# Patient Record
Sex: FEMALE | Race: WHITE | NOT HISPANIC OR LATINO
[De-identification: names, ages, dates, MRNs, and addresses within clinical notes are randomized per-mention and may not be internally consistent; named-entity substitution may affect disease eponyms.]

---

## 2017-02-06 ENCOUNTER — RX RENEWAL (OUTPATIENT)
Age: 62
End: 2017-02-06

## 2017-03-13 ENCOUNTER — RX RENEWAL (OUTPATIENT)
Age: 62
End: 2017-03-13

## 2017-04-19 ENCOUNTER — RX RENEWAL (OUTPATIENT)
Age: 62
End: 2017-04-19

## 2017-07-21 ENCOUNTER — OUTPATIENT (OUTPATIENT)
Dept: OUTPATIENT SERVICES | Facility: HOSPITAL | Age: 62
LOS: 1 days | End: 2017-07-21
Payer: MEDICARE

## 2017-07-21 ENCOUNTER — APPOINTMENT (OUTPATIENT)
Dept: MRI IMAGING | Facility: CLINIC | Age: 62
End: 2017-07-21

## 2017-07-21 DIAGNOSIS — Z00.8 ENCOUNTER FOR OTHER GENERAL EXAMINATION: ICD-10-CM

## 2017-07-21 PROCEDURE — 73721 MRI JNT OF LWR EXTRE W/O DYE: CPT

## 2017-09-26 ENCOUNTER — APPOINTMENT (OUTPATIENT)
Dept: PULMONOLOGY | Facility: CLINIC | Age: 62
End: 2017-09-26
Payer: MEDICARE

## 2017-09-26 VITALS
HEART RATE: 87 BPM | DIASTOLIC BLOOD PRESSURE: 70 MMHG | OXYGEN SATURATION: 93 % | TEMPERATURE: 97.1 F | HEIGHT: 66 IN | SYSTOLIC BLOOD PRESSURE: 110 MMHG | RESPIRATION RATE: 16 BRPM | BODY MASS INDEX: 47.09 KG/M2 | WEIGHT: 293 LBS

## 2017-09-26 PROCEDURE — 99214 OFFICE O/P EST MOD 30 MIN: CPT

## 2017-09-26 RX ORDER — IBUPROFEN 800 MG/1
800 TABLET, FILM COATED ORAL
Qty: 90 | Refills: 0 | Status: DISCONTINUED | COMMUNITY
Start: 2017-07-14

## 2017-09-26 RX ORDER — FAMOTIDINE 20 MG/1
20 TABLET, FILM COATED ORAL
Qty: 90 | Refills: 0 | Status: DISCONTINUED | COMMUNITY
Start: 2017-07-14

## 2017-09-26 RX ORDER — SULFAMETHOXAZOLE AND TRIMETHOPRIM 800; 160 MG/1; MG/1
800-160 TABLET ORAL
Qty: 14 | Refills: 0 | Status: DISCONTINUED | COMMUNITY
Start: 2017-06-24

## 2017-09-26 RX ORDER — TAPENTADOL HYDROCHLORIDE 150 MG/1
150 TABLET, FILM COATED, EXTENDED RELEASE ORAL
Qty: 30 | Refills: 0 | Status: DISCONTINUED | COMMUNITY
Start: 2017-06-04

## 2017-09-26 RX ORDER — SERTRALINE 25 MG/1
25 TABLET, FILM COATED ORAL
Qty: 30 | Refills: 0 | Status: DISCONTINUED | COMMUNITY
Start: 2017-08-18

## 2017-09-26 RX ORDER — OXYCODONE AND ACETAMINOPHEN 7.5; 325 MG/1; MG/1
7.5-325 TABLET ORAL
Qty: 30 | Refills: 0 | Status: DISCONTINUED | COMMUNITY
Start: 2017-07-27

## 2017-09-26 RX ORDER — AMOXICILLIN 500 MG/1
500 CAPSULE ORAL
Qty: 21 | Refills: 0 | Status: DISCONTINUED | COMMUNITY
Start: 2017-09-05

## 2017-09-26 RX ORDER — FERROUS FUM/VIT C/B12-IF/FOLIC 110-0.5MG
CAPSULE ORAL
Qty: 30 | Refills: 0 | Status: DISCONTINUED | COMMUNITY
Start: 2017-04-07

## 2017-09-26 RX ORDER — PENICILLIN V POTASSIUM 500 MG/1
500 TABLET, FILM COATED ORAL
Qty: 28 | Refills: 0 | Status: DISCONTINUED | COMMUNITY
Start: 2017-09-14

## 2017-09-26 RX ORDER — QUETIAPINE 300 MG/1
300 TABLET, FILM COATED, EXTENDED RELEASE ORAL
Qty: 30 | Refills: 0 | Status: DISCONTINUED | COMMUNITY
Start: 2017-06-13

## 2017-09-26 RX ORDER — USTEKINUMAB 90 MG/ML
90 INJECTION, SOLUTION SUBCUTANEOUS
Qty: 1 | Refills: 0 | Status: DISCONTINUED | COMMUNITY
Start: 2017-06-30

## 2017-11-16 ENCOUNTER — EMERGENCY (EMERGENCY)
Facility: HOSPITAL | Age: 62
LOS: 1 days | Discharge: HOME | End: 2017-11-16
Attending: EMERGENCY MEDICINE | Admitting: EMERGENCY MEDICINE
Payer: MEDICARE

## 2017-11-16 VITALS
OXYGEN SATURATION: 98 % | DIASTOLIC BLOOD PRESSURE: 88 MMHG | SYSTOLIC BLOOD PRESSURE: 148 MMHG | HEART RATE: 80 BPM | TEMPERATURE: 98 F | RESPIRATION RATE: 16 BRPM

## 2017-11-16 VITALS
HEART RATE: 85 BPM | OXYGEN SATURATION: 97 % | DIASTOLIC BLOOD PRESSURE: 94 MMHG | SYSTOLIC BLOOD PRESSURE: 164 MMHG | RESPIRATION RATE: 18 BRPM | TEMPERATURE: 98 F

## 2017-11-16 LAB
ALBUMIN SERPL ELPH-MCNC: 4.5 G/DL — SIGNIFICANT CHANGE UP (ref 3.3–5)
ALP SERPL-CCNC: 65 U/L — SIGNIFICANT CHANGE UP (ref 40–120)
ALT FLD-CCNC: 20 U/L RC — SIGNIFICANT CHANGE UP (ref 10–45)
ANION GAP SERPL CALC-SCNC: 17 MMOL/L — SIGNIFICANT CHANGE UP (ref 5–17)
AST SERPL-CCNC: 21 U/L — SIGNIFICANT CHANGE UP (ref 10–40)
BASOPHILS # BLD AUTO: 0.1 K/UL — SIGNIFICANT CHANGE UP (ref 0–0.2)
BASOPHILS NFR BLD AUTO: 1.4 % — SIGNIFICANT CHANGE UP (ref 0–2)
BILIRUB SERPL-MCNC: 0.2 MG/DL — SIGNIFICANT CHANGE UP (ref 0.2–1.2)
BUN SERPL-MCNC: 13 MG/DL — SIGNIFICANT CHANGE UP (ref 7–23)
CALCIUM SERPL-MCNC: 9.4 MG/DL — SIGNIFICANT CHANGE UP (ref 8.4–10.5)
CHLORIDE SERPL-SCNC: 102 MMOL/L — SIGNIFICANT CHANGE UP (ref 96–108)
CO2 SERPL-SCNC: 23 MMOL/L — SIGNIFICANT CHANGE UP (ref 22–31)
CREAT SERPL-MCNC: 0.55 MG/DL — SIGNIFICANT CHANGE UP (ref 0.5–1.3)
EOSINOPHIL # BLD AUTO: 0.3 K/UL — SIGNIFICANT CHANGE UP (ref 0–0.5)
EOSINOPHIL NFR BLD AUTO: 4.6 % — SIGNIFICANT CHANGE UP (ref 0–6)
GLUCOSE SERPL-MCNC: 131 MG/DL — HIGH (ref 70–99)
HCT VFR BLD CALC: 34 % — LOW (ref 34.5–45)
HGB BLD-MCNC: 11.2 G/DL — LOW (ref 11.5–15.5)
LYMPHOCYTES # BLD AUTO: 2 K/UL — SIGNIFICANT CHANGE UP (ref 1–3.3)
LYMPHOCYTES # BLD AUTO: 29.2 % — SIGNIFICANT CHANGE UP (ref 13–44)
MCHC RBC-ENTMCNC: 30.7 PG — SIGNIFICANT CHANGE UP (ref 27–34)
MCHC RBC-ENTMCNC: 33.1 GM/DL — SIGNIFICANT CHANGE UP (ref 32–36)
MCV RBC AUTO: 92.8 FL — SIGNIFICANT CHANGE UP (ref 80–100)
MONOCYTES # BLD AUTO: 0.6 K/UL — SIGNIFICANT CHANGE UP (ref 0–0.9)
MONOCYTES NFR BLD AUTO: 8.8 % — SIGNIFICANT CHANGE UP (ref 2–14)
NEUTROPHILS # BLD AUTO: 3.9 K/UL — SIGNIFICANT CHANGE UP (ref 1.8–7.4)
NEUTROPHILS NFR BLD AUTO: 56 % — SIGNIFICANT CHANGE UP (ref 43–77)
PLATELET # BLD AUTO: 236 K/UL — SIGNIFICANT CHANGE UP (ref 150–400)
POTASSIUM SERPL-MCNC: 3.9 MMOL/L — SIGNIFICANT CHANGE UP (ref 3.5–5.3)
POTASSIUM SERPL-SCNC: 3.9 MMOL/L — SIGNIFICANT CHANGE UP (ref 3.5–5.3)
PROT SERPL-MCNC: 7.1 G/DL — SIGNIFICANT CHANGE UP (ref 6–8.3)
RBC # BLD: 3.66 M/UL — LOW (ref 3.8–5.2)
RBC # FLD: 11.9 % — SIGNIFICANT CHANGE UP (ref 10.3–14.5)
SODIUM SERPL-SCNC: 142 MMOL/L — SIGNIFICANT CHANGE UP (ref 135–145)
WBC # BLD: 7 K/UL — SIGNIFICANT CHANGE UP (ref 3.8–10.5)
WBC # FLD AUTO: 7 K/UL — SIGNIFICANT CHANGE UP (ref 3.8–10.5)

## 2017-11-16 PROCEDURE — 73130 X-RAY EXAM OF HAND: CPT

## 2017-11-16 PROCEDURE — 85027 COMPLETE CBC AUTOMATED: CPT

## 2017-11-16 PROCEDURE — 80053 COMPREHEN METABOLIC PANEL: CPT

## 2017-11-16 PROCEDURE — 99284 EMERGENCY DEPT VISIT MOD MDM: CPT

## 2017-11-16 PROCEDURE — 99284 EMERGENCY DEPT VISIT MOD MDM: CPT | Mod: 25

## 2017-11-16 PROCEDURE — 96374 THER/PROPH/DIAG INJ IV PUSH: CPT

## 2017-11-16 PROCEDURE — 73130 X-RAY EXAM OF HAND: CPT | Mod: 26,RT

## 2017-11-16 RX ORDER — VANCOMYCIN HCL 1 G
1000 VIAL (EA) INTRAVENOUS ONCE
Qty: 0 | Refills: 0 | Status: COMPLETED | OUTPATIENT
Start: 2017-11-16 | End: 2017-11-16

## 2017-11-16 RX ORDER — AZTREONAM 2 G
1 VIAL (EA) INJECTION
Qty: 20 | Refills: 0
Start: 2017-11-16 | End: 2017-11-26

## 2017-11-16 RX ORDER — VANCOMYCIN HCL 1 G
1000 VIAL (EA) INTRAVENOUS ONCE
Qty: 0 | Refills: 0 | Status: DISCONTINUED | OUTPATIENT
Start: 2017-11-16 | End: 2017-11-16

## 2017-11-16 RX ORDER — ACETAMINOPHEN 500 MG
650 TABLET ORAL ONCE
Qty: 0 | Refills: 0 | Status: COMPLETED | OUTPATIENT
Start: 2017-11-16 | End: 2017-11-16

## 2017-11-16 RX ADMIN — Medication 650 MILLIGRAM(S): at 20:01

## 2017-11-16 RX ADMIN — Medication 250 MILLIGRAM(S): at 20:00

## 2017-11-16 NOTE — ED ADULT NURSE NOTE - OBJECTIVE STATEMENT
62 y.o female aaox3 ambulatory with h/o cellulitis and obesity p/w redness and localized swelling in the rt metacarpal area started yesterday, afebrile, but c/o subjective chills, no nausea or vomiting or abdominal pain. VS wnl.

## 2017-11-16 NOTE — ED PROVIDER NOTE - PLAN OF CARE
1. Follow up with your PMD (Gregory Obrien) within 48-72hours.   2. Rest and elevate affected area. Take Motrin 600mg every 8 hours with food for pain. Complete the course of BactrimDS as prescribed.   3. Any worsening redness, swelling, streaking (red lines), fever, chills retrun to ER R 3rd mcp dorsum

## 2017-11-16 NOTE — ED PROVIDER NOTE - CARE PLAN
Instructions for follow-up, activity and diet:	1. Follow up with your PMD (Gregory Obrien) within 48-72hours.   2. Rest and elevate affected area. Take Motrin 600mg every 8 hours with food for pain. Complete the course of BactrimDS as prescribed.   3. Any worsening redness, swelling, streaking (red lines), fever, chills retrun to ER Principal Discharge DX:	Cellulitis of hand excluding fingers  Goal:	R 3rd mcp dorsum  Instructions for follow-up, activity and diet:	1. Follow up with your PMD (Gregory Obrien) within 48-72hours.   2. Rest and elevate affected area. Take Motrin 600mg every 8 hours with food for pain. Complete the course of BactrimDS as prescribed.   3. Any worsening redness, swelling, streaking (red lines), fever, chills retrun to ER

## 2017-11-16 NOTE — ED PROVIDER NOTE - MEDICAL DECISION MAKING DETAILS
R hand cellulitis over dorsum of 3rd MCP, no evidence of septic joint or FTS.  Pt well appearing, nontoxic, will tx c vanc and d/c on bactrim.  STrict return precautions discussed.  Hopeful d/c.  --BMM

## 2017-11-16 NOTE — ED ADULT NURSE NOTE - CHPI ED SYMPTOMS NEG
no nausea/no decreased eating/drinking/no numbness/no fever/no vomiting/no tingling/no dizziness/no weakness

## 2017-11-16 NOTE — ED ADULT NURSE NOTE - DISCHARGE TEACHING
advised to ff-up with PMD in 2-3 days and continue taking Bactrim and complete the course of PO antibiotics.

## 2017-11-16 NOTE — ED ADULT NURSE NOTE - PSH
Gastric bypass status for obesity  , revised 2010  H/O:  section  x 2  History of cholecystectomy  1976  History of laminectomy  lumbar, with fusion  Plastic surgery for unacceptable cosmetic appearance  arms from extra skin post weight loss after bypass surgery
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
## 2017-11-16 NOTE — ED PROVIDER NOTE - PHYSICAL EXAMINATION
R hand 3rd MCP with redness and diffuse swelling, tenderness to palp. FROM less than 2 sec cap refill. 2+ pulses. no lymphangitis
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
## 2018-02-14 ENCOUNTER — OUTPATIENT (OUTPATIENT)
Dept: OUTPATIENT SERVICES | Facility: HOSPITAL | Age: 63
LOS: 1 days | End: 2018-02-14
Payer: MEDICARE

## 2018-02-14 ENCOUNTER — APPOINTMENT (OUTPATIENT)
Dept: MRI IMAGING | Facility: CLINIC | Age: 63
End: 2018-02-14
Payer: MEDICARE

## 2018-02-14 DIAGNOSIS — Z00.8 ENCOUNTER FOR OTHER GENERAL EXAMINATION: ICD-10-CM

## 2018-02-14 PROCEDURE — 70553 MRI BRAIN STEM W/O & W/DYE: CPT | Mod: 26

## 2018-02-14 PROCEDURE — 70553 MRI BRAIN STEM W/O & W/DYE: CPT

## 2018-02-14 PROCEDURE — A9585: CPT

## 2018-03-07 ENCOUNTER — APPOINTMENT (OUTPATIENT)
Dept: ENDOCRINOLOGY | Facility: CLINIC | Age: 63
End: 2018-03-07

## 2018-05-02 ENCOUNTER — RESULT REVIEW (OUTPATIENT)
Age: 63
End: 2018-05-02

## 2018-06-11 ENCOUNTER — APPOINTMENT (OUTPATIENT)
Dept: SURGERY | Facility: CLINIC | Age: 63
End: 2018-06-11
Payer: MEDICARE

## 2018-06-11 PROCEDURE — 99205 OFFICE O/P NEW HI 60 MIN: CPT

## 2018-06-13 ENCOUNTER — FORM ENCOUNTER (OUTPATIENT)
Age: 63
End: 2018-06-13

## 2018-06-14 ENCOUNTER — OUTPATIENT (OUTPATIENT)
Dept: OUTPATIENT SERVICES | Facility: HOSPITAL | Age: 63
LOS: 1 days | End: 2018-06-14
Payer: MEDICARE

## 2018-06-14 ENCOUNTER — APPOINTMENT (OUTPATIENT)
Dept: CT IMAGING | Facility: CLINIC | Age: 63
End: 2018-06-14
Payer: MEDICARE

## 2018-06-14 DIAGNOSIS — K43.2 INCISIONAL HERNIA WITHOUT OBSTRUCTION OR GANGRENE: ICD-10-CM

## 2018-06-14 PROCEDURE — 74177 CT ABD & PELVIS W/CONTRAST: CPT

## 2018-06-14 PROCEDURE — 74177 CT ABD & PELVIS W/CONTRAST: CPT | Mod: 26

## 2018-06-15 ENCOUNTER — APPOINTMENT (OUTPATIENT)
Dept: INFECTIOUS DISEASE | Facility: CLINIC | Age: 63
End: 2018-06-15

## 2018-07-19 ENCOUNTER — APPOINTMENT (OUTPATIENT)
Dept: INFECTIOUS DISEASE | Facility: CLINIC | Age: 63
End: 2018-07-19
Payer: MEDICARE

## 2018-07-19 VITALS
TEMPERATURE: 97.8 F | HEART RATE: 86 BPM | DIASTOLIC BLOOD PRESSURE: 90 MMHG | SYSTOLIC BLOOD PRESSURE: 154 MMHG | WEIGHT: 291 LBS | OXYGEN SATURATION: 94 % | BODY MASS INDEX: 46.77 KG/M2 | HEIGHT: 66 IN

## 2018-07-19 PROCEDURE — 99204 OFFICE O/P NEW MOD 45 MIN: CPT

## 2018-07-30 ENCOUNTER — APPOINTMENT (OUTPATIENT)
Dept: SURGERY | Facility: CLINIC | Age: 63
End: 2018-07-30
Payer: MEDICARE

## 2018-07-30 VITALS
HEIGHT: 64 IN | OXYGEN SATURATION: 97 % | SYSTOLIC BLOOD PRESSURE: 124 MMHG | BODY MASS INDEX: 50.02 KG/M2 | TEMPERATURE: 98 F | WEIGHT: 293 LBS | HEART RATE: 98 BPM | DIASTOLIC BLOOD PRESSURE: 79 MMHG | RESPIRATION RATE: 16 BRPM

## 2018-07-30 PROCEDURE — 99215 OFFICE O/P EST HI 40 MIN: CPT

## 2018-07-30 RX ORDER — HYDROCODONE BITARTRATE AND ACETAMINOPHEN 7.5; 325 MG/1; MG/1
7.5-325 TABLET ORAL
Qty: 20 | Refills: 0 | Status: DISCONTINUED | COMMUNITY
Start: 2017-09-18 | End: 2018-07-30

## 2019-01-11 ENCOUNTER — APPOINTMENT (OUTPATIENT)
Dept: SURGERY | Facility: CLINIC | Age: 64
End: 2019-01-11
Payer: MEDICARE

## 2019-01-11 VITALS
OXYGEN SATURATION: 95 % | HEART RATE: 90 BPM | WEIGHT: 281 LBS | BODY MASS INDEX: 47.97 KG/M2 | SYSTOLIC BLOOD PRESSURE: 145 MMHG | HEIGHT: 64 IN | DIASTOLIC BLOOD PRESSURE: 92 MMHG | TEMPERATURE: 99.3 F | RESPIRATION RATE: 18 BRPM

## 2019-01-11 PROCEDURE — 99214 OFFICE O/P EST MOD 30 MIN: CPT

## 2019-01-11 RX ORDER — CALCIPOTRIENE AND BETAMETHASONE DIPROPIONATE 50; .5 UG/G; MG/G
0.005-0.064 AEROSOL, FOAM TOPICAL
Qty: 60 | Refills: 0 | Status: DISCONTINUED | COMMUNITY
Start: 2017-03-29 | End: 2019-01-11

## 2019-01-11 RX ORDER — NYSTATIN AND TRIAMCINOLONE ACETONIDE 100000; 1 [USP'U]/G; MG/G
100000-0.1 OINTMENT TOPICAL
Qty: 15 | Refills: 0 | Status: DISCONTINUED | COMMUNITY
Start: 2017-06-13 | End: 2019-01-11

## 2019-01-11 RX ORDER — MUPIROCIN 20 MG/G
2 OINTMENT TOPICAL
Qty: 22 | Refills: 0 | Status: DISCONTINUED | COMMUNITY
Start: 2017-06-24 | End: 2019-01-11

## 2019-01-11 RX ORDER — MUPIROCIN CALCIUM 20 MG/G
2 OINTMENT TOPICAL
Qty: 45 | Refills: 3 | Status: DISCONTINUED | COMMUNITY
Start: 2018-07-19 | End: 2019-01-11

## 2019-01-11 RX ORDER — CLONAZEPAM 1 MG/1
1 TABLET ORAL
Refills: 0 | Status: DISCONTINUED | COMMUNITY

## 2019-01-14 LAB
25(OH)D3 SERPL-MCNC: 34.5 NG/ML
ALBUMIN SERPL ELPH-MCNC: 4.5 G/DL
ALP BLD-CCNC: 76 U/L
ALT SERPL-CCNC: 19 U/L
ANION GAP SERPL CALC-SCNC: 13 MMOL/L
AST SERPL-CCNC: 20 U/L
BASOPHILS # BLD AUTO: 0.03 K/UL
BASOPHILS NFR BLD AUTO: 0.3 %
BILIRUB SERPL-MCNC: 0.4 MG/DL
BUN SERPL-MCNC: 14 MG/DL
CALCIUM SERPL-MCNC: 9.2 MG/DL
CHLORIDE SERPL-SCNC: 101 MMOL/L
CO2 SERPL-SCNC: 26 MMOL/L
CREAT SERPL-MCNC: 0.37 MG/DL
EOSINOPHIL # BLD AUTO: 0.18 K/UL
EOSINOPHIL NFR BLD AUTO: 1.6 %
FOLATE SERPL-MCNC: >20 NG/ML
GLUCOSE SERPL-MCNC: 100 MG/DL
HBA1C MFR BLD HPLC: 4.9 %
HCT VFR BLD CALC: 36.9 %
HGB BLD-MCNC: 11.5 G/DL
IMM GRANULOCYTES NFR BLD AUTO: 0.3 %
IRON SERPL-MCNC: 33 UG/DL
LYMPHOCYTES # BLD AUTO: 1.87 K/UL
LYMPHOCYTES NFR BLD AUTO: 16.3 %
MAN DIFF?: NORMAL
MCHC RBC-ENTMCNC: 28.9 PG
MCHC RBC-ENTMCNC: 31.2 GM/DL
MCV RBC AUTO: 92.7 FL
MONOCYTES # BLD AUTO: 0.58 K/UL
MONOCYTES NFR BLD AUTO: 5 %
NEUTROPHILS # BLD AUTO: 8.8 K/UL
NEUTROPHILS NFR BLD AUTO: 76.5 %
PLATELET # BLD AUTO: 256 K/UL
POTASSIUM SERPL-SCNC: 4.1 MMOL/L
PROT SERPL-MCNC: 6.8 G/DL
RBC # BLD: 3.98 M/UL
RBC # FLD: 13.5 %
SODIUM SERPL-SCNC: 140 MMOL/L
VIT B12 SERPL-MCNC: 402 PG/ML
WBC # FLD AUTO: 11.49 K/UL

## 2019-01-16 LAB — VIT B1 SERPL-MCNC: 237.9 NMOL/L

## 2019-06-09 ENCOUNTER — INPATIENT (INPATIENT)
Facility: HOSPITAL | Age: 64
LOS: 1 days | Discharge: HOME | DRG: 313 | End: 2019-06-11
Attending: INTERNAL MEDICINE | Admitting: HOSPITALIST
Payer: MEDICARE

## 2019-06-09 VITALS
RESPIRATION RATE: 19 BRPM | OXYGEN SATURATION: 100 % | DIASTOLIC BLOOD PRESSURE: 66 MMHG | SYSTOLIC BLOOD PRESSURE: 125 MMHG | HEART RATE: 80 BPM | WEIGHT: 279.99 LBS | TEMPERATURE: 98 F | HEIGHT: 65 IN

## 2019-06-09 DIAGNOSIS — R07.9 CHEST PAIN, UNSPECIFIED: ICD-10-CM

## 2019-06-09 LAB
ALBUMIN SERPL ELPH-MCNC: 4.3 G/DL — SIGNIFICANT CHANGE UP (ref 3.3–5)
ALP SERPL-CCNC: 77 U/L — SIGNIFICANT CHANGE UP (ref 40–120)
ALT FLD-CCNC: 19 U/L — SIGNIFICANT CHANGE UP (ref 10–45)
ANION GAP SERPL CALC-SCNC: 16 MMOL/L — SIGNIFICANT CHANGE UP (ref 5–17)
AST SERPL-CCNC: 33 U/L — SIGNIFICANT CHANGE UP (ref 10–40)
BASOPHILS # BLD AUTO: 0.1 K/UL — SIGNIFICANT CHANGE UP (ref 0–0.2)
BASOPHILS NFR BLD AUTO: 0.6 % — SIGNIFICANT CHANGE UP (ref 0–2)
BILIRUB SERPL-MCNC: 0.2 MG/DL — SIGNIFICANT CHANGE UP (ref 0.2–1.2)
BUN SERPL-MCNC: 17 MG/DL — SIGNIFICANT CHANGE UP (ref 7–23)
CALCIUM SERPL-MCNC: 9.6 MG/DL — SIGNIFICANT CHANGE UP (ref 8.4–10.5)
CHLORIDE SERPL-SCNC: 97 MMOL/L — SIGNIFICANT CHANGE UP (ref 96–108)
CO2 SERPL-SCNC: 26 MMOL/L — SIGNIFICANT CHANGE UP (ref 22–31)
CREAT SERPL-MCNC: 0.51 MG/DL — SIGNIFICANT CHANGE UP (ref 0.5–1.3)
EOSINOPHIL # BLD AUTO: 0.2 K/UL — SIGNIFICANT CHANGE UP (ref 0–0.5)
EOSINOPHIL NFR BLD AUTO: 2.4 % — SIGNIFICANT CHANGE UP (ref 0–6)
GLUCOSE SERPL-MCNC: 137 MG/DL — HIGH (ref 70–99)
HCT VFR BLD CALC: 37.3 % — SIGNIFICANT CHANGE UP (ref 34.5–45)
HGB BLD-MCNC: 12.3 G/DL — SIGNIFICANT CHANGE UP (ref 11.5–15.5)
INR BLD: 0.93 RATIO — SIGNIFICANT CHANGE UP (ref 0.88–1.16)
LYMPHOCYTES # BLD AUTO: 3.1 K/UL — SIGNIFICANT CHANGE UP (ref 1–3.3)
LYMPHOCYTES # BLD AUTO: 31.1 % — SIGNIFICANT CHANGE UP (ref 13–44)
MCHC RBC-ENTMCNC: 30.5 PG — SIGNIFICANT CHANGE UP (ref 27–34)
MCHC RBC-ENTMCNC: 32.9 GM/DL — SIGNIFICANT CHANGE UP (ref 32–36)
MCV RBC AUTO: 92.6 FL — SIGNIFICANT CHANGE UP (ref 80–100)
MONOCYTES # BLD AUTO: 0.7 K/UL — SIGNIFICANT CHANGE UP (ref 0–0.9)
MONOCYTES NFR BLD AUTO: 7.2 % — SIGNIFICANT CHANGE UP (ref 2–14)
NEUTROPHILS # BLD AUTO: 5.9 K/UL — SIGNIFICANT CHANGE UP (ref 1.8–7.4)
NEUTROPHILS NFR BLD AUTO: 58.6 % — SIGNIFICANT CHANGE UP (ref 43–77)
PLATELET # BLD AUTO: 286 K/UL — SIGNIFICANT CHANGE UP (ref 150–400)
POTASSIUM SERPL-MCNC: 4.4 MMOL/L — SIGNIFICANT CHANGE UP (ref 3.5–5.3)
POTASSIUM SERPL-SCNC: 4.4 MMOL/L — SIGNIFICANT CHANGE UP (ref 3.5–5.3)
PROT SERPL-MCNC: 7.3 G/DL — SIGNIFICANT CHANGE UP (ref 6–8.3)
PROTHROM AB SERPL-ACNC: 10.6 SEC — SIGNIFICANT CHANGE UP (ref 10–12.9)
RBC # BLD: 4.03 M/UL — SIGNIFICANT CHANGE UP (ref 3.8–5.2)
RBC # FLD: 11.8 % — SIGNIFICANT CHANGE UP (ref 10.3–14.5)
SODIUM SERPL-SCNC: 139 MMOL/L — SIGNIFICANT CHANGE UP (ref 135–145)
TROPONIN T, HIGH SENSITIVITY RESULT: <6 NG/L — SIGNIFICANT CHANGE UP (ref 0–51)
WBC # BLD: 10.1 K/UL — SIGNIFICANT CHANGE UP (ref 3.8–10.5)
WBC # FLD AUTO: 10.1 K/UL — SIGNIFICANT CHANGE UP (ref 3.8–10.5)

## 2019-06-09 PROCEDURE — 71045 X-RAY EXAM CHEST 1 VIEW: CPT | Mod: 26

## 2019-06-09 PROCEDURE — 99285 EMERGENCY DEPT VISIT HI MDM: CPT | Mod: GC,25

## 2019-06-09 PROCEDURE — 93010 ELECTROCARDIOGRAM REPORT: CPT

## 2019-06-09 NOTE — ED PROVIDER NOTE - CLINICAL SUMMARY MEDICAL DECISION MAKING FREE TEXT BOX
62 yo F PMHx gastric bypass, HTN, p/w chest pressure radiating to jaw/R shoulder, concerning for ACS, will check labs + trop, CXR, likely CDU for stress

## 2019-06-09 NOTE — ED PROVIDER NOTE - PHYSICAL EXAMINATION
Gen: AAOx3, non-toxic, obese  Head: NCAT  HEENT: EOMI, oral mucosa moist, normal conjunctiva  Lung: CTAB, no respiratory distress, no wheezes/rhonchi/rales B/L, speaking in full sentences  CV: RRR, no murmurs, rubs or gallops  Abd: soft, NTND, no guarding  MSK: no visible deformities  Neuro: No focal sensory or motor deficits  Skin: Warm, well perfused, no rash  Psych: normal affect.   ~Ben Ingram M.D. Resident

## 2019-06-09 NOTE — ED ADULT TRIAGE NOTE - CHIEF COMPLAINT QUOTE
Pt c/o "palpitations/chest discomfort/jaw pain and some sob x 1 hr that started while eating dinner. I had recent dental work so not sure if the jaw pain is from that "

## 2019-06-09 NOTE — ED PROVIDER NOTE - OBJECTIVE STATEMENT
62 yo F PMHx gastric bypass, HTN, p/w chest pressure. Sx began around 7 pm while patient was out to dinner. Pressure radiates to jaw and R shoulder. +Palpitations. Cont to have chest pressure now. Denies fevers/chills, N/V, abd pain. Denies tobacco. Pt has not had a stress/ECHO in over 10 years. Pt took a 324 mg ASA at 8 pm.    PMD: Gregory Obrien MD

## 2019-06-09 NOTE — ED PROVIDER NOTE - ATTENDING CONTRIBUTION TO CARE
attending [place]: 63yF h/o obesity with prior gastric bypass, HTN, p/w nonexertional chest pressure x 1 hour. Started while eating. Radiates to jaw and R shoulder. +Palpitations. Took full dose ASA prior to arrival. never smoker. Last stress test many years ago. Concern for ACS. Will obtain ekg, place on tele, labs including trop, likely admit for stress.

## 2019-06-09 NOTE — ED ADULT NURSE NOTE - OBJECTIVE STATEMENT
62 y/o F presents to the c/o chest discomfort.  PMHx gastric bypass, HTN.  Pt reports chest pressure which began suddenly while eating dinner about 7 PM tonight. Pt states the pain radiates to the R shoulder and to her jaw.  Last stress test was over 10 years ago.  Pt recently had dental work done.  took a 324 mg ASA at 8 pm. Patient is A&Ox4. Face is symmetrical. PERRL 3mmB. Speech is clear. Patient is moving all extremities with 5/5 strength and walks with steady gait. No SOB.  Abdomen is soft, nondistended, nontender to palpation. No fevers/chills. VSS  Pt on cardiac monitor, EKG complete, NSR.

## 2019-06-09 NOTE — ED PROVIDER NOTE - NS ED ROS FT
GENERAL: No fever or chills, EYES: no change in vision, HEENT: no trouble swallowing or speaking, CARDIAC: +chest pain, PULMONARY: no cough or SOB, GI: no abdominal pain, no nausea, no vomiting, no diarrhea or constipation, : No changes in urination, SKIN: no rashes, NEURO: no headache,  MSK: No joint pain ~Ben Ingram M.D. Resident

## 2019-06-10 DIAGNOSIS — Z29.9 ENCOUNTER FOR PROPHYLACTIC MEASURES, UNSPECIFIED: ICD-10-CM

## 2019-06-10 DIAGNOSIS — F41.9 ANXIETY DISORDER, UNSPECIFIED: ICD-10-CM

## 2019-06-10 DIAGNOSIS — G89.29 OTHER CHRONIC PAIN: ICD-10-CM

## 2019-06-10 DIAGNOSIS — R07.9 CHEST PAIN, UNSPECIFIED: ICD-10-CM

## 2019-06-10 DIAGNOSIS — I10 ESSENTIAL (PRIMARY) HYPERTENSION: ICD-10-CM

## 2019-06-10 LAB
CHOLEST SERPL-MCNC: 167 MG/DL — SIGNIFICANT CHANGE UP (ref 10–199)
HBA1C BLD-MCNC: 4.9 % — SIGNIFICANT CHANGE UP (ref 4–5.6)
HDLC SERPL-MCNC: 51 MG/DL — SIGNIFICANT CHANGE UP
LIPID PNL WITH DIRECT LDL SERPL: 103 MG/DL — HIGH
TOTAL CHOLESTEROL/HDL RATIO MEASUREMENT: 3.3 RATIO — SIGNIFICANT CHANGE UP (ref 3.3–7.1)
TRIGL SERPL-MCNC: 63 MG/DL — SIGNIFICANT CHANGE UP (ref 10–149)
TROPONIN T, HIGH SENSITIVITY RESULT: <6 NG/L — SIGNIFICANT CHANGE UP (ref 0–51)
TSH SERPL-MCNC: 2.64 UIU/ML — SIGNIFICANT CHANGE UP (ref 0.27–4.2)

## 2019-06-10 PROCEDURE — 99223 1ST HOSP IP/OBS HIGH 75: CPT | Mod: GC

## 2019-06-10 PROCEDURE — 93018 CV STRESS TEST I&R ONLY: CPT

## 2019-06-10 PROCEDURE — 12345: CPT | Mod: NC

## 2019-06-10 PROCEDURE — 93016 CV STRESS TEST SUPVJ ONLY: CPT

## 2019-06-10 PROCEDURE — 78452 HT MUSCLE IMAGE SPECT MULT: CPT | Mod: 26

## 2019-06-10 RX ORDER — QUETIAPINE FUMARATE 200 MG/1
300 TABLET, FILM COATED ORAL AT BEDTIME
Refills: 0 | Status: DISCONTINUED | OUTPATIENT
Start: 2019-06-10 | End: 2019-06-11

## 2019-06-10 RX ORDER — OXYBUTYNIN CHLORIDE 5 MG
5 TABLET ORAL
Refills: 0 | Status: DISCONTINUED | OUTPATIENT
Start: 2019-06-10 | End: 2019-06-11

## 2019-06-10 RX ORDER — SERTRALINE 25 MG/1
200 TABLET, FILM COATED ORAL DAILY
Refills: 0 | Status: DISCONTINUED | OUTPATIENT
Start: 2019-06-10 | End: 2019-06-10

## 2019-06-10 RX ORDER — CLONAZEPAM 1 MG
3 TABLET ORAL AT BEDTIME
Refills: 0 | Status: DISCONTINUED | OUTPATIENT
Start: 2019-06-10 | End: 2019-06-11

## 2019-06-10 RX ORDER — CLONAZEPAM 1 MG
1.5 TABLET ORAL DAILY
Refills: 0 | Status: DISCONTINUED | OUTPATIENT
Start: 2019-06-10 | End: 2019-06-11

## 2019-06-10 RX ORDER — CLONAZEPAM 1 MG
1 TABLET ORAL DAILY
Refills: 0 | Status: DISCONTINUED | OUTPATIENT
Start: 2019-06-10 | End: 2019-06-11

## 2019-06-10 RX ORDER — SERTRALINE 25 MG/1
200 TABLET, FILM COATED ORAL DAILY
Refills: 0 | Status: DISCONTINUED | OUTPATIENT
Start: 2019-06-10 | End: 2019-06-11

## 2019-06-10 RX ORDER — ENOXAPARIN SODIUM 100 MG/ML
40 INJECTION SUBCUTANEOUS EVERY 24 HOURS
Refills: 0 | Status: DISCONTINUED | OUTPATIENT
Start: 2019-06-10 | End: 2019-06-11

## 2019-06-10 RX ORDER — MONTELUKAST 4 MG/1
4 TABLET, CHEWABLE ORAL DAILY
Refills: 0 | Status: DISCONTINUED | OUTPATIENT
Start: 2019-06-10 | End: 2019-06-11

## 2019-06-10 RX ORDER — LAMOTRIGINE 25 MG/1
150 TABLET, ORALLY DISINTEGRATING ORAL DAILY
Refills: 0 | Status: DISCONTINUED | OUTPATIENT
Start: 2019-06-10 | End: 2019-06-11

## 2019-06-10 RX ADMIN — QUETIAPINE FUMARATE 300 MILLIGRAM(S): 200 TABLET, FILM COATED ORAL at 21:25

## 2019-06-10 RX ADMIN — Medication 5 MILLIGRAM(S): at 05:29

## 2019-06-10 RX ADMIN — SERTRALINE 200 MILLIGRAM(S): 25 TABLET, FILM COATED ORAL at 10:57

## 2019-06-10 RX ADMIN — LAMOTRIGINE 150 MILLIGRAM(S): 25 TABLET, ORALLY DISINTEGRATING ORAL at 10:57

## 2019-06-10 RX ADMIN — Medication 5 MILLIGRAM(S): at 17:30

## 2019-06-10 RX ADMIN — ENOXAPARIN SODIUM 40 MILLIGRAM(S): 100 INJECTION SUBCUTANEOUS at 05:30

## 2019-06-10 RX ADMIN — MONTELUKAST 4 MILLIGRAM(S): 4 TABLET, CHEWABLE ORAL at 10:57

## 2019-06-10 RX ADMIN — Medication 3 MILLIGRAM(S): at 21:25

## 2019-06-10 RX ADMIN — Medication 1.5 MILLIGRAM(S): at 17:03

## 2019-06-10 NOTE — H&P ADULT - NSHPSOCIALHISTORY_GEN_ALL_CORE
Lives with , has son daughter, grand children, enjoys logic games and puzzles, no smoking or alcohol use.

## 2019-06-10 NOTE — PATIENT PROFILE ADULT - DO YOU FEEL LIKE HURTING OTHERS?
no
Per mom past two weeks waking up from naps saying he couldn't see. Then today and tonight had abnormal eye movements "He would look down, eyes would roll back, and then continuos blinking." PMD told to come in, unsure of activity is a tick or seizure. Pt. alert/appropriate and currently acting himself per mom, no distress

## 2019-06-10 NOTE — CHART NOTE - NSCHARTNOTEFT_GEN_A_CORE
Patient seen and examined. Currently no chest pain but did have epigastric and substernal chest pain while eating. Radiation to the jaw. To go for stress test today. If normal, may consider dc home as symptoms are then likely GI related. if not, can watch on monitor overnight. If stress test positive, will need cardiology consult for possible cath.

## 2019-06-10 NOTE — H&P ADULT - NSHPPHYSICALEXAM_GEN_ALL_CORE
PHYSICAL EXAM:  Vital Signs Last 24 Hrs  T(C): 36.7 (06-10-19 @ 01:52)  T(F): 98 (06-10-19 @ 01:52), Max: 98 (06-10-19 @ 01:52)  HR: 76 (06-10-19 @ 01:52) (72 - 80)  BP: 135/76 (06-10-19 @ 01:52)  BP(mean): --  RR: 16 (06-10-19 @ 01:52) (16 - 19)  SpO2: 100% (06-10-19 @ 01:52) (99% - 100%)  Wt(kg): --    Constitutional: NAD, awake and alert, morbidly obese  EYES: EOMI  ENT:  Normal Hearing, no tonsillar exudates   Neck: Soft and supple , no thyromegaly   Respiratory: Breath sounds are clear bilaterally, No wheezing, rales or rhonchi  Cardiovascular: S1 and S2, regular rate and rhythm, no Murmurs, gallops or rubs, no JVD,    Gastrointestinal: Bowel Sounds present, soft, nontender, nondistended, no guarding, no rebound  Extremities: No cyanosis or clubbing; warm to touch  Vascular: 2+ peripheral pulses lower ex  Neurological: No focal deficits, CN II-XII intact bilaterally, sensation to light touch intact in all extremities, gait intact. Pupils are equally reactive to light and symmetrical in size.   Musculoskeletal: 5/5 strength b/l upper and lower extremities; no joint swelling.  Skin: No rashes  Psych: no depression or anhedonia, AAOx3  HEME: no bruises, no nose bleeds

## 2019-06-10 NOTE — H&P ADULT - ASSESSMENT
62 yo woman with PMH of gastric bypass, HTN, chronic pain, anxiety p/w chest pressure likely stable angina vs anxeity driven

## 2019-06-10 NOTE — H&P ADULT - PROBLEM SELECTOR PLAN 1
- ACS r/o, EKG reviewed NSR w/o ST changes TWI, trop neg x 2  - if not cardiac in origin consideration for lung/GI/other etiology. No reproducible pain, no infectious symptoms, no abodmianl pain, acid reflux symptoms. Possibly anxiety driven given patients extensive history.   - stress test in AM - ACS r/o, EKG reviewed NSR w/o ST changes TWI, trop neg x 2  - if not cardiac in origin consideration for lung/GI/other etiology. No reproducible pain, no infectious symptoms, no abodmianl pain, acid reflux symptoms. Possibly anxiety driven given patients extensive history. Consideration for myocarditis w/ episode of fever/stress/translocation from recent dental procedure but pt afebrile/no leukocytosis/CP not sharp,reproducable.   - stress test in AM - ACS r/o, EKG reviewed NSR w/o ST changes TWI, trop neg x 2  - if not cardiac in origin consideration for lung/GI/other etiology. No reproducible pain, no infectious symptoms, no abodmianl pain, acid reflux symptoms. Possibly anxiety driven given patients extensive history. Consideration for myocarditis w/ reported episode of fever/stress/translocation from recent dental procedure but unlikely since pt afebrile/no leukocytosis/CP not sharp,reproducable.   - stress test in AM  - monitor on telemetry  - check lipid panel, TSH, A1c

## 2019-06-10 NOTE — H&P ADULT - NSHPLABSRESULTS_GEN_ALL_CORE
12.3   10.1  )-----------( 286      ( 09 Jun 2019 21:48 )             37.3   06-09    139  |  97  |  17  ----------------------------<  137<H>  4.4   |  26  |  0.51    Ca    9.6      09 Jun 2019 21:49    TPro  7.3  /  Alb  4.3  /  TBili  0.2  /  DBili  x   /  AST  33  /  ALT  19  /  AlkPhos  77  06-09    EKG: NSR VR 72, No ST changes, no TWI Labs reviewed     12.3   10.1  )-----------( 286      ( 09 Jun 2019 21:48 )             37.3   06-09    139  |  97  |  17  ----------------------------<  137<H>  4.4   |  26  |  0.51    Ca    9.6      09 Jun 2019 21:49    TPro  7.3  /  Alb  4.3  /  TBili  0.2  /  DBili  x   /  AST  33  /  ALT  19  /  AlkPhos  77  06-09    EKG reviewed: NSR VR 72, No ST changes, no TWI    CXR reviewed: negative
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
## 2019-06-10 NOTE — CHART NOTE - NSCHARTNOTEFT_GEN_A_CORE
Reference #: [id number]    06/03/2019	06/03/2019	clonazepam 1 mg tablet	60	30	ReitmanRony MD  05/08/2019	05/26/2019	nucynta er 150 mg tablet	60	30	Stuart, Usresh  05/02/2019	05/21/2019	clonazepam 0.5 mg tablet	90	30	ReitmanRony MD  05/08/2019	05/08/2019	nucynta 50 mg tablet	90	30	Stuart, Suresh  04/04/2019	04/25/2019	clonazepam 1 mg tablet	60	30	ReitmanRony MD  04/08/2019	04/25/2019	nucynta er 150 mg tablet	60	30	Stuart, Suresh  04/04/2019	04/21/2019	clonazepam 0.5 mg tablet	90	30	ReitmanRony MD  04/08/2019	04/21/2019	oxycodone-acetaminophen  mg tab	60	30	Stuart, Suresh  03/07/2019	03/26/2019	clonazepam 1 mg tablet	60	30	ReitRony rendon MD  03/07/2019	03/22/2019	clonazepam 0.5 mg tablet	90	30	ReitmanRony MD  03/14/2019	03/22/2019	nucynta er 150 mg tablet	60	30	Stuart, Suresh  03/14/2019	03/22/2019	oxycodone-acetaminophen  mg tab	60	30	Stuart, Suresh  02/06/2019	02/19/2019	clonazepam 0.5 mg tablet	90	30	ReitRony rendon MD  02/06/2019	02/19/2019	clonazepam 1 mg tablet	60	30	ReitRony rendon MD  02/13/2019	02/19/2019	nucynta er 150 mg tablet	60	30	Obrien, Adalberto  02/13/2019	02/19/2019	oxycodone-acetaminophen  mg tab	60	30	ObrienAdalberto garcia  01/09/2019	01/21/2019	clonazepam 1 mg tablet	60	30	ReitRony rendon MD  01/09/2019	01/21/2019	clonazepam 0.5 mg tablet	90	30	ReitRony rendon MD  01/16/2019	01/21/2019	nucynta er 150 mg tablet	60	30	Stuart, Suresh  01/15/2019	01/17/2019	clonazepam 2 mg tablet	45	30	ReRony cuello MD  01/16/2019	01/17/2019	oxycodone-acetaminophen  mg tablet	60	30	Stuart, Suresh  12/06/2018	12/23/2018	nucynta er 150 mg tablet	60	30	Stuart, Suresh  12/12/2018	12/23/2018	clonazepam 0.5 mg tablet	90	30	ReRony cuello MD  12/12/2018	12/23/2018	clonazepam 1 mg tablet	60	30	ReRony cuello MD  12/12/2018	12/13/2018	clonazepam 2 mg tablet	45	30	ReitRony rendon MD  12/06/2018	12/08/2018	oxycodone-acetaminophen  mg tablet	60	30	Stuart, Suresh  11/14/2018	11/24/2018	clonazepam 1 mg tablet	60	30	ReRony cuello MD  11/14/2018	11/24/2018	clonazepam 0.5 mg tablet	90	30	ReitRony rendon MD  11/15/2018	11/24/2018	nucynta er 150 mg tablet	60	30	Stuart, Suresh  10/17/2018	10/24/2018	nucynta er 150 mg tablet	60	30	Stuart, Suresh  10/17/2018	10/24/2018	oxycodone-acetaminophen  mg tablet	60	30	Stuart, Suresh  10/17/2018	10/24/2018	clonazepam 0.5 mg tablet	90	30	ReRony cuello MD  10/17/2018	10/24/2018	clonazepam 1 mg tablet	60	30	ReRony cuello MD  09/13/2018	09/23/2018	clonazepam 0.5 mg tablet	90	30	ReitRony rendon MD  09/13/2018	09/23/2018	clonazepam 1 mg tablet	60	30	ReitRony rendon MD  09/20/2018	09/23/2018	oxycodone-acetaminophen  mg tablet	60	30	Stuart, Suresh  09/20/2018	09/23/2018	nucynta er 150 mg tablet	60	30	Stuart, Suresh  08/16/2018	08/24/2018	clonazepam 0.5 mg tablet	90	30	ReitRony rendon MD  08/16/2018	08/24/2018	clonazepam 1 mg tablet	60	30	Rony Mesa MD  08/23/2018	08/24/2018	oxycodone-acetaminophen  mg tablet	60	30	Stuart, Suresh  08/23/2018	08/24/2018	nucynta er 150 mg tablet	60	30	Stuart, Suresh  07/25/2018	07/31/2018	nucynta er 150 mg tablet	60	30	Stuart, Suresh  07/18/2018	07/26/2018	clonazepam 1 mg tablet	60	30	Rony Mesa MD  07/18/2018	07/26/2018	clonazepam 0.5 mg tablet	90	30	Rony Mesa MD  07/25/2018	07/26/2018	oxycodone-acetaminophen  mg tablet	60	30	Stuart, Suresh  06/27/2018	06/30/2018	nucynta er 150 mg tablet	60	30	Adalberto Obrien  06/13/2018	06/22/2018	clonazepam 0.5 mg tablet	90	30	Rony Mesa MD  06/13/2018	06/22/2018	clonazepam 1 mg tablet	60	30	Rony Mesa MD

## 2019-06-10 NOTE — H&P ADULT - NSICDXPASTSURGICALHX_GEN_ALL_CORE_FT
PAST SURGICAL HISTORY:  Gastric bypass status for obesity , revised 2010    H/O:  section x 2    History of cholecystectomy 1976    History of laminectomy lumbar, with fusion    Plastic surgery for unacceptable cosmetic appearance arms from extra skin post weight loss after bypass surgery

## 2019-06-10 NOTE — H&P ADULT - NSHPREVIEWOFSYSTEMS_GEN_ALL_CORE
CONSTITUTIONAL: No weakness, +fevers or chills  EYES/ENT: No visual changes;  No vertigo or throat pain   NECK: No pain or stiffness  RESPIRATORY: No cough, wheezing, hemoptysis; No shortness of breath  CARDIOVASCULAR: +chest pain or palpitations  GASTROINTESTINAL: No abdominal or epigastric pain. No nausea, vomiting, or hematemesis; No diarrhea or constipation. No melena or hematochezia.  GENITOURINARY: No dysuria, frequency or hematuria  NEUROLOGICAL: No numbness or weakness  SKIN: No itching, burning, rashes, or lesions   All other review of systems is negative unless indicated above. CONSTITUTIONAL: No weakness, +fevers or chills  EYES/ENT: No visual changes;  No vertigo or throat pain   NECK: No pain or stiffness  RESPIRATORY: No cough, wheezing, hemoptysis; + shortness of breath  CARDIOVASCULAR: +chest pain and palpitations; +orthopnea; denies PND or LE edema  GASTROINTESTINAL: No abdominal or epigastric pain. No nausea, vomiting, or hematemesis; No diarrhea or constipation. No melena or hematochezia.  GENITOURINARY: No dysuria, frequency or hematuria  NEUROLOGICAL: No numbness or weakness  MUSCULOSKELETAL: +Joint pain  SKIN: No itching, burning, rashes, or lesions   PSYCH: +Anxiety  All other review of systems is negative unless indicated above.

## 2019-06-10 NOTE — H&P ADULT - HISTORY OF PRESENT ILLNESS
The patient is a 64 yo woman with PMH of gastric bypass, HTN, chronic pain, anxiety p/w chest pressure. Patient in usual state of health yesterday morning. At 7 PM patient was at dinner with , developed generalized chest tightness radiating to right sided chest, non radiating to back. No associated dyspnea. Patient reports having similar episodes of chest pain previously, usually dissapates on own. This episode pain persisted prompting patient to come to ED for evaluation. Pt reports subjective fevers/chills yesterday w/o Tmax. No N/V/Abdominal pain. She reports previous stress test/echo over 5 years later. Patient took at 324 mg ASA at 8 PM.     In the ED:  VS: T 97.6, HR 80, /66, RR 19/100  Labs: CBC, CMP wnl The patient is a 64 yo woman with PMH of gastric bypass, HTN, chronic pain, anxiety p/w chest pressure. Patient in usual state of health yesterday morning. At 7 PM patient was at dinner with , developed generalized chest tightness radiating to right sided chest, non radiating to back. Pt also reports right sided jaw pain, had dental procedure on Thursday, reports receiving significant amount of novocaine. No associated dyspnea. Patient reports having similar episodes of chest pain previously, usually dissapates on own. This episode pain persisted prompting patient to come to ED for evaluation. Pt reports subjective fevers/chills yesterday w/o Tmax. No N/V/Abdominal pain. She reports previous stress test/echo over 5 years later. Patient took at 324 mg ASA at 8 PM.     In the ED:  VS: T 97.6, HR 80, /66, RR 19/100  Labs: CBC, CMP wnl The patient is a 62 yo woman with PMH of gastric bypass, HTN, chronic pain, anxiety p/w chest pressure. Patient in usual state of health yesterday morning. At 7 PM patient was at dinner with , developed generalized chest tightness radiating to right sided chest and shoulder, non radiating to back. She also had right sided jaw pain, but she attributes that to dental procedure on Thursday.   She reports receiving significant amount of novocaine during the procedure. She also had some associated SOB and palpitation. dyspnea. Denies diaphoresis.  Pain did not change with movement or deep breathing.  Patient reports having similar episodes of chest pain previously, usually dissapates on own. This episode pain persisted for an 1hr.  She took aspirin 324mg around 8pm.  By the time she came to ED, her pain subsided.  Patient also reports subjective fevers/chills yesterday w/o Tmax. No N/V/Abdominal pain.  Denies sick contact or recent travel or long flight. She reports previous stress test/echo over 5 years ago.     In the ED:  VS: T 97.6, HR 80, /66, RR 19/100  Labs: CBC, CMP wnl

## 2019-06-11 ENCOUNTER — TRANSCRIPTION ENCOUNTER (OUTPATIENT)
Age: 64
End: 2019-06-11

## 2019-06-11 VITALS
HEART RATE: 76 BPM | TEMPERATURE: 98 F | DIASTOLIC BLOOD PRESSURE: 96 MMHG | SYSTOLIC BLOOD PRESSURE: 157 MMHG | RESPIRATION RATE: 17 BRPM | OXYGEN SATURATION: 99 %

## 2019-06-11 LAB
ANION GAP SERPL CALC-SCNC: 14 MMOL/L — SIGNIFICANT CHANGE UP (ref 5–17)
BUN SERPL-MCNC: 7 MG/DL — SIGNIFICANT CHANGE UP (ref 7–23)
CALCIUM SERPL-MCNC: 9.2 MG/DL — SIGNIFICANT CHANGE UP (ref 8.4–10.5)
CHLORIDE SERPL-SCNC: 98 MMOL/L — SIGNIFICANT CHANGE UP (ref 96–108)
CO2 SERPL-SCNC: 25 MMOL/L — SIGNIFICANT CHANGE UP (ref 22–31)
CREAT SERPL-MCNC: 0.33 MG/DL — LOW (ref 0.5–1.3)
GLUCOSE SERPL-MCNC: 96 MG/DL — SIGNIFICANT CHANGE UP (ref 70–99)
HCT VFR BLD CALC: 32.9 % — LOW (ref 34.5–45)
HCV AB S/CO SERPL IA: 0.08 S/CO — SIGNIFICANT CHANGE UP (ref 0–0.99)
HCV AB SERPL-IMP: SIGNIFICANT CHANGE UP
HGB BLD-MCNC: 11.2 G/DL — LOW (ref 11.5–15.5)
MCHC RBC-ENTMCNC: 31 PG — SIGNIFICANT CHANGE UP (ref 27–34)
MCHC RBC-ENTMCNC: 34.2 GM/DL — SIGNIFICANT CHANGE UP (ref 32–36)
MCV RBC AUTO: 90.7 FL — SIGNIFICANT CHANGE UP (ref 80–100)
PLATELET # BLD AUTO: 287 K/UL — SIGNIFICANT CHANGE UP (ref 150–400)
POTASSIUM SERPL-MCNC: 3.6 MMOL/L — SIGNIFICANT CHANGE UP (ref 3.5–5.3)
POTASSIUM SERPL-SCNC: 3.6 MMOL/L — SIGNIFICANT CHANGE UP (ref 3.5–5.3)
RBC # BLD: 3.62 M/UL — LOW (ref 3.8–5.2)
RBC # FLD: 11.6 % — SIGNIFICANT CHANGE UP (ref 10.3–14.5)
SODIUM SERPL-SCNC: 137 MMOL/L — SIGNIFICANT CHANGE UP (ref 135–145)
WBC # BLD: 8.2 K/UL — SIGNIFICANT CHANGE UP (ref 3.8–10.5)
WBC # FLD AUTO: 8.2 K/UL — SIGNIFICANT CHANGE UP (ref 3.8–10.5)

## 2019-06-11 PROCEDURE — 80048 BASIC METABOLIC PNL TOTAL CA: CPT

## 2019-06-11 PROCEDURE — 84443 ASSAY THYROID STIM HORMONE: CPT

## 2019-06-11 PROCEDURE — 86803 HEPATITIS C AB TEST: CPT

## 2019-06-11 PROCEDURE — 84484 ASSAY OF TROPONIN QUANT: CPT

## 2019-06-11 PROCEDURE — 71045 X-RAY EXAM CHEST 1 VIEW: CPT

## 2019-06-11 PROCEDURE — A9500: CPT

## 2019-06-11 PROCEDURE — 93005 ELECTROCARDIOGRAM TRACING: CPT

## 2019-06-11 PROCEDURE — 99285 EMERGENCY DEPT VISIT HI MDM: CPT | Mod: 25

## 2019-06-11 PROCEDURE — 85027 COMPLETE CBC AUTOMATED: CPT

## 2019-06-11 PROCEDURE — 99239 HOSP IP/OBS DSCHRG MGMT >30: CPT

## 2019-06-11 PROCEDURE — 83880 ASSAY OF NATRIURETIC PEPTIDE: CPT

## 2019-06-11 PROCEDURE — 78452 HT MUSCLE IMAGE SPECT MULT: CPT

## 2019-06-11 PROCEDURE — 80061 LIPID PANEL: CPT

## 2019-06-11 PROCEDURE — 83036 HEMOGLOBIN GLYCOSYLATED A1C: CPT

## 2019-06-11 PROCEDURE — 80053 COMPREHEN METABOLIC PANEL: CPT

## 2019-06-11 PROCEDURE — 93017 CV STRESS TEST TRACING ONLY: CPT

## 2019-06-11 PROCEDURE — 85610 PROTHROMBIN TIME: CPT

## 2019-06-11 RX ORDER — ACETAMINOPHEN 500 MG
650 TABLET ORAL ONCE
Refills: 0 | Status: COMPLETED | OUTPATIENT
Start: 2019-06-11 | End: 2019-06-11

## 2019-06-11 RX ORDER — POTASSIUM CHLORIDE 20 MEQ
40 PACKET (EA) ORAL ONCE
Refills: 0 | Status: COMPLETED | OUTPATIENT
Start: 2019-06-11 | End: 2019-06-11

## 2019-06-11 RX ORDER — PANTOPRAZOLE SODIUM 20 MG/1
40 TABLET, DELAYED RELEASE ORAL
Refills: 0 | Status: DISCONTINUED | OUTPATIENT
Start: 2019-06-11 | End: 2019-06-11

## 2019-06-11 RX ORDER — PANTOPRAZOLE SODIUM 20 MG/1
1 TABLET, DELAYED RELEASE ORAL
Qty: 30 | Refills: 0
Start: 2019-06-11 | End: 2019-07-10

## 2019-06-11 RX ADMIN — ENOXAPARIN SODIUM 40 MILLIGRAM(S): 100 INJECTION SUBCUTANEOUS at 04:59

## 2019-06-11 RX ADMIN — MONTELUKAST 4 MILLIGRAM(S): 4 TABLET, CHEWABLE ORAL at 10:53

## 2019-06-11 RX ADMIN — LAMOTRIGINE 150 MILLIGRAM(S): 25 TABLET, ORALLY DISINTEGRATING ORAL at 10:53

## 2019-06-11 RX ADMIN — Medication 650 MILLIGRAM(S): at 06:05

## 2019-06-11 RX ADMIN — Medication 650 MILLIGRAM(S): at 04:59

## 2019-06-11 RX ADMIN — Medication 40 MILLIEQUIVALENT(S): at 08:55

## 2019-06-11 RX ADMIN — SERTRALINE 200 MILLIGRAM(S): 25 TABLET, FILM COATED ORAL at 10:53

## 2019-06-11 RX ADMIN — Medication 1.5 MILLIGRAM(S): at 10:53

## 2019-06-11 RX ADMIN — PANTOPRAZOLE SODIUM 40 MILLIGRAM(S): 20 TABLET, DELAYED RELEASE ORAL at 10:53

## 2019-06-11 RX ADMIN — Medication 5 MILLIGRAM(S): at 05:02

## 2019-06-11 NOTE — PROGRESS NOTE ADULT - ASSESSMENT
64 yo woman with PMH of gastric bypass, HTN, chronic pain, anxiety p/w chest pressure likely stable angina vs anxeity driven

## 2019-06-11 NOTE — DISCHARGE NOTE PROVIDER - NSDCCPTREATMENT_GEN_ALL_CORE_FT
PRINCIPAL PROCEDURE  Procedure: Regadenoson SPECT cardiac stress test  Findings and Treatment: see HPI

## 2019-06-11 NOTE — DISCHARGE NOTE PROVIDER - NSDCACTIVITY_GEN_ALL_CORE
Stairs allowed/Walking - Indoors allowed/Bathing allowed/Driving allowed/Walking - Outdoors allowed/Return to Work/School allowed/Sex allowed/Showering allowed/No restrictions

## 2019-06-11 NOTE — PROGRESS NOTE ADULT - SUBJECTIVE AND OBJECTIVE BOX
Patient feels well today. No chest pain today.    GENERAL: No fevers, no chills.  EYES: No blurry vision,  No photophobia  ENT: No sore throat.  No dysphagia  Cardiovascular: No chest pain, palpitations, orthopnea  Pulmonary: No cough, no wheezing. No shortness of breath  Gastrointestinal: No abdominal pain, no diarrhea, no constipation.    Musculoskeletal: No weakness.  No myalgias.  Dermatology:  No rashes.  Neuro: No Headache.  No vertigo.  No dizziness.  Psych: No anxiety, no depression.  Denies suicidal thoughts.    Vital Signs Last 24 Hrs  T(C): 36.7 (11 Jun 2019 10:46), Max: 36.8 (10 Crow 2019 21:05)  T(F): 98.1 (11 Jun 2019 10:46), Max: 98.3 (10 Crow 2019 21:05)  HR: 76 (11 Jun 2019 10:46) (63 - 79)  BP: 157/96 (11 Jun 2019 10:46) (142/53 - 166/92)  BP(mean): --  RR: 17 (11 Jun 2019 10:46) (16 - 17)  SpO2: 99% (11 Jun 2019 10:46) (94% - 99%)    GENERAL: NAD, obese  HEAD:  Atraumatic, Normocephalic  EYES: EOMI, PERRLA, conjunctiva and sclera clear  ENT: Pharynx not erythematous  PULMONARY: Clear to auscultation bilaterally; No wheeze  CARDIOVASCULAR: Regular rate and rhythm; No murmurs, rubs, or gallops  ABDOMEN: Soft, Nontender, Nondistended; Bowel sounds present  EXTREMITIES:  2+ Peripheral Pulses, No clubbing, cyanosis, or edema  MUSCULOSKELETAL: No calf tenderness  PSYCH: AAOx3, normal affect  SKIN: warm and dry, No rashes or lesions

## 2019-06-11 NOTE — DISCHARGE NOTE PROVIDER - NSDCCPCAREPLAN_GEN_ALL_CORE_FT
PRINCIPAL DISCHARGE DIAGNOSIS  Diagnosis: Chest pain  Assessment and Plan of Treatment: Low salt, low fat diet.   Weight management.   Take medications as prescribed.    No smoking.  Follow up appointments with your doctor(s)  as instruced.

## 2019-06-11 NOTE — DISCHARGE NOTE PROVIDER - HOSPITAL COURSE
The patient is a 64 yo woman with PMH of gastric bypass, HTN, chronic pain, anxiety p/w chest pressure. Patient in usual state of health yesterday morning. At 7 PM patient was at dinner with , developed generalized chest tightness radiating to right sided chest and shoulder, non radiating to back. She also had right sided jaw pain, but she attributes that to dental procedure on Thursday.   She reports receiving significant amount of novocaine during the procedure. She also had some associated SOB and palpitation. dyspnea. Denies diaphoresis.  Pain did not change with movement or deep breathing.  Patient reports having similar episodes of chest pain previously, usually dissipates on own. This episode pain persisted for an 1hr.  She took aspirin 324mg around 8pm.  By the time she came to ED, her pain subsided.  Patient also reports subjective fevers/chills yesterday w/o Tmax. No N/V/Abdominal pain.  Denies sick contact or recent travel or long flight. She reports previous stress test/echo over 5 years ago. 6/10/19 pt underwent nuclear stress that revealed     * Chest Pain: No chest pain with administration of    Regadenoson.    * Symptom: SOB and nausea started 2 minutes post injection    and resolved after3 minutes.    * HR Response: Appropriate.    * BP Response: Appropriate.    * Heart Rhythm: Sinus Rhythm - 75 BPM.    * Baseline ECG: No significant ST abnormalities.    * ECG Abnormalities: No ischemic ECG changes.    * Arrhythmia: None.    * The left ventricle was normal in size. There are medium    sized, mild defects in anterior, inferior, inferolateral,    inferoseptal walls that are predominantly fixed, mostly    correct with prone imaging, and have normal wall motion    suggestive of attenuation artifact.    * Post-stress gated wall motion analysis was performed    (LVEF = 67 %;LVEDV= 90 ml.), revealing normal LV    function. Chest pain likely GI in origin. Pt started on Protonix daily and will folllow up with PMD.

## 2019-06-11 NOTE — PROGRESS NOTE ADULT - PROBLEM SELECTOR PLAN 1
- ACS r/o, EKG reviewed NSR w/o ST changes TWI, trop neg x 2  - stress test negative  - likely gastrointestinal in nature

## 2019-06-13 NOTE — DISCHARGE NOTE PROVIDER - CARE PROVIDER_API CALL
INSTILL 1 DROP INTO BOTH EYES AT BEDTIME AS DIRECTED Gregory Obrien)  Cardiovascular Disease; Internal Medicine  [facility] [place] [id number]  Phone: [phone]  Fax: [phone]  Follow Up Time: 2 weeks

## 2019-09-17 ENCOUNTER — APPOINTMENT (OUTPATIENT)
Dept: SURGERY | Facility: CLINIC | Age: 64
End: 2019-09-17

## 2019-11-20 ENCOUNTER — APPOINTMENT (OUTPATIENT)
Dept: INFECTIOUS DISEASE | Facility: CLINIC | Age: 64
End: 2019-11-20
Payer: MEDICARE

## 2019-11-20 VITALS
HEIGHT: 64 IN | HEART RATE: 111 BPM | DIASTOLIC BLOOD PRESSURE: 88 MMHG | SYSTOLIC BLOOD PRESSURE: 149 MMHG | TEMPERATURE: 99.58 F | WEIGHT: 266 LBS | OXYGEN SATURATION: 95 % | BODY MASS INDEX: 45.41 KG/M2

## 2019-11-20 PROCEDURE — 99204 OFFICE O/P NEW MOD 45 MIN: CPT | Mod: 25

## 2019-11-20 PROCEDURE — 90686 IIV4 VACC NO PRSV 0.5 ML IM: CPT

## 2019-11-20 PROCEDURE — 99213 OFFICE O/P EST LOW 20 MIN: CPT

## 2019-11-20 PROCEDURE — G0008: CPT
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
## 2019-11-20 NOTE — ASSESSMENT
[FreeTextEntry1] : Recurrent/persistent oral thrush on biologics for Psoriasis.\par other exacerbating factors include antibiotics and steroids.\par \par Chronic Fluconazole is reasonable option. The biologics appear crucial to support quality of life.\par \par Fluconzole 200 mg daily -\par if free from thrush would discontinue and monitor after several weeks.\par potential hepatoxicity and resistance discussed\par \par follow up in 3 months.\par flu shot today\par support offered. [Treatment Education] : treatment education [Rx Dose / Side Effects] : Rx dose/side effects [Medical Care Issues] : medical care issues

## 2019-11-20 NOTE — CONSULT LETTER
[Dear  ___] : Dear  [unfilled], [Consult Letter:] : I had the pleasure of evaluating your patient, [unfilled]. [Please see my note below.] : Please see my note below. [Sincerely,] : Sincerely, [Consult Closing:] : Thank you very much for allowing me to participate in the care of this patient.  If you have any questions, please do not hesitate to contact me. [FreeTextEntry2] : Dr Gregory Obrien\par [address] [facility] [id number] [FreeTextEntry3] : Jim Smith MD, FACP, GRACIE, AAHIVM\par Infectious Diseases \par [facility]

## 2019-11-20 NOTE — REVIEW OF SYSTEMS
[Body Aches] : body aches [Normal Appetite] : appetite not normal  [Recent Weight Loss (___ Lbs)] : recent [unfilled] ~Ulb weight loss [Sore Throat] : sore throat [Hoarseness] : hoarseness [Cough] : cough [Depression] : depression [Anxiety] : anxiety [As Noted in HPI] : as noted in HPI [Negative] : Heme/Lymph [FreeTextEntry6] : resolved

## 2019-11-20 NOTE — PHYSICAL EXAM
[General Appearance - Alert] : alert [General Appearance - In No Acute Distress] : in no acute distress [Sclera] : the sclera and conjunctiva were normal [PERRL With Normal Accommodation] : pupils were equal in size, round, reactive to light [Extraocular Movements] : extraocular movements were intact [Oropharynx] : the oropharynx was normal with no thrush [Outer Ear] : the ears and nose were normal in appearance [Neck Cervical Mass (___cm)] : no neck mass was observed [Thyroid Diffuse Enlargement] : the thyroid was not enlarged [Neck Appearance] : the appearance of the neck was normal [Jugular Venous Distention Increased] : there was no jugular-venous distention [Auscultation Breath Sounds / Voice Sounds] : lungs were clear to auscultation bilaterally [Heart Rate And Rhythm] : heart rate was normal and rhythm regular [Heart Sounds Gallop] : no gallops [Heart Sounds] : normal S1 and S2 [Heart Sounds Pericardial Friction Rub] : no pericardial rub [Murmurs] : no murmurs [Edema] : there was no peripheral edema [Abdomen Soft] : soft [Bowel Sounds] : normal bowel sounds [Abdomen Tenderness] : non-tender [Abdomen Mass (___ Cm)] : no abdominal mass palpated [] : no hepato-splenomegaly [FreeTextEntry1] : psoriated patches noted on dorsum of right hand [Costovertebral Angle Tenderness] : no CVA tenderness [Oriented To Time, Place, And Person] : oriented to person, place, and time [Affect] : the affect was normal

## 2020-01-13 ENCOUNTER — RX RENEWAL (OUTPATIENT)
Age: 65
End: 2020-01-13

## 2020-02-12 ENCOUNTER — APPOINTMENT (OUTPATIENT)
Dept: INFECTIOUS DISEASE | Facility: CLINIC | Age: 65
End: 2020-02-12
Payer: MEDICARE

## 2020-02-12 VITALS
WEIGHT: 256 LBS | RESPIRATION RATE: 18 BRPM | BODY MASS INDEX: 43.71 KG/M2 | TEMPERATURE: 97.9 F | OXYGEN SATURATION: 93 % | HEIGHT: 64 IN | DIASTOLIC BLOOD PRESSURE: 77 MMHG | HEART RATE: 94 BPM | SYSTOLIC BLOOD PRESSURE: 126 MMHG

## 2020-02-12 DIAGNOSIS — L40.50 ARTHROPATHIC PSORIASIS, UNSPECIFIED: ICD-10-CM

## 2020-02-12 PROCEDURE — 99214 OFFICE O/P EST MOD 30 MIN: CPT

## 2020-02-12 NOTE — HISTORY OF PRESENT ILLNESS
[FreeTextEntry1] : Ms Damico was seen in 11/20/19 for recalcitrant thrush. She has been taking Fluconazole 200 mg daily with good effect. She has dry mouth with occasional swallowing disturbance. She consumes lots of water and uses Biotene oral spray. She had ENT exam about 1 week ago which concluded that no thrush is present.\par \par She has resumed Siliq biologic in late December with good suppression of her psoriasis. She continues to have painful arthritic changes in knees - left is worse. She has lost 10# since 11/20/19. Eight in office = 256#, BMI = 43.94.\par \par She notes handing skin folds as well as her painful ventral hernia. She asks about the infectious risk of surgery. Her history is remarkable for 2009 aggressive MRSA infection of anterior abdominal wall; 2013 MSSA infection involving left knee: pre-patellar bursitus; 2018 MRSA infection of dorsum of left hand. She had normal HgbA1C = 4.9 on 1/11/19.   While an infectious risk is present, controlling the psoriasis reduces the risk. I would support indicated surgery after utilizing intranasal muporicin and topical decolonization ("bleach bath") strategies.

## 2020-02-12 NOTE — PHYSICAL EXAM
[General Appearance - In No Acute Distress] : in no acute distress [Sclera] : the sclera and conjunctiva were normal [Outer Ear] : the ears and nose were normal in appearance [Extraocular Movements] : extraocular movements were intact [Oropharynx] : the oropharynx was normal with no thrush [Neck Cervical Mass (___cm)] : no neck mass was observed [Jugular Venous Distention Increased] : there was no jugular-venous distention [] : no respiratory distress [Respiration, Rhythm And Depth] : normal respiratory rhythm and effort [Exaggerated Use Of Accessory Muscles For Inspiration] : no accessory muscle use [FreeTextEntry1] : mild plaque over elblows [Oriented To Time, Place, And Person] : oriented to person, place, and time [Affect] : the affect was normal

## 2020-02-12 NOTE — REVIEW OF SYSTEMS
[Normal Appetite] : appetite not normal  [Feeling Tired] : feeling tired [Recent Weight Loss (___ Lbs)] : recent [unfilled] ~Ulb weight loss [As Noted in HPI] : as noted in HPI [Joint Pain] : joint pain [Limb Pain] : limb pain [Skin Lesions] : skin lesion [Anxiety] : anxiety [Depression] : depression [Negative] : Psychiatric

## 2020-02-12 NOTE — CONSULT LETTER
[Dear  ___] : Dear  [unfilled], [Consult Letter:] : I had the pleasure of evaluating your patient, [unfilled]. [Consult Closing:] : Thank you very much for allowing me to participate in the care of this patient.  If you have any questions, please do not hesitate to contact me. [Please see my note below.] : Please see my note below. [Sincerely,] : Sincerely, [FreeTextEntry2] : Haleigh Griffin MD\par [address]\[place]  [address] [FreeTextEntry3] : Jim Smith MD, FACP, GRACIE, AAHIVM\par Infectious Diseases \par [facility] [DrRasheed  ___] : Dr. RODRIGES

## 2020-02-12 NOTE — ASSESSMENT
[FreeTextEntry1] : Thrush suppressed,\par Patient invited to stop fluconazole and monitor for return of symptoms\par \par History of MRSA and MSSA infection. \par obesity -weight loss encouraging\par psoriasis improved on biologic\par arthritis\par chronic pain\par depression\par \par labs today\par  [Treatment Education] : treatment education [Rx Dose / Side Effects] : Rx dose/side effects [Medical Care Issues] : medical care issues

## 2020-02-14 LAB
ALBUMIN SERPL ELPH-MCNC: 3.9 G/DL
ALP BLD-CCNC: 85 U/L
ALT SERPL-CCNC: 17 U/L
ANION GAP SERPL CALC-SCNC: 12 MMOL/L
AST SERPL-CCNC: 20 U/L
BASOPHILS # BLD AUTO: 0.04 K/UL
BASOPHILS NFR BLD AUTO: 0.4 %
BILIRUB SERPL-MCNC: 0.3 MG/DL
BUN SERPL-MCNC: 15 MG/DL
CALCIUM SERPL-MCNC: 8.9 MG/DL
CHLORIDE SERPL-SCNC: 97 MMOL/L
CO2 SERPL-SCNC: 28 MMOL/L
CREAT SERPL-MCNC: 0.45 MG/DL
EOSINOPHIL # BLD AUTO: 0.26 K/UL
EOSINOPHIL NFR BLD AUTO: 2.7 %
GLUCOSE SERPL-MCNC: 87 MG/DL
HBV CORE IGG+IGM SER QL: NONREACTIVE
HCT VFR BLD CALC: 34 %
HCV AB SER QL: NONREACTIVE
HCV S/CO RATIO: 0.15 S/CO
HGB BLD-MCNC: 10.4 G/DL
IMM GRANULOCYTES NFR BLD AUTO: 0.2 %
LYMPHOCYTES # BLD AUTO: 2.33 K/UL
LYMPHOCYTES NFR BLD AUTO: 24.5 %
MAN DIFF?: NORMAL
MCHC RBC-ENTMCNC: 28.8 PG
MCHC RBC-ENTMCNC: 30.6 GM/DL
MCV RBC AUTO: 94.2 FL
MONOCYTES # BLD AUTO: 0.73 K/UL
MONOCYTES NFR BLD AUTO: 7.7 %
NEUTROPHILS # BLD AUTO: 6.14 K/UL
NEUTROPHILS NFR BLD AUTO: 64.5 %
PLATELET # BLD AUTO: 241 K/UL
POTASSIUM SERPL-SCNC: 4.5 MMOL/L
PROT SERPL-MCNC: 6.1 G/DL
RBC # BLD: 3.61 M/UL
RBC # FLD: 13 %
SODIUM SERPL-SCNC: 137 MMOL/L
WBC # FLD AUTO: 9.52 K/UL

## 2020-02-18 ENCOUNTER — RX RENEWAL (OUTPATIENT)
Age: 65
End: 2020-02-18

## 2020-02-18 LAB
M TB IFN-G BLD-IMP: NEGATIVE
QUANTIFERON TB PLUS MITOGEN MINUS NIL: 3.32 IU/ML
QUANTIFERON TB PLUS NIL: 0.01 IU/ML
QUANTIFERON TB PLUS TB1 MINUS NIL: 0 IU/ML
QUANTIFERON TB PLUS TB2 MINUS NIL: 0 IU/ML

## 2020-02-24 ENCOUNTER — RESULT REVIEW (OUTPATIENT)
Age: 65
End: 2020-02-24

## 2020-03-23 ENCOUNTER — APPOINTMENT (OUTPATIENT)
Dept: ULTRASOUND IMAGING | Facility: IMAGING CENTER | Age: 65
End: 2020-03-23
Payer: MEDICARE

## 2020-03-23 ENCOUNTER — OUTPATIENT (OUTPATIENT)
Dept: OUTPATIENT SERVICES | Facility: HOSPITAL | Age: 65
LOS: 1 days | End: 2020-03-23
Payer: MEDICARE

## 2020-03-23 DIAGNOSIS — Z00.00 ENCOUNTER FOR GENERAL ADULT MEDICAL EXAMINATION WITHOUT ABNORMAL FINDINGS: ICD-10-CM

## 2020-03-23 PROCEDURE — 19083 BX BREAST 1ST LESION US IMAG: CPT

## 2020-03-23 PROCEDURE — 76642 ULTRASOUND BREAST LIMITED: CPT

## 2020-03-23 PROCEDURE — 38505 NEEDLE BIOPSY LYMPH NODES: CPT | Mod: 52

## 2020-04-30 ENCOUNTER — APPOINTMENT (OUTPATIENT)
Dept: ULTRASOUND IMAGING | Facility: IMAGING CENTER | Age: 65
End: 2020-04-30
Payer: MEDICARE

## 2020-04-30 ENCOUNTER — RESULT REVIEW (OUTPATIENT)
Age: 65
End: 2020-04-30

## 2020-04-30 ENCOUNTER — OUTPATIENT (OUTPATIENT)
Dept: OUTPATIENT SERVICES | Facility: HOSPITAL | Age: 65
LOS: 1 days | End: 2020-04-30
Payer: MEDICARE

## 2020-04-30 DIAGNOSIS — R92.8 OTHER ABNORMAL AND INCONCLUSIVE FINDINGS ON DIAGNOSTIC IMAGING OF BREAST: ICD-10-CM

## 2020-04-30 DIAGNOSIS — Z00.8 ENCOUNTER FOR OTHER GENERAL EXAMINATION: ICD-10-CM

## 2020-04-30 PROCEDURE — 88305 TISSUE EXAM BY PATHOLOGIST: CPT | Mod: 26

## 2020-04-30 PROCEDURE — 88305 TISSUE EXAM BY PATHOLOGIST: CPT

## 2020-04-30 PROCEDURE — 88173 CYTOPATH EVAL FNA REPORT: CPT | Mod: 26

## 2020-04-30 PROCEDURE — A4648: CPT

## 2020-04-30 PROCEDURE — 10005 FNA BX W/US GDN 1ST LES: CPT

## 2020-04-30 PROCEDURE — 87205 SMEAR GRAM STAIN: CPT

## 2020-04-30 PROCEDURE — 10005 FNA BX W/US GDN 1ST LES: CPT | Mod: RT

## 2020-04-30 PROCEDURE — 88172 CYTP DX EVAL FNA 1ST EA SITE: CPT

## 2020-04-30 PROCEDURE — 88173 CYTOPATH EVAL FNA REPORT: CPT

## 2020-05-05 LAB
NON-GYNECOLOGICAL CYTOLOGY STUDY: SIGNIFICANT CHANGE UP
TM INTERPRETATION: SIGNIFICANT CHANGE UP

## 2020-07-09 ENCOUNTER — APPOINTMENT (OUTPATIENT)
Dept: SURGERY | Facility: CLINIC | Age: 65
End: 2020-07-09
Payer: MEDICARE

## 2020-07-09 VITALS
HEART RATE: 100 BPM | RESPIRATION RATE: 15 BRPM | HEIGHT: 64 IN | WEIGHT: 237 LBS | OXYGEN SATURATION: 96 % | BODY MASS INDEX: 40.46 KG/M2 | TEMPERATURE: 98.2 F | DIASTOLIC BLOOD PRESSURE: 86 MMHG | SYSTOLIC BLOOD PRESSURE: 132 MMHG

## 2020-07-09 DIAGNOSIS — Z82.3 FAMILY HISTORY OF STROKE: ICD-10-CM

## 2020-07-09 DIAGNOSIS — K43.2 INCISIONAL HERNIA W/OUT OBSTRUCTION OR GANGRENE: ICD-10-CM

## 2020-07-09 PROCEDURE — 99214 OFFICE O/P EST MOD 30 MIN: CPT

## 2020-07-09 RX ORDER — FOLIC ACID 1 MG/1
1 TABLET ORAL
Refills: 0 | Status: DISCONTINUED | COMMUNITY
End: 2020-07-09

## 2020-07-09 RX ORDER — METHOTREXATE 25 MG/ML
25 INJECTION INTRA-ARTERIAL; INTRAMUSCULAR; INTRATHECAL; INTRAVENOUS
Refills: 0 | Status: DISCONTINUED | COMMUNITY
End: 2020-07-09

## 2020-07-09 RX ORDER — HYDROCODONE BITARTRATE AND ACETAMINOPHEN 10; 325 MG/1; MG/1
10-325 TABLET ORAL
Refills: 0 | Status: DISCONTINUED | COMMUNITY
End: 2020-07-09

## 2020-07-09 NOTE — REVIEW OF SYSTEMS
[Joint Pain] : joint pain [Joint Stiffness] : joint stiffness [Confused] : no confusion [Dizziness] : no dizziness [Fainting] : no fainting [Suicidal] : not suicidal [Negative] : Genitourinary

## 2020-07-09 NOTE — HISTORY OF PRESENT ILLNESS
[Pre-Op Weight ___] : Pre-op weight was [unfilled] lbs [de-identified] : Alejandra is a 66 y/o female s/p Revision Solis-en-Y gastric bypass and transoral gastric plication. Patient last seen 01/11/2019- weight  281 lbs/ BMI 48.23. [de-identified] : Patient trying to lose weight in anticipation of needed incisional hernia repair.\roberto Hernia slightly more symptomatic as she has lost weight.\roberto Wants to continue her weight loss efforts for the next couple of months.\roberto Has had multiple recalcitrant bouts of thrush thought to be due to silent reflux

## 2020-07-09 NOTE — PHYSICAL EXAM
[de-identified] : Reducible 4 cm upper midline incisional hernia\par Healed right subcostal incision without hernia\par

## 2020-07-09 NOTE — ASSESSMENT
[de-identified] : Morbid obesity with weight related comorbidity and incisional hernia\par Rule out hiatal hernia\par \par Plan:\par Abdominal CT scan to evaluate size of hernia and presence of hiatal hernia\par Preoperative Botox per protocol prior to surgery\par Follow-up visit 2 months\par Continue medical weight loss efforts\par \par -Details of surgery with drawings reviewed with the patient. Risks include bleeding, infection, injury to surrounding structures, chronic pain, recurrence. Patient understands and wants to proceed.

## 2020-07-17 ENCOUNTER — APPOINTMENT (OUTPATIENT)
Dept: CT IMAGING | Facility: IMAGING CENTER | Age: 65
End: 2020-07-17
Payer: MEDICARE

## 2020-07-17 ENCOUNTER — OUTPATIENT (OUTPATIENT)
Dept: OUTPATIENT SERVICES | Facility: HOSPITAL | Age: 65
LOS: 1 days | End: 2020-07-17
Payer: MEDICARE

## 2020-07-17 DIAGNOSIS — K43.2 INCISIONAL HERNIA WITHOUT OBSTRUCTION OR GANGRENE: ICD-10-CM

## 2020-07-17 PROCEDURE — 74177 CT ABD & PELVIS W/CONTRAST: CPT

## 2020-07-17 PROCEDURE — 82565 ASSAY OF CREATININE: CPT

## 2020-07-17 PROCEDURE — 74177 CT ABD & PELVIS W/CONTRAST: CPT | Mod: 26

## 2020-07-23 RX ORDER — CLONAZEPAM 1 MG/1
1 TABLET ORAL
Qty: 90 | Refills: 0 | Status: DISCONTINUED | COMMUNITY
Start: 2017-07-26 | End: 2020-07-23

## 2020-07-30 ENCOUNTER — APPOINTMENT (OUTPATIENT)
Dept: GASTROENTEROLOGY | Facility: CLINIC | Age: 65
End: 2020-07-30
Payer: MEDICARE

## 2020-07-30 ENCOUNTER — RX RENEWAL (OUTPATIENT)
Age: 65
End: 2020-07-30

## 2020-07-30 VITALS
SYSTOLIC BLOOD PRESSURE: 140 MMHG | HEIGHT: 64 IN | BODY MASS INDEX: 39.78 KG/M2 | HEART RATE: 102 BPM | DIASTOLIC BLOOD PRESSURE: 90 MMHG | TEMPERATURE: 98.8 F | WEIGHT: 233 LBS | OXYGEN SATURATION: 98 %

## 2020-07-30 PROCEDURE — 99202 OFFICE O/P NEW SF 15 MIN: CPT

## 2020-07-30 NOTE — HISTORY OF PRESENT ILLNESS
[de-identified] : Dr. Diaz takes care of this very pleasant 65-year-old female\par \par History of gastric bypass, Solis-en-Y many years ago\par \par Now ventral hernia not symptomatic with some discomfort at times\par \par No nausea vomiting\par \par CAT scan done very recently shows partial bowel obstruction with a loop of bowel in the hernia sac with proximal colon dilation\par \par On physical examination the hernia is able to be entirely reduced\par \par There is no trouble with nausea, vomiting, bowel movements, clinically does not appear to be obstructive\par \par She does get discomfort at times\par \par She has a long history of thrush, 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

## 2020-07-30 NOTE — ASSESSMENT
[FreeTextEntry1] : Impression,\par \par History of gastric bypass surgery with Solis-en-Y\par \par Ventral hernia that appears reducible\par \par CAT scan showing loop of colon within the hernia with partial bowel obstruction and proximal bowel distention on the CAT scan\par \par On physical exam today the hernia seems to be fully reducible and nontender and the patient does not have any obstructive symptoms\par \par Some vague dysphasia, she points to her posterior pharynx and neck\par \par History of chronic yeast infection of the posterior pharynx for which she is on Diflucan under the care of infectious disease\par \par Suggest,\par \par Suggest,\par \par The patient is scheduled for upper endoscopy and colonoscopy next week\par \par Nurse will have to apply constant pressure over the hernia site to prevent bowel entering the hernia to make it possible for the colonoscope to pass and going to the cecum for full study\par \par Upper endoscopy to look for evidence of yeast or other causes of vague dysphasia\par \par I sent a task message to the surgeon to just verify that it safe to proceed, it would seem reasonable to proceed at this time, explained this to the patient, I explained that if bowel gets stuck in the hernia sac I would not be able to have the colonoscope pass and a virtual colonoscopy might make more sense\par \par Suprep\par \par The laxative, or its risks benefits and alternatives have been thoroughly reviewed with the patient in great detail. The laxative instructions have been reviewed in great detail with the patient.\par \par Risks/benefits:\par The procedure, the risks and benefits and alternatives have been reviewed in great detail with the patient.  Risks including, but not limited to sedation such as cardiac and pulmonary compromise, the procedure itself such as bleeding requiring hospitalization, transfusion, surgery, temporary or permanent colostomy.  Perforation or puncture of the requiring hospitalization, surgery, temporary colostomy.\par It has been explained to the patient that though colonoscopy is thought to be the best screening exam for colon cancer and polyps, no screening exam can find all colon polyps or cancers.  \par The patient expresses understanding of the procedure and consents to undergoing the procedure.\par \par

## 2020-07-30 NOTE — PHYSICAL EXAM
[General Appearance - Alert] : alert [General Appearance - In No Acute Distress] : in no acute distress [Sclera] : the sclera and conjunctiva were normal [Neck Appearance] : the appearance of the neck was normal [Jugular Venous Distention Increased] : there was no jugular-venous distention [Neck Cervical Mass (___cm)] : no neck mass was observed [Auscultation Breath Sounds / Voice Sounds] : lungs were clear to auscultation bilaterally [Apical Impulse] : the apical impulse was normal [Full Pulse] : the pedal pulses are present [Edema] : there was no peripheral edema [Bowel Sounds] : normal bowel sounds [Abdomen Soft] : soft [Abdomen Mass (___ Cm)] : no abdominal mass palpated [Abdomen Tenderness] : non-tender [FreeTextEntry1] : Abdomen mid to left upper abdominal ventral hernia, soft, nontender, appears to be fully reducible on physical examination, normal active bowel sounds and nondistended abdomen [Cervical Lymph Nodes Enlarged Posterior Bilaterally] : posterior cervical [Patient Refused] : rectal exam was refused by the patient [Supraclavicular Lymph Nodes Enlarged Bilaterally] : supraclavicular [Cervical Lymph Nodes Enlarged Anterior Bilaterally] : anterior cervical [Axillary Lymph Nodes Enlarged Bilaterally] : axillary [Femoral Lymph Nodes Enlarged Bilaterally] : femoral [Inguinal Lymph Nodes Enlarged Bilaterally] : inguinal [No CVA Tenderness] : no ~M costovertebral angle tenderness [No Spinal Tenderness] : no spinal tenderness [Abnormal Walk] : normal gait [Nail Clubbing] : no clubbing  or cyanosis of the fingernails [Musculoskeletal - Swelling] : no joint swelling seen [Motor Tone] : muscle strength and tone were normal [Skin Color & Pigmentation] : normal skin color and pigmentation [Skin Turgor] : normal skin turgor [No Focal Deficits] : no focal deficits [] : no rash [Affect] : the affect was normal [Impaired Insight] : insight and judgment were intact [Oriented To Time, Place, And Person] : oriented to person, place, and time

## 2020-07-30 NOTE — REASON FOR VISIT
[Initial Evaluation] : an initial evaluation [FreeTextEntry1] : Ventral hernia, partial obstruction on CAT scan but appears to fully reduce on physical exam, vague dysphasia and history of chronic thrush, request for upper endoscopy and colonoscopy preoperative repair of hernia

## 2020-08-03 LAB
25(OH)D3 SERPL-MCNC: 31 NG/ML
ALBUMIN SERPL ELPH-MCNC: 4.2 G/DL
ALP BLD-CCNC: 94 U/L
ALT SERPL-CCNC: 15 U/L
ANION GAP SERPL CALC-SCNC: 14 MMOL/L
AST SERPL-CCNC: 17 U/L
BASOPHILS # BLD AUTO: 0.07 K/UL
BASOPHILS NFR BLD AUTO: 0.9 %
BILIRUB SERPL-MCNC: 0.2 MG/DL
BUN SERPL-MCNC: 13 MG/DL
CALCIUM SERPL-MCNC: 9.6 MG/DL
CHLORIDE SERPL-SCNC: 101 MMOL/L
CO2 SERPL-SCNC: 27 MMOL/L
CREAT SERPL-MCNC: 0.44 MG/DL
EOSINOPHIL # BLD AUTO: 0.35 K/UL
EOSINOPHIL NFR BLD AUTO: 4.3 %
ESTIMATED AVERAGE GLUCOSE: 85 MG/DL
FOLATE SERPL-MCNC: >20 NG/ML
GLUCOSE SERPL-MCNC: 88 MG/DL
HBA1C MFR BLD HPLC: 4.6 %
HCT VFR BLD CALC: 34 %
HGB BLD-MCNC: 10.3 G/DL
IMM GRANULOCYTES NFR BLD AUTO: 0.2 %
IRON SERPL-MCNC: 53 UG/DL
LYMPHOCYTES # BLD AUTO: 3.01 K/UL
LYMPHOCYTES NFR BLD AUTO: 37.3 %
MAN DIFF?: NORMAL
MCHC RBC-ENTMCNC: 28.9 PG
MCHC RBC-ENTMCNC: 30.3 GM/DL
MCV RBC AUTO: 95.2 FL
MONOCYTES # BLD AUTO: 0.68 K/UL
MONOCYTES NFR BLD AUTO: 8.4 %
NEUTROPHILS # BLD AUTO: 3.95 K/UL
NEUTROPHILS NFR BLD AUTO: 48.9 %
PLATELET # BLD AUTO: 283 K/UL
POTASSIUM SERPL-SCNC: 3.9 MMOL/L
PROT SERPL-MCNC: 6.7 G/DL
RBC # BLD: 3.57 M/UL
RBC # FLD: 12.3 %
SARS-COV-2 N GENE NPH QL NAA+PROBE: NOT DETECTED
SODIUM SERPL-SCNC: 142 MMOL/L
VIT B12 SERPL-MCNC: 585 PG/ML
WBC # FLD AUTO: 8.08 K/UL

## 2020-08-04 ENCOUNTER — APPOINTMENT (OUTPATIENT)
Dept: GASTROENTEROLOGY | Facility: AMBULATORY MEDICAL SERVICES | Age: 65
End: 2020-08-04
Payer: MEDICARE

## 2020-08-04 DIAGNOSIS — Z12.11 ENCOUNTER FOR SCREENING FOR MALIGNANT NEOPLASM OF COLON: ICD-10-CM

## 2020-08-04 PROCEDURE — 45378 DIAGNOSTIC COLONOSCOPY: CPT

## 2020-08-04 PROCEDURE — 43239 EGD BIOPSY SINGLE/MULTIPLE: CPT

## 2020-08-10 ENCOUNTER — OUTPATIENT (OUTPATIENT)
Dept: OUTPATIENT SERVICES | Facility: HOSPITAL | Age: 65
LOS: 1 days | End: 2020-08-10
Payer: MEDICARE

## 2020-08-10 ENCOUNTER — RESULT REVIEW (OUTPATIENT)
Age: 65
End: 2020-08-10

## 2020-08-10 ENCOUNTER — APPOINTMENT (OUTPATIENT)
Dept: CT IMAGING | Facility: CLINIC | Age: 65
End: 2020-08-10
Payer: MEDICARE

## 2020-08-10 DIAGNOSIS — Z12.11 ENCOUNTER FOR SCREENING FOR MALIGNANT NEOPLASM OF COLON: ICD-10-CM

## 2020-08-10 DIAGNOSIS — Z98.84 BARIATRIC SURGERY STATUS: ICD-10-CM

## 2020-08-10 DIAGNOSIS — K43.9 VENTRAL HERNIA WITHOUT OBSTRUCTION OR GANGRENE: ICD-10-CM

## 2020-08-10 DIAGNOSIS — E66.01 MORBID (SEVERE) OBESITY DUE TO EXCESS CALORIES: ICD-10-CM

## 2020-08-10 PROCEDURE — 74261 CT COLONOGRAPHY DX: CPT

## 2020-08-10 PROCEDURE — 74261 CT COLONOGRAPHY DX: CPT | Mod: 26

## 2020-08-11 ENCOUNTER — APPOINTMENT (OUTPATIENT)
Dept: SURGERY | Facility: CLINIC | Age: 65
End: 2020-08-11
Payer: MEDICARE

## 2020-08-11 VITALS
HEIGHT: 69 IN | BODY MASS INDEX: 34.51 KG/M2 | DIASTOLIC BLOOD PRESSURE: 66 MMHG | SYSTOLIC BLOOD PRESSURE: 101 MMHG | HEART RATE: 76 BPM | WEIGHT: 233 LBS

## 2020-08-11 PROCEDURE — 99203 OFFICE O/P NEW LOW 30 MIN: CPT

## 2020-08-11 NOTE — HISTORY OF PRESENT ILLNESS
[de-identified] : Pt c/o lesion mouth was very painful but has since resolved.  denies bleeding or increase in size, dysphagia, hoarseness or history of trauma.  no history of tobacco use. \par Brush biopsy:  squamous epithelium with atypia

## 2020-08-11 NOTE — PHYSICAL EXAM
[de-identified] : no palpable thyroid nodules [Laryngoscopy Performed] : laryngoscopy was performed, see procedure section for findings [Midline] : located in midline position [de-identified] : small red area left tonsillar pillar with no substance.  [Normal] : orientation to person, place, and time: normal [de-identified] : indirect  laryngoscopy shows normal vocal cord mobility bilaterally with no lesions noted

## 2020-08-11 NOTE — CONSULT LETTER
[Consult Letter:] : I had the pleasure of evaluating your patient, [unfilled]. [Dear  ___] : Dear  [unfilled], [Please see my note below.] : Please see my note below. [Sincerely,] : Sincerely, [FreeTextEntry2] : Dr. Kennedy Driver, Dr. Gregory Obrien [Consult Closing:] : Thank you very much for allowing me to participate in the care of this patient.  If you have any questions, please do not hesitate to contact me. [FreeTextEntry3] : Norris Paul MD, FACS\par System Director, Endocrine Surgery\par [facility]\par Assistant Clinical Professor of Surgery\par [facility] [employer] [facility]  [DrRasheed  ___] : Dr. RODRIGES

## 2020-08-28 ENCOUNTER — OUTPATIENT (OUTPATIENT)
Dept: OUTPATIENT SERVICES | Facility: HOSPITAL | Age: 65
LOS: 1 days | End: 2020-08-28

## 2020-08-28 DIAGNOSIS — K14.8 OTHER DISEASES OF TONGUE: ICD-10-CM

## 2020-08-29 ENCOUNTER — TRANSCRIPTION ENCOUNTER (OUTPATIENT)
Age: 65
End: 2020-08-29

## 2020-09-04 ENCOUNTER — APPOINTMENT (OUTPATIENT)
Dept: CT IMAGING | Facility: CLINIC | Age: 65
End: 2020-09-04

## 2020-09-06 ENCOUNTER — INPATIENT (INPATIENT)
Facility: HOSPITAL | Age: 65
LOS: 4 days | Discharge: HOME | DRG: 193 | End: 2020-09-11
Attending: INTERNAL MEDICINE | Admitting: HOSPITALIST
Payer: MEDICARE

## 2020-09-06 VITALS
RESPIRATION RATE: 20 BRPM | SYSTOLIC BLOOD PRESSURE: 130 MMHG | OXYGEN SATURATION: 97 % | HEART RATE: 85 BPM | HEIGHT: 63 IN | WEIGHT: 240.08 LBS | DIASTOLIC BLOOD PRESSURE: 83 MMHG

## 2020-09-06 LAB
ALBUMIN SERPL ELPH-MCNC: 3.5 G/DL — SIGNIFICANT CHANGE UP (ref 3.3–5)
ALP SERPL-CCNC: 80 U/L — SIGNIFICANT CHANGE UP (ref 40–120)
ALT FLD-CCNC: 19 U/L — SIGNIFICANT CHANGE UP (ref 10–45)
ANION GAP SERPL CALC-SCNC: 12 MMOL/L — SIGNIFICANT CHANGE UP (ref 5–17)
AST SERPL-CCNC: 24 U/L — SIGNIFICANT CHANGE UP (ref 10–40)
BASOPHILS # BLD AUTO: 0 K/UL — SIGNIFICANT CHANGE UP (ref 0–0.2)
BASOPHILS NFR BLD AUTO: 0 % — SIGNIFICANT CHANGE UP (ref 0–2)
BILIRUB SERPL-MCNC: 0.2 MG/DL — SIGNIFICANT CHANGE UP (ref 0.2–1.2)
BUN SERPL-MCNC: 11 MG/DL — SIGNIFICANT CHANGE UP (ref 7–23)
CALCIUM SERPL-MCNC: 9 MG/DL — SIGNIFICANT CHANGE UP (ref 8.4–10.5)
CHLORIDE SERPL-SCNC: 101 MMOL/L — SIGNIFICANT CHANGE UP (ref 96–108)
CO2 SERPL-SCNC: 26 MMOL/L — SIGNIFICANT CHANGE UP (ref 22–31)
CREAT SERPL-MCNC: 0.51 MG/DL — SIGNIFICANT CHANGE UP (ref 0.5–1.3)
CRP SERPL-MCNC: 0.39 MG/DL — SIGNIFICANT CHANGE UP (ref 0–0.4)
D DIMER BLD IA.RAPID-MCNC: 338 NG/ML DDU — HIGH
EOSINOPHIL # BLD AUTO: 0 K/UL — SIGNIFICANT CHANGE UP (ref 0–0.5)
EOSINOPHIL NFR BLD AUTO: 0 % — SIGNIFICANT CHANGE UP (ref 0–6)
GAS PNL BLDV: SIGNIFICANT CHANGE UP
GLUCOSE SERPL-MCNC: 107 MG/DL — HIGH (ref 70–99)
HCT VFR BLD CALC: 33.1 % — LOW (ref 34.5–45)
HGB BLD-MCNC: 10 G/DL — LOW (ref 11.5–15.5)
LYMPHOCYTES # BLD AUTO: 1.4 K/UL — SIGNIFICANT CHANGE UP (ref 1–3.3)
LYMPHOCYTES # BLD AUTO: 17 % — SIGNIFICANT CHANGE UP (ref 13–44)
MCHC RBC-ENTMCNC: 27.4 PG — SIGNIFICANT CHANGE UP (ref 27–34)
MCHC RBC-ENTMCNC: 30.2 GM/DL — LOW (ref 32–36)
MCV RBC AUTO: 90.7 FL — SIGNIFICANT CHANGE UP (ref 80–100)
MONOCYTES # BLD AUTO: 0 K/UL — SIGNIFICANT CHANGE UP (ref 0–0.9)
MONOCYTES NFR BLD AUTO: 0 % — LOW (ref 2–14)
NEUTROPHILS # BLD AUTO: 6.42 K/UL — SIGNIFICANT CHANGE UP (ref 1.8–7.4)
NEUTROPHILS NFR BLD AUTO: 77 % — SIGNIFICANT CHANGE UP (ref 43–77)
PLATELET # BLD AUTO: 244 K/UL — SIGNIFICANT CHANGE UP (ref 150–400)
POTASSIUM SERPL-MCNC: 4.2 MMOL/L — SIGNIFICANT CHANGE UP (ref 3.5–5.3)
POTASSIUM SERPL-SCNC: 4.2 MMOL/L — SIGNIFICANT CHANGE UP (ref 3.5–5.3)
PROCALCITONIN SERPL-MCNC: <0.03 NG/ML — SIGNIFICANT CHANGE UP (ref 0.02–0.1)
PROT SERPL-MCNC: 6.7 G/DL — SIGNIFICANT CHANGE UP (ref 6–8.3)
RBC # BLD: 3.65 M/UL — LOW (ref 3.8–5.2)
RBC # FLD: 12 % — SIGNIFICANT CHANGE UP (ref 10.3–14.5)
SODIUM SERPL-SCNC: 139 MMOL/L — SIGNIFICANT CHANGE UP (ref 135–145)
WBC # BLD: 8.23 K/UL — SIGNIFICANT CHANGE UP (ref 3.8–10.5)
WBC # FLD AUTO: 8.23 K/UL — SIGNIFICANT CHANGE UP (ref 3.8–10.5)

## 2020-09-06 PROCEDURE — 93010 ELECTROCARDIOGRAM REPORT: CPT

## 2020-09-06 PROCEDURE — 99291 CRITICAL CARE FIRST HOUR: CPT | Mod: CS,GC

## 2020-09-06 PROCEDURE — 71275 CT ANGIOGRAPHY CHEST: CPT | Mod: 26

## 2020-09-06 NOTE — ED PROVIDER NOTE - ATTENDING CONTRIBUTION TO CARE
Jennifer Bassett MD - Attending Physician: I have personally seen and examined this patient with the resident/fellow.  I have fully participated in the care of this patient. I have reviewed all pertinent clinical information, including history, physical exam, plan and the Resident/Fellow’s note and agree except as noted. See MDM

## 2020-09-06 NOTE — ED PROVIDER NOTE - NS ED ROS FT
ROS:  GENERAL: +fever  EYES: no change in vision  HEENT: no trouble swallowing, no trouble speaking  CARDIAC: no chest pain  PULMONARY: +cough/SOB   GI: +nausea.  no abdominal pain, no vomiting, no diarrhea, no constipation  : No dysuria, no frequency, no change in appearance, or odor of urine  SKIN: no rashes  NEURO: no headache, no weakness  MSK: No joint pain    Alex Mcnally PGY3

## 2020-09-06 NOTE — ED PROVIDER NOTE - PROGRESS NOTE DETAILS
Weaned O2. On 1L NC with Sat 92%. No tachypnea/tachycardia. Labs nonactionable. Awaiting CTA for dispo planning Spoke with Rads. R segmental and Subsegmental PE, but no signs of RHS. GGO c/w COVID. Will anticoagulate, admit. Dr Godinez per PMD Dr Obrien Bassem Antonio MD. paged Dr. Godinez at 12:30, 1am, awaiting call back. Bassem Antonio MD. dr ramesh has not called back; discussed case w/ hospitalist, accepts for admission.

## 2020-09-06 NOTE — ED PROVIDER NOTE - CARE PLAN
Principal Discharge DX:	COVID-19 virus infection Principal Discharge DX:	Acute pulmonary embolism without acute cor pulmonale, unspecified pulmonary embolism type  Secondary Diagnosis:	COVID-19 virus infection  Secondary Diagnosis:	Hypoxia

## 2020-09-06 NOTE — ED PROVIDER NOTE - PRINCIPAL DIAGNOSIS
COVID-19 virus infection Acute pulmonary embolism without acute cor pulmonale, unspecified pulmonary embolism type

## 2020-09-06 NOTE — ED PROVIDER NOTE - CRITICAL CARE PROVIDED
interpretation of diagnostic studies/direct patient care (not related to procedure)/additional history taking/documentation/consultation with other physicians

## 2020-09-06 NOTE — ED ADULT NURSE NOTE - CHPI ED NUR SYMPTOMS NEG
no chills/no headache/no decreased eating/drinking/no abdominal pain/no fever/no rash/no vomiting/no diarrhea

## 2020-09-06 NOTE — ED ADULT TRIAGE NOTE - CHIEF COMPLAINT QUOTE
covid + on friday, on 3L NC at home, checked o2 saturation and it was below 95%. 97% on EMS arrival. reports sob and cough.

## 2020-09-06 NOTE — ED PROVIDER NOTE - OBJECTIVE STATEMENT
65F with PMH including HTN, JANE, asthma, anxiety p/w fever, cough, shortness of breath and hypoxia. Was diagnosed with COVID at an urgent care 1.5 weeks ago. Discharged from  on 3L home oxygen. Went to [facility] two days ago due to worsening SOB despite supp ox. CXR was reportedly negative there and the pt was discharged on prednisone and azithromycin. Coming to the ER today because she has continued to experienced worsening SOB and was reportedly hypoxic to the high 80s on 3L. Pt comfortable, satting 98% on 3L on arrival to the ER. Denies any chest pain, abd pain, hemoptysis, numbness, weakness. 65F with PMH including HTN, JANE, asthma, anxiety p/w fever, cough, shortness of breath and hypoxia. Was diagnosed with COVID at an urgent care 1.5 weeks ago. Discharged from  on 2L home oxygen prn (Per records, at that time RA sat 96-98%, Ambulatory Sat 92-94%). Patient has been using 2-3L NC constants since. Went to [facility] two days ago due to worsening SOB despite supp ox. CXR was reportedly negative there and the pt was discharged on prednisone and azithromycin. Coming to the ER today because she has continued to experienced worsening SOB and was reportedly hypoxic to the high 80s when she woke up this morning. Was on 3L, and when EMS arrival, her Sat over 96%. Pt comfortable, satting 98% on 3L on arrival to the ER. Denies any chest pain, abd pain, hemoptysis, numbness, weakness.

## 2020-09-06 NOTE — ED PROVIDER NOTE - PHYSICAL EXAMINATION
Gen: AAOx3, non-toxic  Head: NCAT  HEENT: EOMI, oral mucosa moist, normal conjunctiva  Lung: CTAB, no respiratory distress, no wheezes/rhonchi/rales B/L, speaking in full sentences  CV: RRR, no murmurs, rubs or gallops  Abd: soft, NTND  MSK: no visible deformities  Neuro: No focal sensory or motor deficits  Skin: Warm, well perfused, no rash  Psych: normal affect.     Alex Mcnally PGY3

## 2020-09-06 NOTE — ED PROVIDER NOTE - CLINICAL SUMMARY MEDICAL DECISION MAKING FREE TEXT BOX
SOB in setting of COVID infection. Pt on stable on 3L 02, not in respiratory distress. Will assess for PE, PTX, PNA, worsening inflammatory markers with labs/CTA then determine need for further evaluation. SOB in setting of COVID infection. Pt on stable on 3L 02, not in respiratory distress. Will assess for PE, PTX, PNA, worsening inflammatory markers with labs/CTA then determine need for further evaluation.    Jennifer Bassett MD - Attending Physician: Pt here with known COVID+ here with persistent SOB. No respiratory distress, no tachypnea/retractions, talking in full sentences. Will wean O2 as sat currently over 96%. Labs, CTA for r/o PE given complaints.

## 2020-09-06 NOTE — ED PROVIDER NOTE - CCCP TRG CHIEF CMPLNT
sob, cough
I have reviewed and confirmed nurses' notes for patient's medications, allergies, medical history, and surgical history.

## 2020-09-07 DIAGNOSIS — G47.33 OBSTRUCTIVE SLEEP APNEA (ADULT) (PEDIATRIC): ICD-10-CM

## 2020-09-07 DIAGNOSIS — F39 UNSPECIFIED MOOD [AFFECTIVE] DISORDER: ICD-10-CM

## 2020-09-07 DIAGNOSIS — I26.99 OTHER PULMONARY EMBOLISM WITHOUT ACUTE COR PULMONALE: ICD-10-CM

## 2020-09-07 DIAGNOSIS — U07.1 COVID-19: ICD-10-CM

## 2020-09-07 DIAGNOSIS — G89.29 OTHER CHRONIC PAIN: ICD-10-CM

## 2020-09-07 DIAGNOSIS — J45.909 UNSPECIFIED ASTHMA, UNCOMPLICATED: ICD-10-CM

## 2020-09-07 DIAGNOSIS — Z29.9 ENCOUNTER FOR PROPHYLACTIC MEASURES, UNSPECIFIED: ICD-10-CM

## 2020-09-07 DIAGNOSIS — K43.9 VENTRAL HERNIA WITHOUT OBSTRUCTION OR GANGRENE: ICD-10-CM

## 2020-09-07 DIAGNOSIS — Z71.89 OTHER SPECIFIED COUNSELING: ICD-10-CM

## 2020-09-07 DIAGNOSIS — I10 ESSENTIAL (PRIMARY) HYPERTENSION: ICD-10-CM

## 2020-09-07 LAB
APTT BLD: 116.9 SEC — HIGH (ref 27.5–35.5)
APTT BLD: 26.7 SEC — LOW (ref 27.5–35.5)
FERRITIN SERPL-MCNC: 770 NG/ML — HIGH (ref 15–150)
HCT VFR BLD CALC: 30.6 % — LOW (ref 34.5–45)
HGB BLD-MCNC: 9.2 G/DL — LOW (ref 11.5–15.5)
INR BLD: 1 RATIO — SIGNIFICANT CHANGE UP (ref 0.88–1.16)
MCHC RBC-ENTMCNC: 27.2 PG — SIGNIFICANT CHANGE UP (ref 27–34)
MCHC RBC-ENTMCNC: 30.1 GM/DL — LOW (ref 32–36)
MCV RBC AUTO: 90.5 FL — SIGNIFICANT CHANGE UP (ref 80–100)
NRBC # BLD: 0 /100 WBCS — SIGNIFICANT CHANGE UP (ref 0–0)
PLATELET # BLD AUTO: 263 K/UL — SIGNIFICANT CHANGE UP (ref 150–400)
PROTHROM AB SERPL-ACNC: 11.9 SEC — SIGNIFICANT CHANGE UP (ref 10.6–13.6)
RBC # BLD: 3.38 M/UL — LOW (ref 3.8–5.2)
RBC # FLD: 12 % — SIGNIFICANT CHANGE UP (ref 10.3–14.5)
SARS-COV-2 RNA SPEC QL NAA+PROBE: DETECTED
WBC # BLD: 8.47 K/UL — SIGNIFICANT CHANGE UP (ref 3.8–10.5)
WBC # FLD AUTO: 8.47 K/UL — SIGNIFICANT CHANGE UP (ref 3.8–10.5)

## 2020-09-07 PROCEDURE — 99223 1ST HOSP IP/OBS HIGH 75: CPT | Mod: CS

## 2020-09-07 RX ORDER — CLONAZEPAM 1 MG
1 TABLET ORAL DAILY
Refills: 0 | Status: DISCONTINUED | OUTPATIENT
Start: 2020-09-07 | End: 2020-09-11

## 2020-09-07 RX ORDER — LAMOTRIGINE 25 MG/1
150 TABLET, ORALLY DISINTEGRATING ORAL DAILY
Refills: 0 | Status: DISCONTINUED | OUTPATIENT
Start: 2020-09-07 | End: 2020-09-11

## 2020-09-07 RX ORDER — MONTELUKAST 4 MG/1
1 TABLET, CHEWABLE ORAL
Qty: 0 | Refills: 0 | DISCHARGE

## 2020-09-07 RX ORDER — REMDESIVIR 5 MG/ML
100 INJECTION INTRAVENOUS EVERY 24 HOURS
Refills: 0 | Status: COMPLETED | OUTPATIENT
Start: 2020-09-08 | End: 2020-09-11

## 2020-09-07 RX ORDER — TAPENTADOL HYDROCHLORIDE 50 MG/1
100 TABLET, FILM COATED ORAL DAILY
Refills: 0 | Status: DISCONTINUED | OUTPATIENT
Start: 2020-09-07 | End: 2020-09-08

## 2020-09-07 RX ORDER — DEXAMETHASONE 0.5 MG/5ML
6 ELIXIR ORAL DAILY
Refills: 0 | Status: DISCONTINUED | OUTPATIENT
Start: 2020-09-07 | End: 2020-09-11

## 2020-09-07 RX ORDER — REMDESIVIR 5 MG/ML
INJECTION INTRAVENOUS
Refills: 0 | Status: COMPLETED | OUTPATIENT
Start: 2020-09-07 | End: 2020-09-11

## 2020-09-07 RX ORDER — FLUCONAZOLE 150 MG/1
200 TABLET ORAL
Refills: 0 | Status: DISCONTINUED | OUTPATIENT
Start: 2020-09-07 | End: 2020-09-11

## 2020-09-07 RX ORDER — LAMOTRIGINE 25 MG/1
1 TABLET, ORALLY DISINTEGRATING ORAL
Qty: 0 | Refills: 0 | DISCHARGE

## 2020-09-07 RX ORDER — OMEGA-3 ACID ETHYL ESTERS 1 G
2 CAPSULE ORAL DAILY
Refills: 0 | Status: DISCONTINUED | OUTPATIENT
Start: 2020-09-07 | End: 2020-09-11

## 2020-09-07 RX ORDER — TAPENTADOL HYDROCHLORIDE 50 MG/1
1 TABLET, FILM COATED ORAL
Qty: 0 | Refills: 0 | DISCHARGE

## 2020-09-07 RX ORDER — SOLIFENACIN SUCCINATE 10 MG/1
1 TABLET ORAL
Qty: 0 | Refills: 0 | DISCHARGE

## 2020-09-07 RX ORDER — ENOXAPARIN SODIUM 100 MG/ML
100 INJECTION SUBCUTANEOUS ONCE
Refills: 0 | Status: DISCONTINUED | OUTPATIENT
Start: 2020-09-07 | End: 2020-09-07

## 2020-09-07 RX ORDER — ALBUTEROL 90 UG/1
2 AEROSOL, METERED ORAL EVERY 6 HOURS
Refills: 0 | Status: DISCONTINUED | OUTPATIENT
Start: 2020-09-07 | End: 2020-09-11

## 2020-09-07 RX ORDER — ENOXAPARIN SODIUM 100 MG/ML
110 INJECTION SUBCUTANEOUS ONCE
Refills: 0 | Status: COMPLETED | OUTPATIENT
Start: 2020-09-07 | End: 2020-09-07

## 2020-09-07 RX ORDER — CLONAZEPAM 1 MG
3 TABLET ORAL AT BEDTIME
Refills: 0 | Status: DISCONTINUED | OUTPATIENT
Start: 2020-09-07 | End: 2020-09-11

## 2020-09-07 RX ORDER — HEPARIN SODIUM 5000 [USP'U]/ML
4000 INJECTION INTRAVENOUS; SUBCUTANEOUS EVERY 6 HOURS
Refills: 0 | Status: DISCONTINUED | OUTPATIENT
Start: 2020-09-07 | End: 2020-09-10

## 2020-09-07 RX ORDER — HEPARIN SODIUM 5000 [USP'U]/ML
INJECTION INTRAVENOUS; SUBCUTANEOUS
Qty: 25000 | Refills: 0 | Status: DISCONTINUED | OUTPATIENT
Start: 2020-09-07 | End: 2020-09-10

## 2020-09-07 RX ORDER — MIRABEGRON 50 MG/1
1 TABLET, EXTENDED RELEASE ORAL
Qty: 0 | Refills: 0 | DISCHARGE

## 2020-09-07 RX ORDER — PREGABALIN 225 MG/1
1000 CAPSULE ORAL DAILY
Refills: 0 | Status: DISCONTINUED | OUTPATIENT
Start: 2020-09-07 | End: 2020-09-11

## 2020-09-07 RX ORDER — OXYBUTYNIN CHLORIDE 5 MG
10 TABLET ORAL AT BEDTIME
Refills: 0 | Status: DISCONTINUED | OUTPATIENT
Start: 2020-09-07 | End: 2020-09-11

## 2020-09-07 RX ORDER — OXYCODONE HYDROCHLORIDE 5 MG/1
15 TABLET ORAL
Refills: 0 | Status: DISCONTINUED | OUTPATIENT
Start: 2020-09-07 | End: 2020-09-11

## 2020-09-07 RX ORDER — INFLUENZA VIRUS VACCINE 15; 15; 15; 15 UG/.5ML; UG/.5ML; UG/.5ML; UG/.5ML
0.5 SUSPENSION INTRAMUSCULAR ONCE
Refills: 0 | Status: DISCONTINUED | OUTPATIENT
Start: 2020-09-07 | End: 2020-09-11

## 2020-09-07 RX ORDER — BRODALUMAB 210 MG/1
210 INJECTION SUBCUTANEOUS
Qty: 0 | Refills: 0 | DISCHARGE

## 2020-09-07 RX ORDER — GABAPENTIN 400 MG/1
600 CAPSULE ORAL AT BEDTIME
Refills: 0 | Status: DISCONTINUED | OUTPATIENT
Start: 2020-09-07 | End: 2020-09-11

## 2020-09-07 RX ORDER — GABAPENTIN 400 MG/1
400 CAPSULE ORAL
Refills: 0 | Status: DISCONTINUED | OUTPATIENT
Start: 2020-09-07 | End: 2020-09-11

## 2020-09-07 RX ORDER — CLONAZEPAM 1 MG
1 TABLET ORAL
Qty: 0 | Refills: 0 | DISCHARGE

## 2020-09-07 RX ORDER — FERROUS SULFATE 325(65) MG
325 TABLET ORAL AT BEDTIME
Refills: 0 | Status: DISCONTINUED | OUTPATIENT
Start: 2020-09-07 | End: 2020-09-11

## 2020-09-07 RX ORDER — PANTOPRAZOLE SODIUM 20 MG/1
40 TABLET, DELAYED RELEASE ORAL AT BEDTIME
Refills: 0 | Status: DISCONTINUED | OUTPATIENT
Start: 2020-09-07 | End: 2020-09-11

## 2020-09-07 RX ORDER — CLONAZEPAM 1 MG
1.5 TABLET ORAL DAILY
Refills: 0 | Status: DISCONTINUED | OUTPATIENT
Start: 2020-09-07 | End: 2020-09-11

## 2020-09-07 RX ORDER — ASCORBIC ACID 60 MG
1000 TABLET,CHEWABLE ORAL DAILY
Refills: 0 | Status: DISCONTINUED | OUTPATIENT
Start: 2020-09-07 | End: 2020-09-11

## 2020-09-07 RX ORDER — HEPARIN SODIUM 5000 [USP'U]/ML
8500 INJECTION INTRAVENOUS; SUBCUTANEOUS EVERY 6 HOURS
Refills: 0 | Status: DISCONTINUED | OUTPATIENT
Start: 2020-09-07 | End: 2020-09-10

## 2020-09-07 RX ORDER — HYDROCODONE BITARTRATE 50 MG/1
1 CAPSULE, EXTENDED RELEASE ORAL
Qty: 0 | Refills: 0 | DISCHARGE

## 2020-09-07 RX ORDER — VORTIOXETINE 5 MG/1
10 TABLET, FILM COATED ORAL DAILY
Refills: 0 | Status: DISCONTINUED | OUTPATIENT
Start: 2020-09-07 | End: 2020-09-11

## 2020-09-07 RX ORDER — REMDESIVIR 5 MG/ML
200 INJECTION INTRAVENOUS EVERY 24 HOURS
Refills: 0 | Status: COMPLETED | OUTPATIENT
Start: 2020-09-07 | End: 2020-09-07

## 2020-09-07 RX ORDER — MONTELUKAST 4 MG/1
10 TABLET, CHEWABLE ORAL AT BEDTIME
Refills: 0 | Status: DISCONTINUED | OUTPATIENT
Start: 2020-09-07 | End: 2020-09-11

## 2020-09-07 RX ORDER — ASPIRIN/CALCIUM CARB/MAGNESIUM 324 MG
81 TABLET ORAL
Refills: 0 | Status: DISCONTINUED | OUTPATIENT
Start: 2020-09-07 | End: 2020-09-11

## 2020-09-07 RX ORDER — SERTRALINE 25 MG/1
200 TABLET, FILM COATED ORAL
Qty: 0 | Refills: 0 | DISCHARGE

## 2020-09-07 RX ORDER — LACTOBACILLUS ACIDOPHILUS 100MM CELL
1 CAPSULE ORAL AT BEDTIME
Refills: 0 | Status: DISCONTINUED | OUTPATIENT
Start: 2020-09-07 | End: 2020-09-11

## 2020-09-07 RX ORDER — LIDOCAINE 4 G/100G
1 CREAM TOPICAL DAILY
Refills: 0 | Status: DISCONTINUED | OUTPATIENT
Start: 2020-09-07 | End: 2020-09-11

## 2020-09-07 RX ORDER — LISDEXAMFETAMINE DIMESYLATE 70 MG/1
60 CAPSULE ORAL
Refills: 0 | Status: DISCONTINUED | OUTPATIENT
Start: 2020-09-07 | End: 2020-09-11

## 2020-09-07 RX ORDER — MULTIVIT-MIN/FERROUS GLUCONATE 9 MG/15 ML
1 LIQUID (ML) ORAL DAILY
Refills: 0 | Status: DISCONTINUED | OUTPATIENT
Start: 2020-09-07 | End: 2020-09-11

## 2020-09-07 RX ORDER — SERTRALINE 25 MG/1
200 TABLET, FILM COATED ORAL DAILY
Refills: 0 | Status: DISCONTINUED | OUTPATIENT
Start: 2020-09-07 | End: 2020-09-11

## 2020-09-07 RX ORDER — QUETIAPINE FUMARATE 200 MG/1
200 TABLET, FILM COATED ORAL
Refills: 0 | Status: DISCONTINUED | OUTPATIENT
Start: 2020-09-07 | End: 2020-09-11

## 2020-09-07 RX ADMIN — Medication 1000 MILLIGRAM(S): at 13:40

## 2020-09-07 RX ADMIN — HEPARIN SODIUM 1800 UNIT(S)/HR: 5000 INJECTION INTRAVENOUS; SUBCUTANEOUS at 13:42

## 2020-09-07 RX ADMIN — Medication 3 MILLIGRAM(S): at 21:06

## 2020-09-07 RX ADMIN — Medication 1 TABLET(S): at 13:41

## 2020-09-07 RX ADMIN — LISDEXAMFETAMINE DIMESYLATE 60 MILLIGRAM(S): 70 CAPSULE ORAL at 10:01

## 2020-09-07 RX ADMIN — Medication 325 MILLIGRAM(S): at 21:06

## 2020-09-07 RX ADMIN — Medication 1 APPLICATION(S): at 17:33

## 2020-09-07 RX ADMIN — HEPARIN SODIUM 1600 UNIT(S)/HR: 5000 INJECTION INTRAVENOUS; SUBCUTANEOUS at 21:01

## 2020-09-07 RX ADMIN — VORTIOXETINE 10 MILLIGRAM(S): 5 TABLET, FILM COATED ORAL at 10:02

## 2020-09-07 RX ADMIN — Medication 1 MILLIGRAM(S): at 10:03

## 2020-09-07 RX ADMIN — MONTELUKAST 10 MILLIGRAM(S): 4 TABLET, CHEWABLE ORAL at 21:08

## 2020-09-07 RX ADMIN — Medication 2 GRAM(S): at 10:03

## 2020-09-07 RX ADMIN — FLUCONAZOLE 200 MILLIGRAM(S): 150 TABLET ORAL at 11:58

## 2020-09-07 RX ADMIN — Medication 1.5 MILLIGRAM(S): at 13:41

## 2020-09-07 RX ADMIN — LAMOTRIGINE 150 MILLIGRAM(S): 25 TABLET, ORALLY DISINTEGRATING ORAL at 11:58

## 2020-09-07 RX ADMIN — Medication 5 MILLIGRAM(S): at 10:04

## 2020-09-07 RX ADMIN — GABAPENTIN 600 MILLIGRAM(S): 400 CAPSULE ORAL at 21:06

## 2020-09-07 RX ADMIN — TAPENTADOL HYDROCHLORIDE 100 MILLIGRAM(S): 50 TABLET, FILM COATED ORAL at 10:02

## 2020-09-07 RX ADMIN — Medication 81 MILLIGRAM(S): at 13:40

## 2020-09-07 RX ADMIN — Medication 10 MILLIGRAM(S): at 21:06

## 2020-09-07 RX ADMIN — QUETIAPINE FUMARATE 200 MILLIGRAM(S): 200 TABLET, FILM COATED ORAL at 17:33

## 2020-09-07 RX ADMIN — PREGABALIN 1000 MICROGRAM(S): 225 CAPSULE ORAL at 13:40

## 2020-09-07 RX ADMIN — Medication 6 MILLIGRAM(S): at 13:41

## 2020-09-07 RX ADMIN — SERTRALINE 200 MILLIGRAM(S): 25 TABLET, FILM COATED ORAL at 12:00

## 2020-09-07 RX ADMIN — PANTOPRAZOLE SODIUM 40 MILLIGRAM(S): 20 TABLET, DELAYED RELEASE ORAL at 21:06

## 2020-09-07 RX ADMIN — Medication 1 TABLET(S): at 21:06

## 2020-09-07 RX ADMIN — ALBUTEROL 2 PUFF(S): 90 AEROSOL, METERED ORAL at 22:29

## 2020-09-07 RX ADMIN — ENOXAPARIN SODIUM 110 MILLIGRAM(S): 100 INJECTION SUBCUTANEOUS at 01:02

## 2020-09-07 RX ADMIN — REMDESIVIR 500 MILLIGRAM(S): 5 INJECTION INTRAVENOUS at 21:05

## 2020-09-07 NOTE — H&P ADULT - ASSESSMENT
This patient is a 66yo lady with PMH of OA, chronic pain, sleep apnea, on CPAP, psoriatic arthritis, htn, anemia, mood disorder, history of falls, recently diagnosed with COVID19 infection (8/29/2020) who presents to the ED with COVID pneumonia and new onset segmental and subsegmental PEs.

## 2020-09-07 NOTE — CONSULT NOTE ADULT - ASSESSMENT
This patient is a 64yo lady with PMH of OA, chronic pain, sleep apnea, on CPAP, psoriatic arthritis, htn, anemia, mood disorder, history of falls, recently diagnosed with COVID19 infection (8/29/2020) who presents to the ED with complaint of worsening dyspnea. Her daughter, son-in-law and grandchildren had gotten diagnosed with COVID19. Because of her interaction with them, she was tested, and found positive as well. She was sent home from urgent care with O2 via NC, albuterol inhaler and visiting nurse services. The patient had visited [facility] 3 days prior to admission due to worsening SOB and was discharged on prednisone and azithromycin, which she has continued to take. Despite these medications, she had worsening dyspnea and had to inc O2via NC from 2L to 3L.   Yesterday morning, she found her O2 sat of 86% on 3L NC, thus came to the ED.  sob sec to Covid and acute PE  agree with ac  echo to check RV function as well as check for pericardial effusion  le venous doppler r/o dvt  repeat ecg  asa daily

## 2020-09-07 NOTE — H&P ADULT - NSHPSOCIALHISTORY_GEN_ALL_CORE
The patient lives with her .  She denies tobacco or alcohol use.   She uses cane or walker to ambulate.

## 2020-09-07 NOTE — H&P ADULT - HISTORY OF PRESENT ILLNESS
This patient is a 64yo lady with PMH of htn, JANE, asthma, recently diagnosed with COVID19 infection (8/29/2020) who presents to the ED with complaint of worsening dyspnea. This patient is a 66yo lady with PMH of OA, chronic pain, sleep apnea, on CPAP, psoriatic arthritis, htn, anemia, mood disorder, history of falls, recently diagnosed with COVID19 infection (8/29/2020) who presents to the ED with complaint of worsening dyspnea. Her daughter, son-in-law and grandchildren had gotten diagnosed with COVID19. Because of her interaction with them, she was tested, and found positive as well. She was sent home from urgent care with O2 via NC, albuterol inhaler and visiting nurse services. The patient had visited [facility] 3 days prior to admission due to worsening SOB and was discharged on prednisone and azithromycin, which she has continued to take. Despite these medications, she had worsening dyspnea and had to inc O2via NC from 2L to 3L.   Yesterday morning, she found her O2 sat of 86% on 3L NC, thus came to the ED.    She denies fever, chills, but endorses SOB, cough productive of yellow sputum.

## 2020-09-07 NOTE — H&P ADULT - NSHPPHYSICALEXAM_GEN_ALL_CORE
T(C): 36.4 (09-07-20 @ 08:06), Max: 36.6 (09-06-20 @ 19:52)  HR: 70 (09-07-20 @ 08:06) (66 - 87)  BP: 136/69 (09-07-20 @ 08:06) (130/83 - 142/114)  RR: 18 (09-07-20 @ 08:06) (18 - 20)  SpO2: 95% (09-07-20 @ 08:06) (88% - 97%)  Wt(kg): --    PHYSICAL EXAM:  GENERAL: NAD, obese, well-groomed, well-developed  HEAD:  Atraumatic, Normocephalic  EYES: EOMI, PERRLA, conjunctiva and sclera clear  ENMT: No oropharyngeal exudates, erythema or lesions,  Moist mucous membranes,   NECK: Supple, no cervical lymphadenopathy  NERVOUS SYSTEM:  Alert & Oriented X3, CN II-XII intact, BLE motor strength, full sensation to light touch, 4/5 LUE motor strength (pt has hx of weakness in this arm), 5/5 RUE motor strength   CHEST/LUNG: Coarse breath sounds at bilateral bases, active coughing, no wheezing  HEART: Regular rate and rhythm; No murmurs, rubs, or gallops  ABDOMEN: Soft, nondistended, + hernia palpable at LUQ with mild tenderness  EXTREMITIES:  2+ Peripheral Pulses, trace ankle edema, no cyanosis  SKIN: warm, dry, flaky silvery plaque on erythematous base

## 2020-09-07 NOTE — H&P ADULT - PROBLEM SELECTOR PLAN 10
I discussed patient's current clinical status and goals of care with the patient for 20 minutes. Patient has elected to be FULL CODE and permits intubation with mechanical ventilation, CPR should she develop worsening respiratory failure or experience cardiac arrest.

## 2020-09-07 NOTE — H&P ADULT - NSHPLABSRESULTS_GEN_ALL_CORE
Labs, imaging  personally reviewed by me.     Labs are notable for anemia- Hgb of 10.  D-dimer is elevated at 338.  CMP is unremarkable.   Procalcitonin <0.03 WNL.  VBG found lactate of 0.9 WNL.  COVID19 PCR positive.    LABS:                        10.0   8.23  )-----------( 244      ( 06 Sep 2020 18:58 )             33.1     Hgb Trend: 10.0<--  09-06    139  |  101  |  11  ----------------------------<  107<H>  4.2   |  26  |  0.51    Ca    9.0      06 Sep 2020 18:58    TPro  6.7  /  Alb  3.5  /  TBili  0.2  /  DBili  x   /  AST  24  /  ALT  19  /  AlkPhos  80  09-06    Creatinine Trend: 0.51<--  PT/INR - ( 07 Sep 2020 00:17 )   PT: 11.9 sec;   INR: 1.00 ratio         PTT - ( 07 Sep 2020 00:17 )  PTT:26.7 sec      Venous Blood Gas:  09-06 @ 18:58  7.36/54/33/30/57  VBG Lactate: 0.9

## 2020-09-07 NOTE — H&P ADULT - ATTENDING COMMENTS
Patient assigned to me by night hospitalist in charge for management and care for patient for this evening only. Care to be resumed by day hospitalist in the morning and thereafter.   Layla Rasmussen MD p [phone]

## 2020-09-07 NOTE — H&P ADULT - PROBLEM SELECTOR PLAN 1
Maintain O2 > 92%. Maintain O2 > 92%.  Continue dexamethasone 6mg qdaily.  Consult ID to determine if pt is a candidate for clinical trial or remdesivir use.  Trend CBC, BMP, ferritin, LDH Maintain O2 > 92%.  Continue dexamethasone 6mg qdaily.  Consult ID in AM to determine if pt is a candidate for clinical trial or remdesivir use.  Trend CBC, BMP, ferritin, LDH

## 2020-09-07 NOTE — H&P ADULT - NSHPREVIEWOFSYSTEMS_GEN_ALL_CORE
REVIEW OF SYSTEMS  CONSTITUTIONAL: No fever, no chills, +fatigue  EYES: No eye pain, no vision changes  ENMT:  No difficulty hearing, no throat pain  RESPIRATORY: + cough, + sputum production; + shortness of breath  CARDIOVASCULAR: No chest pain, mild leg swelling  GASTROINTESTINAL: + mild abdominal pain from known hernia, no nausea, no vomiting  GENITOURINARY: No dysuria, no hematuria  NEUROLOGICAL: No headaches, no loss of strength, no numbness  SKIN: No itching, no rashes, no lesions   MUSCULOSKELETAL: No joint pain, no joint swelling; No muscle pain  HEME/LYMPH: No easy bruising, bleeding

## 2020-09-07 NOTE — PROVIDER CONTACT NOTE (OTHER) - SITUATION
pt took home meds this am on her own. trintellix, vyvanse, klonipin and nucynta. meds located in plastic bags with no container or label.

## 2020-09-07 NOTE — H&P ADULT - PROBLEM SELECTOR PLAN 2
Will switch from lovenox to heparin gtt so that it can be quickly discontinued if she has any bleeding related to known hernia.  Pt currently hemodynamically stable.  Check TTE. Will switch from lovenox to heparin gtt so that it can be quickly discontinued if she has any bleeding related to known hernia.  Pt currently hemodynamically stable.  Check TTE.  Check BLE VA duplex US.

## 2020-09-07 NOTE — H&P ADULT - PROBLEM SELECTOR PLAN 6
I discussed patient's current clinical status and goals of care with the patient for 20 minutes. Patient has elected to be FULL CODE and permits intubation with mechanical ventilation, CPR should she develop worsening respiratory failure or experience cardiac arrest. Patient has abdominal wall hernia pending repair in October 2020. She has mild tenderness there. She reports she has a partial bowel obstruction, but has been passing stool and gas without difficulty. Will use heparin gtt for now for PE due to concern that patient may potentially have bleeding, thus may need to discontinue use in a timely fashion. Pt currently, however, does not endorse any signs of melena or hematochezia

## 2020-09-07 NOTE — H&P ADULT - PROBLEM SELECTOR PLAN 3
On CPAP at 8 at home. On CPAP at 8 at home.  Continue O2- ?start BiPAP qhs- discuss if this is feasible with ID team

## 2020-09-07 NOTE — H&P ADULT - PROBLEM SELECTOR PLAN 5
Start home dose of nucynta ER> Start home dose of nucynta ER.  Start home dose of PRN hydrocodone- hold for sedation, RR< 12  Hold flexeril for now. Start home dose of nucynta ER.  Start home dose of PRN oxycodone. hold for sedation, RR< 12  Hold flexeril for now.  ISTOP reviewed

## 2020-09-07 NOTE — H&P ADULT - PROBLEM SELECTOR PLAN 8
VTE ppx: heparin gtt.  Activity: OOB with assistance, fall precaution  Diet: DASH Start equivalent to home dose of enalapril.

## 2020-09-07 NOTE — CHART NOTE - NSCHARTNOTEFT_GEN_A_CORE
This report was requested by: Layla Rasmussen | Reference #: [id number]   others' Prescriptions      Patient Name: Alejandra Damico     YOB: [birth year] 09/05/2020 clonazepam 2 mg tablet  60 30 ReitRony rendon MD Insurance [facility] #[id number]   08/12/2020 08/16/2020 oxycodone hcl 15 mg tablet  60 30 Stuart, Suresh Insurance [facility] #[id number]   08/12/2020 08/17/2020 nucynta er 100 mg tablet  60 30 Stuart, Suresh Insurance [facility] #[id number]   08/12/2020 08/29/2020 oxycodone hcl 5 mg tablet  30 30 Stuart, Suresh Insurance [facility] #[id number]   08/05/2020 08/05/2020 clonazepam 2 mg tablet  60 30 ReitRony rendon MD Insurance [facility] #[id number]   08/05/2020 08/05/2020 vyvanse 60 mg capsule  30 30 ReitRony rendon MD Insurance [facility] #[id number]

## 2020-09-07 NOTE — H&P ADULT - PROBLEM SELECTOR PLAN 4
Speech Therapy  Speech Acute Treatment     Recommendations     -Diet:          *thin liquids and mechanical soft    -Medication Administration:         *via feeding tube, with puree and crushed    -Compensatory Strategies:         *double swallow liquids and double swallow solids    -Feeding Guidelines:          *alternate solids/liquids, eat slowly only when alert, feeds self, periodic liquid wash and periodic/intermittent supervision    -Speech Reviewed Swallow:         *with patient/family, with clinical caregivers and feeding guidelines posted in room    Visit type: treatment    Note type: swallow  Precautions     Affect/Behavior: alert, appropriate, calm, cooperative and pleasant    SUBJECTIVE                                                                                                               Pt pleasant and cooperative       Patient goal / family goal: return to previous functional status     OBJECTIVE                                                                                                                Swallowing   Consistencies     Thin Liquid (straw):  Amount given (oz):  3  Type: snack  Oral:  Impaired  Slow oral transit and suspect premature spillage  Pharyngeal:  Impaired  Multiple swallows noted               ASSESSMENT                                                                                                                 The patient was seen on [address] [place] [address] for swallow treatment.    Mr. Garcia states no difficulty with thin liquids or mechanical soft solids. RN denies concerns. Mr. Garcia concerned regarding episode of shortness of breath yesterday, but no concerns with swallow function. He consumes thin liquids without issue during session, but denies solid trials due to having just eaten a snack and feeling full. Will follow up tomorrow with goal to trial advanced solids to progress oral diet.    Requires SLP Follow Up: Yes    Discharge Recommendations  Recommendations SLP:  Rehab consult, Outpatient speech, Home speech        SLP Identified Barriers to Discharge: medical status   Recommendations for Discharge: SLP: Ongoing speech therapy follow up          Skilled therapy is required to address these limitations in attempt to maximize the patient's independence.    Patient location end of session:  In bed    Safety measures at end of session:  Call light within reach    Patient hand off to:  Nurse    Progress:  Improving as expected  PLAN                                                                                                                                 Suggestions for next Therapy Session:  Trial general solids for upgrade, Mr. Garcia indicated interest in fresh veggies.    Frequency: M sw, trial general item for upgrade (8/23)      Interventions:  Assess tolerance of least restrictive oral diet and diet advancement trials    Plan/Goal Agreement:  Patient agrees with goals and treatment plan      Recommendations     -Diet:          *thin liquids and mechanical soft    -Medication Administration:         *via feeding tube, with puree and crushed    -Compensatory Strategies:         *double swallow liquids and double swallow solids    -Feeding Guidelines:          *alternate solids/liquids, eat slowly only when alert, feeds self, periodic liquid wash and periodic/intermittent supervision    -Speech Reviewed Swallow:         *with patient/family, with clinical caregivers and feeding guidelines posted in room    GOALS:  Long Term Goals: Long Term Goal 1: Tolerate general solids and thin liquids with <10% SS aspiration and stable temps/lungs  Short Term Goals: Goals to be reviewed by: 8/27/20   Short Term Goal 1: Tolerate nectar thick full liquids with <10% SS aspiration and stable temps/lungs   Goal Progress: Goal met    Short Term Goal 2: Complete videofluoroscopic swallow study to clarify/update treatment plan  Goal Progress: Goal Met    Short Term Goal 3: Mr. Garcia will  consume thin liquids and mechanical soft solids with adequate oral clearance, < 10% clinical signs aspiration and stable temps/lungs  Goal Progress:    Short Term Goal 4: Mr. Garcia will consume thin liquids and general consistency solids with adequate mastication and oral clearance, 10% clinical signs aspiration and stable temps/lungs.  Goal Progress:  Documented in the chart in the following areas: Pain. Assessment.         Start home dose of vyvanse, zoloft, lamictal, trintellix, Start home dose of vyvanse, zoloft, lamictal, trintellix. Medications reviewed with pharmacy here.  ISTOP reviewed

## 2020-09-07 NOTE — PROVIDER CONTACT NOTE (OTHER) - ACTION/TREATMENT ORDERED:
provider notified no interventions at this time. awaiting son to drop off medication containers with labels to verify with pharmacy.
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
## 2020-09-08 DIAGNOSIS — U07.1 COVID-19: ICD-10-CM

## 2020-09-08 DIAGNOSIS — R09.02 HYPOXEMIA: ICD-10-CM

## 2020-09-08 DIAGNOSIS — I26.99 OTHER PULMONARY EMBOLISM WITHOUT ACUTE COR PULMONALE: ICD-10-CM

## 2020-09-08 DIAGNOSIS — G47.33 OBSTRUCTIVE SLEEP APNEA (ADULT) (PEDIATRIC): ICD-10-CM

## 2020-09-08 LAB
ALBUMIN SERPL ELPH-MCNC: 3.3 G/DL — SIGNIFICANT CHANGE UP (ref 3.3–5)
ALP SERPL-CCNC: 67 U/L — SIGNIFICANT CHANGE UP (ref 40–120)
ALT FLD-CCNC: 14 U/L — SIGNIFICANT CHANGE UP (ref 10–45)
ANION GAP SERPL CALC-SCNC: 13 MMOL/L — SIGNIFICANT CHANGE UP (ref 5–17)
APTT BLD: 146.6 SEC — CRITICAL HIGH (ref 27.5–35.5)
APTT BLD: 74.9 SEC — HIGH (ref 27.5–35.5)
APTT BLD: 75.2 SEC — HIGH (ref 27.5–35.5)
AST SERPL-CCNC: 20 U/L — SIGNIFICANT CHANGE UP (ref 10–40)
BILIRUB DIRECT SERPL-MCNC: 0.1 MG/DL — SIGNIFICANT CHANGE UP (ref 0–0.2)
BILIRUB INDIRECT FLD-MCNC: 0.1 MG/DL — LOW (ref 0.2–1)
BILIRUB SERPL-MCNC: 0.2 MG/DL — SIGNIFICANT CHANGE UP (ref 0.2–1.2)
BUN SERPL-MCNC: 10 MG/DL — SIGNIFICANT CHANGE UP (ref 7–23)
CALCIUM SERPL-MCNC: 8.9 MG/DL — SIGNIFICANT CHANGE UP (ref 8.4–10.5)
CHLORIDE SERPL-SCNC: 105 MMOL/L — SIGNIFICANT CHANGE UP (ref 96–108)
CO2 SERPL-SCNC: 26 MMOL/L — SIGNIFICANT CHANGE UP (ref 22–31)
CREAT SERPL-MCNC: 0.4 MG/DL — LOW (ref 0.5–1.3)
CRP SERPL-MCNC: 0.19 MG/DL — SIGNIFICANT CHANGE UP (ref 0–0.4)
FERRITIN SERPL-MCNC: 750 NG/ML — HIGH (ref 15–150)
GLUCOSE SERPL-MCNC: 83 MG/DL — SIGNIFICANT CHANGE UP (ref 70–99)
HCT VFR BLD CALC: 30.5 % — LOW (ref 34.5–45)
HGB BLD-MCNC: 9.2 G/DL — LOW (ref 11.5–15.5)
LDH SERPL L TO P-CCNC: 161 U/L — SIGNIFICANT CHANGE UP (ref 50–242)
MCHC RBC-ENTMCNC: 27.1 PG — SIGNIFICANT CHANGE UP (ref 27–34)
MCHC RBC-ENTMCNC: 30.2 GM/DL — LOW (ref 32–36)
MCV RBC AUTO: 90 FL — SIGNIFICANT CHANGE UP (ref 80–100)
NRBC # BLD: 0 /100 WBCS — SIGNIFICANT CHANGE UP (ref 0–0)
PLATELET # BLD AUTO: 276 K/UL — SIGNIFICANT CHANGE UP (ref 150–400)
POTASSIUM SERPL-MCNC: 3.7 MMOL/L — SIGNIFICANT CHANGE UP (ref 3.5–5.3)
POTASSIUM SERPL-SCNC: 3.7 MMOL/L — SIGNIFICANT CHANGE UP (ref 3.5–5.3)
PROT SERPL-MCNC: 6.1 G/DL — SIGNIFICANT CHANGE UP (ref 6–8.3)
RBC # BLD: 3.39 M/UL — LOW (ref 3.8–5.2)
RBC # FLD: 11.9 % — SIGNIFICANT CHANGE UP (ref 10.3–14.5)
SARS-COV-2 IGG SERPL QL IA: POSITIVE
SARS-COV-2 IGM SERPL IA-ACNC: 27.1 INDEX — HIGH
SODIUM SERPL-SCNC: 144 MMOL/L — SIGNIFICANT CHANGE UP (ref 135–145)
WBC # BLD: 7.85 K/UL — SIGNIFICANT CHANGE UP (ref 3.8–10.5)
WBC # FLD AUTO: 7.85 K/UL — SIGNIFICANT CHANGE UP (ref 3.8–10.5)

## 2020-09-08 PROCEDURE — 93970 EXTREMITY STUDY: CPT | Mod: 26,CS

## 2020-09-08 PROCEDURE — 99223 1ST HOSP IP/OBS HIGH 75: CPT | Mod: CS

## 2020-09-08 RX ORDER — OXYCODONE HYDROCHLORIDE 5 MG/1
10 TABLET ORAL EVERY 8 HOURS
Refills: 0 | Status: DISCONTINUED | OUTPATIENT
Start: 2020-09-08 | End: 2020-09-09

## 2020-09-08 RX ORDER — TAPENTADOL HYDROCHLORIDE 50 MG/1
100 TABLET, FILM COATED ORAL DAILY
Refills: 0 | Status: DISCONTINUED | OUTPATIENT
Start: 2020-09-08 | End: 2020-09-08

## 2020-09-08 RX ORDER — CYCLOBENZAPRINE HYDROCHLORIDE 10 MG/1
10 TABLET, FILM COATED ORAL ONCE
Refills: 0 | Status: COMPLETED | OUTPATIENT
Start: 2020-09-08 | End: 2020-09-08

## 2020-09-08 RX ADMIN — FLUCONAZOLE 200 MILLIGRAM(S): 150 TABLET ORAL at 09:24

## 2020-09-08 RX ADMIN — PANTOPRAZOLE SODIUM 40 MILLIGRAM(S): 20 TABLET, DELAYED RELEASE ORAL at 21:12

## 2020-09-08 RX ADMIN — Medication 5 MILLIGRAM(S): at 04:53

## 2020-09-08 RX ADMIN — Medication 1 TABLET(S): at 13:59

## 2020-09-08 RX ADMIN — Medication 1 APPLICATION(S): at 04:53

## 2020-09-08 RX ADMIN — Medication 1 TABLET(S): at 14:05

## 2020-09-08 RX ADMIN — MONTELUKAST 10 MILLIGRAM(S): 4 TABLET, CHEWABLE ORAL at 21:07

## 2020-09-08 RX ADMIN — HEPARIN SODIUM 1300 UNIT(S)/HR: 5000 INJECTION INTRAVENOUS; SUBCUTANEOUS at 04:52

## 2020-09-08 RX ADMIN — HEPARIN SODIUM 1300 UNIT(S)/HR: 5000 INJECTION INTRAVENOUS; SUBCUTANEOUS at 19:12

## 2020-09-08 RX ADMIN — LAMOTRIGINE 150 MILLIGRAM(S): 25 TABLET, ORALLY DISINTEGRATING ORAL at 09:23

## 2020-09-08 RX ADMIN — LIDOCAINE 1 PATCH: 4 CREAM TOPICAL at 14:30

## 2020-09-08 RX ADMIN — Medication 3 MILLIGRAM(S): at 21:07

## 2020-09-08 RX ADMIN — QUETIAPINE FUMARATE 200 MILLIGRAM(S): 200 TABLET, FILM COATED ORAL at 18:17

## 2020-09-08 RX ADMIN — Medication 1 MILLIGRAM(S): at 09:24

## 2020-09-08 RX ADMIN — PREGABALIN 1000 MICROGRAM(S): 225 CAPSULE ORAL at 13:59

## 2020-09-08 RX ADMIN — Medication 1000 MILLIGRAM(S): at 14:00

## 2020-09-08 RX ADMIN — LISDEXAMFETAMINE DIMESYLATE 60 MILLIGRAM(S): 70 CAPSULE ORAL at 09:27

## 2020-09-08 RX ADMIN — REMDESIVIR 500 MILLIGRAM(S): 5 INJECTION INTRAVENOUS at 21:06

## 2020-09-08 RX ADMIN — HEPARIN SODIUM 0 UNIT(S)/HR: 5000 INJECTION INTRAVENOUS; SUBCUTANEOUS at 03:47

## 2020-09-08 RX ADMIN — VORTIOXETINE 10 MILLIGRAM(S): 5 TABLET, FILM COATED ORAL at 09:24

## 2020-09-08 RX ADMIN — SERTRALINE 200 MILLIGRAM(S): 25 TABLET, FILM COATED ORAL at 09:24

## 2020-09-08 RX ADMIN — HEPARIN SODIUM 1300 UNIT(S)/HR: 5000 INJECTION INTRAVENOUS; SUBCUTANEOUS at 12:10

## 2020-09-08 RX ADMIN — OXYCODONE HYDROCHLORIDE 10 MILLIGRAM(S): 5 TABLET ORAL at 18:29

## 2020-09-08 RX ADMIN — Medication 1 TABLET(S): at 21:06

## 2020-09-08 RX ADMIN — Medication 325 MILLIGRAM(S): at 21:07

## 2020-09-08 RX ADMIN — Medication 6 MILLIGRAM(S): at 14:00

## 2020-09-08 RX ADMIN — Medication 10 MILLIGRAM(S): at 21:07

## 2020-09-08 RX ADMIN — LIDOCAINE 1 PATCH: 4 CREAM TOPICAL at 20:00

## 2020-09-08 RX ADMIN — Medication 1.5 MILLIGRAM(S): at 13:59

## 2020-09-08 RX ADMIN — GABAPENTIN 600 MILLIGRAM(S): 400 CAPSULE ORAL at 21:06

## 2020-09-08 RX ADMIN — Medication 81 MILLIGRAM(S): at 14:00

## 2020-09-08 NOTE — CONSULT NOTE ADULT - PROBLEM SELECTOR RECOMMENDATION 2
Small filling defect identified within segmental to subsegmental branches of the RUL pulmonary artery   -Likely provoked 2nd to hypercoagulable state from COVID  -C/w Heparin gtt for now  -Will need AC x at least 3 months  -F/u TTE  -F/u LE duplex  -Check tropin and pro-BNP with AM labs

## 2020-09-08 NOTE — PROGRESS NOTE ADULT - ASSESSMENT
64yo lady      with PMH of OA, chronic pain, sleep apnea, on CPAP, psoriatic arthritis, htn, anemia, mood disorder, history of falls,     recently diagnosed with COVID19 infection (8/29/2020)       who presents to the ED with COVID pneumonia and new onset segmental and subsegmental PE  on iv heparin  pulm dr sarina ELIZABETH / melina ,  on remdisivir  on home meds  for   c/c  pain/  deoression    c/c  anemia  .

## 2020-09-08 NOTE — CONSULT NOTE ADULT - ASSESSMENT
65F with OA, chronic pain, obesity (BMI = 38.4), sleep apnea, on CPAP, psoriatic arthritis - off of biologic, htn, anemia, mood disorder, history of falls, ventral hernia diagnosed with COVID19 on (8/29/2020) with hypoxia and noted to have Pulmonary embolism    Day # 10 since diagnosis  elevated antibodies suggest longer time since infection: (grandchildrens  developed covid from her son and is currently on vent at [facility])  moderate elevation in inflammatory markers  COVID complicated by PE    Suggest  Continue Remdisivir 5 day course 9/7-->  On anticoagulation with Heparin  Decadron 6 mg x 10 days - most beneficial in ventilated patients - would consider taper off in 4-5 days

## 2020-09-08 NOTE — PROGRESS NOTE ADULT - PROBLEM SELECTOR PLAN 2
Will switch from lovenox to heparin gtt so that it can be quickly discontinued if she has any bleeding related to known hernia.  Pt currently hemodynamically stable.  Check TTE.  Check BLE VA duplex US.

## 2020-09-08 NOTE — CONSULT NOTE ADULT - PROBLEM SELECTOR RECOMMENDATION 9
-C/w remdesivir for now (started 9/8). Monitor creatinine/LFTs.  -C/w Decadron 6mg IV for now. Will likely limit to 5 day course pending clinical improvement.

## 2020-09-08 NOTE — PROVIDER CONTACT NOTE (MEDICATION) - SITUATION
it is unknown if patient has correct doasage of home med nuncynta 100 mg. pt admitted to PICU 9/7 with medications in clear ziploc bags w/ no lablels. no container/label for nucynta upon admission. pharmacy unable to verify. hospital pharmacy does not have nucynta in stock. pt missed 9/7 nuncynta x2 doses and will miss 9/8 two doses. as per pharmacy will have nucynta delivered tomorrow 9/9 it is unknown if patient has correct doasage of home med nuncynta 100 mg. pt admitted to PICU 9/7 with medications in clear ziploc bags w/ no lablels. no container/label for nucynta upon admission. As per pharmacy currently unable to verify this medication without correct label. hospital pharmacy does not have nucynta in stock. pt missed 9/7 nuncynta x2 doses and will miss 9/8 two doses. as per pharmacy will have nucynta delivered tomorrow 9/9.
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
## 2020-09-08 NOTE — CONSULT NOTE ADULT - PROBLEM SELECTOR RECOMMENDATION 3
2nd to viral PNA + PE  -O2 lowered from 3LNC to 2LNC. Continue to wean as tolerated, keep sats >88%.

## 2020-09-08 NOTE — PROVIDER CONTACT NOTE (MEDICATION) - ACTION/TREATMENT ORDERED:
NP notified. as per provider, patient to wait until tomorrow when nucynta is delivered to pharmacy to begin taking medication. do not give nucynta from home without label. NP notified. as per provider, patient to wait until tomorrow when nucynta is delivered to pharmacy to begin taking medication. do not give nucynta from home until pharmacy is able to verify.

## 2020-09-08 NOTE — PROGRESS NOTE ADULT - SUBJECTIVE AND OBJECTIVE BOX
afberile    REVIEW OF SYSTEMS:  GEN: no fever,    no chills  RESP: no SOB,   no cough  CVS: no chest pain,   no palpitations  GI: no abdominal pain,   no nausea,   no vomiting,   no constipation,   no diarrhea  : no dysuria,   no frequency  NEURO: no headache,   no dizziness  PSYCH: no depression,   not anxious  Derm : no rash    MEDICATIONS  (STANDING):  ascorbic acid 1000 milliGRAM(s) Oral daily  aspirin enteric coated 81 milliGRAM(s) Oral <User Schedule>  calcium carbonate 1250 mG  + Vitamin D (OsCal 500 + D) 1 Tablet(s) Oral daily  clobetasol 0.05% Ointment 1 Application(s) Topical two times a day  clonazePAM  Tablet 1 milliGRAM(s) Oral daily  clonazePAM  Tablet 1.5 milliGRAM(s) Oral daily  clonazePAM  Tablet 3 milliGRAM(s) Oral at bedtime  cyanocobalamin 1000 MICROGram(s) Oral daily  dexAMETHasone  Injectable 6 milliGRAM(s) IV Push daily  duobrii lotion (halobetasol/tazarotene) 1 Application(s) 1 Application(s) Topical two times a day  enalapril 5 milliGRAM(s) Oral daily  ferrous    sulfate 325 milliGRAM(s) Oral at bedtime  fluconAZOLE   Tablet 200 milliGRAM(s) Oral <User Schedule>  gabapentin 600 milliGRAM(s) Oral at bedtime  heparin  Infusion.  Unit(s)/Hr (18 mL/Hr) IV Continuous <Continuous>  influenza   Vaccine 0.5 milliLiter(s) IntraMuscular once  lactobacillus acidophilus 1 Tablet(s) Oral at bedtime  lamoTRIgine 150 milliGRAM(s) Oral daily  lisdexamfetamine 60 milliGRAM(s) Oral with breakfast  montelukast 10 milliGRAM(s) Oral at bedtime  multivitamin/minerals 1 Tablet(s) Oral daily  omega-3-Acid Ethyl Esters 2 Gram(s) Oral daily  oxybutynin 10 milliGRAM(s) Oral at bedtime  pantoprazole    Tablet 40 milliGRAM(s) Oral at bedtime  QUEtiapine 200 milliGRAM(s) Oral <User Schedule>  remdesivir  IVPB   IV Intermittent   remdesivir  IVPB 100 milliGRAM(s) IV Intermittent every 24 hours  sertraline 200 milliGRAM(s) Oral daily  tapentadol 100 milliGRAM(s) Oral daily  tazarotene cream 0.1 % 1 Application(s) 1 Application(s) Topical two times a day  vortioxetine 10 milliGRAM(s) Oral daily    MEDICATIONS  (PRN):  ALBUTerol    90 MICROgram(s) HFA Inhaler 2 Puff(s) Inhalation every 6 hours PRN Shortness of Breath and/or Wheezing  gabapentin 400 milliGRAM(s) Oral two times a day PRN nerve pain  heparin   Injectable 8500 Unit(s) IV Push every 6 hours PRN For aPTT less than 40  heparin   Injectable 4000 Unit(s) IV Push every 6 hours PRN For aPTT between 40 - 57  lidocaine   Patch 1 Patch Transdermal daily PRN localized pain  oxyCODONE    IR 15 milliGRAM(s) Oral two times a day PRN Severe Pain (7 - 10)      Vital Signs Last 24 Hrs  T(C): 36.2 (08 Sep 2020 05:07), Max: 37.1 (08 Sep 2020 01:26)  T(F): 97.1 (08 Sep 2020 05:07), Max: 98.7 (08 Sep 2020 01:26)  HR: 68 (08 Sep 2020 05:07) (60 - 90)  BP: 131/75 (08 Sep 2020 05:07) (103/56 - 131/75)  BP(mean): --  RR: 19 (08 Sep 2020 05:07) (18 - 19)  SpO2: 98% (08 Sep 2020 05:07) (94% - 99%)  CAPILLARY BLOOD GLUCOSE        I&O's Summary    07 Sep 2020 07:01  -  08 Sep 2020 07:00  --------------------------------------------------------  IN: 0 mL / OUT: 500 mL / NET: -500 mL        PHYSICAL EXAM: per  ID                        9.2    7.85  )-----------( 276      ( 08 Sep 2020 06:07 )             30.5     09-08    144  |  105  |  10  ----------------------------<  83  3.7   |  26  |  0.40<L>    Ca    8.9      08 Sep 2020 06:08    TPro  6.1  /  Alb  3.3  /  TBili  0.2  /  DBili  0.1  /  AST  20  /  ALT  14  /  AlkPhos  67  09-08    PT/INR - ( 07 Sep 2020 00:17 )   PT: 11.9 sec;   INR: 1.00 ratio         PTT - ( 08 Sep 2020 02:56 )  PTT:146.6 sec            09-06 @ 18:58  4.0  33              Consultant(s) Notes Reviewed:      Care Discussed with Consultants/Other Providers:

## 2020-09-08 NOTE — PROGRESS NOTE ADULT - SUBJECTIVE AND OBJECTIVE BOX
CARDIOLOGY     PROGRESS  NOTE   ________________________________________________    CHIEF COMPLAINT:Patient is a 65y old  Female who presents with a chief complaint of dyspnea (08 Sep 2020 08:11)  no complain.  	  REVIEW OF SYSTEMS:  CONSTITUTIONAL: No fever, weight loss, or fatigue  EYES: No eye pain, visual disturbances, or discharge  ENT:  No difficulty hearing, tinnitus, vertigo; No sinus or throat pain  NECK: No pain or stiffness  RESPIRATORY: No cough, wheezing, chills or hemoptysis; No Shortness of Breath  CARDIOVASCULAR: No chest pain, palpitations, passing out, dizziness, or leg swelling  GASTROINTESTINAL: No abdominal or epigastric pain. No nausea, vomiting, or hematemesis; No diarrhea or constipation. No melena or hematochezia.  GENITOURINARY: No dysuria, frequency, hematuria, or incontinence  NEUROLOGICAL: No headaches, memory loss, loss of strength, numbness, or tremors  SKIN: No itching, burning, rashes, or lesions   LYMPH Nodes: No enlarged glands  ENDOCRINE: No heat or cold intolerance; No hair loss  MUSCULOSKELETAL: No joint pain or swelling; No muscle, back, or extremity pain  PSYCHIATRIC: No depression, anxiety, mood swings, or difficulty sleeping  HEME/LYMPH: No easy bruising, or bleeding gums  ALLERGY AND IMMUNOLOGIC: No hives or eczema	    [ ] All others negative	  [ ] Unable to obtain    PHYSICAL EXAM:  T(C): 37.1 (09-08-20 @ 09:08), Max: 37.1 (09-08-20 @ 01:26)  HR: 77 (09-08-20 @ 09:08) (60 - 90)  BP: 143/79 (09-08-20 @ 09:08) (103/56 - 143/79)  RR: 19 (09-08-20 @ 09:08) (18 - 19)  SpO2: 98% (09-08-20 @ 09:08) (94% - 99%)  Wt(kg): --  I&O's Summary    07 Sep 2020 07:01  -  08 Sep 2020 07:00  --------------------------------------------------------  IN: 0 mL / OUT: 500 mL / NET: -500 mL      not examined  Appearance: Normal	  HEENT:   Normal oral mucosa, PERRL, EOMI	  Lymphatic: No lymphadenopathy  Cardiovascular: Normal S1 S2, No JVD, No murmurs, No edema  Respiratory: Lungs clear to auscultation	  Psychiatry: A & O x 3, Mood & affect appropriate  Gastrointestinal:  Soft, Non-tender, + BS	  Skin: No rashes, No ecchymoses, No cyanosis	  Neurologic: Non-focal  Extremities: Normal range of motion, No clubbing, cyanosis or edema  Vascular: Peripheral pulses palpable 2+ bilaterally    MEDICATIONS  (STANDING):  ascorbic acid 1000 milliGRAM(s) Oral daily  aspirin enteric coated 81 milliGRAM(s) Oral <User Schedule>  calcium carbonate 1250 mG  + Vitamin D (OsCal 500 + D) 1 Tablet(s) Oral daily  clobetasol 0.05% Ointment 1 Application(s) Topical two times a day  clonazePAM  Tablet 1 milliGRAM(s) Oral daily  clonazePAM  Tablet 1.5 milliGRAM(s) Oral daily  clonazePAM  Tablet 3 milliGRAM(s) Oral at bedtime  cyanocobalamin 1000 MICROGram(s) Oral daily  dexAMETHasone  Injectable 6 milliGRAM(s) IV Push daily  duobrii lotion (halobetasol/tazarotene) 1 Application(s) 1 Application(s) Topical two times a day  enalapril 5 milliGRAM(s) Oral daily  ferrous    sulfate 325 milliGRAM(s) Oral at bedtime  fluconAZOLE   Tablet 200 milliGRAM(s) Oral <User Schedule>  gabapentin 600 milliGRAM(s) Oral at bedtime  heparin  Infusion.  Unit(s)/Hr (18 mL/Hr) IV Continuous <Continuous>  influenza   Vaccine 0.5 milliLiter(s) IntraMuscular once  lactobacillus acidophilus 1 Tablet(s) Oral at bedtime  lamoTRIgine 150 milliGRAM(s) Oral daily  lisdexamfetamine 60 milliGRAM(s) Oral with breakfast  montelukast 10 milliGRAM(s) Oral at bedtime  multivitamin/minerals 1 Tablet(s) Oral daily  omega-3-Acid Ethyl Esters 2 Gram(s) Oral daily  oxybutynin 10 milliGRAM(s) Oral at bedtime  pantoprazole    Tablet 40 milliGRAM(s) Oral at bedtime  QUEtiapine 200 milliGRAM(s) Oral <User Schedule>  remdesivir  IVPB   IV Intermittent   remdesivir  IVPB 100 milliGRAM(s) IV Intermittent every 24 hours  sertraline 200 milliGRAM(s) Oral daily  tapentadol 100 milliGRAM(s) Oral daily  tazarotene cream 0.1 % 1 Application(s) 1 Application(s) Topical two times a day  vortioxetine 10 milliGRAM(s) Oral daily      TELEMETRY: 	    ECG:  	  RADIOLOGY:  OTHER: 	  	  LABS:	 	    CARDIAC MARKERS:                                9.2    7.85  )-----------( 276      ( 08 Sep 2020 06:07 )             30.5     09-08    144  |  105  |  10  ----------------------------<  83  3.7   |  26  |  0.40<L>    Ca    8.9      08 Sep 2020 06:08    TPro  6.1  /  Alb  3.3  /  TBili  0.2  /  DBili  0.1  /  AST  20  /  ALT  14  /  AlkPhos  67  09-08    proBNP:   Lipid Profile:   HgA1c:   TSH:   PT/INR - ( 07 Sep 2020 00:17 )   PT: 11.9 sec;   INR: 1.00 ratio         PTT - ( 08 Sep 2020 02:56 )  PTT:146.6 sec    < from: Nuclear Stress Test-Pharmacologic (06.10.19 @ 16:09) >  * Chest Pain: No chest pain with administration of  Regadenoson.  * Symptom: SOB and nausea started 2 minutes post injection  and resolved after3 minutes.  * HR Response: Appropriate.  * BP Response: Appropriate.  * Heart Rhythm: Sinus Rhythm - 75 BPM.  * Baseline ECG: No significant ST abnormalities.  * ECG Abnormalities: No ischemic ECG changes.  * Arrhythmia: None.  * The left ventricle was normal in size. There are medium  sized, mild defects in anterior, inferior, inferolateral,  inferoseptal walls that are predominantly fixed, mostly  correct with prone imaging, and have normal wall motion  suggestive of attenuation artifact.  * Post-stress gated wall motion analysis was performed  (LVEF = 67 %;LVEDV= 90 ml.), revealing normal LV  function.      Assessment and plan  ---------------------------  This patient is a 64yo lady with PMH of OA, chronic pain, sleep apnea, on CPAP, psoriatic arthritis, htn, anemia, mood disorder, history of falls, recently diagnosed with COVID19 infection (8/29/2020) who presents to the ED with complaint of worsening dyspnea. Her daughter, son-in-law and grandchildren had gotten diagnosed with COVID19. Because of her interaction with them, she was tested, and found positive as well. She was sent home from urgent care with O2 via NC, albuterol inhaler and visiting nurse services. The patient had visited [facility] 3 days prior to admission due to worsening SOB and was discharged on prednisone and azithromycin, which she has continued to take. Despite these medications, she had worsening dyspnea and had to inc O2via NC from 2L to 3L.   Yesterday morning, she found her O2 sat of 86% on 3L NC, thus came to the ED.  sob sec to Covid and acute PE  agree with ac  echo to check RV function as well as check for pericardial effusion  le venous doppler r/o dvt  repeat ecg  asa daily  started on Remdesevir  stess test noted from 06/19  ?change heparin to lovenox

## 2020-09-08 NOTE — PROGRESS NOTE ADULT - PROBLEM SELECTOR PLAN 1
Maintain O2 > 92%.  Continue dexamethasone 6mg qdaily.  Consult ID in AM to determine if pt is a candidate for clinical trial or remdesivir use.  Trend CBC, BMP, ferritin, LDH

## 2020-09-08 NOTE — CONSULT NOTE ADULT - ASSESSMENT
64y/o F with PMH JANE on CPAP, OA, chronic pain, psoriatic arthritis, HTN, anemia, mood disorder, history of falls. Recently diagnosed with COVID19 infection (8/29/2020) who presents to the ED with complaint of worsening dyspnea. She was sent home from urgent care with O2 via NC, albuterol inhaler and visiting nurse services. The patient had visited [facility] 3 days prior to admission due to worsening SOB and was discharged on prednisone and azithromycin, which she has continued to take. Despite these medications, she had worsening dyspnea and had to inc O2 via NC from 2L to 3L. +Mild SOB, cough. CTA chest +PE.
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
## 2020-09-08 NOTE — CHART NOTE - NSCHARTNOTEFT_GEN_A_CORE
Patient on Nucynta 100mg twice daily - unavailable in the inpt pharmacy - will be available only on 9/9/20.  Per Dr. Smith discussed with pain management and recommended Oxycodone 10mg Q 8 hours ATC until Nucynta is available to prevent withdrawal    [id number]

## 2020-09-09 LAB
ALBUMIN SERPL ELPH-MCNC: 3.3 G/DL — SIGNIFICANT CHANGE UP (ref 3.3–5)
ALP SERPL-CCNC: 66 U/L — SIGNIFICANT CHANGE UP (ref 40–120)
ALT FLD-CCNC: 14 U/L — SIGNIFICANT CHANGE UP (ref 10–45)
ANION GAP SERPL CALC-SCNC: 12 MMOL/L — SIGNIFICANT CHANGE UP (ref 5–17)
APTT BLD: 92.9 SEC — HIGH (ref 27.5–35.5)
AST SERPL-CCNC: 18 U/L — SIGNIFICANT CHANGE UP (ref 10–40)
BILIRUB DIRECT SERPL-MCNC: <0.1 MG/DL — SIGNIFICANT CHANGE UP (ref 0–0.2)
BILIRUB INDIRECT FLD-MCNC: >0.1 MG/DL — LOW (ref 0.2–1)
BILIRUB SERPL-MCNC: 0.2 MG/DL — SIGNIFICANT CHANGE UP (ref 0.2–1.2)
BUN SERPL-MCNC: 10 MG/DL — SIGNIFICANT CHANGE UP (ref 7–23)
CALCIUM SERPL-MCNC: 9 MG/DL — SIGNIFICANT CHANGE UP (ref 8.4–10.5)
CHLORIDE SERPL-SCNC: 101 MMOL/L — SIGNIFICANT CHANGE UP (ref 96–108)
CO2 SERPL-SCNC: 26 MMOL/L — SIGNIFICANT CHANGE UP (ref 22–31)
CREAT SERPL-MCNC: 0.39 MG/DL — LOW (ref 0.5–1.3)
CREAT SERPL-MCNC: 0.43 MG/DL — LOW (ref 0.5–1.3)
GLUCOSE SERPL-MCNC: 94 MG/DL — SIGNIFICANT CHANGE UP (ref 70–99)
HCT VFR BLD CALC: 29.2 % — LOW (ref 34.5–45)
HGB BLD-MCNC: 9 G/DL — LOW (ref 11.5–15.5)
MCHC RBC-ENTMCNC: 27.5 PG — SIGNIFICANT CHANGE UP (ref 27–34)
MCHC RBC-ENTMCNC: 30.8 GM/DL — LOW (ref 32–36)
MCV RBC AUTO: 89.3 FL — SIGNIFICANT CHANGE UP (ref 80–100)
NRBC # BLD: 0 /100 WBCS — SIGNIFICANT CHANGE UP (ref 0–0)
NT-PROBNP SERPL-SCNC: 488 PG/ML — HIGH (ref 0–300)
PLATELET # BLD AUTO: 303 K/UL — SIGNIFICANT CHANGE UP (ref 150–400)
POTASSIUM SERPL-MCNC: 3.8 MMOL/L — SIGNIFICANT CHANGE UP (ref 3.5–5.3)
POTASSIUM SERPL-SCNC: 3.8 MMOL/L — SIGNIFICANT CHANGE UP (ref 3.5–5.3)
PROT SERPL-MCNC: 6.3 G/DL — SIGNIFICANT CHANGE UP (ref 6–8.3)
RBC # BLD: 3.27 M/UL — LOW (ref 3.8–5.2)
RBC # FLD: 12 % — SIGNIFICANT CHANGE UP (ref 10.3–14.5)
SODIUM SERPL-SCNC: 139 MMOL/L — SIGNIFICANT CHANGE UP (ref 135–145)
TROPONIN T, HIGH SENSITIVITY RESULT: <6 NG/L — SIGNIFICANT CHANGE UP (ref 0–51)
WBC # BLD: 8.89 K/UL — SIGNIFICANT CHANGE UP (ref 3.8–10.5)
WBC # FLD AUTO: 8.89 K/UL — SIGNIFICANT CHANGE UP (ref 3.8–10.5)

## 2020-09-09 PROCEDURE — 99232 SBSQ HOSP IP/OBS MODERATE 35: CPT | Mod: CS

## 2020-09-09 RX ADMIN — OXYCODONE HYDROCHLORIDE 10 MILLIGRAM(S): 5 TABLET ORAL at 09:54

## 2020-09-09 RX ADMIN — LIDOCAINE 1 PATCH: 4 CREAM TOPICAL at 02:00

## 2020-09-09 RX ADMIN — CYCLOBENZAPRINE HYDROCHLORIDE 10 MILLIGRAM(S): 10 TABLET, FILM COATED ORAL at 00:52

## 2020-09-09 RX ADMIN — VORTIOXETINE 10 MILLIGRAM(S): 5 TABLET, FILM COATED ORAL at 09:50

## 2020-09-09 RX ADMIN — OXYCODONE HYDROCHLORIDE 10 MILLIGRAM(S): 5 TABLET ORAL at 02:45

## 2020-09-09 RX ADMIN — Medication 1 TABLET(S): at 21:33

## 2020-09-09 RX ADMIN — PREGABALIN 1000 MICROGRAM(S): 225 CAPSULE ORAL at 09:41

## 2020-09-09 RX ADMIN — PANTOPRAZOLE SODIUM 40 MILLIGRAM(S): 20 TABLET, DELAYED RELEASE ORAL at 21:33

## 2020-09-09 RX ADMIN — REMDESIVIR 500 MILLIGRAM(S): 5 INJECTION INTRAVENOUS at 21:33

## 2020-09-09 RX ADMIN — HEPARIN SODIUM 1300 UNIT(S)/HR: 5000 INJECTION INTRAVENOUS; SUBCUTANEOUS at 06:48

## 2020-09-09 RX ADMIN — LAMOTRIGINE 150 MILLIGRAM(S): 25 TABLET, ORALLY DISINTEGRATING ORAL at 14:25

## 2020-09-09 RX ADMIN — Medication 1 MILLIGRAM(S): at 09:50

## 2020-09-09 RX ADMIN — Medication 1 TABLET(S): at 09:42

## 2020-09-09 RX ADMIN — Medication 10 MILLIGRAM(S): at 21:33

## 2020-09-09 RX ADMIN — QUETIAPINE FUMARATE 200 MILLIGRAM(S): 200 TABLET, FILM COATED ORAL at 18:02

## 2020-09-09 RX ADMIN — Medication 1 TABLET(S): at 09:39

## 2020-09-09 RX ADMIN — Medication 325 MILLIGRAM(S): at 21:33

## 2020-09-09 RX ADMIN — GABAPENTIN 600 MILLIGRAM(S): 400 CAPSULE ORAL at 21:33

## 2020-09-09 RX ADMIN — Medication 6 MILLIGRAM(S): at 09:41

## 2020-09-09 RX ADMIN — OXYCODONE HYDROCHLORIDE 10 MILLIGRAM(S): 5 TABLET ORAL at 02:13

## 2020-09-09 RX ADMIN — SERTRALINE 200 MILLIGRAM(S): 25 TABLET, FILM COATED ORAL at 09:39

## 2020-09-09 RX ADMIN — Medication 2 GRAM(S): at 09:42

## 2020-09-09 RX ADMIN — Medication 100 MILLIGRAM(S): at 18:04

## 2020-09-09 RX ADMIN — MONTELUKAST 10 MILLIGRAM(S): 4 TABLET, CHEWABLE ORAL at 21:33

## 2020-09-09 RX ADMIN — LISDEXAMFETAMINE DIMESYLATE 60 MILLIGRAM(S): 70 CAPSULE ORAL at 09:55

## 2020-09-09 RX ADMIN — Medication 5 MILLIGRAM(S): at 05:12

## 2020-09-09 RX ADMIN — FLUCONAZOLE 200 MILLIGRAM(S): 150 TABLET ORAL at 09:42

## 2020-09-09 RX ADMIN — Medication 81 MILLIGRAM(S): at 14:25

## 2020-09-09 RX ADMIN — Medication 1000 MILLIGRAM(S): at 09:39

## 2020-09-09 RX ADMIN — Medication 3 MILLIGRAM(S): at 21:53

## 2020-09-09 RX ADMIN — Medication 1.5 MILLIGRAM(S): at 14:25

## 2020-09-09 RX ADMIN — Medication 1 APPLICATION(S): at 05:13

## 2020-09-09 NOTE — PHYSICAL THERAPY INITIAL EVALUATION ADULT - PERTINENT HX OF CURRENT PROBLEM, REHAB EVAL
Pt is a 65F PMH HTN, JANE on CPAP, asthma, anxiety, OA, Chronic Pain,  p/w fever, cough, SOB & hypoxia. Diagnosed with COVID at urgent care 1.5 weeks ago. Discharged from  on 2L home oxygen prn (Per records, at that time RA sat 96-98%, Ambulatory Sat 92-94%). Pt has been using 2-3L NC constantly since. Went to [facility] two days ago due to worsening SOB despite supp ox, discharged on prednisone & azithromycin. Continued below.

## 2020-09-09 NOTE — PROGRESS NOTE ADULT - PROBLEM SELECTOR PLAN 2
Will switch from lovenox to heparin gtt so that it can be quickly discontinued if she has any bleeding related to known hernia.  Pt currently hemodynamically stable.  Check TTE.  Check BLE VA duplex US. Will switch from lovenox to heparin gtt so that it can be quickly discontinued if she has any bleeding related to known hernia. Pt currently hemodynamically stable.  Check TTE. VA venous duplex US negative for DVT.

## 2020-09-09 NOTE — PROGRESS NOTE ADULT - SUBJECTIVE AND OBJECTIVE BOX
INTERVAL HPI/OVERNIGHT EVENTS:  Pt seen and examined at bedside.     Allergies/Intolerance: Cymbalta (Diarrhea)  No Known Allergies      MEDICATIONS  (STANDING):  ascorbic acid 1000 milliGRAM(s) Oral daily  aspirin enteric coated 81 milliGRAM(s) Oral <User Schedule>  calcium carbonate 1250 mG  + Vitamin D (OsCal 500 + D) 1 Tablet(s) Oral daily  clobetasol 0.05% Ointment 1 Application(s) Topical two times a day  clonazePAM  Tablet 1 milliGRAM(s) Oral daily  clonazePAM  Tablet 1.5 milliGRAM(s) Oral daily  clonazePAM  Tablet 3 milliGRAM(s) Oral at bedtime  cyanocobalamin 1000 MICROGram(s) Oral daily  dexAMETHasone  Injectable 6 milliGRAM(s) IV Push daily  duobrii lotion (halobetasol/tazarotene) 1 Application(s) 1 Application(s) Topical two times a day  enalapril 5 milliGRAM(s) Oral daily  ferrous    sulfate 325 milliGRAM(s) Oral at bedtime  fluconAZOLE   Tablet 200 milliGRAM(s) Oral <User Schedule>  gabapentin 600 milliGRAM(s) Oral at bedtime  heparin  Infusion.  Unit(s)/Hr (18 mL/Hr) IV Continuous <Continuous>  influenza   Vaccine 0.5 milliLiter(s) IntraMuscular once  lactobacillus acidophilus 1 Tablet(s) Oral at bedtime  lamoTRIgine 150 milliGRAM(s) Oral daily  lisdexamfetamine 60 milliGRAM(s) Oral with breakfast  montelukast 10 milliGRAM(s) Oral at bedtime  multivitamin/minerals 1 Tablet(s) Oral daily  NUCYNTA  Extended-Release 100 milliGRAM(s) 100 milliGRAM(s) Oral every 12 hours  omega-3-Acid Ethyl Esters 2 Gram(s) Oral daily  oxybutynin 10 milliGRAM(s) Oral at bedtime  pantoprazole    Tablet 40 milliGRAM(s) Oral at bedtime  QUEtiapine 200 milliGRAM(s) Oral <User Schedule>  remdesivir  IVPB   IV Intermittent   remdesivir  IVPB 100 milliGRAM(s) IV Intermittent every 24 hours  sertraline 200 milliGRAM(s) Oral daily  tazarotene cream 0.1 % 1 Application(s) 1 Application(s) Topical two times a day  vortioxetine 10 milliGRAM(s) Oral daily    MEDICATIONS  (PRN):  ALBUTerol    90 MICROgram(s) HFA Inhaler 2 Puff(s) Inhalation every 6 hours PRN Shortness of Breath and/or Wheezing  gabapentin 400 milliGRAM(s) Oral two times a day PRN nerve pain  heparin   Injectable 8500 Unit(s) IV Push every 6 hours PRN For aPTT less than 40  heparin   Injectable 4000 Unit(s) IV Push every 6 hours PRN For aPTT between 40 - 57  lidocaine   Patch 1 Patch Transdermal daily PRN localized pain  oxyCODONE    IR 15 milliGRAM(s) Oral two times a day PRN Severe Pain (7 - 10)        ROS: all systems reviewed and wnl      PHYSICAL EXAMINATION:  Vital Signs Last 24 Hrs  T(C): 36.7 (09 Sep 2020 10:16), Max: 37.3 (08 Sep 2020 14:06)  T(F): 98 (09 Sep 2020 10:16), Max: 99.2 (08 Sep 2020 14:06)  HR: 82 (09 Sep 2020 04:34) (73 - 95)  BP: 130/62 (09 Sep 2020 04:34) (104/64 - 131/83)  BP(mean): --  RR: 18 (09 Sep 2020 04:34) (18 - 18)  SpO2: 95% (09 Sep 2020 10:16) (84% - 98%)  CAPILLARY BLOOD GLUCOSE          09-08 @ 07:01  -  09-09 @ 07:00  --------------------------------------------------------  IN: 250 mL / OUT: 0 mL / NET: 250 mL        GENERAL:   NECK: supple, No JVD  CHEST/LUNG: clear to auscultation bilaterally; no rales, rhonchi, or wheezing b/l  HEART: normal S1, S2  ABDOMEN: BS+, soft, ND, NT   EXTREMITIES:  pulses palpable; no clubbing, cyanosis, or edema b/l LEs  SKIN: no rashes or lesions      LABS:                        9.0    8.89  )-----------( 303      ( 09 Sep 2020 06:07 )             29.2     09-09    139  |  101  |  10  ----------------------------<  94  3.8   |  26  |  0.39<L>    Ca    9.0      09 Sep 2020 06:07    TPro  6.3  /  Alb  3.3  /  TBili  0.2  /  DBili  <0.1  /  AST  18  /  ALT  14  /  AlkPhos  66  09-09    PTT - ( 09 Sep 2020 06:07 )  PTT:92.9 sec INTERVAL HPI/OVERNIGHT EVENTS:  Pt seen and examined at bedside.     Allergies/Intolerance: Cymbalta (Diarrhea)  No Known Allergies      MEDICATIONS  (STANDING):  ascorbic acid 1000 milliGRAM(s) Oral daily  aspirin enteric coated 81 milliGRAM(s) Oral <User Schedule>  calcium carbonate 1250 mG  + Vitamin D (OsCal 500 + D) 1 Tablet(s) Oral daily  clobetasol 0.05% Ointment 1 Application(s) Topical two times a day  clonazePAM  Tablet 1 milliGRAM(s) Oral daily  clonazePAM  Tablet 1.5 milliGRAM(s) Oral daily  clonazePAM  Tablet 3 milliGRAM(s) Oral at bedtime  cyanocobalamin 1000 MICROGram(s) Oral daily  dexAMETHasone  Injectable 6 milliGRAM(s) IV Push daily  duobrii lotion (halobetasol/tazarotene) 1 Application(s) 1 Application(s) Topical two times a day  enalapril 5 milliGRAM(s) Oral daily  ferrous    sulfate 325 milliGRAM(s) Oral at bedtime  fluconAZOLE   Tablet 200 milliGRAM(s) Oral <User Schedule>  gabapentin 600 milliGRAM(s) Oral at bedtime  heparin  Infusion.  Unit(s)/Hr (18 mL/Hr) IV Continuous <Continuous>  influenza   Vaccine 0.5 milliLiter(s) IntraMuscular once  lactobacillus acidophilus 1 Tablet(s) Oral at bedtime  lamoTRIgine 150 milliGRAM(s) Oral daily  lisdexamfetamine 60 milliGRAM(s) Oral with breakfast  montelukast 10 milliGRAM(s) Oral at bedtime  multivitamin/minerals 1 Tablet(s) Oral daily  NUCYNTA  Extended-Release 100 milliGRAM(s) 100 milliGRAM(s) Oral every 12 hours  omega-3-Acid Ethyl Esters 2 Gram(s) Oral daily  oxybutynin 10 milliGRAM(s) Oral at bedtime  pantoprazole    Tablet 40 milliGRAM(s) Oral at bedtime  QUEtiapine 200 milliGRAM(s) Oral <User Schedule>  remdesivir  IVPB   IV Intermittent   remdesivir  IVPB 100 milliGRAM(s) IV Intermittent every 24 hours  sertraline 200 milliGRAM(s) Oral daily  tazarotene cream 0.1 % 1 Application(s) 1 Application(s) Topical two times a day  vortioxetine 10 milliGRAM(s) Oral daily    MEDICATIONS  (PRN):  ALBUTerol    90 MICROgram(s) HFA Inhaler 2 Puff(s) Inhalation every 6 hours PRN Shortness of Breath and/or Wheezing  gabapentin 400 milliGRAM(s) Oral two times a day PRN nerve pain  heparin   Injectable 8500 Unit(s) IV Push every 6 hours PRN For aPTT less than 40  heparin   Injectable 4000 Unit(s) IV Push every 6 hours PRN For aPTT between 40 - 57  lidocaine   Patch 1 Patch Transdermal daily PRN localized pain  oxyCODONE    IR 15 milliGRAM(s) Oral two times a day PRN Severe Pain (7 - 10)        ROS: all systems reviewed and wnl      PHYSICAL EXAMINATION:  Vital Signs Last 24 Hrs  T(C): 36.7 (09 Sep 2020 10:16), Max: 37.3 (08 Sep 2020 14:06)  T(F): 98 (09 Sep 2020 10:16), Max: 99.2 (08 Sep 2020 14:06)  HR: 82 (09 Sep 2020 04:34) (73 - 95)  BP: 130/62 (09 Sep 2020 04:34) (104/64 - 131/83)  BP(mean): --  RR: 18 (09 Sep 2020 04:34) (18 - 18)  SpO2: 95% (09 Sep 2020 10:16) (84% - 98%)  CAPILLARY BLOOD GLUCOSE          09-08 @ 07:01  -  09-09 @ 07:00  --------------------------------------------------------  IN: 250 mL / OUT: 0 mL / NET: 250 mL        GENERAL: stable in oxygen NC, comfortable, no fevers or CP  NECK: supple, No JVD  CHEST/LUNG: clear to auscultation bilaterally; no rales, rhonchi, or wheezing b/l  HEART: normal S1, S2  ABDOMEN: BS+, soft, ND, NT   EXTREMITIES:  pulses palpable; no clubbing, cyanosis, or edema b/l LEs  SKIN: no rashes or lesions      LABS:                        9.0    8.89  )-----------( 303      ( 09 Sep 2020 06:07 )             29.2     09-09    139  |  101  |  10  ----------------------------<  94  3.8   |  26  |  0.39<L>    Ca    9.0      09 Sep 2020 06:07    TPro  6.3  /  Alb  3.3  /  TBili  0.2  /  DBili  <0.1  /  AST  18  /  ALT  14  /  AlkPhos  66  09-09    PTT - ( 09 Sep 2020 06:07 )  PTT:92.9 sec

## 2020-09-09 NOTE — PROGRESS NOTE ADULT - SUBJECTIVE AND OBJECTIVE BOX
Follow-up Pulm Progress Note    Cough worsening  No c/o dyspnea  Sats 98% on RA (o2 off x ~15 min)    Medications:  MEDICATIONS  (STANDING):  ascorbic acid 1000 milliGRAM(s) Oral daily  aspirin enteric coated 81 milliGRAM(s) Oral <User Schedule>  calcium carbonate 1250 mG  + Vitamin D (OsCal 500 + D) 1 Tablet(s) Oral daily  clobetasol 0.05% Ointment 1 Application(s) Topical two times a day  clonazePAM  Tablet 1 milliGRAM(s) Oral daily  clonazePAM  Tablet 1.5 milliGRAM(s) Oral daily  clonazePAM  Tablet 3 milliGRAM(s) Oral at bedtime  cyanocobalamin 1000 MICROGram(s) Oral daily  dexAMETHasone  Injectable 6 milliGRAM(s) IV Push daily  duobrii lotion (halobetasol/tazarotene) 1 Application(s) 1 Application(s) Topical two times a day  enalapril 5 milliGRAM(s) Oral daily  ferrous    sulfate 325 milliGRAM(s) Oral at bedtime  fluconAZOLE   Tablet 200 milliGRAM(s) Oral <User Schedule>  gabapentin 600 milliGRAM(s) Oral at bedtime  heparin  Infusion.  Unit(s)/Hr (18 mL/Hr) IV Continuous <Continuous>  influenza   Vaccine 0.5 milliLiter(s) IntraMuscular once  lactobacillus acidophilus 1 Tablet(s) Oral at bedtime  lamoTRIgine 150 milliGRAM(s) Oral daily  lisdexamfetamine 60 milliGRAM(s) Oral with breakfast  montelukast 10 milliGRAM(s) Oral at bedtime  multivitamin/minerals 1 Tablet(s) Oral daily  NUCYNTA  Extended-Release 100 milliGRAM(s) 100 milliGRAM(s) Oral every 12 hours  omega-3-Acid Ethyl Esters 2 Gram(s) Oral daily  oxybutynin 10 milliGRAM(s) Oral at bedtime  pantoprazole    Tablet 40 milliGRAM(s) Oral at bedtime  QUEtiapine 200 milliGRAM(s) Oral <User Schedule>  remdesivir  IVPB   IV Intermittent   remdesivir  IVPB 100 milliGRAM(s) IV Intermittent every 24 hours  sertraline 200 milliGRAM(s) Oral daily  tazarotene cream 0.1 % 1 Application(s) 1 Application(s) Topical two times a day  vortioxetine 10 milliGRAM(s) Oral daily    MEDICATIONS  (PRN):  ALBUTerol    90 MICROgram(s) HFA Inhaler 2 Puff(s) Inhalation every 6 hours PRN Shortness of Breath and/or Wheezing  gabapentin 400 milliGRAM(s) Oral two times a day PRN nerve pain  heparin   Injectable 8500 Unit(s) IV Push every 6 hours PRN For aPTT less than 40  heparin   Injectable 4000 Unit(s) IV Push every 6 hours PRN For aPTT between 40 - 57  lidocaine   Patch 1 Patch Transdermal daily PRN localized pain  oxyCODONE    IR 15 milliGRAM(s) Oral two times a day PRN Severe Pain (7 - 10)          Vital Signs Last 24 Hrs  T(C): 36.7 (09 Sep 2020 10:16), Max: 37.3 (08 Sep 2020 21:29)  T(F): 98 (09 Sep 2020 10:16), Max: 99.2 (08 Sep 2020 21:29)  HR: 83 (09 Sep 2020 12:21) (73 - 95)  BP: 142/99 (09 Sep 2020 12:21) (104/64 - 142/99)  BP(mean): --  RR: 18 (09 Sep 2020 12:21) (18 - 18)  SpO2: 93% (09 Sep 2020 12:21) (84% - 98%)          09-08 @ 07:01  -  09-09 @ 07:00  --------------------------------------------------------  IN: 250 mL / OUT: 0 mL / NET: 250 mL          LABS:                        9.0    8.89  )-----------( 303      ( 09 Sep 2020 06:07 )             29.2     09-09    139  |  101  |  10  ----------------------------<  94  3.8   |  26  |  0.39<L>    Ca    9.0      09 Sep 2020 06:07    TPro  6.3  /  Alb  3.3  /  TBili  0.2  /  DBili  <0.1  /  AST  18  /  ALT  14  /  AlkPhos  66  09-09        PTT - ( 09 Sep 2020 06:07 )  PTT:92.9 sec    Procalcitonin, Serum: <0.03 ng/mL (09-06-20 @ 18:58)    Serum Pro-Brain Natriuretic Peptide: 488 pg/mL (09-09-20 @ 06:07)          Physical Examination:  PULM: Clear to auscultation bilaterally, no significant sputum production  CVS: RRR    RADIOLOGY REVIEWED  CT chest: < from: CT Angio Chest w/ IV Cont (09.06.20 @ 21:40) >  FINDINGS:    LUNGS AND AIRWAYS: Patent central airways.  Scattered bilateral groundglass opacities, predominantly within the bilateral upper lobes. Additional somewhat consolidative left lower lobe opacity.  PLEURA: Trace bilateral pleural effusions.  MEDIASTINUM AND CATRINA: No lymphadenopathy.  VESSELS: Small filling defectidentified within segmental to subsegmental branches of the right upper lobe pulmonary artery (:3), compatible with pulmonary embolism. No central pulmonary embolism. Detailed evaluation of subsegmental branches limited secondary to respiratorymotion artifact and adjacent lung disease.  HEART: Mild cardiomegaly. Small pericardial effusion. Coronary artery calcification and/or stenting.  CHEST WALL AND LOWER NECK: Heterogeneous thyroid lobes. Consider nonemergent correlation with ultrasoundto exclude underlying nodule. Mildly prominent bilateral axillary lymph nodes.  VISUALIZED UPPER ABDOMEN: Cholecystectomy. Partially visualized gastric bypass. Partially imaged hernia bowel loop in the left anterior abdominal wall. Partially imaged right renal hypodensity.  BONES: Chronic right sixth and seventh rib deformities.    IMPRESSION:    Small filling defect identified within segmental to subsegmental branches of the right upper lobe pulmonary artery (:3), compatible with pulmonaryembolism. No central pulmonary embolism. Detailed evaluation of subsegmental branches limited secondary to respiratory motion artifact and adjacent lung disease.    Scattered bilateral groundglass opacities, predominantly within the bilateral upper lobes, which likely represent atypical viral infection or multifocal pneumonia of reported COVID-19. Additional somewhat consolidative left lower lobe opacity. Trace bilateral pleural effusions.    Mild cardiomegaly and small pericardial effusion.    < end of copied text >

## 2020-09-09 NOTE — PROGRESS NOTE ADULT - ASSESSMENT
64yo lady      with PMH of OA, chronic pain, sleep apnea, on CPAP, psoriatic arthritis, htn, anemia, mood disorder, history of falls,     recently diagnosed with COVID19 infection (8/29/2020)       who presents to the ED with COVID pneumonia and new onset segmental and subsegmental PE  on iv heparin  pulm dr sarina ELIZABETH / melina ,  on remdisivir  on home meds  for   c/c  pain/  deoression    c/c  anemia  . 64yo lady PMH of OA, chronic pain, sleep apnea, on CPAP, psoriatic arthritis, htn, anemia, mood disorder, history of falls, recently diagnosed with COVID19 infection (8/29/2020)  who presents to the ED with COVID pneumonia and new onset segmental and subsegmental PE from COVID pneumonia. On iv heparin pulm dr branch.  ID has on Remdisivir with Decadron.  on home meds  for   c/c  pain/  deoression.

## 2020-09-09 NOTE — PROGRESS NOTE ADULT - ASSESSMENT
66y/o F with PMH JANE on CPAP, OA, chronic pain, psoriatic arthritis, HTN, anemia, mood disorder, history of falls. Recently diagnosed with COVID19 infection (8/29/2020) who presents to the ED with complaint of worsening dyspnea. She was sent home from urgent care with O2 via NC, albuterol inhaler and visiting nurse services. The patient had visited [facility] 3 days prior to admission due to worsening SOB and was discharged on prednisone and azithromycin, which she has continued to take. Despite these medications, she had worsening dyspnea and had to inc O2 via NC from 2L to 3L. +Mild SOB, cough. CTA chest +PE.

## 2020-09-09 NOTE — PHYSICAL THERAPY INITIAL EVALUATION ADULT - PRECAUTIONS/LIMITATIONS, REHAB EVAL
Pt presents to the ER because she has continued to experienced worsening SOB and was reportedly hypoxic to the high 80s when she woke up. (-) [facility] Duplex BLE Vein Scan 9/8/2020. +Chest CTA 9/6/2020: Small filling defect identified within segmental to subsegmental branches of the right upper lobe pulmonary artery, compatible with pulmonary embolism. No central pulmonary embolism. Detailed evaluation of subsegmental branches limited secondary to respiratory motion artifact and adjacent lung disease. Scattered bilateral groundglass opacities, predominantly within the bilateral upper lobes, which likely represent atypical viral infection or multifocal pneumonia of reported COVID-19. Additional somewhat consolidative left lower lobe opacity. Trace bilateral pleural effusions. Mild cardiomegaly and small pericardial effusion./fall precautions

## 2020-09-09 NOTE — PROGRESS NOTE ADULT - PROBLEM SELECTOR PLAN 1
-C/w remdesivir for now (started 9/8). Monitor creatinine/LFTs.  -C/w Decadron 6mg IV for now. Will likely limit to 5 day course or less as she continues to improve.   -Start tessalon perle 100mg PO TID

## 2020-09-09 NOTE — PHYSICAL THERAPY INITIAL EVALUATION ADULT - PLANNED THERAPY INTERVENTIONS, PT EVAL
GOAL: Stair Negotiation Training: Patient will be able to negotiate up & down 1 flight of stairs with unilateral rail, step to gait pattern, in 2 weeks./strengthening/gait training/balance training/bed mobility training

## 2020-09-09 NOTE — PROGRESS NOTE ADULT - PROBLEM SELECTOR PLAN 2
Small filling defect identified within segmental to subsegmental branches of the RUL pulmonary artery   -Likely provoked 2nd to hypercoagulable state from COVID  -Will need AC x at least 3 months. No objections to changing to NOAC.   -F/u TTE  -LE duplex neg DVT

## 2020-09-09 NOTE — PHYSICAL THERAPY INITIAL EVALUATION ADULT - ADDITIONAL COMMENTS
Pt lives with her  in a apt with 3 steps to enter from front entrance, +HR, 4 steps from the garage, +HR and +elevator inside. Pt was independent with all ADLs and ambulation PTA. Pt did not use a AD for ambulation PTA. Pt lives with her  in a apt with 3 steps to enter from front entrance, +HR, 4 steps from the garage, +HR and +elevator inside. Pt was independent with all ADLs and ambulation PTA. Pt did not use a AD for ambulation PTA. Pt states her  is available to assist when needed.

## 2020-09-09 NOTE — PROGRESS NOTE ADULT - PROBLEM SELECTOR PLAN 3
2nd to viral PNA + PE  -Hypoxia improving. Sats 98% on RA (o2 off x ~15 min). Start continuous pulse ox, keep sats >88% with supplemental O2 PRN

## 2020-09-09 NOTE — PROGRESS NOTE ADULT - SUBJECTIVE AND OBJECTIVE BOX
CARDIOLOGY     PROGRESS  NOTE   ________________________________________________    CHIEF COMPLAINT:Patient is a 65y old  Female who presents with a chief complaint of dyspnea (08 Sep 2020 12:48)  no complain.  	  REVIEW OF SYSTEMS:  CONSTITUTIONAL: No fever, weight loss, or fatigue  EYES: No eye pain, visual disturbances, or discharge  ENT:  No difficulty hearing, tinnitus, vertigo; No sinus or throat pain  NECK: No pain or stiffness  RESPIRATORY: No cough, wheezing, chills or hemoptysis; No Shortness of Breath  CARDIOVASCULAR: No chest pain, palpitations, passing out, dizziness, or leg swelling  GASTROINTESTINAL: No abdominal or epigastric pain. No nausea, vomiting, or hematemesis; No diarrhea or constipation. No melena or hematochezia.  GENITOURINARY: No dysuria, frequency, hematuria, or incontinence  NEUROLOGICAL: No headaches, memory loss, loss of strength, numbness, or tremors  SKIN: No itching, burning, rashes, or lesions   LYMPH Nodes: No enlarged glands  ENDOCRINE: No heat or cold intolerance; No hair loss  MUSCULOSKELETAL: No joint pain or swelling; No muscle, back, or extremity pain  PSYCHIATRIC: No depression, anxiety, mood swings, or difficulty sleeping  HEME/LYMPH: No easy bruising, or bleeding gums  ALLERGY AND IMMUNOLOGIC: No hives or eczema	    [ ] All others negative	  [ ] Unable to obtain    PHYSICAL EXAM:  T(C): 36.9 (09-09-20 @ 04:34), Max: 37.3 (09-08-20 @ 14:06)  HR: 82 (09-09-20 @ 04:34) (73 - 95)  BP: 130/62 (09-09-20 @ 04:34) (104/64 - 131/83)  RR: 18 (09-09-20 @ 04:34) (18 - 18)  SpO2: 84% (09-09-20 @ 04:34) (84% - 98%)  Wt(kg): --  I&O's Summary    08 Sep 2020 07:01  -  09 Sep 2020 07:00  --------------------------------------------------------  IN: 250 mL / OUT: 0 mL / NET: 250 mL      not examined  Appearance: Normal	  HEENT:   Normal oral mucosa, PERRL, EOMI	  Lymphatic: No lymphadenopathy  Cardiovascular: Normal S1 S2, No JVD, No murmurs, No edema  Respiratory: Lungs clear to auscultation	  Psychiatry: A & O x 3, Mood & affect appropriate  Gastrointestinal:  Soft, Non-tender, + BS	  Skin: No rashes, No ecchymoses, No cyanosis	  Neurologic: Non-focal  Extremities: Normal range of motion, No clubbing, cyanosis or edema  Vascular: Peripheral pulses palpable 2+ bilaterally    MEDICATIONS  (STANDING):  ascorbic acid 1000 milliGRAM(s) Oral daily  aspirin enteric coated 81 milliGRAM(s) Oral <User Schedule>  calcium carbonate 1250 mG  + Vitamin D (OsCal 500 + D) 1 Tablet(s) Oral daily  clobetasol 0.05% Ointment 1 Application(s) Topical two times a day  clonazePAM  Tablet 1 milliGRAM(s) Oral daily  clonazePAM  Tablet 1.5 milliGRAM(s) Oral daily  clonazePAM  Tablet 3 milliGRAM(s) Oral at bedtime  cyanocobalamin 1000 MICROGram(s) Oral daily  dexAMETHasone  Injectable 6 milliGRAM(s) IV Push daily  duobrii lotion (halobetasol/tazarotene) 1 Application(s) 1 Application(s) Topical two times a day  enalapril 5 milliGRAM(s) Oral daily  ferrous    sulfate 325 milliGRAM(s) Oral at bedtime  fluconAZOLE   Tablet 200 milliGRAM(s) Oral <User Schedule>  gabapentin 600 milliGRAM(s) Oral at bedtime  heparin  Infusion.  Unit(s)/Hr (18 mL/Hr) IV Continuous <Continuous>  influenza   Vaccine 0.5 milliLiter(s) IntraMuscular once  lactobacillus acidophilus 1 Tablet(s) Oral at bedtime  lamoTRIgine 150 milliGRAM(s) Oral daily  lisdexamfetamine 60 milliGRAM(s) Oral with breakfast  montelukast 10 milliGRAM(s) Oral at bedtime  multivitamin/minerals 1 Tablet(s) Oral daily  NUCYNTA  Extended-Release 100 milliGRAM(s) 100 milliGRAM(s) Oral every 12 hours  omega-3-Acid Ethyl Esters 2 Gram(s) Oral daily  oxybutynin 10 milliGRAM(s) Oral at bedtime  oxyCODONE    IR 10 milliGRAM(s) Oral every 8 hours  pantoprazole    Tablet 40 milliGRAM(s) Oral at bedtime  QUEtiapine 200 milliGRAM(s) Oral <User Schedule>  remdesivir  IVPB   IV Intermittent   remdesivir  IVPB 100 milliGRAM(s) IV Intermittent every 24 hours  sertraline 200 milliGRAM(s) Oral daily  tazarotene cream 0.1 % 1 Application(s) 1 Application(s) Topical two times a day  vortioxetine 10 milliGRAM(s) Oral daily      TELEMETRY: 	    ECG:  	  RADIOLOGY:  OTHER: 	  	  LABS:	 	    CARDIAC MARKERS:                                9.0    8.89  )-----------( 303      ( 09 Sep 2020 06:07 )             29.2     09-09    139  |  101  |  10  ----------------------------<  94  3.8   |  26  |  0.39<L>    Ca    9.0      09 Sep 2020 06:07    TPro  6.3  /  Alb  3.3  /  TBili  0.2  /  DBili  <0.1  /  AST  18  /  ALT  14  /  AlkPhos  66  09-09    proBNP: Serum Pro-Brain Natriuretic Peptide: 488 pg/mL (09-09 @ 06:07)    Lipid Profile:   HgA1c:   TSH:   PTT - ( 09 Sep 2020 06:07 )  PTT:92.9 sec  COVID-19  Antibody - for prior infection screening (09.07.20 @ 23:54)    COVID-19 IgG Antibody Index: 27.10: Roche ECLIA Total AB (GUNNAR)  NOTE: This result index represents a total antibody measurement,  which  includes IgG, IgA, and IgM  Negative <= 0.99 Index  Positive >= 1.00 Index Index    COVID-19 IgG Antibody Interpretation: Positive: This test has not been reviewed by the FDA by the standard review  process. It has been authorized for emergency use by the FDA. [employer] has validated this test to be accurate.  Negative results do not rule out SARS-CoV-2 infection, particularly in  those who have been in recent contact with the virus. Follow-up testing  with a molecular diagnostic test should be considered to rule out  infection in these individuals.  Results from antibody testing should not be used as thesole basis to  diagnose or exclude SARS-CoV-2 infection, or to inform infection status.  Positive results may rarely be due to past or present infection with  non-SARS-CoV-2 coronavirus strains, such as coronavirus HKU1, NL63, OC43,  or 229E. [facility] [employer], through extensive validation  testing, has confirmed that this risk is minimal with this test.    Suggest  Continue Remdisivir 5 day course 9/7-->  On anticoagulation with Heparin  Decadron 6 mg x 10 days - most beneficial in ventilated patients - would consider taper off in 4-5 days      Assessment and plan  ---------------------------  This patient is a 66yo lady with PMH of OA, chronic pain, sleep apnea, on CPAP, psoriatic arthritis, htn, anemia, mood disorder, history of falls, recently diagnosed with COVID19 infection (8/29/2020) who presents to the ED with complaint of worsening dyspnea. Her daughter, son-in-law and grandchildren had gotten diagnosed with COVID19. Because of her interaction with them, she was tested, and found positive as well. She was sent home from urgent care with O2 via NC, albuterol inhaler and visiting nurse services. The patient had visited [facility] 3 days prior to admission due to worsening SOB and was discharged on prednisone and azithromycin, which she has continued to take. Despite these medications, she had worsening dyspnea and had to inc O2via NC from 2L to 3L.   Yesterday morning, she found her O2 sat of 86% on 3L NC, thus came to the ED.  sob sec to Covid and acute PE  agree with ac  echo to check RV function as well as check for pericardial effusion  le venous doppler r/o dvt  repeat ecg  asa daily  started on Remdesevir  stress test noted from 06/19  ?change heparin to lovenox  ID noted

## 2020-09-09 NOTE — PROGRESS NOTE ADULT - PROBLEM SELECTOR PLAN 1
Maintain O2 > 92%.  Continue dexamethasone 6mg qdaily.  Consult ID in AM to determine if pt is a candidate for clinical trial or remdesivir use.  Trend CBC, BMP, ferritin, LDH Maintain O2 > 92%. Continue dexamethasone 6mg qdaily, consider taper if ID agrees. Consult ID in AM to determine if pt is a candidate for clinical trial or remdesivir use.

## 2020-09-09 NOTE — PROGRESS NOTE ADULT - SUBJECTIVE AND OBJECTIVE BOX
Follow Up:  covid    Interval History/ROS: "today is emotional day"  no sob at rest, occasional cough    Allergies  No Known Allergies    ANTIMICROBIALS:  fluconAZOLE   Tablet 200 <User Schedule>  remdesivir  IVPB    remdesivir  IVPB 100 every 24 hours      OTHER MEDS:  MEDICATIONS  (STANDING):  ALBUTerol    90 MICROgram(s) HFA Inhaler 2 every 6 hours PRN  aspirin enteric coated 81 <User Schedule>  benzonatate 100 every 8 hours  clonazePAM  Tablet 1 daily  clonazePAM  Tablet 1.5 daily  clonazePAM  Tablet 3 at bedtime  dexAMETHasone  Injectable 6 daily  enalapril 5 daily  gabapentin 400 two times a day PRN  gabapentin 600 at bedtime  heparin   Injectable 8500 every 6 hours PRN  heparin   Injectable 4000 every 6 hours PRN  heparin  Infusion.  <Continuous>  influenza   Vaccine 0.5 once  lamoTRIgine 150 daily  lisdexamfetamine 60 with breakfast  montelukast 10 at bedtime  oxybutynin 10 at bedtime  oxyCODONE    IR 15 two times a day PRN  pantoprazole    Tablet 40 at bedtime  QUEtiapine 200 <User Schedule>  sertraline 200 daily  vortioxetine 10 daily      Vital Signs Last 24 Hrs  T(C): 36.7 (09 Sep 2020 10:16), Max: 37.3 (08 Sep 2020 21:29)  T(F): 98 (09 Sep 2020 10:16), Max: 99.2 (08 Sep 2020 21:29)  HR: 83 (09 Sep 2020 12:21) (73 - 95)  BP: 142/99 (09 Sep 2020 12:21) (104/64 - 142/99)  BP(mean): --  RR: 18 (09 Sep 2020 12:21) (18 - 18)  SpO2: 93% (09 Sep 2020 12:21) (84% - 98%) on 2 L/min    PHYSICAL EXAM:  General: WN/WD NAD, Non-toxic  Neurology: A&Ox3, nonfocal  Respiratory: Clear to auscultation bilaterally  CV: RRR, S1S2, no murmurs, rubs or gallops  Abdominal: Soft, Non-tender, non-distended,   Extremities: No edema,   Line Sites: Clear  Skin: No rash                          9.0    8.89  )-----------( 303      ( 09 Sep 2020 06:07 )             29.2       09-09    139  |  101  |  10  ----------------------------<  94  3.8   |  26  |  0.39<L>    Ca    9.0      09 Sep 2020 06:07    TPro  6.3  /  Alb  3.3  /  TBili  0.2  /  DBili  <0.1  /  AST  18  /  ALT  14  /  AlkPhos  66  09-09    C-Reactive Protein, Serum (09.08.20 @ 06:08)    C-Reactive Protein, Serum: 0.19 mg/dL  Ferritin, Serum in AM (09.08.20 @ 08:43)    Ferritin, Serum: 750 ng/mL  D-Dimer Assay, Quantitative (09.06.20 @ 18:58)    D-Dimer Assay, Quantitative: 338  ng/mL DDU    Procalcitonin, Serum (09.06.20 @ 18:58)    Procalcitonin, Serum: <0.03 ng/mL    COVID-19 PCR . (09.07.20 @ 00:23)    COVID-19 PCR: Detected  COVID-19  Antibody - for prior infection screening (09.07.20 @ 23:54)    COVID-19 IgG Antibody Index: 27.10:  Positive >= 1.00 Index Index    RADIOLOGY:  < from: VA Duplex Lower Ext Vein Scan, Bilat (09.08.20 @ 17:19) >  IMPRESSION:  No evidence of deep venous thrombosis in either lower extremity.    < end of copied text >    < from: CT Angio Chest w/ IV Cont (09.06.20 @ 21:40) >  IMPRESSION:    Small filling defect identified within segmental to subsegmental branches of the right upper lobe pulmonary artery (:3), compatible with pulmonaryembolism. No central pulmonary embolism. Detailed evaluation of subsegmental branches limited secondary to respiratory motion artifact and adjacent lung disease.    Scattered bilateral groundglass opacities, predominantly within the bilateral upper lobes, which likely represent atypical viral infection or multifocal pneumonia of reported COVID-19. Additional somewhat consolidative left lower lobe opacity. Trace bilateral pleural effusions.    Mild cardiomegaly and small pericardial effusion.    < end of copied text >      Jim Smith MD; Division of Infectious Disease; Pager: [phone]; nights and weekends: [phone]

## 2020-09-09 NOTE — PROGRESS NOTE ADULT - ASSESSMENT
65F with OA, chronic pain, obesity (BMI = 38.4), sleep apnea, on CPAP, psoriatic arthritis - off of biologic, htn, anemia, mood disorder, history of falls, ventral hernia diagnosed with COVID19 on (8/29/2020) with hypoxia and noted to have Pulmonary embolism    Day # 11 since diagnosis  elevated antibodies suggest longer time since infection: (grandchildrens  developed covid from her son and is currently on vent at [facility])  moderate elevation in inflammatory markers  COVID complicated by PE  improving oxygenation    Suggest  Continue Remdisivir 5 day course 9/7 200 mg dose, followed by 100 mg daily 9/8--> through 9/12  On anticoagulation with Heparin  Decadron 6 mg x 5 day course likely adequate

## 2020-09-10 ENCOUNTER — APPOINTMENT (OUTPATIENT)
Dept: SURGERY | Facility: CLINIC | Age: 65
End: 2020-09-10

## 2020-09-10 LAB
ALBUMIN SERPL ELPH-MCNC: 3.4 G/DL — SIGNIFICANT CHANGE UP (ref 3.3–5)
ALP SERPL-CCNC: 61 U/L — SIGNIFICANT CHANGE UP (ref 40–120)
ALT FLD-CCNC: 17 U/L — SIGNIFICANT CHANGE UP (ref 10–45)
ANION GAP SERPL CALC-SCNC: 11 MMOL/L — SIGNIFICANT CHANGE UP (ref 5–17)
APTT BLD: 88.1 SEC — HIGH (ref 27.5–35.5)
AST SERPL-CCNC: 23 U/L — SIGNIFICANT CHANGE UP (ref 10–40)
BASOPHILS # BLD AUTO: 0.02 K/UL — SIGNIFICANT CHANGE UP (ref 0–0.2)
BASOPHILS NFR BLD AUTO: 0.2 % — SIGNIFICANT CHANGE UP (ref 0–2)
BILIRUB DIRECT SERPL-MCNC: <0.1 MG/DL — SIGNIFICANT CHANGE UP (ref 0–0.2)
BILIRUB SERPL-MCNC: 0.2 MG/DL — SIGNIFICANT CHANGE UP (ref 0.2–1.2)
BUN SERPL-MCNC: 11 MG/DL — SIGNIFICANT CHANGE UP (ref 7–23)
CALCIUM SERPL-MCNC: 9.1 MG/DL — SIGNIFICANT CHANGE UP (ref 8.4–10.5)
CHLORIDE SERPL-SCNC: 101 MMOL/L — SIGNIFICANT CHANGE UP (ref 96–108)
CO2 SERPL-SCNC: 26 MMOL/L — SIGNIFICANT CHANGE UP (ref 22–31)
CREAT SERPL-MCNC: 0.44 MG/DL — LOW (ref 0.5–1.3)
EOSINOPHIL # BLD AUTO: 0.08 K/UL — SIGNIFICANT CHANGE UP (ref 0–0.5)
EOSINOPHIL NFR BLD AUTO: 0.8 % — SIGNIFICANT CHANGE UP (ref 0–6)
GLUCOSE SERPL-MCNC: 78 MG/DL — SIGNIFICANT CHANGE UP (ref 70–99)
HCT VFR BLD CALC: 30.5 % — LOW (ref 34.5–45)
HGB BLD-MCNC: 9.4 G/DL — LOW (ref 11.5–15.5)
IMM GRANULOCYTES NFR BLD AUTO: 0.5 % — SIGNIFICANT CHANGE UP (ref 0–1.5)
LYMPHOCYTES # BLD AUTO: 4.17 K/UL — HIGH (ref 1–3.3)
LYMPHOCYTES # BLD AUTO: 42.2 % — SIGNIFICANT CHANGE UP (ref 13–44)
MCHC RBC-ENTMCNC: 27.5 PG — SIGNIFICANT CHANGE UP (ref 27–34)
MCHC RBC-ENTMCNC: 30.8 GM/DL — LOW (ref 32–36)
MCV RBC AUTO: 89.2 FL — SIGNIFICANT CHANGE UP (ref 80–100)
MONOCYTES # BLD AUTO: 0.69 K/UL — SIGNIFICANT CHANGE UP (ref 0–0.9)
MONOCYTES NFR BLD AUTO: 7 % — SIGNIFICANT CHANGE UP (ref 2–14)
NEUTROPHILS # BLD AUTO: 4.88 K/UL — SIGNIFICANT CHANGE UP (ref 1.8–7.4)
NEUTROPHILS NFR BLD AUTO: 49.3 % — SIGNIFICANT CHANGE UP (ref 43–77)
NRBC # BLD: 0 /100 WBCS — SIGNIFICANT CHANGE UP (ref 0–0)
PLATELET # BLD AUTO: 321 K/UL — SIGNIFICANT CHANGE UP (ref 150–400)
POTASSIUM SERPL-MCNC: 3.6 MMOL/L — SIGNIFICANT CHANGE UP (ref 3.5–5.3)
POTASSIUM SERPL-SCNC: 3.6 MMOL/L — SIGNIFICANT CHANGE UP (ref 3.5–5.3)
PROT SERPL-MCNC: 6 G/DL — SIGNIFICANT CHANGE UP (ref 6–8.3)
RBC # BLD: 3.42 M/UL — LOW (ref 3.8–5.2)
RBC # FLD: 12.1 % — SIGNIFICANT CHANGE UP (ref 10.3–14.5)
SODIUM SERPL-SCNC: 138 MMOL/L — SIGNIFICANT CHANGE UP (ref 135–145)
WBC # BLD: 9.89 K/UL — SIGNIFICANT CHANGE UP (ref 3.8–10.5)
WBC # FLD AUTO: 9.89 K/UL — SIGNIFICANT CHANGE UP (ref 3.8–10.5)

## 2020-09-10 PROCEDURE — 99232 SBSQ HOSP IP/OBS MODERATE 35: CPT | Mod: CS

## 2020-09-10 RX ORDER — APIXABAN 2.5 MG/1
5 TABLET, FILM COATED ORAL EVERY 12 HOURS
Refills: 0 | Status: CANCELLED | OUTPATIENT
Start: 2020-09-17 | End: 2020-09-11

## 2020-09-10 RX ORDER — APIXABAN 2.5 MG/1
10 TABLET, FILM COATED ORAL EVERY 12 HOURS
Refills: 0 | Status: DISCONTINUED | OUTPATIENT
Start: 2020-09-10 | End: 2020-09-11

## 2020-09-10 RX ORDER — APIXABAN 2.5 MG/1
2 TABLET, FILM COATED ORAL
Qty: 70 | Refills: 0
Start: 2020-09-10 | End: 2020-10-09

## 2020-09-10 RX ADMIN — Medication 1 TABLET(S): at 09:40

## 2020-09-10 RX ADMIN — Medication 2 GRAM(S): at 09:41

## 2020-09-10 RX ADMIN — Medication 3 MILLIGRAM(S): at 22:08

## 2020-09-10 RX ADMIN — FLUCONAZOLE 200 MILLIGRAM(S): 150 TABLET ORAL at 10:20

## 2020-09-10 RX ADMIN — OXYCODONE HYDROCHLORIDE 15 MILLIGRAM(S): 5 TABLET ORAL at 23:42

## 2020-09-10 RX ADMIN — Medication 325 MILLIGRAM(S): at 22:08

## 2020-09-10 RX ADMIN — Medication 1 TABLET(S): at 09:42

## 2020-09-10 RX ADMIN — Medication 1.5 MILLIGRAM(S): at 14:49

## 2020-09-10 RX ADMIN — Medication 10 MILLIGRAM(S): at 22:08

## 2020-09-10 RX ADMIN — GABAPENTIN 600 MILLIGRAM(S): 400 CAPSULE ORAL at 22:08

## 2020-09-10 RX ADMIN — QUETIAPINE FUMARATE 200 MILLIGRAM(S): 200 TABLET, FILM COATED ORAL at 16:54

## 2020-09-10 RX ADMIN — Medication 1 TABLET(S): at 22:07

## 2020-09-10 RX ADMIN — Medication 1 MILLIGRAM(S): at 09:40

## 2020-09-10 RX ADMIN — SERTRALINE 200 MILLIGRAM(S): 25 TABLET, FILM COATED ORAL at 09:40

## 2020-09-10 RX ADMIN — Medication 100 MILLIGRAM(S): at 22:08

## 2020-09-10 RX ADMIN — Medication 100 MILLIGRAM(S): at 05:28

## 2020-09-10 RX ADMIN — REMDESIVIR 500 MILLIGRAM(S): 5 INJECTION INTRAVENOUS at 22:08

## 2020-09-10 RX ADMIN — VORTIOXETINE 10 MILLIGRAM(S): 5 TABLET, FILM COATED ORAL at 09:41

## 2020-09-10 RX ADMIN — Medication 1000 MILLIGRAM(S): at 09:41

## 2020-09-10 RX ADMIN — Medication 5 MILLIGRAM(S): at 05:28

## 2020-09-10 RX ADMIN — Medication 100 MILLIGRAM(S): at 14:49

## 2020-09-10 RX ADMIN — PANTOPRAZOLE SODIUM 40 MILLIGRAM(S): 20 TABLET, DELAYED RELEASE ORAL at 22:08

## 2020-09-10 RX ADMIN — LISDEXAMFETAMINE DIMESYLATE 60 MILLIGRAM(S): 70 CAPSULE ORAL at 12:59

## 2020-09-10 RX ADMIN — MONTELUKAST 10 MILLIGRAM(S): 4 TABLET, CHEWABLE ORAL at 22:07

## 2020-09-10 RX ADMIN — Medication 6 MILLIGRAM(S): at 09:43

## 2020-09-10 RX ADMIN — LAMOTRIGINE 150 MILLIGRAM(S): 25 TABLET, ORALLY DISINTEGRATING ORAL at 09:40

## 2020-09-10 RX ADMIN — Medication 1 APPLICATION(S): at 05:30

## 2020-09-10 RX ADMIN — PREGABALIN 1000 MICROGRAM(S): 225 CAPSULE ORAL at 09:41

## 2020-09-10 RX ADMIN — HEPARIN SODIUM 1300 UNIT(S)/HR: 5000 INJECTION INTRAVENOUS; SUBCUTANEOUS at 08:11

## 2020-09-10 RX ADMIN — APIXABAN 10 MILLIGRAM(S): 2.5 TABLET, FILM COATED ORAL at 16:54

## 2020-09-10 RX ADMIN — Medication 81 MILLIGRAM(S): at 14:50

## 2020-09-10 NOTE — PROGRESS NOTE ADULT - SUBJECTIVE AND OBJECTIVE BOX
INTERVAL HPI/OVERNIGHT EVENTS:  Pt seen and examined at bedside.     Allergies/Intolerance: Cymbalta (Diarrhea)  No Known Allergies      MEDICATIONS  (STANDING):  ascorbic acid 1000 milliGRAM(s) Oral daily  aspirin enteric coated 81 milliGRAM(s) Oral <User Schedule>  benzonatate 100 milliGRAM(s) Oral every 8 hours  calcium carbonate 1250 mG  + Vitamin D (OsCal 500 + D) 1 Tablet(s) Oral daily  clobetasol 0.05% Ointment 1 Application(s) Topical two times a day  clonazePAM  Tablet 1 milliGRAM(s) Oral daily  clonazePAM  Tablet 1.5 milliGRAM(s) Oral daily  clonazePAM  Tablet 3 milliGRAM(s) Oral at bedtime  cyanocobalamin 1000 MICROGram(s) Oral daily  dexAMETHasone  Injectable 6 milliGRAM(s) IV Push daily  duobrii lotion (halobetasol/tazarotene) 1 Application(s) 1 Application(s) Topical two times a day  enalapril 5 milliGRAM(s) Oral daily  ferrous    sulfate 325 milliGRAM(s) Oral at bedtime  fluconAZOLE   Tablet 200 milliGRAM(s) Oral <User Schedule>  gabapentin 600 milliGRAM(s) Oral at bedtime  heparin  Infusion.  Unit(s)/Hr (18 mL/Hr) IV Continuous <Continuous>  influenza   Vaccine 0.5 milliLiter(s) IntraMuscular once  lactobacillus acidophilus 1 Tablet(s) Oral at bedtime  lamoTRIgine 150 milliGRAM(s) Oral daily  lisdexamfetamine 60 milliGRAM(s) Oral with breakfast  montelukast 10 milliGRAM(s) Oral at bedtime  multivitamin/minerals 1 Tablet(s) Oral daily  NUCYNTA  Extended-Release 100 milliGRAM(s) 100 milliGRAM(s) Oral every 12 hours  omega-3-Acid Ethyl Esters 2 Gram(s) Oral daily  oxybutynin 10 milliGRAM(s) Oral at bedtime  pantoprazole    Tablet 40 milliGRAM(s) Oral at bedtime  QUEtiapine 200 milliGRAM(s) Oral <User Schedule>  remdesivir  IVPB   IV Intermittent   remdesivir  IVPB 100 milliGRAM(s) IV Intermittent every 24 hours  sertraline 200 milliGRAM(s) Oral daily  tazarotene cream 0.1 % 1 Application(s) 1 Application(s) Topical two times a day  vortioxetine 10 milliGRAM(s) Oral daily    MEDICATIONS  (PRN):  ALBUTerol    90 MICROgram(s) HFA Inhaler 2 Puff(s) Inhalation every 6 hours PRN Shortness of Breath and/or Wheezing  gabapentin 400 milliGRAM(s) Oral two times a day PRN nerve pain  heparin   Injectable 8500 Unit(s) IV Push every 6 hours PRN For aPTT less than 40  heparin   Injectable 4000 Unit(s) IV Push every 6 hours PRN For aPTT between 40 - 57  lidocaine   Patch 1 Patch Transdermal daily PRN localized pain  oxyCODONE    IR 15 milliGRAM(s) Oral two times a day PRN Severe Pain (7 - 10)        ROS: all systems reviewed and wnl      PHYSICAL EXAMINATION:  Vital Signs Last 24 Hrs  T(C): 36.5 (10 Sep 2020 05:08), Max: 37.6 (09 Sep 2020 18:36)  T(F): 97.7 (10 Sep 2020 05:08), Max: 99.6 (09 Sep 2020 18:36)  HR: 61 (10 Sep 2020 05:08) (61 - 83)  BP: 119/64 (10 Sep 2020 05:08) (112/64 - 142/99)  BP(mean): --  RR: 16 (10 Sep 2020 05:08) (16 - 18)  SpO2: 98% (10 Sep 2020 05:08) (93% - 98%)  CAPILLARY BLOOD GLUCOSE          09-09 @ 07:01  -  09-10 @ 07:00  --------------------------------------------------------  IN: 925 mL / OUT: 250 mL / NET: 675 mL        GENERAL:   NECK: supple, No JVD  CHEST/LUNG: clear to auscultation bilaterally; no rales, rhonchi, or wheezing b/l  HEART: normal S1, S2  ABDOMEN: BS+, soft, ND, NT   EXTREMITIES:  pulses palpable; no clubbing, cyanosis, or edema b/l LEs  SKIN: no rashes or lesions      LABS:                        9.4    9.89  )-----------( 321      ( 10 Sep 2020 06:30 )             30.5     09-10    138  |  101  |  11  ----------------------------<  78  3.6   |  26  |  0.44<L>    Ca    9.1      10 Sep 2020 06:28    TPro  6.0  /  Alb  3.4  /  TBili  0.2  /  DBili  <0.1  /  AST  23  /  ALT  17  /  AlkPhos  61  09-10    PTT - ( 10 Sep 2020 06:30 )  PTT:88.1 sec INTERVAL HPI/OVERNIGHT EVENTS:  Pt seen and examined at bedside.     Allergies/Intolerance: Cymbalta (Diarrhea)  No Known Allergies      MEDICATIONS  (STANDING):  ascorbic acid 1000 milliGRAM(s) Oral daily  aspirin enteric coated 81 milliGRAM(s) Oral <User Schedule>  benzonatate 100 milliGRAM(s) Oral every 8 hours  calcium carbonate 1250 mG  + Vitamin D (OsCal 500 + D) 1 Tablet(s) Oral daily  clobetasol 0.05% Ointment 1 Application(s) Topical two times a day  clonazePAM  Tablet 1 milliGRAM(s) Oral daily  clonazePAM  Tablet 1.5 milliGRAM(s) Oral daily  clonazePAM  Tablet 3 milliGRAM(s) Oral at bedtime  cyanocobalamin 1000 MICROGram(s) Oral daily  dexAMETHasone  Injectable 6 milliGRAM(s) IV Push daily  duobrii lotion (halobetasol/tazarotene) 1 Application(s) 1 Application(s) Topical two times a day  enalapril 5 milliGRAM(s) Oral daily  ferrous    sulfate 325 milliGRAM(s) Oral at bedtime  fluconAZOLE   Tablet 200 milliGRAM(s) Oral <User Schedule>  gabapentin 600 milliGRAM(s) Oral at bedtime  heparin  Infusion.  Unit(s)/Hr (18 mL/Hr) IV Continuous <Continuous>  influenza   Vaccine 0.5 milliLiter(s) IntraMuscular once  lactobacillus acidophilus 1 Tablet(s) Oral at bedtime  lamoTRIgine 150 milliGRAM(s) Oral daily  lisdexamfetamine 60 milliGRAM(s) Oral with breakfast  montelukast 10 milliGRAM(s) Oral at bedtime  multivitamin/minerals 1 Tablet(s) Oral daily  NUCYNTA  Extended-Release 100 milliGRAM(s) 100 milliGRAM(s) Oral every 12 hours  omega-3-Acid Ethyl Esters 2 Gram(s) Oral daily  oxybutynin 10 milliGRAM(s) Oral at bedtime  pantoprazole    Tablet 40 milliGRAM(s) Oral at bedtime  QUEtiapine 200 milliGRAM(s) Oral <User Schedule>  remdesivir  IVPB   IV Intermittent   remdesivir  IVPB 100 milliGRAM(s) IV Intermittent every 24 hours  sertraline 200 milliGRAM(s) Oral daily  tazarotene cream 0.1 % 1 Application(s) 1 Application(s) Topical two times a day  vortioxetine 10 milliGRAM(s) Oral daily    MEDICATIONS  (PRN):  ALBUTerol    90 MICROgram(s) HFA Inhaler 2 Puff(s) Inhalation every 6 hours PRN Shortness of Breath and/or Wheezing  gabapentin 400 milliGRAM(s) Oral two times a day PRN nerve pain  heparin   Injectable 8500 Unit(s) IV Push every 6 hours PRN For aPTT less than 40  heparin   Injectable 4000 Unit(s) IV Push every 6 hours PRN For aPTT between 40 - 57  lidocaine   Patch 1 Patch Transdermal daily PRN localized pain  oxyCODONE    IR 15 milliGRAM(s) Oral two times a day PRN Severe Pain (7 - 10)        ROS: all systems reviewed and wnl      PHYSICAL EXAMINATION:  Vital Signs Last 24 Hrs  T(C): 36.5 (10 Sep 2020 05:08), Max: 37.6 (09 Sep 2020 18:36)  T(F): 97.7 (10 Sep 2020 05:08), Max: 99.6 (09 Sep 2020 18:36)  HR: 61 (10 Sep 2020 05:08) (61 - 83)  BP: 119/64 (10 Sep 2020 05:08) (112/64 - 142/99)  BP(mean): --  RR: 16 (10 Sep 2020 05:08) (16 - 18)  SpO2: 98% (10 Sep 2020 05:08) (93% - 98%)  CAPILLARY BLOOD GLUCOSE          09-09 @ 07:01  -  09-10 @ 07:00  --------------------------------------------------------  IN: 925 mL / OUT: 250 mL / NET: 675 mL        GENERAL: comfortable on RA, no fevers, SOB or CP  NECK: supple, No JVD  CHEST/LUNG: clear to auscultation bilaterally; no rales, rhonchi, or wheezing b/l  HEART: normal S1, S2  ABDOMEN: BS+, soft, ND, NT   EXTREMITIES:  pulses palpable; no clubbing, cyanosis, or edema b/l LEs  SKIN: no rashes or lesions      LABS:                        9.4    9.89  )-----------( 321      ( 10 Sep 2020 06:30 )             30.5     09-10    138  |  101  |  11  ----------------------------<  78  3.6   |  26  |  0.44<L>    Ca    9.1      10 Sep 2020 06:28    TPro  6.0  /  Alb  3.4  /  TBili  0.2  /  DBili  <0.1  /  AST  23  /  ALT  17  /  AlkPhos  61  09-10    PTT - ( 10 Sep 2020 06:30 )  PTT:88.1 sec

## 2020-09-10 NOTE — PROGRESS NOTE ADULT - PROBLEM SELECTOR PLAN 1
Maintain O2 > 92%. Continue dexamethasone 6mg qdaily, consider taper if ID agrees. Consult ID in AM to determine if pt is a candidate for clinical trial or remdesivir use. Maintain O2 > 92%. Continue dexamethasone 6mg qdaily, will stop tomorrow. Finish Remdizivir in AM. Discharge home AM Friday, no need for home oxygen as RA sat is 98 %.

## 2020-09-10 NOTE — PROGRESS NOTE ADULT - PROBLEM SELECTOR PLAN 1
-C/w remdesivir for now (started 9/8). Monitor creatinine/LFTs.  -C/w Decadron 6mg IV for now.   -She is clinically improved and hypoxia has resolved. Possible d/c planning tomorrow if remains stable and okay with ID.   -C/w tessalon perle 100mg PO TID

## 2020-09-10 NOTE — PROGRESS NOTE ADULT - SUBJECTIVE AND OBJECTIVE BOX
Follow Up:  COVID pneumonia    Interval History/ROS:  felt comfortable on RA x 1 hour, continued dry cough and discomfort with deep breaths    Allergies  No Known Allergies    ANTIMICROBIALS:  fluconAZOLE   Tablet 200 <User Schedule>  remdesivir  IVPB    remdesivir  IVPB 100 every 24 hours      OTHER MEDS:  MEDICATIONS  (STANDING):  ALBUTerol    90 MICROgram(s) HFA Inhaler 2 every 6 hours PRN  aspirin enteric coated 81 <User Schedule>  benzonatate 100 every 8 hours  clonazePAM  Tablet 1 daily  clonazePAM  Tablet 1.5 daily  clonazePAM  Tablet 3 at bedtime  dexAMETHasone  Injectable 6 daily  enalapril 5 daily  gabapentin 400 two times a day PRN  gabapentin 600 at bedtime  heparin   Injectable 8500 every 6 hours PRN  heparin   Injectable 4000 every 6 hours PRN  heparin  Infusion.  <Continuous>  influenza   Vaccine 0.5 once  lamoTRIgine 150 daily  lisdexamfetamine 60 with breakfast  montelukast 10 at bedtime  oxybutynin 10 at bedtime  oxyCODONE    IR 15 two times a day PRN  pantoprazole    Tablet 40 at bedtime  QUEtiapine 200 <User Schedule>  sertraline 200 daily  vortioxetine 10 daily      Vital Signs Last 24 Hrs  T(C): 36.7 (10 Sep 2020 08:12), Max: 37.6 (09 Sep 2020 18:36)  T(F): 98 (10 Sep 2020 08:12), Max: 99.6 (09 Sep 2020 18:36)  HR: 67 (10 Sep 2020 08:12) (61 - 83)  BP: 134/83 (10 Sep 2020 08:12) (112/64 - 140/90)  BP(mean): --  RR: 13 (10 Sep 2020 08:12) (13 - 16)  SpO2: 98% (10 Sep 2020 08:12) (98% - 98%) NPO2 2 l/mion, reduced this am to 1 L/Min    PHYSICAL EXAM:  General: WN/WD NAD, Non-toxic  Neurology: A&Ox3, nonfocal  Respiratory: Clear to auscultation bilaterally - no crackles  CV: RRR, S1S2, no murmurs, rubs or gallops  Abdominal: Soft, Non-tender, non-distended  Extremities: No edema,  Line Sites: Clear  Skin: No rash                        9.4    9.89  )-----------( 321      ( 10 Sep 2020 06:30 )             30.5       09-10    138  |  101  |  11  ----------------------------<  78  3.6   |  26  |  0.44<L>    Ca    9.1      10 Sep 2020 06:28    TPro  6.0  /  Alb  3.4  /  TBili  0.2  /  DBili  <0.1  /  AST  23  /  ALT  17  /  AlkPhos  61  09-10    C-Reactive Protein, Serum (09.08.20 @ 06:08)    C-Reactive Protein, Serum: 0.19 mg/dL  Ferritin, Serum in AM (09.08.20 @ 08:43)    Ferritin, Serum: 750 ng/mL  D-Dimer Assay, Quantitative (09.06.20 @ 18:58)    D-Dimer Assay, Quantitative: 338 ng/mL DDU    Procalcitonin, Serum (09.06.20 @ 18:58)    Procalcitonin, Serum: <0.03  ng/mL    RADIOLOGY:  < from: CT Angio Chest w/ IV Cont (09.06.20 @ 21:40) >  IMPRESSION:    Small filling defect identified within segmental to subsegmental branches of the right upper lobe pulmonary artery (:3), compatible with pulmonaryembolism. No central pulmonary embolism. Detailed evaluation of subsegmental branches limited secondary to respiratory motion artifact and adjacent lung disease.    Scattered bilateral groundglass opacities, predominantly within the bilateral upper lobes, which likely represent atypical viral infection or multifocal pneumonia of reported COVID-19. Additional somewhat consolidative left lower lobe opacity. Trace bilateral pleural effusions.    Mild cardiomegaly and small pericardial effusion.    < end of copied text >      Jim Smith MD; Division of Infectious Disease; Pager: [phone]; nights and weekends: [phone]

## 2020-09-10 NOTE — PROGRESS NOTE ADULT - ASSESSMENT
64yo lady PMH of OA, chronic pain, sleep apnea, on CPAP, psoriatic arthritis, htn, anemia, mood disorder, history of falls, recently diagnosed with COVID19 infection (8/29/2020)  who presents to the ED with COVID pneumonia and new onset segmental and subsegmental PE from COVID pneumonia. On iv heparin pulm dr branch.  ID has on Remdisivir with Decadron.  on home meds  for   c/c  pain/  deoression.

## 2020-09-10 NOTE — PROGRESS NOTE ADULT - ASSESSMENT
65F with OA, chronic pain, obesity (BMI = 38.4), sleep apnea, on CPAP, psoriatic arthritis - off of biologic, htn, anemia, mood disorder, history of falls, ventral hernia diagnosed with COVID19 on (8/29/2020) with hypoxia and noted to have Pulmonary embolism    Day # 12 since diagnosis  elevated antibodies suggest longer time since infection: (grandchildrens  developed covid from her son and is currently on vent at [facility])  moderate elevation in inflammatory markers  COVID complicated by PE  improving oxygenation  improving clinically    patient concerned that surgery for ventral hernia with partial colonic obstruction may have to be delayed due to need for anticoagulation after recent PE    Suggest  Continue Remdisivir 5 day course: 9/7 200 mg dose, followed by 100 mg daily 9/8--> through 9/12  On anticoagulation with Heparin- being transitioned to Eliquis today  Decadron 6 mg x 5 day course likely adequate

## 2020-09-10 NOTE — PROGRESS NOTE ADULT - SUBJECTIVE AND OBJECTIVE BOX
CARDIOLOGY     PROGRESS  NOTE   ________________________________________________    CHIEF COMPLAINT:Patient is a 65y old  Female who presents with a chief complaint of dyspnea (10 Sep 2020 07:52)  doing better.  	  REVIEW OF SYSTEMS:  CONSTITUTIONAL: No fever, weight loss, or fatigue  EYES: No eye pain, visual disturbances, or discharge  ENT:  No difficulty hearing, tinnitus, vertigo; No sinus or throat pain  NECK: No pain or stiffness  RESPIRATORY: No cough, wheezing, chills or hemoptysis; No Shortness of Breath  CARDIOVASCULAR: No chest pain, palpitations, passing out, dizziness, or leg swelling  GASTROINTESTINAL: No abdominal or epigastric pain. No nausea, vomiting, or hematemesis; No diarrhea or constipation. No melena or hematochezia.  GENITOURINARY: No dysuria, frequency, hematuria, or incontinence  NEUROLOGICAL: No headaches, memory loss, loss of strength, numbness, or tremors  SKIN: No itching, burning, rashes, or lesions   LYMPH Nodes: No enlarged glands  ENDOCRINE: No heat or cold intolerance; No hair loss  MUSCULOSKELETAL: No joint pain or swelling; No muscle, back, or extremity pain  PSYCHIATRIC: No depression, anxiety, mood swings, or difficulty sleeping  HEME/LYMPH: No easy bruising, or bleeding gums  ALLERGY AND IMMUNOLOGIC: No hives or eczema	    [ ] All others negative	  [ ] Unable to obtain    PHYSICAL EXAM:  T(C): 36.5 (09-10-20 @ 05:08), Max: 37.6 (09-09-20 @ 18:36)  HR: 67 (09-10-20 @ 08:12) (61 - 83)  BP: 134/83 (09-10-20 @ 08:12) (112/64 - 142/99)  RR: 13 (09-10-20 @ 08:12) (13 - 18)  SpO2: 98% (09-10-20 @ 08:12) (93% - 98%)  Wt(kg): --  I&O's Summary    09 Sep 2020 07:01  -  10 Sep 2020 07:00  --------------------------------------------------------  IN: 925 mL / OUT: 250 mL / NET: 675 mL        MEDICATIONS  (STANDING):  ascorbic acid 1000 milliGRAM(s) Oral daily  aspirin enteric coated 81 milliGRAM(s) Oral <User Schedule>  benzonatate 100 milliGRAM(s) Oral every 8 hours  calcium carbonate 1250 mG  + Vitamin D (OsCal 500 + D) 1 Tablet(s) Oral daily  clobetasol 0.05% Ointment 1 Application(s) Topical two times a day  clonazePAM  Tablet 1 milliGRAM(s) Oral daily  clonazePAM  Tablet 1.5 milliGRAM(s) Oral daily  clonazePAM  Tablet 3 milliGRAM(s) Oral at bedtime  cyanocobalamin 1000 MICROGram(s) Oral daily  dexAMETHasone  Injectable 6 milliGRAM(s) IV Push daily  duobrii lotion (halobetasol/tazarotene) 1 Application(s) 1 Application(s) Topical two times a day  enalapril 5 milliGRAM(s) Oral daily  ferrous    sulfate 325 milliGRAM(s) Oral at bedtime  fluconAZOLE   Tablet 200 milliGRAM(s) Oral <User Schedule>  gabapentin 600 milliGRAM(s) Oral at bedtime  heparin  Infusion.  Unit(s)/Hr (18 mL/Hr) IV Continuous <Continuous>  influenza   Vaccine 0.5 milliLiter(s) IntraMuscular once  lactobacillus acidophilus 1 Tablet(s) Oral at bedtime  lamoTRIgine 150 milliGRAM(s) Oral daily  lisdexamfetamine 60 milliGRAM(s) Oral with breakfast  montelukast 10 milliGRAM(s) Oral at bedtime  multivitamin/minerals 1 Tablet(s) Oral daily  NUCYNTA  Extended-Release 100 milliGRAM(s) 100 milliGRAM(s) Oral every 12 hours  omega-3-Acid Ethyl Esters 2 Gram(s) Oral daily  oxybutynin 10 milliGRAM(s) Oral at bedtime  pantoprazole    Tablet 40 milliGRAM(s) Oral at bedtime  QUEtiapine 200 milliGRAM(s) Oral <User Schedule>  remdesivir  IVPB   IV Intermittent   remdesivir  IVPB 100 milliGRAM(s) IV Intermittent every 24 hours  sertraline 200 milliGRAM(s) Oral daily  tazarotene cream 0.1 % 1 Application(s) 1 Application(s) Topical two times a day  vortioxetine 10 milliGRAM(s) Oral daily      TELEMETRY: 	    ECG:  	  RADIOLOGY:  OTHER: 	  	  LABS:	 	    CARDIAC MARKERS:                                9.4    9.89  )-----------( 321      ( 10 Sep 2020 06:30 )             30.5     09-10    138  |  101  |  11  ----------------------------<  78  3.6   |  26  |  0.44<L>    Ca    9.1      10 Sep 2020 06:28    TPro  6.0  /  Alb  3.4  /  TBili  0.2  /  DBili  <0.1  /  AST  23  /  ALT  17  /  AlkPhos  61  09-10    proBNP: Serum Pro-Brain Natriuretic Peptide: 488 pg/mL (09-09 @ 06:07)    Lipid Profile:   HgA1c:   TSH:   PTT - ( 10 Sep 2020 06:30 )  PTT:88.1 sec  Continue Remdisivir 5 day course 9/7 200 mg dose, followed by 100 mg daily 9/8--> through 9/12  On anticoagulation with Heparin  Decadron 6 mg x 5 day course likely adequate    Assessment and plan  ---------------------------  This patient is a 64yo lady with PMH of OA, chronic pain, sleep apnea, on CPAP, psoriatic arthritis, htn, anemia, mood disorder, history of falls, recently diagnosed with COVID19 infection (8/29/2020) who presents to the ED with complaint of worsening dyspnea. Her daughter, son-in-law and grandchildren had gotten diagnosed with COVID19. Because of her interaction with them, she was tested, and found positive as well. She was sent home from urgent care with O2 via NC, albuterol inhaler and visiting nurse services. The patient had visited [facility] 3 days prior to admission due to worsening SOB and was discharged on prednisone and azithromycin, which she has continued to take. Despite these medications, she had worsening dyspnea and had to inc O2via NC from 2L to 3L.   Yesterday morning, she found her O2 sat of 86% on 3L NC, thus came to the ED.  sob sec to Covid and acute PE  agree with ac  echo to check RV function as well as check for pericardial effusion  le venous doppler r/o dvt  repeat ecg  asa daily  started on Remdesevir  stress test noted from 06/19  ?change heparin to lovenox  ID noted

## 2020-09-10 NOTE — PROGRESS NOTE ADULT - ASSESSMENT
64y/o F with PMH JANE on CPAP, OA, chronic pain, psoriatic arthritis, HTN, anemia, mood disorder, history of falls. Recently diagnosed with COVID19 infection (8/29/2020) who presents to the ED with complaint of worsening dyspnea. She was sent home from urgent care with O2 via NC, albuterol inhaler and visiting nurse services. The patient had visited [facility] 3 days prior to admission due to worsening SOB and was discharged on prednisone and azithromycin, which she has continued to take. Despite these medications, she had worsening dyspnea and had to inc O2 via NC from 2L to 3L. +Mild SOB, cough. CTA chest +PE.

## 2020-09-10 NOTE — PROGRESS NOTE ADULT - SUBJECTIVE AND OBJECTIVE BOX
Follow-up Pulm Progress Note    Feeling better  Dry cough still present, slightly improving   Sats 97% RA    Medications:  MEDICATIONS  (STANDING):  ascorbic acid 1000 milliGRAM(s) Oral daily  aspirin enteric coated 81 milliGRAM(s) Oral <User Schedule>  benzonatate 100 milliGRAM(s) Oral every 8 hours  calcium carbonate 1250 mG  + Vitamin D (OsCal 500 + D) 1 Tablet(s) Oral daily  clobetasol 0.05% Ointment 1 Application(s) Topical two times a day  clonazePAM  Tablet 1 milliGRAM(s) Oral daily  clonazePAM  Tablet 1.5 milliGRAM(s) Oral daily  clonazePAM  Tablet 3 milliGRAM(s) Oral at bedtime  cyanocobalamin 1000 MICROGram(s) Oral daily  dexAMETHasone  Injectable 6 milliGRAM(s) IV Push daily  duobrii lotion (halobetasol/tazarotene) 1 Application(s) 1 Application(s) Topical two times a day  enalapril 5 milliGRAM(s) Oral daily  ferrous    sulfate 325 milliGRAM(s) Oral at bedtime  fluconAZOLE   Tablet 200 milliGRAM(s) Oral <User Schedule>  gabapentin 600 milliGRAM(s) Oral at bedtime  heparin  Infusion.  Unit(s)/Hr (18 mL/Hr) IV Continuous <Continuous>  influenza   Vaccine 0.5 milliLiter(s) IntraMuscular once  lactobacillus acidophilus 1 Tablet(s) Oral at bedtime  lamoTRIgine 150 milliGRAM(s) Oral daily  lisdexamfetamine 60 milliGRAM(s) Oral with breakfast  montelukast 10 milliGRAM(s) Oral at bedtime  multivitamin/minerals 1 Tablet(s) Oral daily  NUCYNTA  Extended-Release 100 milliGRAM(s) 100 milliGRAM(s) Oral every 12 hours  omega-3-Acid Ethyl Esters 2 Gram(s) Oral daily  oxybutynin 10 milliGRAM(s) Oral at bedtime  pantoprazole    Tablet 40 milliGRAM(s) Oral at bedtime  QUEtiapine 200 milliGRAM(s) Oral <User Schedule>  remdesivir  IVPB   IV Intermittent   remdesivir  IVPB 100 milliGRAM(s) IV Intermittent every 24 hours  sertraline 200 milliGRAM(s) Oral daily  tazarotene cream 0.1 % 1 Application(s) 1 Application(s) Topical two times a day  vortioxetine 10 milliGRAM(s) Oral daily    MEDICATIONS  (PRN):  ALBUTerol    90 MICROgram(s) HFA Inhaler 2 Puff(s) Inhalation every 6 hours PRN Shortness of Breath and/or Wheezing  gabapentin 400 milliGRAM(s) Oral two times a day PRN nerve pain  heparin   Injectable 8500 Unit(s) IV Push every 6 hours PRN For aPTT less than 40  heparin   Injectable 4000 Unit(s) IV Push every 6 hours PRN For aPTT between 40 - 57  lidocaine   Patch 1 Patch Transdermal daily PRN localized pain  oxyCODONE    IR 15 milliGRAM(s) Oral two times a day PRN Severe Pain (7 - 10)          Vital Signs Last 24 Hrs  T(C): 36.7 (10 Sep 2020 08:12), Max: 37.6 (09 Sep 2020 18:36)  T(F): 98 (10 Sep 2020 08:12), Max: 99.6 (09 Sep 2020 18:36)  HR: 67 (10 Sep 2020 08:12) (61 - 83)  BP: 134/83 (10 Sep 2020 08:12) (112/64 - 140/90)  BP(mean): --  RR: 13 (10 Sep 2020 08:12) (13 - 16)  SpO2: 98% (10 Sep 2020 08:12) (98% - 98%)          09-09 @ 07:01  -  09-10 @ 07:00  --------------------------------------------------------  IN: 925 mL / OUT: 250 mL / NET: 675 mL          LABS:                        9.4    9.89  )-----------( 321      ( 10 Sep 2020 06:30 )             30.5     09-10    138  |  101  |  11  ----------------------------<  78  3.6   |  26  |  0.44<L>    Ca    9.1      10 Sep 2020 06:28    TPro  6.0  /  Alb  3.4  /  TBili  0.2  /  DBili  <0.1  /  AST  23  /  ALT  17  /  AlkPhos  61  09-10          CAPILLARY BLOOD GLUCOSE        PTT - ( 10 Sep 2020 06:30 )  PTT:88.1 sec      Serum Pro-Brain Natriuretic Peptide: 488 pg/mL (09-09-20 @ 06:07)          Physical Examination:  PULM: Clear to auscultation bilaterally, no significant sputum production  CVS: RRR    RADIOLOGY REVIEWED  CT chest: < from: CT Angio Chest w/ IV Cont (09.06.20 @ 21:40) >  FINDINGS:    LUNGS AND AIRWAYS: Patent central airways.  Scattered bilateral groundglass opacities, predominantly within the bilateral upper lobes. Additional somewhat consolidative left lower lobe opacity.  PLEURA: Trace bilateral pleural effusions.  MEDIASTINUM AND CATRINA: No lymphadenopathy.  VESSELS: Small filling defectidentified within segmental to subsegmental branches of the right upper lobe pulmonary artery (:3), compatible with pulmonary embolism. No central pulmonary embolism. Detailed evaluation of subsegmental branches limited secondary to respiratorymotion artifact and adjacent lung disease.  HEART: Mild cardiomegaly. Small pericardial effusion. Coronary artery calcification and/or stenting.  CHEST WALL AND LOWER NECK: Heterogeneous thyroid lobes. Consider nonemergent correlation with ultrasoundto exclude underlying nodule. Mildly prominent bilateral axillary lymph nodes.  VISUALIZED UPPER ABDOMEN: Cholecystectomy. Partially visualized gastric bypass. Partially imaged hernia bowel loop in the left anterior abdominal wall. Partially imaged right renal hypodensity.  BONES: Chronic right sixth and seventh rib deformities.    IMPRESSION:    Small filling defect identified within segmental to subsegmental branches of the right upper lobe pulmonary artery (:3), compatible with pulmonaryembolism. No central pulmonary embolism. Detailed evaluation of subsegmental branches limited secondary to respiratory motion artifact and adjacent lung disease.    Scattered bilateral groundglass opacities, predominantly within the bilateral upper lobes, which likely represent atypical viral infection or multifocal pneumonia of reported COVID-19. Additional somewhat consolidative left lower lobe opacity. Trace bilateral pleural effusions.    Mild cardiomegaly and small pericardial effusion.    Dr. Cailin Peralta discussed these findings with Dr. Antonio on 9/6/2020 11:47 PM, with read back.    < end of copied text >

## 2020-09-10 NOTE — PROGRESS NOTE ADULT - PROBLEM SELECTOR PLAN 2
Will switch from lovenox to heparin gtt so that it can be quickly discontinued if she has any bleeding related to known hernia. Pt currently hemodynamically stable.  Check TTE. [facility] venous duplex US negative for DVT. Will switch from lovenox to heparin gtt so that it can be quickly discontinued if she has any bleeding related to known hernia. Pt currently hemodynamically stable.  Check TTE. VA venous duplex US negative for DVT. Change to Eliquis later today,   to start load over 7 days.

## 2020-09-11 ENCOUNTER — TRANSCRIPTION ENCOUNTER (OUTPATIENT)
Age: 65
End: 2020-09-11

## 2020-09-11 VITALS
OXYGEN SATURATION: 97 % | SYSTOLIC BLOOD PRESSURE: 120 MMHG | TEMPERATURE: 98 F | HEART RATE: 94 BPM | DIASTOLIC BLOOD PRESSURE: 83 MMHG | RESPIRATION RATE: 18 BRPM

## 2020-09-11 LAB
ALBUMIN SERPL ELPH-MCNC: 3.4 G/DL — SIGNIFICANT CHANGE UP (ref 3.3–5)
ALP SERPL-CCNC: 58 U/L — SIGNIFICANT CHANGE UP (ref 40–120)
ALT FLD-CCNC: 28 U/L — SIGNIFICANT CHANGE UP (ref 10–45)
APTT BLD: 30.6 SEC — SIGNIFICANT CHANGE UP (ref 27.5–35.5)
AST SERPL-CCNC: 33 U/L — SIGNIFICANT CHANGE UP (ref 10–40)
BILIRUB DIRECT SERPL-MCNC: 0.1 MG/DL — SIGNIFICANT CHANGE UP (ref 0–0.2)
BILIRUB INDIRECT FLD-MCNC: 0.1 MG/DL — LOW (ref 0.2–1)
BILIRUB SERPL-MCNC: 0.2 MG/DL — SIGNIFICANT CHANGE UP (ref 0.2–1.2)
CREAT SERPL-MCNC: 0.45 MG/DL — LOW (ref 0.5–1.3)
HCT VFR BLD CALC: 29.1 % — LOW (ref 34.5–45)
HGB BLD-MCNC: 9 G/DL — LOW (ref 11.5–15.5)
MCHC RBC-ENTMCNC: 27.7 PG — SIGNIFICANT CHANGE UP (ref 27–34)
MCHC RBC-ENTMCNC: 30.9 GM/DL — LOW (ref 32–36)
MCV RBC AUTO: 89.5 FL — SIGNIFICANT CHANGE UP (ref 80–100)
NRBC # BLD: 0 /100 WBCS — SIGNIFICANT CHANGE UP (ref 0–0)
PLATELET # BLD AUTO: 295 K/UL — SIGNIFICANT CHANGE UP (ref 150–400)
PROT SERPL-MCNC: 5.9 G/DL — LOW (ref 6–8.3)
RBC # BLD: 3.25 M/UL — LOW (ref 3.8–5.2)
RBC # FLD: 12.2 % — SIGNIFICANT CHANGE UP (ref 10.3–14.5)
WBC # BLD: 9.73 K/UL — SIGNIFICANT CHANGE UP (ref 3.8–10.5)
WBC # FLD AUTO: 9.73 K/UL — SIGNIFICANT CHANGE UP (ref 3.8–10.5)

## 2020-09-11 PROCEDURE — 82330 ASSAY OF CALCIUM: CPT

## 2020-09-11 PROCEDURE — 97530 THERAPEUTIC ACTIVITIES: CPT

## 2020-09-11 PROCEDURE — 84132 ASSAY OF SERUM POTASSIUM: CPT

## 2020-09-11 PROCEDURE — 84295 ASSAY OF SERUM SODIUM: CPT

## 2020-09-11 PROCEDURE — 80048 BASIC METABOLIC PNL TOTAL CA: CPT

## 2020-09-11 PROCEDURE — 94640 AIRWAY INHALATION TREATMENT: CPT

## 2020-09-11 PROCEDURE — 85610 PROTHROMBIN TIME: CPT

## 2020-09-11 PROCEDURE — 96372 THER/PROPH/DIAG INJ SC/IM: CPT

## 2020-09-11 PROCEDURE — 93005 ELECTROCARDIOGRAM TRACING: CPT

## 2020-09-11 PROCEDURE — 84484 ASSAY OF TROPONIN QUANT: CPT

## 2020-09-11 PROCEDURE — 83605 ASSAY OF LACTIC ACID: CPT

## 2020-09-11 PROCEDURE — 99232 SBSQ HOSP IP/OBS MODERATE 35: CPT | Mod: CS

## 2020-09-11 PROCEDURE — 84145 PROCALCITONIN (PCT): CPT

## 2020-09-11 PROCEDURE — 82728 ASSAY OF FERRITIN: CPT

## 2020-09-11 PROCEDURE — 82435 ASSAY OF BLOOD CHLORIDE: CPT

## 2020-09-11 PROCEDURE — 86140 C-REACTIVE PROTEIN: CPT

## 2020-09-11 PROCEDURE — 85014 HEMATOCRIT: CPT

## 2020-09-11 PROCEDURE — 97161 PT EVAL LOW COMPLEX 20 MIN: CPT

## 2020-09-11 PROCEDURE — 85025 COMPLETE CBC W/AUTO DIFF WBC: CPT

## 2020-09-11 PROCEDURE — 85018 HEMOGLOBIN: CPT

## 2020-09-11 PROCEDURE — 82248 BILIRUBIN DIRECT: CPT

## 2020-09-11 PROCEDURE — 93970 EXTREMITY STUDY: CPT

## 2020-09-11 PROCEDURE — 85379 FIBRIN DEGRADATION QUANT: CPT

## 2020-09-11 PROCEDURE — 86769 SARS-COV-2 COVID-19 ANTIBODY: CPT

## 2020-09-11 PROCEDURE — 85730 THROMBOPLASTIN TIME PARTIAL: CPT

## 2020-09-11 PROCEDURE — 71275 CT ANGIOGRAPHY CHEST: CPT

## 2020-09-11 PROCEDURE — 80076 HEPATIC FUNCTION PANEL: CPT

## 2020-09-11 PROCEDURE — 97110 THERAPEUTIC EXERCISES: CPT

## 2020-09-11 PROCEDURE — 83880 ASSAY OF NATRIURETIC PEPTIDE: CPT

## 2020-09-11 PROCEDURE — 99285 EMERGENCY DEPT VISIT HI MDM: CPT | Mod: 25

## 2020-09-11 PROCEDURE — 83615 LACTATE (LD) (LDH) ENZYME: CPT

## 2020-09-11 PROCEDURE — 80053 COMPREHEN METABOLIC PANEL: CPT

## 2020-09-11 PROCEDURE — 97116 GAIT TRAINING THERAPY: CPT

## 2020-09-11 PROCEDURE — 85027 COMPLETE CBC AUTOMATED: CPT

## 2020-09-11 PROCEDURE — 82565 ASSAY OF CREATININE: CPT

## 2020-09-11 PROCEDURE — 82947 ASSAY GLUCOSE BLOOD QUANT: CPT

## 2020-09-11 PROCEDURE — 82803 BLOOD GASES ANY COMBINATION: CPT

## 2020-09-11 RX ORDER — ALBUTEROL 90 UG/1
2 AEROSOL, METERED ORAL
Qty: 1 | Refills: 0
Start: 2020-09-11 | End: 2020-10-10

## 2020-09-11 RX ORDER — APIXABAN 2.5 MG/1
1 TABLET, FILM COATED ORAL
Qty: 60 | Refills: 1
Start: 2020-09-11 | End: 2020-11-09

## 2020-09-11 RX ORDER — PREGABALIN 225 MG/1
1 CAPSULE ORAL
Qty: 30 | Refills: 0
Start: 2020-09-11 | End: 2020-10-10

## 2020-09-11 RX ORDER — FLUCONAZOLE 150 MG/1
1 TABLET ORAL
Qty: 0 | Refills: 0 | DISCHARGE

## 2020-09-11 RX ADMIN — LAMOTRIGINE 150 MILLIGRAM(S): 25 TABLET, ORALLY DISINTEGRATING ORAL at 12:13

## 2020-09-11 RX ADMIN — Medication 1000 MILLIGRAM(S): at 12:13

## 2020-09-11 RX ADMIN — SERTRALINE 200 MILLIGRAM(S): 25 TABLET, FILM COATED ORAL at 10:24

## 2020-09-11 RX ADMIN — OXYCODONE HYDROCHLORIDE 15 MILLIGRAM(S): 5 TABLET ORAL at 00:00

## 2020-09-11 RX ADMIN — VORTIOXETINE 10 MILLIGRAM(S): 5 TABLET, FILM COATED ORAL at 10:24

## 2020-09-11 RX ADMIN — Medication 1 TABLET(S): at 12:13

## 2020-09-11 RX ADMIN — Medication 100 MILLIGRAM(S): at 14:11

## 2020-09-11 RX ADMIN — Medication 1 MILLIGRAM(S): at 10:23

## 2020-09-11 RX ADMIN — REMDESIVIR 500 MILLIGRAM(S): 5 INJECTION INTRAVENOUS at 12:15

## 2020-09-11 RX ADMIN — Medication 81 MILLIGRAM(S): at 14:11

## 2020-09-11 RX ADMIN — LISDEXAMFETAMINE DIMESYLATE 60 MILLIGRAM(S): 70 CAPSULE ORAL at 08:03

## 2020-09-11 RX ADMIN — APIXABAN 10 MILLIGRAM(S): 2.5 TABLET, FILM COATED ORAL at 05:18

## 2020-09-11 RX ADMIN — Medication 2 GRAM(S): at 12:13

## 2020-09-11 RX ADMIN — FLUCONAZOLE 200 MILLIGRAM(S): 150 TABLET ORAL at 10:24

## 2020-09-11 RX ADMIN — Medication 1.5 MILLIGRAM(S): at 14:11

## 2020-09-11 RX ADMIN — Medication 5 MILLIGRAM(S): at 05:18

## 2020-09-11 RX ADMIN — Medication 100 MILLIGRAM(S): at 05:18

## 2020-09-11 RX ADMIN — PREGABALIN 1000 MICROGRAM(S): 225 CAPSULE ORAL at 12:16

## 2020-09-11 RX ADMIN — Medication 6 MILLIGRAM(S): at 12:14

## 2020-09-11 NOTE — PROGRESS NOTE ADULT - SUBJECTIVE AND OBJECTIVE BOX
Follow Up:  COVID    Interval History/ROS: doing well, no sob at rest, ambulating in room    Allergies  No Known Allergies  ANTIMICROBIALS:  fluconAZOLE   Tablet 200 <User Schedule>      OTHER MEDS:  MEDICATIONS  (STANDING):  ALBUTerol    90 MICROgram(s) HFA Inhaler 2 every 6 hours PRN  apixaban 10 every 12 hours  aspirin enteric coated 81 <User Schedule>  benzonatate 100 every 8 hours  clonazePAM  Tablet 1 daily  clonazePAM  Tablet 1.5 daily  clonazePAM  Tablet 3 at bedtime  dexAMETHasone  Injectable 6 daily  enalapril 5 daily  gabapentin 400 two times a day PRN  gabapentin 600 at bedtime  influenza   Vaccine 0.5 once  lamoTRIgine 150 daily  lisdexamfetamine 60 with breakfast  montelukast 10 at bedtime  oxybutynin 10 at bedtime  oxyCODONE    IR 15 two times a day PRN  pantoprazole    Tablet 40 at bedtime  QUEtiapine 200 <User Schedule>  sertraline 200 daily  vortioxetine 10 daily      Vital Signs Last 24 Hrs  T(C): 36.7 (11 Sep 2020 10:52), Max: 36.8 (11 Sep 2020 04:29)  T(F): 98 (11 Sep 2020 10:52), Max: 98.2 (11 Sep 2020 04:29)  HR: 94 (11 Sep 2020 10:52) (65 - 94)  BP: 120/83 (11 Sep 2020 10:52) (109/63 - 135/74)  BP(mean): --  RR: 18 (11 Sep 2020 10:52) (18 - 18)  SpO2: 97% (11 Sep 2020 10:52) (94% - 97%)  ROOM AIR    PHYSICAL EXAM:  General: WN/WD NAD, Non-toxic  Neurology: A&Ox3, nonfocal  Respiratory: Clear to auscultation bilaterally  CV: RRR, S1S2, no murmurs, rubs or gallops  Abdominal: Soft, Non-tender, non-distended,  Extremities: No edema  Line Sites: Clear  Skin: No rash                       9.0    9.73  )-----------( 295      ( 11 Sep 2020 06:11 )             29.1       09-11    x   |  x   |  x   ----------------------------<  x   x    |  x   |  0.45<L>    Ca    9.1      10 Sep 2020 06:28    TPro  5.9<L>  /  Alb  3.4  /  TBili  0.2  /  DBili  0.1  /  AST  33  /  ALT  28  /  AlkPhos  58  09-11      Ferritin, Serum in AM (09.08.20 @ 08:43)    Ferritin, Serum: 750 ng/mL  C-Reactive Protein, Serum (09.08.20 @ 06:08)    C-Reactive Protein, Serum: 0.19 mg/dL  D-Dimer Assay, Quantitative (09.06.20 @ 18:58)    D-Dimer Assay, Quantitative: 338:ng/mL DDU    Procalcitonin, Serum (09.06.20 @ 18:58)    Procalcitonin, Serum: <0.03  ng/mL      COVID-19  Antibody - for prior infection screening (09.07.20 @ 23:54)    COVID-19 IgG Antibody Index: 27.10:  Positive >= 1.00 Index Index    COVID-19 IgG Antibody Interpretation: Positive    COVID-19 PCR . (09.07.20 @ 00:23)    COVID-19 PCR: Detected    RADIOLOGY:  < from: CT Angio Chest w/ IV Cont (09.06.20 @ 21:40) >  IMPRESSION:    Small filling defect identified within segmental to subsegmental branches of the right upper lobe pulmonary artery (:3), compatible with pulmonaryembolism. No central pulmonary embolism. Detailed evaluation of subsegmental branches limited secondary to respiratory motion artifact and adjacent lung disease.    Scattered bilateral groundglass opacities, predominantly within the bilateral upper lobes, which likely represent atypical viral infection or multifocal pneumonia of reported COVID-19. Additional somewhat consolidative left lower lobe opacity. Trace bilateral pleural effusions.    Mild cardiomegaly and small pericardial effusion.    < end of copied text >      Jim Smiht MD; Division of Infectious Disease; Pager: [phone]; nights and weekends: [phone]

## 2020-09-11 NOTE — PROGRESS NOTE ADULT - PROBLEM SELECTOR PLAN 1
-Day 5 remdesivir today  -Can d/c Decadron    -She is clinically improved and hypoxia has resolved. Possible d/c planning tomorrow if remains stable and okay with ID.   -C/w tessalon perle 100mg PO TID x 1-2 more weeks PRN.  -No pulmonary objections to d/c planning today. F/u in office 1 month, card given to patient.

## 2020-09-11 NOTE — PROGRESS NOTE ADULT - SUBJECTIVE AND OBJECTIVE BOX
Follow-up Pulm Progress Note    Feels better  Has been off O2 at rest and with ambulation since yesterday   Cough improving     Medications:  MEDICATIONS  (STANDING):  apixaban 10 milliGRAM(s) Oral every 12 hours  ascorbic acid 1000 milliGRAM(s) Oral daily  aspirin enteric coated 81 milliGRAM(s) Oral <User Schedule>  benzonatate 100 milliGRAM(s) Oral every 8 hours  calcium carbonate 1250 mG  + Vitamin D (OsCal 500 + D) 1 Tablet(s) Oral daily  clobetasol 0.05% Ointment 1 Application(s) Topical two times a day  clonazePAM  Tablet 1 milliGRAM(s) Oral daily  clonazePAM  Tablet 1.5 milliGRAM(s) Oral daily  clonazePAM  Tablet 3 milliGRAM(s) Oral at bedtime  cyanocobalamin 1000 MICROGram(s) Oral daily  dexAMETHasone  Injectable 6 milliGRAM(s) IV Push daily  duobrii lotion (halobetasol/tazarotene) 1 Application(s) 1 Application(s) Topical two times a day  enalapril 5 milliGRAM(s) Oral daily  ferrous    sulfate 325 milliGRAM(s) Oral at bedtime  fluconAZOLE   Tablet 200 milliGRAM(s) Oral <User Schedule>  gabapentin 600 milliGRAM(s) Oral at bedtime  influenza   Vaccine 0.5 milliLiter(s) IntraMuscular once  lactobacillus acidophilus 1 Tablet(s) Oral at bedtime  lamoTRIgine 150 milliGRAM(s) Oral daily  lisdexamfetamine 60 milliGRAM(s) Oral with breakfast  montelukast 10 milliGRAM(s) Oral at bedtime  multivitamin/minerals 1 Tablet(s) Oral daily  NUCYNTA  Extended-Release 100 milliGRAM(s) 100 milliGRAM(s) Oral every 12 hours  omega-3-Acid Ethyl Esters 2 Gram(s) Oral daily  oxybutynin 10 milliGRAM(s) Oral at bedtime  pantoprazole    Tablet 40 milliGRAM(s) Oral at bedtime  QUEtiapine 200 milliGRAM(s) Oral <User Schedule>  remdesivir  IVPB   IV Intermittent   remdesivir  IVPB 100 milliGRAM(s) IV Intermittent every 24 hours  sertraline 200 milliGRAM(s) Oral daily  tazarotene cream 0.1 % 1 Application(s) 1 Application(s) Topical two times a day  vortioxetine 10 milliGRAM(s) Oral daily    MEDICATIONS  (PRN):  ALBUTerol    90 MICROgram(s) HFA Inhaler 2 Puff(s) Inhalation every 6 hours PRN Shortness of Breath and/or Wheezing  gabapentin 400 milliGRAM(s) Oral two times a day PRN nerve pain  lidocaine   Patch 1 Patch Transdermal daily PRN localized pain  oxyCODONE    IR 15 milliGRAM(s) Oral two times a day PRN Severe Pain (7 - 10)          Vital Signs Last 24 Hrs  T(C): 36.8 (11 Sep 2020 04:29), Max: 36.9 (10 Sep 2020 13:13)  T(F): 98.2 (11 Sep 2020 04:29), Max: 98.4 (10 Sep 2020 13:13)  HR: 65 (11 Sep 2020 04:29) (65 - 76)  BP: 135/74 (11 Sep 2020 04:29) (109/63 - 135/74)  BP(mean): --  RR: 18 (11 Sep 2020 04:29) (14 - 18)  SpO2: 94% (11 Sep 2020 04:29) (94% - 97%)          09-10 @ 07:01  -  09-11 @ 07:00  --------------------------------------------------------  IN: 790 mL / OUT: 300 mL / NET: 490 mL          LABS:                        9.0    9.73  )-----------( 295      ( 11 Sep 2020 06:11 )             29.1     09-11    x   |  x   |  x   ----------------------------<  x   x    |  x   |  0.45<L>    Ca    9.1      10 Sep 2020 06:28    TPro  5.9<L>  /  Alb  3.4  /  TBili  0.2  /  DBili  0.1  /  AST  33  /  ALT  28  /  AlkPhos  58  09-11            PTT - ( 11 Sep 2020 06:11 )  PTT:30.6 sec      Serum Pro-Brain Natriuretic Peptide: 488 pg/mL (09-09-20 @ 06:07)          Physical Examination:  PULM: Clear to auscultation bilaterally, no significant sputum production  CVS: RRR    RADIOLOGY REVIEWED  CT chest: < from: CT Angio Chest w/ IV Cont (09.06.20 @ 21:40) >  FINDINGS:    LUNGS AND AIRWAYS: Patent central airways.  Scattered bilateral groundglass opacities, predominantly within the bilateral upper lobes. Additional somewhat consolidative left lower lobe opacity.  PLEURA: Trace bilateral pleural effusions.  MEDIASTINUM AND CATRINA: No lymphadenopathy.  VESSELS: Small filling defectidentified within segmental to subsegmental branches of the right upper lobe pulmonary artery (:3), compatible with pulmonary embolism. No central pulmonary embolism. Detailed evaluation of subsegmental branches limited secondary to respiratorymotion artifact and adjacent lung disease.  HEART: Mild cardiomegaly. Small pericardial effusion. Coronary artery calcification and/or stenting.  CHEST WALL AND LOWER NECK: Heterogeneous thyroid lobes. Consider nonemergent correlation with ultrasoundto exclude underlying nodule. Mildly prominent bilateral axillary lymph nodes.  VISUALIZED UPPER ABDOMEN: Cholecystectomy. Partially visualized gastric bypass. Partially imaged hernia bowel loop in the left anterior abdominal wall. Partially imaged right renal hypodensity.  BONES: Chronic right sixth and seventh rib deformities.    IMPRESSION:    Small filling defect identified within segmental to subsegmental branches of the right upper lobe pulmonary artery (:3), compatible with pulmonaryembolism. No central pulmonary embolism. Detailed evaluation of subsegmental branches limited secondary to respiratory motion artifact and adjacent lung disease.    Scattered bilateral groundglass opacities, predominantly within the bilateral upper lobes, which likely represent atypical viral infection or multifocal pneumonia of reported COVID-19. Additional somewhat consolidative left lower lobe opacity. Trace bilateral pleural effusions.    Mild cardiomegaly and small pericardial effusion.    Dr. Cailin Peralta discussed these findings with Dr. Antonio on 9/6/2020 11:47 PM, with read back.      < end of copied text >

## 2020-09-11 NOTE — DISCHARGE NOTE NURSING/CASE MANAGEMENT/SOCIAL WORK - PATIENT PORTAL LINK FT
You can access the FollowMyHealth Patient Portal offered by [facility] by registering at the following website: [email or web address]. By joining [employer]’s FollowMyHealth portal, you will also be able to view your health information using other applications (apps) compatible with our system.

## 2020-09-11 NOTE — PROGRESS NOTE ADULT - SUBJECTIVE AND OBJECTIVE BOX
CARDIOLOGY     PROGRESS  NOTE   ________________________________________________    CHIEF COMPLAINT:Patient is a 65y old  Female who presents with a chief complaint of dyspnea (11 Sep 2020 07:46)  doing better.  	  REVIEW OF SYSTEMS:  CONSTITUTIONAL: No fever, weight loss, or fatigue  EYES: No eye pain, visual disturbances, or discharge  ENT:  No difficulty hearing, tinnitus, vertigo; No sinus or throat pain  NECK: No pain or stiffness  RESPIRATORY: No cough, wheezing, chills or hemoptysis; decrease  Shortness of Breath  CARDIOVASCULAR: No chest pain, palpitations, passing out, dizziness, or leg swelling  GASTROINTESTINAL: No abdominal or epigastric pain. No nausea, vomiting, or hematemesis; No diarrhea or constipation. No melena or hematochezia.  GENITOURINARY: No dysuria, frequency, hematuria, or incontinence  NEUROLOGICAL: No headaches, memory loss, loss of strength, numbness, or tremors  SKIN: No itching, burning, rashes, or lesions   LYMPH Nodes: No enlarged glands  ENDOCRINE: No heat or cold intolerance; No hair loss  MUSCULOSKELETAL: No joint pain or swelling; No muscle, back, or extremity pain  PSYCHIATRIC: No depression, anxiety, mood swings, or difficulty sleeping  HEME/LYMPH: No easy bruising, or bleeding gums  ALLERGY AND IMMUNOLOGIC: No hives or eczema	    [ ] All others negative	  [ ] Unable to obtain    PHYSICAL EXAM:  T(C): 36.8 (09-11-20 @ 04:29), Max: 36.9 (09-10-20 @ 13:13)  HR: 65 (09-11-20 @ 04:29) (65 - 76)  BP: 135/74 (09-11-20 @ 04:29) (109/63 - 135/74)  RR: 18 (09-11-20 @ 04:29) (14 - 18)  SpO2: 94% (09-11-20 @ 04:29) (94% - 97%)  Wt(kg): --  I&O's Summary    10 Sep 2020 07:01  -  11 Sep 2020 07:00  --------------------------------------------------------  IN: 790 mL / OUT: 300 mL / NET: 490 mL        Appearance: Normal	  HEENT:   Normal oral mucosa, PERRL, EOMI	  Lymphatic: No lymphadenopathy  Cardiovascular: Normal S1 S2, No JVD, No murmurs, No edema  Respiratory: Lungs clear to auscultation	  Psychiatry: A & O x 3, Mood & affect appropriate  Gastrointestinal:  Soft, Non-tender, + BS	  Skin: No rashes, No ecchymoses, No cyanosis	  Neurologic: Non-focal  Extremities: Normal range of motion, No clubbing, cyanosis or edema  Vascular: Peripheral pulses palpable 2+ bilaterally    MEDICATIONS  (STANDING):  apixaban 10 milliGRAM(s) Oral every 12 hours  ascorbic acid 1000 milliGRAM(s) Oral daily  aspirin enteric coated 81 milliGRAM(s) Oral <User Schedule>  benzonatate 100 milliGRAM(s) Oral every 8 hours  calcium carbonate 1250 mG  + Vitamin D (OsCal 500 + D) 1 Tablet(s) Oral daily  clobetasol 0.05% Ointment 1 Application(s) Topical two times a day  clonazePAM  Tablet 1 milliGRAM(s) Oral daily  clonazePAM  Tablet 1.5 milliGRAM(s) Oral daily  clonazePAM  Tablet 3 milliGRAM(s) Oral at bedtime  cyanocobalamin 1000 MICROGram(s) Oral daily  dexAMETHasone  Injectable 6 milliGRAM(s) IV Push daily  duobrii lotion (halobetasol/tazarotene) 1 Application(s) 1 Application(s) Topical two times a day  enalapril 5 milliGRAM(s) Oral daily  ferrous    sulfate 325 milliGRAM(s) Oral at bedtime  fluconAZOLE   Tablet 200 milliGRAM(s) Oral <User Schedule>  gabapentin 600 milliGRAM(s) Oral at bedtime  influenza   Vaccine 0.5 milliLiter(s) IntraMuscular once  lactobacillus acidophilus 1 Tablet(s) Oral at bedtime  lamoTRIgine 150 milliGRAM(s) Oral daily  lisdexamfetamine 60 milliGRAM(s) Oral with breakfast  montelukast 10 milliGRAM(s) Oral at bedtime  multivitamin/minerals 1 Tablet(s) Oral daily  NUCYNTA  Extended-Release 100 milliGRAM(s) 100 milliGRAM(s) Oral every 12 hours  omega-3-Acid Ethyl Esters 2 Gram(s) Oral daily  oxybutynin 10 milliGRAM(s) Oral at bedtime  pantoprazole    Tablet 40 milliGRAM(s) Oral at bedtime  QUEtiapine 200 milliGRAM(s) Oral <User Schedule>  remdesivir  IVPB   IV Intermittent   remdesivir  IVPB 100 milliGRAM(s) IV Intermittent every 24 hours  sertraline 200 milliGRAM(s) Oral daily  tazarotene cream 0.1 % 1 Application(s) 1 Application(s) Topical two times a day  vortioxetine 10 milliGRAM(s) Oral daily      TELEMETRY: 	    ECG:  	  RADIOLOGY:  OTHER: 	  	  LABS:	 	    CARDIAC MARKERS:                                9.0    9.73  )-----------( 295      ( 11 Sep 2020 06:11 )             29.1     09-11    x   |  x   |  x   ----------------------------<  x   x    |  x   |  0.45<L>    Ca    9.1      10 Sep 2020 06:28    TPro  5.9<L>  /  Alb  3.4  /  TBili  0.2  /  DBili  0.1  /  AST  33  /  ALT  28  /  AlkPhos  58  09-11    proBNP: Serum Pro-Brain Natriuretic Peptide: 488 pg/mL (09-09 @ 06:07)    Lipid Profile:   HgA1c:   TSH:   PTT - ( 11 Sep 2020 06:11 )  PTT:30.6 sec    < from: [facility] Duplex Lower Ext Vein Scan, Bilat (09.08.20 @ 17:19) >  No evidence of deep venous thrombosis in either lower extremity.    < end of copied text >    Assessment and plan  ---------------------------  This patient is a 66yo lady with PMH of OA, chronic pain, sleep apnea, on CPAP, psoriatic arthritis, htn, anemia, mood disorder, history of falls, recently diagnosed with COVID19 infection (8/29/2020) who presents to the ED with complaint of worsening dyspnea. Her daughter, son-in-law and grandchildren had gotten diagnosed with COVID19. Because of her interaction with them, she was tested, and found positive as well. She was sent home from urgent care with O2 via NC, albuterol inhaler and visiting nurse services. The patient had visited [facility] 3 days prior to admission due to worsening SOB and was discharged on prednisone and azithromycin, which she has continued to take. Despite these medications, she had worsening dyspnea and had to inc O2via NC from 2L to 3L.   Yesterday morning, she found her O2 sat of 86% on 3L NC, thus came to the ED.  sob sec to Covid and acute PE  agree with ac  echo to check RV function as well as check for pericardial effusion  started on Remdesevir  stress test noted from 06/19  started on noac  ID noted CARDIOLOGY     PROGRESS  NOTE   ________________________________________________    CHIEF COMPLAINT:Patient is a 65y old  Female who presents with a chief complaint of dyspnea (11 Sep 2020 07:46)  doing better.  	  REVIEW OF SYSTEMS:  CONSTITUTIONAL: No fever, weight loss, or fatigue  EYES: No eye pain, visual disturbances, or discharge  ENT:  No difficulty hearing, tinnitus, vertigo; No sinus or throat pain  NECK: No pain or stiffness  RESPIRATORY: No cough, wheezing, chills or hemoptysis; decrease  Shortness of Breath  CARDIOVASCULAR: No chest pain, palpitations, passing out, dizziness, or leg swelling  GASTROINTESTINAL: No abdominal or epigastric pain. No nausea, vomiting, or hematemesis; No diarrhea or constipation. No melena or hematochezia.  GENITOURINARY: No dysuria, frequency, hematuria, or incontinence  NEUROLOGICAL: No headaches, memory loss, loss of strength, numbness, or tremors  SKIN: No itching, burning, rashes, or lesions   LYMPH Nodes: No enlarged glands  ENDOCRINE: No heat or cold intolerance; No hair loss  MUSCULOSKELETAL: No joint pain or swelling; No muscle, back, or extremity pain  PSYCHIATRIC: No depression, anxiety, mood swings, or difficulty sleeping  HEME/LYMPH: No easy bruising, or bleeding gums  ALLERGY AND IMMUNOLOGIC: No hives or eczema	    [ ] All others negative	  [ ] Unable to obtain    PHYSICAL EXAM:  T(C): 36.8 (09-11-20 @ 04:29), Max: 36.9 (09-10-20 @ 13:13)  HR: 65 (09-11-20 @ 04:29) (65 - 76)  BP: 135/74 (09-11-20 @ 04:29) (109/63 - 135/74)  RR: 18 (09-11-20 @ 04:29) (14 - 18)  SpO2: 94% (09-11-20 @ 04:29) (94% - 97%)  Wt(kg): --  I&O's Summary    10 Sep 2020 07:01  -  11 Sep 2020 07:00  --------------------------------------------------------  IN: 790 mL / OUT: 300 mL / NET: 490 mL        Appearance: Normal	  HEENT:   Normal oral mucosa, PERRL, EOMI	  Lymphatic: No lymphadenopathy  Cardiovascular: Normal S1 S2, No JVD, No murmurs, No edema  Respiratory: Lungs clear to auscultation	  Psychiatry: A & O x 3, Mood & affect appropriate  Gastrointestinal:  Soft, Non-tender, + BS	  Skin: No rashes, No ecchymoses, No cyanosis	  Neurologic: Non-focal  Extremities: Normal range of motion, No clubbing, cyanosis or edema  Vascular: Peripheral pulses palpable 2+ bilaterally    MEDICATIONS  (STANDING):  apixaban 10 milliGRAM(s) Oral every 12 hours  ascorbic acid 1000 milliGRAM(s) Oral daily  aspirin enteric coated 81 milliGRAM(s) Oral <User Schedule>  benzonatate 100 milliGRAM(s) Oral every 8 hours  calcium carbonate 1250 mG  + Vitamin D (OsCal 500 + D) 1 Tablet(s) Oral daily  clobetasol 0.05% Ointment 1 Application(s) Topical two times a day  clonazePAM  Tablet 1 milliGRAM(s) Oral daily  clonazePAM  Tablet 1.5 milliGRAM(s) Oral daily  clonazePAM  Tablet 3 milliGRAM(s) Oral at bedtime  cyanocobalamin 1000 MICROGram(s) Oral daily  dexAMETHasone  Injectable 6 milliGRAM(s) IV Push daily  duobrii lotion (halobetasol/tazarotene) 1 Application(s) 1 Application(s) Topical two times a day  enalapril 5 milliGRAM(s) Oral daily  ferrous    sulfate 325 milliGRAM(s) Oral at bedtime  fluconAZOLE   Tablet 200 milliGRAM(s) Oral <User Schedule>  gabapentin 600 milliGRAM(s) Oral at bedtime  influenza   Vaccine 0.5 milliLiter(s) IntraMuscular once  lactobacillus acidophilus 1 Tablet(s) Oral at bedtime  lamoTRIgine 150 milliGRAM(s) Oral daily  lisdexamfetamine 60 milliGRAM(s) Oral with breakfast  montelukast 10 milliGRAM(s) Oral at bedtime  multivitamin/minerals 1 Tablet(s) Oral daily  NUCYNTA  Extended-Release 100 milliGRAM(s) 100 milliGRAM(s) Oral every 12 hours  omega-3-Acid Ethyl Esters 2 Gram(s) Oral daily  oxybutynin 10 milliGRAM(s) Oral at bedtime  pantoprazole    Tablet 40 milliGRAM(s) Oral at bedtime  QUEtiapine 200 milliGRAM(s) Oral <User Schedule>  remdesivir  IVPB   IV Intermittent   remdesivir  IVPB 100 milliGRAM(s) IV Intermittent every 24 hours  sertraline 200 milliGRAM(s) Oral daily  tazarotene cream 0.1 % 1 Application(s) 1 Application(s) Topical two times a day  vortioxetine 10 milliGRAM(s) Oral daily      TELEMETRY: 	    ECG:  	  RADIOLOGY:  OTHER: 	  	  LABS:	 	    CARDIAC MARKERS:                                9.0    9.73  )-----------( 295      ( 11 Sep 2020 06:11 )             29.1     09-11    x   |  x   |  x   ----------------------------<  x   x    |  x   |  0.45<L>    Ca    9.1      10 Sep 2020 06:28    TPro  5.9<L>  /  Alb  3.4  /  TBili  0.2  /  DBili  0.1  /  AST  33  /  ALT  28  /  AlkPhos  58  09-11    proBNP: Serum Pro-Brain Natriuretic Peptide: 488 pg/mL (09-09 @ 06:07)    Lipid Profile:   HgA1c:   TSH:   PTT - ( 11 Sep 2020 06:11 )  PTT:30.6 sec    < from: [facility] Duplex Lower Ext Vein Scan, Bilat (09.08.20 @ 17:19) >  No evidence of deep venous thrombosis in either lower extremity.    < end of copied text >    Assessment and plan  ---------------------------  This patient is a 64yo lady with PMH of OA, chronic pain, sleep apnea, on CPAP, psoriatic arthritis, htn, anemia, mood disorder, history of falls, recently diagnosed with COVID19 infection (8/29/2020) who presents to the ED with complaint of worsening dyspnea. Her daughter, son-in-law and grandchildren had gotten diagnosed with COVID19. Because of her interaction with them, she was tested, and found positive as well. She was sent home from urgent care with O2 via NC, albuterol inhaler and visiting nurse services. The patient had visited [facility] 3 days prior to admission due to worsening SOB and was discharged on prednisone and azithromycin, which she has continued to take. Despite these medications, she had worsening dyspnea and had to inc O2via NC from 2L to 3L.   Yesterday morning, she found her O2 sat of 86% on 3L NC, thus came to the ED.  sob sec to Covid and acute PE  agree with ac  echo to check RV function as well as check for pericardial effusion, echo can be done as out pt in order not vto expose other people to COVID  in 1 to 2 weeks  started on Remdesevir  stress test noted from 06/19  started on noac  ID noted

## 2020-09-11 NOTE — DISCHARGE NOTE PROVIDER - NSDCCPCAREPLAN_GEN_ALL_CORE_FT
PRINCIPAL DISCHARGE DIAGNOSIS  Diagnosis: Acute pulmonary embolism without acute cor pulmonale, unspecified pulmonary embolism type  Assessment and Plan of Treatment: Please continue Eliquis as directed  Please follow up with your primray care physician  If you develop shortness of breath or if your shortness of breath worsens call your Health Care Provider or go to the Emergency Department.         SECONDARY DISCHARGE DIAGNOSES  Diagnosis: Hypoxia  Assessment and Plan of Treatment:     Diagnosis: COVID-19 virus infection  Assessment and Plan of Treatment: You tested positive for COVID 19.  You no longer require hospitalization.  Please restrict activities outside of your home except for getting medical care.  Do not go to work, school, or public areas.  Avoid using public transportation, ride-sharing, or taxis.  Separate yourself from other people and animals in your home.  Call ahead before visiting your doctor.  Wear a facemask when you are around other people. Cover your cough and sneezes.  Clean your hands often.  Avoid sharing personal household items.  Clean all frequently touched surfaces daily.    Diagnosis: Mood disorder  Assessment and Plan of Treatment: Please continue vyvanse, zoloft, lamictal, trintellix.    Diagnosis: Chronic pain  Assessment and Plan of Treatment: Continue current pain medications as directed PRINCIPAL DISCHARGE DIAGNOSIS  Diagnosis: Acute pulmonary embolism without acute cor pulmonale, unspecified pulmonary embolism type  Assessment and Plan of Treatment: Please continue Eliquis as directed   Eliquis 10 mg q 12 hours X7 days ( till 9/17 am dose), then 5 mg q 12 hours. Do not stop Eliquis unless you discuss with your doctor.  Please follow up with your primary care physician  Please follow up with your pulmonologist in one month  Please follow up wiht your cardiologist in one to two weeks and have Echocardiogram done  If you develop shortness of breath or if your shortness of breath worsens call your Health Care Provider or go to the Emergency Department.         SECONDARY DISCHARGE DIAGNOSES  Diagnosis: Hypoxia  Assessment and Plan of Treatment: Now resolved    Diagnosis: COVID-19 virus infection  Assessment and Plan of Treatment: You tested positive for COVID 19.  You no longer require hospitalization.  Please restrict activities outside of your home except for getting medical care.  Do not go to work, school, or public areas.  Avoid using public transportation, ride-sharing, or taxis.  Separate yourself from other people and animals in your home.  Call ahead before visiting your doctor.  Wear a facemask when you are around other people. Cover your cough and sneezes.  Clean your hands often.  Avoid sharing personal household items.  Clean all frequently touched surfaces daily.    Diagnosis: Mood disorder  Assessment and Plan of Treatment: Please continue vyvanse, zoloft, lamictal, trintellix.    Diagnosis: Chronic pain  Assessment and Plan of Treatment: Continue current pain medications as directed

## 2020-09-11 NOTE — PROGRESS NOTE ADULT - PROBLEM SELECTOR PLAN 4
Start home dose of vyvanse, zoloft, lamictal, trintellix. Medications reviewed with pharmacy here.  ISTOP reviewed
by hx  -Mostly non compliant  -Can hold off on CPAP during hospital for now due to +COVID status.

## 2020-09-11 NOTE — PROGRESS NOTE ADULT - ASSESSMENT
64y/o F with PMH JANE on CPAP, OA, chronic pain, psoriatic arthritis, HTN, anemia, mood disorder, history of falls. Recently diagnosed with COVID19 infection (8/29/2020) who presents to the ED with complaint of worsening dyspnea. She was sent home from urgent care with O2 via NC, albuterol inhaler and visiting nurse services. The patient had visited [facility] 3 days prior to admission due to worsening SOB and was discharged on prednisone and azithromycin, which she has continued to take. Despite these medications, she had worsening dyspnea and had to inc O2 via NC from 2L to 3L. +Mild SOB, cough. CTA chest +PE.

## 2020-09-11 NOTE — DISCHARGE NOTE PROVIDER - CARE PROVIDER_API CALL
Keshav Bentley  CARDIOVASCULAR DISEASE  [address], [address]  [place] [address]  Phone: [phone]  Fax: [phone]  Follow Up Time: 2 weeks    He Rich  CRITICAL CARE MEDICINE  [address]  [place] [address]  Phone: [phone]  Fax: [phone]  Follow Up Time: 1 month

## 2020-09-11 NOTE — DISCHARGE NOTE PROVIDER - HOSPITAL COURSE
66yo lady PMH of OA, chronic pain, sleep apnea, on CPAP, psoriatic arthritis, htn, anemia, mood disorder, history of falls, recently diagnosed with COVID19 infection (8/29/2020)  who presents to the ED with COVID pneumonia and new onset segmental and subsegmental PE from COVID pneumonia. On iv heparin pulm dr branch.  ID has on Remdisivir with Decadron.  on home meds  for   c/c  pain/  deoression.          Problem/Plan - 1:    ·  Problem: COVID-19 virus infection.  Plan: Maintain O2 > 92%. Continue dexamethasone 6mg qdaily, will stop tomorrow. Finish Remdizivir in AM. Discharge home AM Friday, no need for home oxygen as RA sat is 98 %.         Problem/Plan - 2:    ·  Problem: Acute pulmonary embolism without acute cor pulmonale, unspecified pulmonary embolism type.  Plan: Will switch from lovenox to heparin gtt so that it can be quickly discontinued if she has any bleeding related to known hernia. Pt currently hemodynamically stable.    Check TTE. [facility] venous duplex US negative for DVT. Change to Eliquis later today,     to start load over 7 days.             Problem/Plan - 3:    ·  Problem: JANE (obstructive sleep apnea).          Problem/Plan - 4:    ·  Problem: Mood disorder.  Plan: Start home dose of vyvanse, zoloft, lamictal, trintellix. Medications reviewed with pharmacy here.  ISTOP reviewed.          Problem/Plan - 5:    ·  Problem: Chronic pain.  Plan: Start home dose of nucynta ER.    Start home dose of PRN oxycodone. hold for sedation, RR< 12    Hold flexeril for now.    ISTOP reviewed. 66yo lady PMH of OA, chronic pain, sleep apnea, on CPAP, psoriatic arthritis, htn, anemia, mood disorder, history of falls, recently diagnosed with COVID19 infection (8/29/2020)  who presents to the ED with COVID pneumonia and new onset segmental and subsegmental PE from COVID pneumonia. On iv heparin pulm dr branch.  ID has on Remdisivir with Decadron.  on home meds  for   c/c  pain/  deoression.     Anticoagulation switched to Eliquis starting with loading dose.         Medically cleared for discharge by Dr. Godinez.     Echocardiogram to be done as outpatient as per Dr. Bentley. pt to follow up with PCP in one week, cardiology in one to two weeks and pulmonary in one months 64yo lady PMH of OA, chronic pain, sleep apnea, on CPAP, psoriatic arthritis, htn, anemia, mood disorder, history of falls, recently diagnosed with COVID19 infection (8/29/2020)  who presents to the ED with COVID pneumonia and new onset segmental and subsegmental PE from COVID pneumonia. On iv heparin pulm dr branch.  s/p Remdisivir with Decadron. Continue home meds  for   c/c  pain/  deoression.     Anticoagulation switched to Eliquis starting with loading dose.         Medically cleared for discharge by Dr. Godinez.     Echocardiogram to be done as outpatient as per Dr. Bentley. pt to follow up with PCP in one week, cardiology in one to two weeks and pulmonary in one months 66 yo lady PMH of OA, chronic pain, sleep apnea, on CPAP, psoriatic arthritis, htn, anemia, mood disorder, history of falls, obesity, recently diagnosed with COVID19 infection (8/29/2020)  who presents to the ED with COVID pneumonia and new onset segmental and subsegmental PE from COVID pneumonia.     Was seen by Pulmonary Dr. He Rich and placed on IV heparin, then changed to Eliquis. Completed course of Remdisivir with Decadron. LFT's improved in hospital. We continued all home meds  for  chronic pain. Anticoagulation switched to Eliquis starting with loading dose for 7 days. Leg dopplers negative for DVT.   See copy of CTA.     Medically cleared for discharge by Dr. Nolan Godinez. Echocardiogram to be done as outpatient as per Dr. Bentley. pt to follow up with PCP in one week, cardiology in one to two weeks and pulmonary in one months. PE needs minimun three months of AC, may be six. Pulmonary to decide later.        64 yo lady PMH of OA, chronic pain, sleep apnea, on CPAP, psoriatic arthritis, htn, anemia, mood disorder, history of falls, obesity, recently diagnosed with COVID19 infection (8/29/2020)  who presents to the ED with COVID pneumonia and new onset segmental and subsegmental PE from COVID pneumonia.     Was seen by Pulmonary Dr. He Rich and placed on IV heparin, then changed to Eliquis. Completed course of Remdisivir with Decadron. LFT's improved in hospital. We continued all home meds  for  chronic pain. Anticoagulation switched to Eliquis starting with loading dose for 7 days. Leg dopplers negative for DVT.   See copy of CTA. COVID antibodies positive 27.     Medically cleared for discharge by Dr. Nolan Godinez. Echocardiogram to be done as outpatient as per cardiology Dr. Bentley. Pt to follow up with PCP in one week, cardiology in one to two weeks and pulmonary in one month. PE needs minimun three months of AC, may be six. Pulmonary to decide later.   See med list.

## 2020-09-11 NOTE — PROGRESS NOTE ADULT - SUBJECTIVE AND OBJECTIVE BOX
INTERVAL HPI/OVERNIGHT EVENTS:  Pt seen and examined at bedside.     Allergies/Intolerance: Cymbalta (Diarrhea)  No Known Allergies      MEDICATIONS  (STANDING):  apixaban 10 milliGRAM(s) Oral every 12 hours  ascorbic acid 1000 milliGRAM(s) Oral daily  aspirin enteric coated 81 milliGRAM(s) Oral <User Schedule>  benzonatate 100 milliGRAM(s) Oral every 8 hours  calcium carbonate 1250 mG  + Vitamin D (OsCal 500 + D) 1 Tablet(s) Oral daily  clobetasol 0.05% Ointment 1 Application(s) Topical two times a day  clonazePAM  Tablet 1 milliGRAM(s) Oral daily  clonazePAM  Tablet 1.5 milliGRAM(s) Oral daily  clonazePAM  Tablet 3 milliGRAM(s) Oral at bedtime  cyanocobalamin 1000 MICROGram(s) Oral daily  dexAMETHasone  Injectable 6 milliGRAM(s) IV Push daily  duobrii lotion (halobetasol/tazarotene) 1 Application(s) 1 Application(s) Topical two times a day  enalapril 5 milliGRAM(s) Oral daily  ferrous    sulfate 325 milliGRAM(s) Oral at bedtime  fluconAZOLE   Tablet 200 milliGRAM(s) Oral <User Schedule>  gabapentin 600 milliGRAM(s) Oral at bedtime  influenza   Vaccine 0.5 milliLiter(s) IntraMuscular once  lactobacillus acidophilus 1 Tablet(s) Oral at bedtime  lamoTRIgine 150 milliGRAM(s) Oral daily  lisdexamfetamine 60 milliGRAM(s) Oral with breakfast  montelukast 10 milliGRAM(s) Oral at bedtime  multivitamin/minerals 1 Tablet(s) Oral daily  NUCYNTA  Extended-Release 100 milliGRAM(s) 100 milliGRAM(s) Oral every 12 hours  omega-3-Acid Ethyl Esters 2 Gram(s) Oral daily  oxybutynin 10 milliGRAM(s) Oral at bedtime  pantoprazole    Tablet 40 milliGRAM(s) Oral at bedtime  QUEtiapine 200 milliGRAM(s) Oral <User Schedule>  remdesivir  IVPB   IV Intermittent   remdesivir  IVPB 100 milliGRAM(s) IV Intermittent every 24 hours  sertraline 200 milliGRAM(s) Oral daily  tazarotene cream 0.1 % 1 Application(s) 1 Application(s) Topical two times a day  vortioxetine 10 milliGRAM(s) Oral daily    MEDICATIONS  (PRN):  ALBUTerol    90 MICROgram(s) HFA Inhaler 2 Puff(s) Inhalation every 6 hours PRN Shortness of Breath and/or Wheezing  gabapentin 400 milliGRAM(s) Oral two times a day PRN nerve pain  lidocaine   Patch 1 Patch Transdermal daily PRN localized pain  oxyCODONE    IR 15 milliGRAM(s) Oral two times a day PRN Severe Pain (7 - 10)        ROS: all systems reviewed and wnl      PHYSICAL EXAMINATION:  Vital Signs Last 24 Hrs  T(C): 36.8 (11 Sep 2020 04:29), Max: 36.9 (10 Sep 2020 13:13)  T(F): 98.2 (11 Sep 2020 04:29), Max: 98.4 (10 Sep 2020 13:13)  HR: 65 (11 Sep 2020 04:29) (65 - 76)  BP: 135/74 (11 Sep 2020 04:29) (109/63 - 135/74)  BP(mean): --  RR: 18 (11 Sep 2020 04:29) (13 - 18)  SpO2: 94% (11 Sep 2020 04:29) (94% - 98%)  CAPILLARY BLOOD GLUCOSE          09-10 @ 07:01  -  09-11 @ 07:00  --------------------------------------------------------  IN: 790 mL / OUT: 300 mL / NET: 490 mL        GENERAL:   NECK: supple, No JVD  CHEST/LUNG: clear to auscultation bilaterally; no rales, rhonchi, or wheezing b/l  HEART: normal S1, S2  ABDOMEN: BS+, soft, ND, NT   EXTREMITIES:  pulses palpable; no clubbing, cyanosis, or edema b/l LEs  SKIN: no rashes or lesions      LABS:                        9.0    9.73  )-----------( 295      ( 11 Sep 2020 06:11 )             29.1     09-11    x   |  x   |  x   ----------------------------<  x   x    |  x   |  0.45<L>    Ca    9.1      10 Sep 2020 06:28    TPro  5.9<L>  /  Alb  3.4  /  TBili  0.2  /  DBili  0.1  /  AST  33  /  ALT  28  /  AlkPhos  58  09-11    PTT - ( 11 Sep 2020 06:11 )  PTT:30.6 sec INTERVAL HPI/OVERNIGHT EVENTS:  Pt seen and examined at bedside.     Allergies/Intolerance: Cymbalta (Diarrhea)  No Known Allergies      MEDICATIONS  (STANDING):  apixaban 10 milliGRAM(s) Oral every 12 hours  ascorbic acid 1000 milliGRAM(s) Oral daily  aspirin enteric coated 81 milliGRAM(s) Oral <User Schedule>  benzonatate 100 milliGRAM(s) Oral every 8 hours  calcium carbonate 1250 mG  + Vitamin D (OsCal 500 + D) 1 Tablet(s) Oral daily  clobetasol 0.05% Ointment 1 Application(s) Topical two times a day  clonazePAM  Tablet 1 milliGRAM(s) Oral daily  clonazePAM  Tablet 1.5 milliGRAM(s) Oral daily  clonazePAM  Tablet 3 milliGRAM(s) Oral at bedtime  cyanocobalamin 1000 MICROGram(s) Oral daily  dexAMETHasone  Injectable 6 milliGRAM(s) IV Push daily  duobrii lotion (halobetasol/tazarotene) 1 Application(s) 1 Application(s) Topical two times a day  enalapril 5 milliGRAM(s) Oral daily  ferrous    sulfate 325 milliGRAM(s) Oral at bedtime  fluconAZOLE   Tablet 200 milliGRAM(s) Oral <User Schedule>  gabapentin 600 milliGRAM(s) Oral at bedtime  influenza   Vaccine 0.5 milliLiter(s) IntraMuscular once  lactobacillus acidophilus 1 Tablet(s) Oral at bedtime  lamoTRIgine 150 milliGRAM(s) Oral daily  lisdexamfetamine 60 milliGRAM(s) Oral with breakfast  montelukast 10 milliGRAM(s) Oral at bedtime  multivitamin/minerals 1 Tablet(s) Oral daily  NUCYNTA  Extended-Release 100 milliGRAM(s) 100 milliGRAM(s) Oral every 12 hours  omega-3-Acid Ethyl Esters 2 Gram(s) Oral daily  oxybutynin 10 milliGRAM(s) Oral at bedtime  pantoprazole    Tablet 40 milliGRAM(s) Oral at bedtime  QUEtiapine 200 milliGRAM(s) Oral <User Schedule>  remdesivir  IVPB   IV Intermittent   remdesivir  IVPB 100 milliGRAM(s) IV Intermittent every 24 hours  sertraline 200 milliGRAM(s) Oral daily  tazarotene cream 0.1 % 1 Application(s) 1 Application(s) Topical two times a day  vortioxetine 10 milliGRAM(s) Oral daily    MEDICATIONS  (PRN):  ALBUTerol    90 MICROgram(s) HFA Inhaler 2 Puff(s) Inhalation every 6 hours PRN Shortness of Breath and/or Wheezing  gabapentin 400 milliGRAM(s) Oral two times a day PRN nerve pain  lidocaine   Patch 1 Patch Transdermal daily PRN localized pain  oxyCODONE    IR 15 milliGRAM(s) Oral two times a day PRN Severe Pain (7 - 10)        ROS: all systems reviewed and wnl      PHYSICAL EXAMINATION:  Vital Signs Last 24 Hrs  T(C): 36.8 (11 Sep 2020 04:29), Max: 36.9 (10 Sep 2020 13:13)  T(F): 98.2 (11 Sep 2020 04:29), Max: 98.4 (10 Sep 2020 13:13)  HR: 65 (11 Sep 2020 04:29) (65 - 76)  BP: 135/74 (11 Sep 2020 04:29) (109/63 - 135/74)  BP(mean): --  RR: 18 (11 Sep 2020 04:29) (13 - 18)  SpO2: 94% (11 Sep 2020 04:29) (94% - 98%)  CAPILLARY BLOOD GLUCOSE          09-10 @ 07:01  -  09-11 @ 07:00  --------------------------------------------------------  IN: 790 mL / OUT: 300 mL / NET: 490 mL        GENERAL: no new complaints  NECK: supple, No JVD  CHEST/LUNG: clear to auscultation bilaterally; no rales, rhonchi, or wheezing b/l  HEART: normal S1, S2  ABDOMEN: BS+, soft, ND, NT   EXTREMITIES:  pulses palpable; no clubbing, cyanosis, or edema b/l LEs  SKIN: no rashes or lesions      LABS:                        9.0    9.73  )-----------( 295      ( 11 Sep 2020 06:11 )             29.1     09-11    x   |  x   |  x   ----------------------------<  x   x    |  x   |  0.45<L>    Ca    9.1      10 Sep 2020 06:28    TPro  5.9<L>  /  Alb  3.4  /  TBili  0.2  /  DBili  0.1  /  AST  33  /  ALT  28  /  AlkPhos  58  09-11    PTT - ( 11 Sep 2020 06:11 )  PTT:30.6 sec

## 2020-09-11 NOTE — PROGRESS NOTE ADULT - PROBLEM SELECTOR PLAN 2
Small filling defect identified within segmental to subsegmental branches of the RUL pulmonary artery   -Likely provoked 2nd to hypercoagulable state from COVID  -Will need AC x at least 3 months. No objections to changing to NOAC.   -F/u TTE. To be done prior to discharge?  -LE duplex neg DVT

## 2020-09-11 NOTE — PROGRESS NOTE ADULT - REASON FOR ADMISSION
dyspnea

## 2020-09-11 NOTE — DISCHARGE NOTE PROVIDER - NSDCMRMEDTOKEN_GEN_ALL_CORE_FT
aspirin 81 mg oral tablet: 1 tab(s) orally once a day  B-Complex 50 oral tablet: 1 tab(s) orally once a day  biotin 1000 mcg oral tablet: 1 tab(s) orally once a day  Calcium 600+D oral tablet: 2 tab(s) orally once a day  Centrum Silver oral tablet: 1 tab(s) orally once a day  clobetasol 0.05% topical ointment: Apply topically to affected area 2 times a day  duobril: Apply topically to affected area 2 times a day  Eliquis 5 mg oral tablet: 2 tab(s) oral - orally q 12 hoours  x 7 days  Then   1 tab(s) oral - orally q 12 hours   Ferocon oral capsule: 1 cap(s) orally once a day (at bedtime)  Flexeril 10 mg oral tablet: 1 tab(s) orally once a day, As Needed  fluconazole 200 mg oral tablet: 1 tab(s) orally once a day  KlonoPIN 1 mg oral tablet: 3 tab(s) orally once a day (at bedtime)  KlonoPIN 1 mg oral tablet: 1.5 milligram(s) orally once a day  KlonoPIN 1 mg oral tablet: 1 tab(s) orally once a day  LaMICtal 150 mg oral tablet: 1 tab(s) orally once a day  Myrbetriq 25 mg oral tablet, extended release: 1 tab(s) orally once a day  Neurontin 400 mg oral capsule: 1 cap(s) orally 2 times a day, As Needed  Neurontin 600 mg oral tablet: 1 tab(s) orally once a day (at bedtime)  Nucynta  mg oral tablet, extended release: 1 tab(s) orally 2 times a day  Omega-3 1000 mg oral capsule: 1 cap(s) orally once a day  oxyCODONE 15 mg oral tablet: 1 tab(s) orally 2 times a day, As Needed  pantoprazole 40 mg oral delayed release tablet: 1 tab(s) orally once a day  probiotic: 1 dose(s) orally once a day (at bedtime)  SEROquel  mg oral tablet, extended release: 1 tab(s) orally once a day (in the evening)  Singulair 10 mg oral tablet: 1 tab(s) orally once a day (at bedtime)  Tazorac: Apply topically to affected area 2 times a day  Trintellix 10 mg oral tablet: 1 tab(s) orally once a day  Vasotec 5 mg oral tablet: 1 tab(s) orally once a day  VESIcare 10 mg oral tablet: 1 tab(s) orally once a day (at bedtime)  Vitamin C 1000 mg oral tablet: 1 tab(s) orally once a day  Voltaren 1% topical gel: Apply topically to affected area 4 times a day, As Needed  Vyvanse 60 mg oral capsule: 1 cap(s) orally once a day (in the morning)  Zoloft 100 mg oral tablet: 2 tab(s) orally once a day albuterol 90 mcg/inh inhalation aerosol: 2 puff(s) inhaled every 6 hours, As needed, Shortness of Breath and/or Wheezing  apixaban 5 mg oral tablet: 1 tab(s) orally every 12 hours   aspirin 81 mg oral tablet: 1 tab(s) orally once a day  B-Complex 50 oral tablet: 1 tab(s) orally once a day  benzonatate 100 mg oral capsule: 1 cap(s) orally every 8 hours, As Needed   biotin 1000 mcg oral tablet: 1 tab(s) orally once a day  Calcium 600+D oral tablet: 2 tab(s) orally once a day  Centrum Silver oral tablet: 1 tab(s) orally once a day  clobetasol 0.05% topical ointment: Apply topically to affected area 2 times a day  duobril: Apply topically to affected area 2 times a day  Eliquis 5 mg oral tablet: 2 tab(s) oral - orally q 12 hoours  x 7 days  Then   1 tab(s) oral - orally q 12 hours   Ferocon oral capsule: 1 cap(s) orally once a day (at bedtime)  Flexeril 10 mg oral tablet: 1 tab(s) orally once a day, As Needed  fluconazole 200 mg oral tablet: 1 tab(s) orally once a day  KlonoPIN 1 mg oral tablet: 3 tab(s) orally once a day (at bedtime)  KlonoPIN 1 mg oral tablet: 1.5 milligram(s) orally once a day  KlonoPIN 1 mg oral tablet: 1 tab(s) orally once a day  LaMICtal 150 mg oral tablet: 1 tab(s) orally once a day  Myrbetriq 25 mg oral tablet, extended release: 1 tab(s) orally once a day  Neurontin 400 mg oral capsule: 1 cap(s) orally 2 times a day, As Needed  Neurontin 600 mg oral tablet: 1 tab(s) orally once a day (at bedtime)  Nucynta  mg oral tablet, extended release: 1 tab(s) orally 2 times a day  Omega-3 1000 mg oral capsule: 1 cap(s) orally once a day  oxyCODONE 15 mg oral tablet: 1 tab(s) orally 2 times a day, As Needed  pantoprazole 40 mg oral delayed release tablet: 1 tab(s) orally once a day  probiotic: 1 dose(s) orally once a day (at bedtime)  SEROquel  mg oral tablet, extended release: 1 tab(s) orally once a day (in the evening)  Singulair 10 mg oral tablet: 1 tab(s) orally once a day (at bedtime)  Tazorac: Apply topically to affected area 2 times a day  Trintellix 10 mg oral tablet: 1 tab(s) orally once a day  Vasotec 5 mg oral tablet: 1 tab(s) orally once a day  VESIcare 10 mg oral tablet: 1 tab(s) orally once a day (at bedtime)  Vitamin C 1000 mg oral tablet: 1 tab(s) orally once a day  Voltaren 1% topical gel: Apply topically to affected area 4 times a day, As Needed  Vyvanse 60 mg oral capsule: 1 cap(s) orally once a day (in the morning)  Zoloft 100 mg oral tablet: 2 tab(s) orally once a day albuterol 90 mcg/inh inhalation aerosol: 2 puff(s) inhaled every 6 hours, As needed, Shortness of Breath and/or Wheezing  apixaban 5 mg oral tablet: 1 tab(s) orally every 12 hours   aspirin 81 mg oral tablet: 1 tab(s) orally once a day  B-Complex 50 oral tablet: 1 tab(s) orally once a day  benzonatate 100 mg oral capsule: 1 cap(s) orally every 8 hours, As Needed   biotin 1000 mcg oral tablet: 1 tab(s) orally once a day  Calcium 600+D oral tablet: 2 tab(s) orally once a day  Centrum Silver oral tablet: 1 tab(s) orally once a day  clobetasol 0.05% topical ointment: Apply topically to affected area 2 times a day  cyanocobalamin 1000 mcg oral tablet: 1 tab(s) orally once a day  duobril: Apply topically to affected area 2 times a day  Eliquis 5 mg oral tablet: 2 tab(s) oral - orally q 12 hoours  x 7 days  Then   1 tab(s) oral - orally q 12 hours   Ferocon oral capsule: 1 cap(s) orally once a day (at bedtime)  Flexeril 10 mg oral tablet: 1 tab(s) orally once a day, As Needed  fluconazole 200 mg oral tablet: 1 tab(s) orally once a day ( please follow up with your ID doctor)  KlonoPIN 1 mg oral tablet: 3 tab(s) orally once a day (at bedtime)  KlonoPIN 1 mg oral tablet: 1.5 milligram(s) orally once a day  KlonoPIN 1 mg oral tablet: 1 tab(s) orally once a day  LaMICtal 150 mg oral tablet: 1 tab(s) orally once a day  Myrbetriq 25 mg oral tablet, extended release: 1 tab(s) orally once a day  Neurontin 400 mg oral capsule: 1 cap(s) orally 2 times a day, As Needed  Neurontin 600 mg oral tablet: 1 tab(s) orally once a day (at bedtime)  Nucynta  mg oral tablet, extended release: 1 tab(s) orally 2 times a day  Omega-3 1000 mg oral capsule: 1 cap(s) orally once a day  oxyCODONE 15 mg oral tablet: 1 tab(s) orally 2 times a day, As Needed  pantoprazole 40 mg oral delayed release tablet: 1 tab(s) orally once a day  probiotic: 1 dose(s) orally once a day (at bedtime)  SEROquel  mg oral tablet, extended release: 1 tab(s) orally once a day (in the evening)  Singulair 10 mg oral tablet: 1 tab(s) orally once a day (at bedtime)  Tazorac: Apply topically to affected area 2 times a day  Trintellix 10 mg oral tablet: 1 tab(s) orally once a day  Vasotec 5 mg oral tablet: 1 tab(s) orally once a day  VESIcare 10 mg oral tablet: 1 tab(s) orally once a day (at bedtime)  Vitamin C 1000 mg oral tablet: 1 tab(s) orally once a day  Voltaren 1% topical gel: Apply topically to affected area 4 times a day, As Needed  Vyvanse 60 mg oral capsule: 1 cap(s) orally once a day (in the morning)  Zoloft 100 mg oral tablet: 2 tab(s) orally once a day

## 2020-09-11 NOTE — PROGRESS NOTE ADULT - ASSESSMENT
65F with OA, chronic pain, obesity (BMI = 38.4), sleep apnea, on CPAP, psoriatic arthritis - off of biologic, htn, anemia, mood disorder, history of falls, ventral hernia diagnosed with COVID19 on (8/29/2020) with hypoxia and noted to have Pulmonary embolism    Day # 13 since diagnosis  elevated antibodies suggest longer time since infection: (grandchildrens  developed covid from her son and is currently on vent at [facility])  moderate elevation in inflammatory markers  COVID complicated by PE  improving oxygenation, now on Room Air  improving clinically    patient concerned that surgery for ventral hernia with partial colonic obstruction may have to be delayed due to need for anticoagulation after recent PE    Suggest  Completed Remdisivir 5 day course: 9/7 200 mg dose, followed by 100 mg daily 9/8-->  On anticoagulation with  Eliquis  Decadron 6 mg x 5 day course completed  being discharged home    prolonged discussion with patient  infectious risk to family members, importance of precautions with others, optimum timing for preventive vaccination discussed

## 2020-09-11 NOTE — DISCHARGE NOTE PROVIDER - PROVIDER TOKENS
PROVIDER:[TOKEN:[[id number]],FOLLOWUP:[2 weeks]],PROVIDER:[TOKEN:[[id number]],FOLLOWUP:[1 month]]

## 2020-09-11 NOTE — PROGRESS NOTE ADULT - PROBLEM SELECTOR PLAN 1
Maintain O2 > 92%. Continue dexamethasone 6mg qdaily, will stop tomorrow. Finish Remdizivir in AM. Discharge home AM Friday, no need for home oxygen as RA sat is 98 %. Maintain O2 > 92%. Continue dexamethasone 6mg qdaily, will stop today. Finish Remdizivir today. Discharge home today. No need for home oxygen as RA sat is 98 %.

## 2020-09-11 NOTE — PROGRESS NOTE ADULT - PROBLEM SELECTOR PLAN 5
Start home dose of nucynta ER.  Start home dose of PRN oxycodone. hold for sedation, RR< 12  Hold flexeril for now.  ISTOP reviewed

## 2020-09-11 NOTE — PROGRESS NOTE ADULT - PROBLEM SELECTOR PLAN 2
Will switch from lovenox to heparin gtt so that it can be quickly discontinued if she has any bleeding related to known hernia. Pt currently hemodynamically stable.  Check TTE. [facility] venous duplex US negative for DVT. Change to Eliquis later today,   to start load over 7 days. Will switch from lovenox to heparin gtt so that it can be quickly discontinued if she has any bleeding related to known hernia. Pt currently hemodynamically stable.  Check TTE. VA venous duplex US negative for DVT. Change to Eliquis later today,   to start load over 7 days. Needs Eliquis for 3 months, pulmonary follow-up will decide about longer treatment regimen at that point.

## 2020-09-16 NOTE — PATIENT PROFILE ADULT - PRIMARY ROLES/RESPONSIBILITIES
Follow up:    1. Alyssia will call you to schedule your procedure     Things you will need to do:  -see PAC or your PCP  -COVID test  -2 fleet enema prep     Please call with any questions or concerns regarding your clinic visit today.    It is a pleasure being involved in your health care.    Contacts post-consultation depending on your need:    Radiology Appointments [phone]    Schedule Clinic Appointments [phone] # 1   M-F 7:30 - 5 pm    BRIANNA Mora [phone]    Clinic Fax Number [phone]    Surgery Scheduling [phone]    My Chart is available 24 hours a day and is a secure way to access your records and communicate with your care team.  I strongly recommend signing up if you haven't already done so, if you are comfortable with computers.  If you would like to inquire about this or are having problems with My Chart access, you may call [phone] or go online at [email or web address].  Please allow at least 24 hours for a response and extra time on weekends and Holidays.       none

## 2020-10-11 ENCOUNTER — OUTPATIENT (OUTPATIENT)
Dept: OUTPATIENT SERVICES | Facility: HOSPITAL | Age: 65
LOS: 1 days | End: 2020-10-11
Payer: MEDICARE

## 2020-10-11 ENCOUNTER — APPOINTMENT (OUTPATIENT)
Dept: CT IMAGING | Facility: IMAGING CENTER | Age: 65
End: 2020-10-11
Payer: MEDICARE

## 2020-10-11 DIAGNOSIS — K14.8 OTHER DISEASES OF TONGUE: ICD-10-CM

## 2020-10-11 PROCEDURE — 82565 ASSAY OF CREATININE: CPT

## 2020-10-11 PROCEDURE — 70491 CT SOFT TISSUE NECK W/DYE: CPT | Mod: 26

## 2020-10-11 PROCEDURE — 70491 CT SOFT TISSUE NECK W/DYE: CPT

## 2020-10-23 ENCOUNTER — NON-APPOINTMENT (OUTPATIENT)
Age: 65
End: 2020-10-23

## 2020-12-01 ENCOUNTER — RX RENEWAL (OUTPATIENT)
Age: 65
End: 2020-12-01

## 2020-12-22 ENCOUNTER — APPOINTMENT (OUTPATIENT)
Dept: SURGERY | Facility: CLINIC | Age: 65
End: 2020-12-22

## 2020-12-23 PROBLEM — Z12.11 ENCOUNTER FOR SCREENING COLONOSCOPY: Status: RESOLVED | Noted: 2020-08-04 | Resolved: 2020-12-23

## 2021-01-11 ENCOUNTER — APPOINTMENT (OUTPATIENT)
Dept: RADIOLOGY | Facility: IMAGING CENTER | Age: 66
End: 2021-01-11
Payer: MEDICARE

## 2021-01-11 ENCOUNTER — OUTPATIENT (OUTPATIENT)
Dept: OUTPATIENT SERVICES | Facility: HOSPITAL | Age: 66
LOS: 1 days | End: 2021-01-11
Payer: MEDICARE

## 2021-01-11 DIAGNOSIS — Z00.8 ENCOUNTER FOR OTHER GENERAL EXAMINATION: ICD-10-CM

## 2021-01-11 PROCEDURE — 71046 X-RAY EXAM CHEST 2 VIEWS: CPT | Mod: 26

## 2021-01-11 PROCEDURE — 71046 X-RAY EXAM CHEST 2 VIEWS: CPT

## 2021-02-01 ENCOUNTER — RX RENEWAL (OUTPATIENT)
Age: 66
End: 2021-02-01

## 2021-03-02 ENCOUNTER — RX RENEWAL (OUTPATIENT)
Age: 66
End: 2021-03-02

## 2021-04-19 ENCOUNTER — OUTPATIENT (OUTPATIENT)
Dept: OUTPATIENT SERVICES | Facility: HOSPITAL | Age: 66
LOS: 1 days | End: 2021-04-19
Payer: MEDICARE

## 2021-04-19 ENCOUNTER — APPOINTMENT (OUTPATIENT)
Dept: ULTRASOUND IMAGING | Facility: HOSPITAL | Age: 66
End: 2021-04-19
Payer: MEDICARE

## 2021-04-19 ENCOUNTER — RESULT REVIEW (OUTPATIENT)
Age: 66
End: 2021-04-19

## 2021-04-19 DIAGNOSIS — Z00.00 ENCOUNTER FOR GENERAL ADULT MEDICAL EXAMINATION WITHOUT ABNORMAL FINDINGS: ICD-10-CM

## 2021-04-19 PROCEDURE — 64646 CHEMODENERV TRUNK MUSC 1-5: CPT

## 2021-04-19 PROCEDURE — 76942 ECHO GUIDE FOR BIOPSY: CPT

## 2021-04-19 PROCEDURE — 76942 ECHO GUIDE FOR BIOPSY: CPT | Mod: 26

## 2021-04-19 NOTE — HISTORY OF PRESENT ILLNESS
[Procedure: ___] : Procedure performed: [unfilled]  [Date of Surgery: ___] : Date of Surgery:   [unfilled] [Surgeon Name:   ___] : Surgeon Name: Dr. RODRIGES [Pre-Op Weight ___] : Pre-op weight was [unfilled] lbs [___ Years Post Op] : [unfilled] years [de-identified] : CT abd/pelv from 7/17/2020 showed left ventral wall incisional hernia containing large bowel, will need preop botox\par Here today to discuss surgery

## 2021-04-19 NOTE — REASON FOR VISIT
[Gastric By-pass] : gastric by-pass [de-identified] : 5/25/2010 [de-identified] : DOLV: 7/9/2020

## 2021-04-21 ENCOUNTER — RESULT REVIEW (OUTPATIENT)
Age: 66
End: 2021-04-21

## 2021-04-21 ENCOUNTER — OUTPATIENT (OUTPATIENT)
Dept: OUTPATIENT SERVICES | Facility: HOSPITAL | Age: 66
LOS: 1 days | Discharge: HOME | End: 2021-04-21

## 2021-04-21 DIAGNOSIS — D56.4 HEREDITARY PERSISTENCE OF FETAL HEMOGLOBIN [HPFH]: ICD-10-CM

## 2021-04-22 ENCOUNTER — RESULT REVIEW (OUTPATIENT)
Age: 66
End: 2021-04-22

## 2021-04-22 ENCOUNTER — TRANSCRIPTION ENCOUNTER (OUTPATIENT)
Age: 66
End: 2021-04-22

## 2021-04-22 ENCOUNTER — APPOINTMENT (OUTPATIENT)
Dept: SURGERY | Facility: CLINIC | Age: 66
End: 2021-04-22
Payer: MEDICARE

## 2021-04-22 ENCOUNTER — APPOINTMENT (OUTPATIENT)
Dept: HEMATOLOGY ONCOLOGY | Facility: CLINIC | Age: 66
End: 2021-04-22
Payer: MEDICARE

## 2021-04-22 VITALS
WEIGHT: 228.16 LBS | BODY MASS INDEX: 40.94 KG/M2 | RESPIRATION RATE: 15 BRPM | OXYGEN SATURATION: 99 % | SYSTOLIC BLOOD PRESSURE: 136 MMHG | HEART RATE: 79 BPM | HEIGHT: 62.68 IN | TEMPERATURE: 97.4 F | DIASTOLIC BLOOD PRESSURE: 84 MMHG

## 2021-04-22 LAB
B2 MICROGLOB SERPL-MCNC: 2.2 MG/L
BASOPHILS # BLD AUTO: 0.12 K/UL — SIGNIFICANT CHANGE UP (ref 0–0.2)
BASOPHILS NFR BLD AUTO: 1 % — SIGNIFICANT CHANGE UP (ref 0–2)
EOSINOPHIL # BLD AUTO: 0.12 K/UL — SIGNIFICANT CHANGE UP (ref 0–0.5)
EOSINOPHIL NFR BLD AUTO: 1 % — SIGNIFICANT CHANGE UP (ref 0–6)
ERYTHROCYTE [SEDIMENTATION RATE] IN BLOOD: 26 MM/HR — HIGH (ref 0–20)
HCT VFR BLD CALC: 32 % — LOW (ref 34.5–45)
HGB BLD-MCNC: 10.4 G/DL — LOW (ref 11.5–15.5)
LYMPHOCYTES # BLD AUTO: 57 % — HIGH (ref 13–44)
LYMPHOCYTES # BLD AUTO: 7.06 K/UL — HIGH (ref 1–3.3)
MCHC RBC-ENTMCNC: 29 PG — SIGNIFICANT CHANGE UP (ref 27–34)
MCHC RBC-ENTMCNC: 32.5 G/DL — SIGNIFICANT CHANGE UP (ref 32–36)
MCV RBC AUTO: 89.1 FL — SIGNIFICANT CHANGE UP (ref 80–100)
MONOCYTES # BLD AUTO: 0.62 K/UL — SIGNIFICANT CHANGE UP (ref 0–0.9)
MONOCYTES NFR BLD AUTO: 5 % — SIGNIFICANT CHANGE UP (ref 2–14)
NEUTROPHILS # BLD AUTO: 4.46 K/UL — SIGNIFICANT CHANGE UP (ref 1.8–7.4)
NEUTROPHILS NFR BLD AUTO: 36 % — LOW (ref 43–77)
NRBC # BLD: 0 /100 — SIGNIFICANT CHANGE UP (ref 0–0)
NRBC # BLD: SIGNIFICANT CHANGE UP /100 WBCS (ref 0–0)
PLAT MORPH BLD: NORMAL — SIGNIFICANT CHANGE UP
PLATELET # BLD AUTO: 256 K/UL — SIGNIFICANT CHANGE UP (ref 150–400)
RBC # BLD: 3.59 M/UL — LOW (ref 3.8–5.2)
RBC # FLD: 12.9 % — SIGNIFICANT CHANGE UP (ref 10.3–14.5)
RBC BLD AUTO: SIGNIFICANT CHANGE UP
WBC # BLD: 12.38 K/UL — HIGH (ref 3.8–10.5)
WBC # FLD AUTO: 12.38 K/UL — HIGH (ref 3.8–10.5)

## 2021-04-22 PROCEDURE — 99205 OFFICE O/P NEW HI 60 MIN: CPT

## 2021-04-22 NOTE — REVIEW OF SYSTEMS
[Patient Intake Form Reviewed] : Patient intake form was reviewed [Negative] : Allergic/Immunologic [Fever] : no fever [Chills] : no chills [Night Sweats] : no night sweats [Fatigue] : fatigue [Recent Change In Weight] : ~T recent weight change [Easy Bleeding] : no tendency for easy bleeding [Easy Bruising] : no tendency for easy bruising [FreeTextEntry2] : ambulates with cane [FreeTextEntry7] : s/p abdominal surgery; present incisional hernia [de-identified] : bipolar disorder

## 2021-04-22 NOTE — PHYSICAL EXAM
[Fully active, able to carry on all pre-disease performance without restriction] : Status 0 - Fully active, able to carry on all pre-disease performance without restriction [Normal] : affect appropriate [Obese] : obese [de-identified] : ambulates with difficulty [de-identified] : morbidly obese, excess fatty panniculus, incisional hernia

## 2021-04-22 NOTE — HISTORY OF PRESENT ILLNESS
[de-identified] : Ms. CRUZ is a 65 F with PMHx of psoriasis, psoriatic arthritis, on Otezla, GERD, hypertension, obstructive sleep apnea, bipolar disorder, hiatal hernia and morbid obesity, s/p revision Solis-en-Y gastric bypass and transoral gastric plication (for weight 421 lbs), referred for hematologic consultation of persistent leukocytosis and anemia.\par Patient is symptomatic , complaining of lethargy, weakness, lack of energy, occasional abdominal discomfort etc), for the past 2 months . She is scheduled for revision of abdominal surgery, incisional hernia repair on 5/12/21 ( Dr. Diaz). Gives a previous history of MRSA skin infection, and is currently on chronic Diflucan for chronic fungal infection. Denies recent hospitalization. While on Ottezla, psoriatic plaques around wrists, knees, etc have resolved. In September 2020 patient was hospitalized at [facility] with covid and acute PE; has taken anticoagulant for only 2 months, then started to bleed, and the anticoagulant was discontinued. Endorses no pertinent family history of hematologic disorders. Medication list reviewed; received intraabdominal Botox injections in anticipation of upcoming surgery. Hematologic picture consistent with leukocytosis (WBC fluctuant between 9 and 12 K) , with predominant lymphocytosis, and mild normochromic anemia. She has not been recently exposed to steroids, reports no recent traveling, no close contact with sick family members,etc. She is here accompanied by , Marcus. [FreeTextEntry1] : expectant surveillance\par \par

## 2021-04-22 NOTE — ASSESSMENT
[FreeTextEntry1] : Ms. CRUZ 's questions were answered to her satisfaction. She  expressed her  understanding and willingness to comply with the above recommendations, and  will return to the office in  3 months.\par \par \par \par \par \par \par \par \par

## 2021-04-22 NOTE — CONSULT LETTER
[Consult Letter:] : I had the pleasure of evaluating your patient, [unfilled]. [Please see my note below.] : Please see my note below. [Consult Closing:] : Thank you very much for allowing me to participate in the care of this patient.  If you have any questions, please do not hesitate to contact me. [Sincerely,] : Sincerely, [DrRasheed  ___] : Dr. RODRIGES [DrRasheed ___] : Dr. RODRIGES [FreeTextEntry3] : NEY SLOAN M.D.\par Hematology/ Oncology\par [facility] \par [facility]\par [address]\par [place], [place] [address]\par Telephone: [phone]\par Fax: [phone]\par \par \par \par \par \par

## 2021-04-22 NOTE — REASON FOR VISIT
[Initial Consultation] : an initial consultation for [Leukocytosis] : leukocytosis [Spouse] : spouse [FreeTextEntry2] : anemia

## 2021-04-23 ENCOUNTER — NON-APPOINTMENT (OUTPATIENT)
Age: 66
End: 2021-04-23

## 2021-04-23 LAB
FERRITIN SERPL-MCNC: 253 NG/ML
LDH SERPL-CCNC: 172 U/L

## 2021-04-27 LAB — JAK2 GENE MUT ANL BLD/T: NORMAL

## 2021-04-28 ENCOUNTER — OUTPATIENT (OUTPATIENT)
Dept: OUTPATIENT SERVICES | Facility: HOSPITAL | Age: 66
LOS: 1 days | End: 2021-04-28
Payer: MEDICARE

## 2021-04-28 VITALS
HEIGHT: 64 IN | DIASTOLIC BLOOD PRESSURE: 70 MMHG | OXYGEN SATURATION: 98 % | HEART RATE: 90 BPM | WEIGHT: 227.96 LBS | SYSTOLIC BLOOD PRESSURE: 110 MMHG | RESPIRATION RATE: 20 BRPM | TEMPERATURE: 98 F

## 2021-04-28 DIAGNOSIS — Z01.818 ENCOUNTER FOR OTHER PREPROCEDURAL EXAMINATION: ICD-10-CM

## 2021-04-28 DIAGNOSIS — K43.2 INCISIONAL HERNIA WITHOUT OBSTRUCTION OR GANGRENE: ICD-10-CM

## 2021-04-28 DIAGNOSIS — D64.9 ANEMIA, UNSPECIFIED: ICD-10-CM

## 2021-04-28 DIAGNOSIS — G47.33 OBSTRUCTIVE SLEEP APNEA (ADULT) (PEDIATRIC): ICD-10-CM

## 2021-04-28 DIAGNOSIS — Z29.9 ENCOUNTER FOR PROPHYLACTIC MEASURES, UNSPECIFIED: ICD-10-CM

## 2021-04-28 DIAGNOSIS — G89.29 OTHER CHRONIC PAIN: ICD-10-CM

## 2021-04-28 LAB
ANION GAP SERPL CALC-SCNC: 15 MMOL/L — SIGNIFICANT CHANGE UP (ref 5–17)
BLD GP AB SCN SERPL QL: NEGATIVE — SIGNIFICANT CHANGE UP
BUN SERPL-MCNC: 12 MG/DL — SIGNIFICANT CHANGE UP (ref 7–23)
CALCIUM SERPL-MCNC: 9.3 MG/DL — SIGNIFICANT CHANGE UP (ref 8.4–10.5)
CHLORIDE SERPL-SCNC: 96 MMOL/L — SIGNIFICANT CHANGE UP (ref 96–108)
CO2 SERPL-SCNC: 24 MMOL/L — SIGNIFICANT CHANGE UP (ref 22–31)
CREAT SERPL-MCNC: 0.39 MG/DL — LOW (ref 0.5–1.3)
GLUCOSE SERPL-MCNC: 93 MG/DL — SIGNIFICANT CHANGE UP (ref 70–99)
HCT VFR BLD CALC: 32.5 % — LOW (ref 34.5–45)
HGB BLD-MCNC: 10 G/DL — LOW (ref 11.5–15.5)
MCHC RBC-ENTMCNC: 27.5 PG — SIGNIFICANT CHANGE UP (ref 27–34)
MCHC RBC-ENTMCNC: 30.8 GM/DL — LOW (ref 32–36)
MCV RBC AUTO: 89.3 FL — SIGNIFICANT CHANGE UP (ref 80–100)
NRBC # BLD: 0 /100 WBCS — SIGNIFICANT CHANGE UP (ref 0–0)
PLATELET # BLD AUTO: 269 K/UL — SIGNIFICANT CHANGE UP (ref 150–400)
POTASSIUM SERPL-MCNC: 3.8 MMOL/L — SIGNIFICANT CHANGE UP (ref 3.5–5.3)
POTASSIUM SERPL-SCNC: 3.8 MMOL/L — SIGNIFICANT CHANGE UP (ref 3.5–5.3)
RBC # BLD: 3.64 M/UL — LOW (ref 3.8–5.2)
RBC # FLD: 12.8 % — SIGNIFICANT CHANGE UP (ref 10.3–14.5)
RH IG SCN BLD-IMP: POSITIVE — SIGNIFICANT CHANGE UP
SODIUM SERPL-SCNC: 135 MMOL/L — SIGNIFICANT CHANGE UP (ref 135–145)
T(9;22)(ABL1,BCR)/CONTROL BLD/T: NORMAL
WBC # BLD: 9.84 K/UL — SIGNIFICANT CHANGE UP (ref 3.8–10.5)
WBC # FLD AUTO: 9.84 K/UL — SIGNIFICANT CHANGE UP (ref 3.8–10.5)

## 2021-04-28 PROCEDURE — 85027 COMPLETE CBC AUTOMATED: CPT

## 2021-04-28 PROCEDURE — 80048 BASIC METABOLIC PNL TOTAL CA: CPT

## 2021-04-28 PROCEDURE — 87640 STAPH A DNA AMP PROBE: CPT

## 2021-04-28 PROCEDURE — 87641 MR-STAPH DNA AMP PROBE: CPT

## 2021-04-28 PROCEDURE — 86901 BLOOD TYPING SEROLOGIC RH(D): CPT

## 2021-04-28 PROCEDURE — 86850 RBC ANTIBODY SCREEN: CPT

## 2021-04-28 PROCEDURE — 86900 BLOOD TYPING SEROLOGIC ABO: CPT

## 2021-04-28 PROCEDURE — G0463: CPT

## 2021-04-28 RX ORDER — LIDOCAINE HCL 20 MG/ML
0.2 VIAL (ML) INJECTION ONCE
Refills: 0 | Status: DISCONTINUED | OUTPATIENT
Start: 2021-05-12 | End: 2021-05-15

## 2021-04-28 RX ORDER — CLONAZEPAM 1 MG
3 TABLET ORAL
Qty: 0 | Refills: 0 | DISCHARGE

## 2021-04-28 RX ORDER — TAPENTADOL HYDROCHLORIDE 50 MG/1
1 TABLET, FILM COATED ORAL
Qty: 0 | Refills: 0 | DISCHARGE

## 2021-04-28 RX ORDER — SODIUM CHLORIDE 9 MG/ML
3 INJECTION INTRAMUSCULAR; INTRAVENOUS; SUBCUTANEOUS EVERY 8 HOURS
Refills: 0 | Status: DISCONTINUED | OUTPATIENT
Start: 2021-05-12 | End: 2021-05-17

## 2021-04-28 RX ORDER — CHLORHEXIDINE GLUCONATE 213 G/1000ML
1 SOLUTION TOPICAL ONCE
Refills: 0 | Status: COMPLETED | OUTPATIENT
Start: 2021-05-12 | End: 2021-05-16

## 2021-04-28 NOTE — H&P PST ADULT - GUM GEN PE MLT EXAM PC
Follow up with Dr. Meneses within 1 week.  Call the office to schedule an appointment if you do not already have one.
not examined

## 2021-04-28 NOTE — H&P PST ADULT - HISTORY OF PRESENT ILLNESS
66 yo lady with PMH of OA, anxious on meds chronic pain, sleep apnea, on CPAP, psoriatic arthritis, HTN , anemia, mood disorder, history of falls, COVID19 infection (8/29/2020) required hospitalization complicated with PE  s/p 3 months treatment with Eliquis followed by pulmonary  s/p recent  X ray and VQ scan . Presents to PST for scheduled Incisional Hernia repair with component Separation on  5/12/21.    covid test 4/9/21   Hematology eval   medical eval    64 yo lady with PMH of OA, anxious on meds,  chronic pain, sleep apnea on CPAP, psoriatic arthritis, HTN , anemia, mood disorder, history of falls, COVID19 infection (8/29/2020) required hospitalization complicated with PE treatment with Eliquis x 3 months  followed by pulmonary ,s/p recent  X ray and VQ scan . Presents to PST for scheduled Incisional Hernia repair with component Separation on  5/12/21.    covid test 5/9/21   Hematology eval   medical eval ,pulmonary eval   66 yo lady with PMH of Anxiety, depression,  OA,  chronic pain, sleep apnea on CPAP, psoriatic arthritis, HTN , anemia, mood disorder, history of falls, COVID19 infection (8/29/2020) required hospitalization complicated with PE treatment with Eliquis x 3 months  followed by pulmonary ,s/p recent  X ray and VQ scan . Presents to PST for scheduled Incisional Hernia repair with component Separation on  5/12/21.    covid test 5/9/21     Hematology eval  medical eval   pulmonary eval   66 yo lady with PMH of Anxiety, depression, OA,  chronic pain, sleep apnea on CPAP, psoriatic arthritis, HTN , anemia, mood disorder, history of falls, COVID19 infection (8/29/2020) required hospitalization c/w PE treated with Eliquis x 3 months  followed by pulmonary , s/p recent  X ray and VQ scan . Presents to PST for scheduled Incisional Hernia repair with component Separation on 5/12/21.    covid test 5/9/21     Hematology eval  medical eval   pulmonary eval
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
## 2021-04-28 NOTE — H&P PST ADULT - ASSESSMENT
CAPRINI SCORE [CLOT updated 18]    AGE RELATED RISK FACTORS                                                       MOBILITY RELATED FACTORS  [x ] Age 41-60 years                                            (1 Point)                    [ ] Bed rest                                                        (1 Point)  [ ] Age: 61-74 years                                           (2 Points)                  [ ] Plaster cast                                                   (2 Points)  [ ] Age= 75 years                                              (3 Points)                    [ ] Bed bound for more than 72 hours                 (2 Points)    DISEASE RELATED RISK FACTORS                                               GENDER SPECIFIC FACTORS  [x ] Edema in the lower extremities                       (1 Point)              [ ] Pregnancy                                                     (1 Point)  [ ] Varicose veins                                               (1 Point)                     [ ] Post-partum < 6 weeks                                   (1 Point)             [x ] BMI > 25 Kg/m2                                            (1 Point)                     [ ] Hormonal therapy  or oral contraception          (1 Point)                 [ ] Sepsis (in the previous month)                        (1 Point)               [ ] History of pregnancy complications                 (1 point)  [ ] Pneumonia or serious lung disease                                               [ ] Unexplained or recurrent                     (1 Point)           (in the previous month)                               (1 Point)  [ ] Abnormal pulmonary function test                     (1 Point)                 SURGERY RELATED RISK FACTORS  [ ] Acute myocardial infarction                              (1 Point)               [ ]  Section                                             (1 Point)  [ ] Congestive heart failure (in the previous month)  (1 Point)      [ ] Minor surgery                                                  (1 Point)   [ ] Inflammatory bowel disease                             (1 Point)               [ ] Arthroscopic surgery                                        (2 Points)  [ ] Central venous access                                      (2 Points)                [x ] General surgery lasting more than 45 minutes (2 points)  [ ] Present or previous malignancy                     (2 Points)                [ ] Elective arthroplasty                                         (5 points)    [ ] Stroke (in the previous month)                          (5 Points)                                                                                                                                                           HEMATOLOGY RELATED FACTORS                                                 TRAUMA RELATED RISK FACTORS  [x ] Prior episodes of VTE                                     (3 Points)                [ ] Fracture of the hip, pelvis, or leg                       (5 Points)  [ ] Positive family history for VTE                         (3 Points)             [ ] Acute spinal cord injury (in the previous month)  (5 Points)  [ ] Prothrombin 16853 A                                     (3 Points)               [ ] Paralysis  (less than 1 month)                             (5 Points)  [ ] Factor V Leiden                                             (3 Points)                  [ ] Multiple Trauma within 1 month                        (5 Points)  [ ] Lupus anticoagulants                                     (3 Points)                                                           [ ] Anticardiolipin antibodies                               (3 Points)                                                       [ ] High homocysteine in the blood                      (3 Points)                                             [ ] Other congenital or acquired thrombophilia      (3 Points)                                                [ ] Heparin induced thrombocytopenia                  (3 Points)                                     Total Score [     8     ]

## 2021-04-28 NOTE — H&P PST ADULT - NSICDXPROBLEM_GEN_ALL_CORE_FT
PROBLEM DIAGNOSES  Problem: Incisional hernia without obstruction or gangrene  Assessment and Plan: Incisionla Hernia Repair with Componante Sepration on 5/12/21.  Pre- Op instructions discussed   Labs sent   medical eval ,pulmonary eval,   pt will take anxiety meds dora DOS    Problem: Anemia  Assessment and Plan: hematology clearance        PROBLEM DIAGNOSES  Problem: Incisional hernia without obstruction or gangrene  Assessment and Plan: Incisionla Hernia Repair with Componante Sepration on 5/12/21.  Pre- Op instructions discussed   Labs sent   medical eval ,pulmonary eval,   pt will take anxiety meds dora DOS    Problem: Anemia  Assessment and Plan: hematology clearance   type and screen sent    Problem: JANE on CPAP  Assessment and Plan: OR booking Notified   JANE precautions     Problem: Need for prophylactic measure  Assessment and Plan: The Caprini score indicates that this patient is at high risk for a VTE event (score 6 or greater). Surgical patients in this group will benefit from both pharmacologic prophylaxis and intermittent compression devices.  The surgical team will determine the balance between VTE risk and bleeding risk, and other clinical considerations         PROBLEM DIAGNOSES  Problem: Incisional hernia without obstruction or gangrene  Assessment and Plan: Incisionla Hernia Repair with Componante Sepration on 5/12/21.  Pre- Op instructions discussed   Labs sent ,MRSA sent  medical eval ,pulmonary eval,   pt will take anxiety meds the DOS    Problem: Anemia  Assessment and Plan: hematology clearance   type and screen sent    Problem: JANE on CPAP  Assessment and Plan: OR booking Notified   JANE precautions     Problem: Need for prophylactic measure  Assessment and Plan: The Caprini score indicates that this patient is at high risk for a VTE event (score 6 or greater). Surgical patients in this group will benefit from both pharmacologic prophylaxis and intermittent compression devices.  The surgical team will determine the balance between VTE risk and bleeding risk, and other clinical considerations      Problem: Chronic pain  Assessment and Plan: continue current meds   pain consult

## 2021-04-28 NOTE — H&P PST ADULT - NSICDXPASTSURGICALHX_GEN_ALL_CORE_FT
PAST SURGICAL HISTORY:  Gastric bypass status for obesity , revised     H/O:  section x 2    History of cholecystectomy 1976    History of laminectomy lumbar, with fusion-     Plastic surgery for unacceptable cosmetic appearance arms from extra skin post weight loss after bypass surgery -

## 2021-04-28 NOTE — H&P PST ADULT - OTHER CARE PROVIDERS
Pulmonary Dr Matilde Calvillo  [phone],  Hematology Dr Tereso Nguyen Pulmonary Dr John Paul Calvillo  [phone],  Hematology Dr Tereso Nguyen

## 2021-04-29 LAB
MRSA PCR RESULT.: SIGNIFICANT CHANGE UP
S AUREUS DNA NOSE QL NAA+PROBE: SIGNIFICANT CHANGE UP

## 2021-05-01 ENCOUNTER — TRANSCRIPTION ENCOUNTER (OUTPATIENT)
Age: 66
End: 2021-05-01

## 2021-05-04 PROBLEM — U07.1 COVID-19: Chronic | Status: ACTIVE | Noted: 2021-04-28

## 2021-05-06 ENCOUNTER — APPOINTMENT (OUTPATIENT)
Dept: SURGERY | Facility: CLINIC | Age: 66
End: 2021-05-06
Payer: MEDICARE

## 2021-05-06 ENCOUNTER — LABORATORY RESULT (OUTPATIENT)
Age: 66
End: 2021-05-06

## 2021-05-06 VITALS
DIASTOLIC BLOOD PRESSURE: 84 MMHG | BODY MASS INDEX: 41.41 KG/M2 | OXYGEN SATURATION: 95 % | WEIGHT: 225 LBS | SYSTOLIC BLOOD PRESSURE: 153 MMHG | TEMPERATURE: 97.5 F | HEIGHT: 62 IN | HEART RATE: 85 BPM

## 2021-05-06 PROCEDURE — 99215 OFFICE O/P EST HI 40 MIN: CPT

## 2021-05-06 NOTE — PLAN
[FreeTextEntry1] : Incisional hernia repair with component separation and mesh\par Patient at increased risk for MRSA, VTE, PNA due to underlying conditions\par \par -Details of surgery with demonstration reviewed with the patient and her . Risks include bleeding, infection, injury to surrounding structures, chronic pain, and recurrence recurrence. Patient understands and wants to proceed.

## 2021-05-06 NOTE — ASSESSMENT
[FreeTextEntry1] : Incisional hernia status post open gastric bypass for morbid obesity, history of Kocher incision for open cholecystectomy in the past, history of MRSA and VTE

## 2021-05-06 NOTE — ED ADULT NURSE NOTE - PRIMARY CARE PROVIDER
May 6, 2021     Patient: Viki Sargent   YOB: 1955   Date of Visit: 5/6/2021       To Whom it May Concern:    Viki Sargent is under my professional care  He was seen in my office on 5/6/2021  He may return to work with no lifting greater than 10lbs with the right hand, he should avoid heavy pinching and gripping as well  He will be re-evaluated in 6 weeks  If you have any questions or concerns, please don't hesitate to call           Sincerely,          George Thompson MD        CC: No Recipients
unk

## 2021-05-06 NOTE — REASON FOR VISIT
[Follow-Up: _____] : a [unfilled] follow-up visit [Spouse] : spouse [FreeTextEntry1] : to discuss upcoming hernia surgery on 5/12/2021

## 2021-05-06 NOTE — PHYSICAL EXAM
[Normal Thyroid] : the thyroid was normal [Normal Breath Sounds] : Normal breath sounds [Normal Heart Sounds] : normal heart sounds [Normal Rate and Rhythm] : normal rate and rhythm [No Rash or Lesion] : No rash or lesion [Alert] : alert [Oriented to Person] : oriented to person [Oriented to Place] : oriented to place [Oriented to Time] : oriented to time [Calm] : calm [JVD] : no jugular venous distention  [Purpura] : no purpura  [Petechiae] : no petechiae [Skin Ulcer] : no ulcer [Skin Induration] : no induration [de-identified] : NAD [de-identified] : PERRLA mucous membranes moist  [de-identified] : abdomen soft nontender nondistended reducible incisional hernia nontender [de-identified] : grossly intact

## 2021-05-06 NOTE — HISTORY OF PRESENT ILLNESS
[de-identified] : Ms. SHANTEL CRUZ is a 65 year-old woman with incisional hernia. Scheduled for incisional hernia repair with component separation and mesh at [facility] on 5/12/2021. \par Ultrasound-guided Botox injection of the anterior abdominal wall completed on 4/19/2021.\par \par \par  [de-identified] : Ms. SHANTEL CRUZ is a 65 year-old woman s/p Revision Solis en Y Gastric Bypass & transoral gastric plication done 5/25/2010\par \par Patient here to have her preoperative questions addressed.  Greater than 1 hour spent with patient and  discussing same.

## 2021-05-07 LAB
BASOPHILS # BLD AUTO: 0.06 K/UL
BASOPHILS NFR BLD AUTO: 0.5 %
EOSINOPHIL # BLD AUTO: 0.23 K/UL
EOSINOPHIL NFR BLD AUTO: 2 %
ERYTHROCYTE [SEDIMENTATION RATE] IN BLOOD BY WESTERGREN METHOD: 33 MM/HR
HCT VFR BLD CALC: 32.7 %
HGB BLD-MCNC: 10.4 G/DL
IMM GRANULOCYTES NFR BLD AUTO: 0.2 %
LYMPHOCYTES # BLD AUTO: 5.97 K/UL
LYMPHOCYTES NFR BLD AUTO: 51.7 %
MAN DIFF?: NORMAL
MCHC RBC-ENTMCNC: 28.7 PG
MCHC RBC-ENTMCNC: 31.8 GM/DL
MCV RBC AUTO: 90.3 FL
MONOCYTES # BLD AUTO: 0.57 K/UL
MONOCYTES NFR BLD AUTO: 4.9 %
NEUTROPHILS # BLD AUTO: 4.69 K/UL
NEUTROPHILS NFR BLD AUTO: 40.7 %
PLATELET # BLD AUTO: 251 K/UL
RBC # BLD: 3.62 M/UL
RBC # FLD: 12.7 %
WBC # FLD AUTO: 11.54 K/UL

## 2021-05-09 ENCOUNTER — OUTPATIENT (OUTPATIENT)
Dept: OUTPATIENT SERVICES | Facility: HOSPITAL | Age: 66
LOS: 1 days | End: 2021-05-09

## 2021-05-09 DIAGNOSIS — Z11.52 ENCOUNTER FOR SCREENING FOR COVID-19: ICD-10-CM

## 2021-05-09 LAB — SARS-COV-2 RNA SPEC QL NAA+PROBE: SIGNIFICANT CHANGE UP

## 2021-05-11 ENCOUNTER — TRANSCRIPTION ENCOUNTER (OUTPATIENT)
Age: 66
End: 2021-05-11

## 2021-05-12 ENCOUNTER — INPATIENT (INPATIENT)
Facility: HOSPITAL | Age: 66
LOS: 4 days | Discharge: HOME HEALTH | DRG: 354 | End: 2021-05-17
Attending: SURGERY | Admitting: SURGERY
Payer: MEDICARE

## 2021-05-12 ENCOUNTER — RESULT REVIEW (OUTPATIENT)
Age: 66
End: 2021-05-12

## 2021-05-12 ENCOUNTER — APPOINTMENT (OUTPATIENT)
Dept: SURGERY | Facility: HOSPITAL | Age: 66
End: 2021-05-12
Payer: MEDICARE

## 2021-05-12 VITALS
RESPIRATION RATE: 18 BRPM | HEIGHT: 64 IN | HEART RATE: 82 BPM | SYSTOLIC BLOOD PRESSURE: 129 MMHG | WEIGHT: 227.96 LBS | OXYGEN SATURATION: 100 % | DIASTOLIC BLOOD PRESSURE: 79 MMHG | TEMPERATURE: 98 F

## 2021-05-12 DIAGNOSIS — K43.2 INCISIONAL HERNIA WITHOUT OBSTRUCTION OR GANGRENE: ICD-10-CM

## 2021-05-12 PROCEDURE — 49560: CPT

## 2021-05-12 PROCEDURE — 97605 NEG PRS WND THER DME<=50SQCM: CPT | Mod: 82

## 2021-05-12 PROCEDURE — 49560: CPT | Mod: 82

## 2021-05-12 PROCEDURE — 15734 MUSCLE-SKIN GRAFT TRUNK: CPT | Mod: 82,59

## 2021-05-12 PROCEDURE — 49568: CPT

## 2021-05-12 PROCEDURE — 15734 MUSCLE-SKIN GRAFT TRUNK: CPT | Mod: 59

## 2021-05-12 PROCEDURE — 97605 NEG PRS WND THER DME<=50SQCM: CPT

## 2021-05-12 PROCEDURE — 15734 MUSCLE-SKIN GRAFT TRUNK: CPT

## 2021-05-12 PROCEDURE — 88304 TISSUE EXAM BY PATHOLOGIST: CPT | Mod: 26

## 2021-05-12 PROCEDURE — 49568: CPT | Mod: 82

## 2021-05-12 RX ORDER — ACETAMINOPHEN 500 MG
1000 TABLET ORAL ONCE
Refills: 0 | Status: DISCONTINUED | OUTPATIENT
Start: 2021-05-12 | End: 2021-05-13

## 2021-05-12 RX ORDER — SODIUM CHLORIDE 9 MG/ML
1000 INJECTION, SOLUTION INTRAVENOUS
Refills: 0 | Status: DISCONTINUED | OUTPATIENT
Start: 2021-05-12 | End: 2021-05-14

## 2021-05-12 RX ORDER — CEFAZOLIN SODIUM 1 G
2000 VIAL (EA) INJECTION EVERY 8 HOURS
Refills: 0 | Status: COMPLETED | OUTPATIENT
Start: 2021-05-12 | End: 2021-05-13

## 2021-05-12 RX ORDER — NALOXONE HYDROCHLORIDE 4 MG/.1ML
0.1 SPRAY NASAL
Refills: 0 | Status: DISCONTINUED | OUTPATIENT
Start: 2021-05-12 | End: 2021-05-15

## 2021-05-12 RX ORDER — METOPROLOL TARTRATE 50 MG
5 TABLET ORAL EVERY 6 HOURS
Refills: 0 | Status: DISCONTINUED | OUTPATIENT
Start: 2021-05-12 | End: 2021-05-13

## 2021-05-12 RX ORDER — HEPARIN SODIUM 5000 [USP'U]/ML
5000 INJECTION INTRAVENOUS; SUBCUTANEOUS ONCE
Refills: 0 | Status: DISCONTINUED | OUTPATIENT
Start: 2021-05-12 | End: 2021-05-12

## 2021-05-12 RX ORDER — QUETIAPINE FUMARATE 200 MG/1
300 TABLET, FILM COATED ORAL AT BEDTIME
Refills: 0 | Status: DISCONTINUED | OUTPATIENT
Start: 2021-05-12 | End: 2021-05-17

## 2021-05-12 RX ORDER — CEFAZOLIN SODIUM 1 G
2000 VIAL (EA) INJECTION ONCE
Refills: 0 | Status: DISCONTINUED | OUTPATIENT
Start: 2021-05-12 | End: 2021-05-12

## 2021-05-12 RX ORDER — DICLOFENAC SODIUM 30 MG/G
0 GEL TOPICAL
Qty: 0 | Refills: 0 | DISCHARGE

## 2021-05-12 RX ORDER — PANTOPRAZOLE SODIUM 20 MG/1
40 TABLET, DELAYED RELEASE ORAL DAILY
Refills: 0 | Status: DISCONTINUED | OUTPATIENT
Start: 2021-05-12 | End: 2021-05-15

## 2021-05-12 RX ORDER — DICLOFENAC SODIUM 30 MG/G
1 GEL TOPICAL
Qty: 0 | Refills: 0 | DISCHARGE

## 2021-05-12 RX ORDER — TAZAROTENE 0.05 MG/G
1 CREAM CUTANEOUS
Qty: 0 | Refills: 0 | DISCHARGE

## 2021-05-12 RX ORDER — LAMOTRIGINE 25 MG/1
150 TABLET, ORALLY DISINTEGRATING ORAL DAILY
Refills: 0 | Status: DISCONTINUED | OUTPATIENT
Start: 2021-05-13 | End: 2021-05-17

## 2021-05-12 RX ORDER — HEPARIN SODIUM 5000 [USP'U]/ML
5000 INJECTION INTRAVENOUS; SUBCUTANEOUS EVERY 8 HOURS
Refills: 0 | Status: DISCONTINUED | OUTPATIENT
Start: 2021-05-12 | End: 2021-05-13

## 2021-05-12 RX ORDER — ONDANSETRON 8 MG/1
4 TABLET, FILM COATED ORAL EVERY 6 HOURS
Refills: 0 | Status: DISCONTINUED | OUTPATIENT
Start: 2021-05-12 | End: 2021-05-15

## 2021-05-12 RX ADMIN — SODIUM CHLORIDE 125 MILLILITER(S): 9 INJECTION, SOLUTION INTRAVENOUS at 20:14

## 2021-05-12 RX ADMIN — SODIUM CHLORIDE 3 MILLILITER(S): 9 INJECTION INTRAMUSCULAR; INTRAVENOUS; SUBCUTANEOUS at 23:21

## 2021-05-12 RX ADMIN — Medication 100 MILLIGRAM(S): at 23:21

## 2021-05-12 RX ADMIN — QUETIAPINE FUMARATE 300 MILLIGRAM(S): 200 TABLET, FILM COATED ORAL at 23:21

## 2021-05-12 RX ADMIN — HEPARIN SODIUM 5000 UNIT(S): 5000 INJECTION INTRAVENOUS; SUBCUTANEOUS at 23:21

## 2021-05-12 NOTE — CHART NOTE - NSCHARTNOTEFT_GEN_A_CORE
POST-OPERATIVE NOTE    Patient is s/p ventral hernia repair with mesh    Subjective:  Patient reports pain at the incisional site, controlled with medication. Denies nausea, vomiting.     Vital Signs Last 24 Hrs  T(C): 37.2 (13 May 2021 00:00), Max: 37.7 (12 May 2021 21:00)  T(F): 98.9 (13 May 2021 00:00), Max: 99.8 (12 May 2021 21:00)  HR: 86 (13 May 2021 00:00) (75 - 87)  BP: 95/58 (13 May 2021 00:00) (95/58 - 170/107)  BP(mean): 78 (12 May 2021 20:00) (75 - 134)  RR: 18 (13 May 2021 00:00) (15 - 22)  SpO2: 100% (13 May 2021 00:00) (93% - 100%)    I&O's Detail    12 May 2021 07:01  -  13 May 2021 01:19  --------------------------------------------------------  IN:    Lactated Ringers: 625 mL  Total IN: 625 mL    OUT:    Bulb (mL): 50 mL    Indwelling Catheter - Urethral (mL): 1320 mL  Total OUT: 1370 mL    Total NET: -745 mL          Physical Exam:  General: NAD, resting comfortably in bed  Pulmonary: Nonlabored breathing, no respiratory distress  Abdominal: soft, appropriately tender, nondistended, incision c/d/i with prevena to suction, GABRIELLA serosang  Extremities: WWP              Assessment:  Patient is a 64yo F with h/o anxiety, depression, OA, chronic pain, sleep apnea on CPAP, psoriatic arthritis, HTN , anemia, mood disorder, history of falls, COVID19 infection (8/29/2020) c/b hospitalization, PE treated with Eliquis x 3 months now several hours s/p ventral hernia repair with mesh. Patient stable and recovering well.    Plan:  - PCEA  - Turner  - NPO/IVF  - DVT ppx  - OOB and ambulating as tolerated  - F/u AM labs  - PT eval  - JANE on CPAP, however patient refusing to use hospital equipment    [place] [facility]  [id number]

## 2021-05-12 NOTE — PRE-ANESTHESIA EVALUATION ADULT - NSANTHPMHFT_GEN_ALL_CORE
64 yo lady with PMH of Anxiety, depression, OA,  chronic pain, sleep apnea on CPAP, psoriatic arthritis, HTN , anemia, mood disorder, history of falls, COVID19 infection (8/29/2020) required hospitalization c/w PE treated with Eliquis x 3 months  followed by pulmonary , s/p recent  X ray and VQ scan . Presents to PST for scheduled Incisional Hernia repair with component Separation on 5/12/21.

## 2021-05-12 NOTE — BRIEF OPERATIVE NOTE - OPERATION/FINDINGS
Patient with predominant epigastric hernia but further smaller defects noticed inferiorly incorporated into incision. Scar excised and sent to path. Posterior fascia bridged with 2S absorbable Ovitex mesh, then progrip mesh placed retrorectus. Fascia closed primarily over the progrip mesh and skin closed with 3-0 and staples. Prevena in place. PCEA and Turner in place

## 2021-05-13 LAB
ANION GAP SERPL CALC-SCNC: 11 MMOL/L — SIGNIFICANT CHANGE UP (ref 5–17)
APTT BLD: 28.9 SEC — SIGNIFICANT CHANGE UP (ref 27.5–35.5)
BLD GP AB SCN SERPL QL: NEGATIVE — SIGNIFICANT CHANGE UP
BUN SERPL-MCNC: 8 MG/DL — SIGNIFICANT CHANGE UP (ref 7–23)
CALCIUM SERPL-MCNC: 8.4 MG/DL — SIGNIFICANT CHANGE UP (ref 8.4–10.5)
CHLORIDE SERPL-SCNC: 101 MMOL/L — SIGNIFICANT CHANGE UP (ref 96–108)
CO2 SERPL-SCNC: 25 MMOL/L — SIGNIFICANT CHANGE UP (ref 22–31)
CREAT SERPL-MCNC: 0.48 MG/DL — LOW (ref 0.5–1.3)
GLUCOSE SERPL-MCNC: 85 MG/DL — SIGNIFICANT CHANGE UP (ref 70–99)
HCT VFR BLD CALC: 21.5 % — LOW (ref 34.5–45)
HCT VFR BLD CALC: 22.8 % — LOW (ref 34.5–45)
HCT VFR BLD CALC: 22.8 % — LOW (ref 34.5–45)
HCT VFR BLD CALC: 23.6 % — LOW (ref 34.5–45)
HGB BLD-MCNC: 6.8 G/DL — CRITICAL LOW (ref 11.5–15.5)
HGB BLD-MCNC: 7.1 G/DL — LOW (ref 11.5–15.5)
HGB BLD-MCNC: 7.3 G/DL — LOW (ref 11.5–15.5)
HGB BLD-MCNC: 7.3 G/DL — LOW (ref 11.5–15.5)
INR BLD: 1.06 RATIO — SIGNIFICANT CHANGE UP (ref 0.88–1.16)
MAGNESIUM SERPL-MCNC: 1.8 MG/DL — SIGNIFICANT CHANGE UP (ref 1.6–2.6)
MCHC RBC-ENTMCNC: 28.1 PG — SIGNIFICANT CHANGE UP (ref 27–34)
MCHC RBC-ENTMCNC: 28.4 PG — SIGNIFICANT CHANGE UP (ref 27–34)
MCHC RBC-ENTMCNC: 28.8 PG — SIGNIFICANT CHANGE UP (ref 27–34)
MCHC RBC-ENTMCNC: 29.3 PG — SIGNIFICANT CHANGE UP (ref 27–34)
MCHC RBC-ENTMCNC: 30.9 GM/DL — LOW (ref 32–36)
MCHC RBC-ENTMCNC: 31.1 GM/DL — LOW (ref 32–36)
MCHC RBC-ENTMCNC: 31.6 GM/DL — LOW (ref 32–36)
MCHC RBC-ENTMCNC: 32 GM/DL — SIGNIFICANT CHANGE UP (ref 32–36)
MCV RBC AUTO: 90.8 FL — SIGNIFICANT CHANGE UP (ref 80–100)
MCV RBC AUTO: 91.1 FL — SIGNIFICANT CHANGE UP (ref 80–100)
MCV RBC AUTO: 91.2 FL — SIGNIFICANT CHANGE UP (ref 80–100)
MCV RBC AUTO: 91.6 FL — SIGNIFICANT CHANGE UP (ref 80–100)
NRBC # BLD: 0 /100 WBCS — SIGNIFICANT CHANGE UP (ref 0–0)
PHOSPHATE SERPL-MCNC: 4.3 MG/DL — SIGNIFICANT CHANGE UP (ref 2.5–4.5)
PLATELET # BLD AUTO: 175 K/UL — SIGNIFICANT CHANGE UP (ref 150–400)
PLATELET # BLD AUTO: 187 K/UL — SIGNIFICANT CHANGE UP (ref 150–400)
PLATELET # BLD AUTO: 195 K/UL — SIGNIFICANT CHANGE UP (ref 150–400)
PLATELET # BLD AUTO: 219 K/UL — SIGNIFICANT CHANGE UP (ref 150–400)
POTASSIUM SERPL-MCNC: 4.2 MMOL/L — SIGNIFICANT CHANGE UP (ref 3.5–5.3)
POTASSIUM SERPL-SCNC: 4.2 MMOL/L — SIGNIFICANT CHANGE UP (ref 3.5–5.3)
PROTHROM AB SERPL-ACNC: 12.7 SEC — SIGNIFICANT CHANGE UP (ref 10.6–13.6)
RBC # BLD: 2.36 M/UL — LOW (ref 3.8–5.2)
RBC # BLD: 2.49 M/UL — LOW (ref 3.8–5.2)
RBC # BLD: 2.5 M/UL — LOW (ref 3.8–5.2)
RBC # BLD: 2.6 M/UL — LOW (ref 3.8–5.2)
RBC # FLD: 12.8 % — SIGNIFICANT CHANGE UP (ref 10.3–14.5)
RBC # FLD: 12.9 % — SIGNIFICANT CHANGE UP (ref 10.3–14.5)
RH IG SCN BLD-IMP: POSITIVE — SIGNIFICANT CHANGE UP
SODIUM SERPL-SCNC: 137 MMOL/L — SIGNIFICANT CHANGE UP (ref 135–145)
WBC # BLD: 8.51 K/UL — SIGNIFICANT CHANGE UP (ref 3.8–10.5)
WBC # BLD: 9.1 K/UL — SIGNIFICANT CHANGE UP (ref 3.8–10.5)
WBC # BLD: 9.54 K/UL — SIGNIFICANT CHANGE UP (ref 3.8–10.5)
WBC # BLD: 9.57 K/UL — SIGNIFICANT CHANGE UP (ref 3.8–10.5)
WBC # FLD AUTO: 8.51 K/UL — SIGNIFICANT CHANGE UP (ref 3.8–10.5)
WBC # FLD AUTO: 9.1 K/UL — SIGNIFICANT CHANGE UP (ref 3.8–10.5)
WBC # FLD AUTO: 9.54 K/UL — SIGNIFICANT CHANGE UP (ref 3.8–10.5)
WBC # FLD AUTO: 9.57 K/UL — SIGNIFICANT CHANGE UP (ref 3.8–10.5)

## 2021-05-13 PROCEDURE — 74177 CT ABD & PELVIS W/CONTRAST: CPT | Mod: 26

## 2021-05-13 RX ORDER — SODIUM CHLORIDE 9 MG/ML
500 INJECTION, SOLUTION INTRAVENOUS ONCE
Refills: 0 | Status: COMPLETED | OUTPATIENT
Start: 2021-05-13 | End: 2021-05-13

## 2021-05-13 RX ORDER — TRAZODONE HCL 50 MG
100 TABLET ORAL AT BEDTIME
Refills: 0 | Status: DISCONTINUED | OUTPATIENT
Start: 2021-05-13 | End: 2021-05-17

## 2021-05-13 RX ORDER — SERTRALINE 25 MG/1
200 TABLET, FILM COATED ORAL DAILY
Refills: 0 | Status: DISCONTINUED | OUTPATIENT
Start: 2021-05-13 | End: 2021-05-17

## 2021-05-13 RX ORDER — ACETAMINOPHEN 500 MG
1000 TABLET ORAL EVERY 6 HOURS
Refills: 0 | Status: COMPLETED | OUTPATIENT
Start: 2021-05-13 | End: 2021-05-14

## 2021-05-13 RX ORDER — CLONAZEPAM 1 MG
1.5 TABLET ORAL DAILY
Refills: 0 | Status: DISCONTINUED | OUTPATIENT
Start: 2021-05-13 | End: 2021-05-14

## 2021-05-13 RX ORDER — CLONAZEPAM 1 MG
3 TABLET ORAL AT BEDTIME
Refills: 0 | Status: DISCONTINUED | OUTPATIENT
Start: 2021-05-13 | End: 2021-05-13

## 2021-05-13 RX ORDER — MONTELUKAST 4 MG/1
10 TABLET, CHEWABLE ORAL AT BEDTIME
Refills: 0 | Status: DISCONTINUED | OUTPATIENT
Start: 2021-05-13 | End: 2021-05-17

## 2021-05-13 RX ADMIN — Medication 400 MILLIGRAM(S): at 12:48

## 2021-05-13 RX ADMIN — SODIUM CHLORIDE 3 MILLILITER(S): 9 INJECTION INTRAMUSCULAR; INTRAVENOUS; SUBCUTANEOUS at 16:13

## 2021-05-13 RX ADMIN — PANTOPRAZOLE SODIUM 40 MILLIGRAM(S): 20 TABLET, DELAYED RELEASE ORAL at 12:43

## 2021-05-13 RX ADMIN — SODIUM CHLORIDE 125 MILLILITER(S): 9 INJECTION, SOLUTION INTRAVENOUS at 16:19

## 2021-05-13 RX ADMIN — Medication 1000 MILLIGRAM(S): at 13:18

## 2021-05-13 RX ADMIN — Medication 1000 MILLIGRAM(S): at 18:51

## 2021-05-13 RX ADMIN — SODIUM CHLORIDE 3 MILLILITER(S): 9 INJECTION INTRAMUSCULAR; INTRAVENOUS; SUBCUTANEOUS at 05:35

## 2021-05-13 RX ADMIN — SODIUM CHLORIDE 500 MILLILITER(S): 9 INJECTION, SOLUTION INTRAVENOUS at 05:34

## 2021-05-13 RX ADMIN — Medication 100 MILLIGRAM(S): at 16:19

## 2021-05-13 RX ADMIN — MONTELUKAST 10 MILLIGRAM(S): 4 TABLET, CHEWABLE ORAL at 22:30

## 2021-05-13 RX ADMIN — Medication 100 MILLIGRAM(S): at 05:37

## 2021-05-13 RX ADMIN — QUETIAPINE FUMARATE 300 MILLIGRAM(S): 200 TABLET, FILM COATED ORAL at 22:30

## 2021-05-13 RX ADMIN — LAMOTRIGINE 150 MILLIGRAM(S): 25 TABLET, ORALLY DISINTEGRATING ORAL at 12:43

## 2021-05-13 RX ADMIN — Medication 400 MILLIGRAM(S): at 18:21

## 2021-05-13 RX ADMIN — HEPARIN SODIUM 5000 UNIT(S): 5000 INJECTION INTRAVENOUS; SUBCUTANEOUS at 05:37

## 2021-05-13 NOTE — PHYSICAL THERAPY INITIAL EVALUATION ADULT - ADDITIONAL COMMENTS
Pt lives in apartment with  with 3 steps to enter. Pt reports using rollator in community and cane at home. Pt reports that  helps her sometimes with ADLs when needed

## 2021-05-13 NOTE — PROGRESS NOTE ADULT - SUBJECTIVE AND OBJECTIVE BOX
POD #1    Overnight events:   - No acute events    SUBJECTIVE:  Pain well-controlled. Denies n/v, chest pain or SOB.    OBJECTIVE:  Vital Signs Last 24 Hrs  T(C): 37.2 (13 May 2021 00:00), Max: 37.7 (12 May 2021 21:00)  T(F): 98.9 (13 May 2021 00:00), Max: 99.8 (12 May 2021 21:00)  HR: 86 (13 May 2021 00:00) (75 - 87)  BP: 95/58 (13 May 2021 00:00) (95/58 - 170/107)  BP(mean): 78 (12 May 2021 20:00) (75 - 134)  RR: 18 (13 May 2021 00:00) (15 - 22)  SpO2: 100% (13 May 2021 00:00) (93% - 100%)      05-12-21 @ 07:01  -  05-13-21 @ 01:52  --------------------------------------------------------  IN: 625 mL / OUT: 1370 mL / NET: -745 mL        Physical Examination:  GEN: NAD, resting quietly  PULM: symmetric chest rise bilaterally, no increased WOB  ABD: soft, appropriately tender, nondistended, no rebound or guarding, incision CDI with prevena to suction, GABRIELLA serosang  EXTR: no LE erythema, moving all extremities

## 2021-05-13 NOTE — PROGRESS NOTE ADULT - ASSESSMENT
64yo F with h/o anxiety, depression, OA, chronic pain, sleep apnea on CPAP, psoriatic arthritis, HTN , anemia, mood disorder, history of falls, COVID19 infection (8/29/2020) c/b hospitalization, PE treated with Eliquis x 3 months now POD1 s/p ventral hernia repair with mesh. Patient stable and recovering well.    Plan:  - PCEA  - Turner, consider removing today or when PCEA is removed  - NPO/IVF, advance today  - DVT ppx  - OOB and ambulating as tolerated  - F/u AM labs  - PT eval  - JANE on CPAP, however patient refusing to use hospital equipment    [facility]  [id number].   66yo F with h/o anxiety, depression, OA, chronic pain, sleep apnea on CPAP, psoriatic arthritis, HTN , anemia, mood disorder, history of falls, COVID19 infection (8/29/2020) c/b hospitalization, PE treated with Eliquis x 3 months now POD1 s/p ventral hernia repair with mesh. Patient with hypotension and H/H drop on AM labs.    Plan:  - PCEA, transition to PCA vs epidural medication change  - Keep cardoza today  - NPO/IVF, advance to sam regular  - DVT ppx  - OOB and ambulating as tolerated  - F/u repeat labs  - PT eval  - JANE on CPAP, however patient refusing to use hospital equipment    [facility]  [id number].

## 2021-05-13 NOTE — PROGRESS NOTE ADULT - SUBJECTIVE AND OBJECTIVE BOX
Day 1 of Anesthesia Pain Management Service    SUBJECTIVE: Patient doing well with PCEA    Pain Scale Score:   Refer to charted pain scores    THERAPY:  [X] Epidural Bupivacaine 0.0625% and Hydromorphone         [X] 10 micrograms/mL 	[ ] 5 micrograms/mL  [ ] Epidural Ropivacaine 0.2% plain – 1 mg/mL    Demand dose: 3 mL  Lockout: 15 minutes  Continuous Rate: 3 mL/hr    MEDICATIONS  (STANDING):  acetaminophen  IVPB .. 1000 milliGRAM(s) IV Intermittent every 6 hours  ceFAZolin   IVPB 2000 milliGRAM(s) IV Intermittent every 8 hours  chlorhexidine 2% Cloths 1 Application(s) Topical once  hydromorphone (10 MICROgram(s)/mL) + bupivacaine 0.0625% in 0.9% Sodium Chloride PCEA 250 milliLiter(s) Epidural PCA Continuous  lactated ringers. 1000 milliLiter(s) (125 mL/Hr) IV Continuous <Continuous>  lamoTRIgine 150 milliGRAM(s) Oral daily  lidocaine 1% Injectable 0.2 milliLiter(s) Local Injection once  metoprolol tartrate Injectable 5 milliGRAM(s) IV Push every 6 hours  pantoprazole  Injectable 40 milliGRAM(s) IV Push daily  QUEtiapine 300 milliGRAM(s) Oral at bedtime  sodium chloride 0.9% lock flush 3 milliLiter(s) IV Push every 8 hours    MEDICATIONS  (PRN):  naloxone Injectable 0.1 milliGRAM(s) IV Push every 3 minutes PRN For ANY of the following changes in patient status:  A. RR LESS THAN 10 breaths per minute, B. Oxygen saturation LESS THAN 90%, C. Sedation score of 6  ondansetron Injectable 4 milliGRAM(s) IV Push every 6 hours PRN Nausea      OBJECTIVE:    Assessment of Catheter Site:    [X] Epidural 	  [X] Dressing intact	[X] Site non-tender	[X] Site without erythema, discharge, edema  [X] Epidural tubing and connection checked	[X] Gross neurological exam within normal limits  [ ] Catheter removed – tip intact		[X] Afebrile	            [ ] Febrile: ___                          7.3    9.54  )-----------( 219      ( 13 May 2021 07:22 )             23.6     Vital Signs Last 24 Hrs  T(C): 37.1 (05-13-21 @ 09:20), Max: 37.7 (05-12-21 @ 21:00)  T(F): 98.8 (05-13-21 @ 09:20), Max: 99.8 (05-12-21 @ 21:00)  HR: 78 (05-13-21 @ 10:00) (73 - 87)  BP: 91/59 (05-13-21 @ 09:20) (80/60 - 170/107)  BP(mean): 78 (05-12-21 @ 20:00) (75 - 134)  RR: 20 (05-13-21 @ 09:20) (15 - 22)  SpO2: 100% (05-13-21 @ 10:00) (93% - 100%)      Sedation Score:	[X] Alert            [  ] Drowsy	[ ] Arousable  [ ] Asleep     [ ] Unresponsive    Side Effects:	[X] None	[ ] Nausea	[ ] Vomiting   [ ] Pruritus  		[ ] Weakness     [ ] Numbness	[ ] Other:    ASSESSMENT/ PLAN:    Therapy:	[X] Continue   [ ] Discontinue   [ ] Change to PRN Analgesics   [ ] Change to PCA    Documentation and Verification of current medications:  [X] Done	[ ] Not done, not eligible, reason:    COMMENTS: Hypotensive episode noted this am. PCEA dosing decreased to 3mL. Patient resting comfortably at present. Will continue to monitor

## 2021-05-13 NOTE — PHYSICAL THERAPY INITIAL EVALUATION ADULT - PLANNED THERAPY INTERVENTIONS, PT EVAL
GOAL: Pt will perform 3 stairs with or without U HR as needed within 2 weeks./balance training/bed mobility training/gait training/transfer training

## 2021-05-13 NOTE — CHART NOTE - NSCHARTNOTEFT_GEN_A_CORE
Called to bedside to evaluate dropping h/h and hypotension. Pt has been on PCEA since surgery yesterday, however her BP has not responded to reduction in the rate. Overall, the patient is feeling relatively well. She has discomfort on right and left, but not sharp pain. She feels she may be slightly more distended than previously today. She also feels somewhat tired and wiped out. She denies nausea or vomiting.     On exam, her abdomen is soft, obese, with mild bruising on the left. She is tender on both sides with deep palpation. There is no obvious firm hematoma, though her exam is limited by obesity.     Repeat h/h continues to downtrend slowly.                         6.8    8.51  )-----------( 187      ( 13 May 2021 15:30 )             21.5     Given her hypotension and hgb<7, will transfuse 2 units prbc and get CTA to evaluate for ongoing bleeding. PT was instructed to remain NPO until after CTA in the event she requires RTOR for washout/control of bleeding.   Discussed with Dr. Diaz.     Geovanna Tam, MIS fellow Called to bedside to evaluate dropping h/h and hypotension. Pt has been on PCEA since surgery yesterday, however her BP has not responded to reduction in the rate. Overall, the patient is feeling relatively well. She has discomfort on right and left, but not sharp pain. She feels she may be slightly more distended than previously today. She also feels somewhat tired and wiped out. She denies nausea or vomiting.     On exam, her abdomen is soft, obese, with mild bruising on the left. She is tender on both sides with deep palpation. There is no obvious firm hematoma, though her exam is limited by obesity.     Repeat h/h continues to downtrend slowly.                         6.8    8.51  )-----------( 187      ( 13 May 2021 15:30 )             21.5     Given her hypotension and hgb<7, will transfuse 2 units prbc and get CTA to evaluate for ongoing bleeding. PT was instructed to remain NPO until after CTA in the event she requires RTOR for washout/control of bleeding.   Discussed with Dr. Diaz.     Geovanna Tam, MIS fellow      Clinical documentation and note reviewed and edited where appropriate.  D/W note writer in real time..

## 2021-05-13 NOTE — PHYSICAL THERAPY INITIAL EVALUATION ADULT - GENERAL OBSERVATIONS, REHAB EVAL
Pt received semi-supine in bed w/ +NC 2L O2, +PIV, +PCA pump, +cardoza +prevena vac. Pt received semi-supine in bed w/ +NC 2L O2, +PIV, +PCEA pump, +cardoza +prevena vac, +cont pulse ox monitoring

## 2021-05-13 NOTE — PHYSICAL THERAPY INITIAL EVALUATION ADULT - PERTINENT HX OF CURRENT PROBLEM, REHAB EVAL
Pt is a 64 yo lady with PMH of Anxiety, depression, OA,  chronic pain, sleep apnea on CPAP, psoriatic arthritis, HTN , anemia, mood disorder, history of falls, COVID19 infection (8/29/2020) required hospitalization c/w PE treated with Eliquis x 3 months.Now s/p Ventral Hernia repair with  mesh on 5/12/21 complicated by hypotension.

## 2021-05-14 DIAGNOSIS — R06.02 SHORTNESS OF BREATH: ICD-10-CM

## 2021-05-14 DIAGNOSIS — K43.9 VENTRAL HERNIA WITHOUT OBSTRUCTION OR GANGRENE: ICD-10-CM

## 2021-05-14 LAB
ANION GAP SERPL CALC-SCNC: 12 MMOL/L — SIGNIFICANT CHANGE UP (ref 5–17)
BUN SERPL-MCNC: 5 MG/DL — LOW (ref 7–23)
CALCIUM SERPL-MCNC: 8.6 MG/DL — SIGNIFICANT CHANGE UP (ref 8.4–10.5)
CHLORIDE SERPL-SCNC: 104 MMOL/L — SIGNIFICANT CHANGE UP (ref 96–108)
CO2 SERPL-SCNC: 27 MMOL/L — SIGNIFICANT CHANGE UP (ref 22–31)
CREAT SERPL-MCNC: 0.39 MG/DL — LOW (ref 0.5–1.3)
GLUCOSE SERPL-MCNC: 83 MG/DL — SIGNIFICANT CHANGE UP (ref 70–99)
HCT VFR BLD CALC: 26.3 % — LOW (ref 34.5–45)
HCT VFR BLD CALC: 28.7 % — LOW (ref 34.5–45)
HGB BLD-MCNC: 8.4 G/DL — LOW (ref 11.5–15.5)
HGB BLD-MCNC: 9 G/DL — LOW (ref 11.5–15.5)
MAGNESIUM SERPL-MCNC: 1.7 MG/DL — SIGNIFICANT CHANGE UP (ref 1.6–2.6)
MCHC RBC-ENTMCNC: 28.5 PG — SIGNIFICANT CHANGE UP (ref 27–34)
MCHC RBC-ENTMCNC: 28.6 PG — SIGNIFICANT CHANGE UP (ref 27–34)
MCHC RBC-ENTMCNC: 31.4 GM/DL — LOW (ref 32–36)
MCHC RBC-ENTMCNC: 31.9 GM/DL — LOW (ref 32–36)
MCV RBC AUTO: 89.2 FL — SIGNIFICANT CHANGE UP (ref 80–100)
MCV RBC AUTO: 91.1 FL — SIGNIFICANT CHANGE UP (ref 80–100)
NRBC # BLD: 0 /100 WBCS — SIGNIFICANT CHANGE UP (ref 0–0)
NRBC # BLD: 0 /100 WBCS — SIGNIFICANT CHANGE UP (ref 0–0)
PHOSPHATE SERPL-MCNC: 3.7 MG/DL — SIGNIFICANT CHANGE UP (ref 2.5–4.5)
PLATELET # BLD AUTO: 188 K/UL — SIGNIFICANT CHANGE UP (ref 150–400)
PLATELET # BLD AUTO: 190 K/UL — SIGNIFICANT CHANGE UP (ref 150–400)
POTASSIUM SERPL-MCNC: 3.7 MMOL/L — SIGNIFICANT CHANGE UP (ref 3.5–5.3)
POTASSIUM SERPL-SCNC: 3.7 MMOL/L — SIGNIFICANT CHANGE UP (ref 3.5–5.3)
RBC # BLD: 2.95 M/UL — LOW (ref 3.8–5.2)
RBC # BLD: 3.15 M/UL — LOW (ref 3.8–5.2)
RBC # FLD: 14.1 % — SIGNIFICANT CHANGE UP (ref 10.3–14.5)
RBC # FLD: 14.2 % — SIGNIFICANT CHANGE UP (ref 10.3–14.5)
SODIUM SERPL-SCNC: 143 MMOL/L — SIGNIFICANT CHANGE UP (ref 135–145)
SURGICAL PATHOLOGY STUDY: SIGNIFICANT CHANGE UP
WBC # BLD: 8.81 K/UL — SIGNIFICANT CHANGE UP (ref 3.8–10.5)
WBC # BLD: 9.13 K/UL — SIGNIFICANT CHANGE UP (ref 3.8–10.5)
WBC # FLD AUTO: 8.81 K/UL — SIGNIFICANT CHANGE UP (ref 3.8–10.5)
WBC # FLD AUTO: 9.13 K/UL — SIGNIFICANT CHANGE UP (ref 3.8–10.5)

## 2021-05-14 PROCEDURE — 99222 1ST HOSP IP/OBS MODERATE 55: CPT

## 2021-05-14 PROCEDURE — 71045 X-RAY EXAM CHEST 1 VIEW: CPT | Mod: 26

## 2021-05-14 RX ORDER — ACETAMINOPHEN 500 MG
975 TABLET ORAL EVERY 6 HOURS
Refills: 0 | Status: DISCONTINUED | OUTPATIENT
Start: 2021-05-14 | End: 2021-05-17

## 2021-05-14 RX ORDER — IPRATROPIUM/ALBUTEROL SULFATE 18-103MCG
3 AEROSOL WITH ADAPTER (GRAM) INHALATION EVERY 6 HOURS
Refills: 0 | Status: DISCONTINUED | OUTPATIENT
Start: 2021-05-14 | End: 2021-05-17

## 2021-05-14 RX ORDER — HEPARIN SODIUM 5000 [USP'U]/ML
5000 INJECTION INTRAVENOUS; SUBCUTANEOUS EVERY 8 HOURS
Refills: 0 | Status: DISCONTINUED | OUTPATIENT
Start: 2021-05-14 | End: 2021-05-17

## 2021-05-14 RX ORDER — CLONAZEPAM 1 MG
1.5 TABLET ORAL AT BEDTIME
Refills: 0 | Status: DISCONTINUED | OUTPATIENT
Start: 2021-05-14 | End: 2021-05-14

## 2021-05-14 RX ORDER — CLONAZEPAM 1 MG
1 TABLET ORAL
Refills: 0 | Status: DISCONTINUED | OUTPATIENT
Start: 2021-05-15 | End: 2021-05-17

## 2021-05-14 RX ORDER — CLONAZEPAM 1 MG
2.5 TABLET ORAL AT BEDTIME
Refills: 0 | Status: DISCONTINUED | OUTPATIENT
Start: 2021-05-14 | End: 2021-05-17

## 2021-05-14 RX ORDER — CLONAZEPAM 1 MG
3 TABLET ORAL AT BEDTIME
Refills: 0 | Status: DISCONTINUED | OUTPATIENT
Start: 2021-05-14 | End: 2021-05-14

## 2021-05-14 RX ORDER — CLONAZEPAM 1 MG
1.5 TABLET ORAL
Refills: 0 | Status: DISCONTINUED | OUTPATIENT
Start: 2021-05-15 | End: 2021-05-17

## 2021-05-14 RX ADMIN — Medication 3 MILLILITER(S): at 17:26

## 2021-05-14 RX ADMIN — SERTRALINE 200 MILLIGRAM(S): 25 TABLET, FILM COATED ORAL at 11:27

## 2021-05-14 RX ADMIN — Medication 400 MILLIGRAM(S): at 00:22

## 2021-05-14 RX ADMIN — MONTELUKAST 10 MILLIGRAM(S): 4 TABLET, CHEWABLE ORAL at 21:25

## 2021-05-14 RX ADMIN — SODIUM CHLORIDE 3 MILLILITER(S): 9 INJECTION INTRAMUSCULAR; INTRAVENOUS; SUBCUTANEOUS at 21:31

## 2021-05-14 RX ADMIN — PANTOPRAZOLE SODIUM 40 MILLIGRAM(S): 20 TABLET, DELAYED RELEASE ORAL at 11:26

## 2021-05-14 RX ADMIN — Medication 1.5 MILLIGRAM(S): at 11:26

## 2021-05-14 RX ADMIN — Medication 400 MILLIGRAM(S): at 06:29

## 2021-05-14 RX ADMIN — SODIUM CHLORIDE 125 MILLILITER(S): 9 INJECTION, SOLUTION INTRAVENOUS at 11:27

## 2021-05-14 RX ADMIN — Medication 1000 MILLIGRAM(S): at 06:59

## 2021-05-14 RX ADMIN — Medication 975 MILLIGRAM(S): at 11:56

## 2021-05-14 RX ADMIN — HEPARIN SODIUM 5000 UNIT(S): 5000 INJECTION INTRAVENOUS; SUBCUTANEOUS at 21:25

## 2021-05-14 RX ADMIN — SODIUM CHLORIDE 125 MILLILITER(S): 9 INJECTION, SOLUTION INTRAVENOUS at 10:29

## 2021-05-14 RX ADMIN — Medication 2.5 MILLIGRAM(S): at 21:26

## 2021-05-14 RX ADMIN — Medication 975 MILLIGRAM(S): at 17:56

## 2021-05-14 RX ADMIN — QUETIAPINE FUMARATE 300 MILLIGRAM(S): 200 TABLET, FILM COATED ORAL at 21:25

## 2021-05-14 RX ADMIN — Medication 1000 MILLIGRAM(S): at 00:52

## 2021-05-14 RX ADMIN — Medication 975 MILLIGRAM(S): at 17:26

## 2021-05-14 RX ADMIN — LAMOTRIGINE 150 MILLIGRAM(S): 25 TABLET, ORALLY DISINTEGRATING ORAL at 13:21

## 2021-05-14 RX ADMIN — Medication 975 MILLIGRAM(S): at 11:26

## 2021-05-14 RX ADMIN — Medication 3 MILLILITER(S): at 12:58

## 2021-05-14 RX ADMIN — Medication 100 MILLIGRAM(S): at 00:42

## 2021-05-14 NOTE — CONSULT NOTE ADULT - PROBLEM SELECTOR RECOMMENDATION 9
mild SOB - suspect 2nd to retained secretions as she does have a significant congested cough at this time  - CXR ordered, appears grossly clear  - start duonebs q6h  - OOB to chair when able  - Chest PT q6h  - Wean o2 as able, keeps sats >90%  - Incentive spirometer
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
## 2021-05-14 NOTE — CONSULT NOTE ADULT - PROBLEM SELECTOR RECOMMENDATION 3
s/p ventral hernia repair 5/12  -Encourage splinting of abdominal incision with coughs and deep breaths  -Incentive spirometer  -DVT prophylaxis once cleared from surgery perspective

## 2021-05-14 NOTE — PROGRESS NOTE ADULT - SUBJECTIVE AND OBJECTIVE BOX
Day 2 of Anesthesia Pain Management Service    SUBJECTIVE: Doing  better    Pain Scale Score:   Refer to charted pain scores    THERAPY:  [X] Epidural Bupivacaine 0.0625% and Hydromorphone         [X] 10 micrograms/mL 	[ ] 5 micrograms/mL  [ ] Epidural Ropivacaine 0.2% plain – 1 mg/mL    Demand dose: 3 mL  Lockout: 15 minutes  Continuous Rate: 3 mL/hr    MEDICATIONS  (STANDING):  chlorhexidine 2% Cloths 1 Application(s) Topical once  clonazePAM  Tablet 1.5 milliGRAM(s) Oral daily  clonazePAM  Tablet 3 milliGRAM(s) Oral at bedtime  hydromorphone (10 MICROgram(s)/mL) + bupivacaine 0.0625% in 0.9% Sodium Chloride PCEA 250 milliLiter(s) Epidural PCA Continuous  lactated ringers. 1000 milliLiter(s) (125 mL/Hr) IV Continuous <Continuous>  lamoTRIgine 150 milliGRAM(s) Oral daily  lidocaine 1% Injectable 0.2 milliLiter(s) Local Injection once  montelukast 10 milliGRAM(s) Oral at bedtime  pantoprazole  Injectable 40 milliGRAM(s) IV Push daily  QUEtiapine 300 milliGRAM(s) Oral at bedtime  sertraline 200 milliGRAM(s) Oral daily  sodium chloride 0.9% lock flush 3 milliLiter(s) IV Push every 8 hours  traZODone 100 milliGRAM(s) Oral at bedtime    MEDICATIONS  (PRN):  naloxone Injectable 0.1 milliGRAM(s) IV Push every 3 minutes PRN For ANY of the following changes in patient status:  A. RR LESS THAN 10 breaths per minute, B. Oxygen saturation LESS THAN 90%, C. Sedation score of 6  ondansetron Injectable 4 milliGRAM(s) IV Push every 6 hours PRN Nausea      OBJECTIVE:    Assessment of Catheter Site:    [X] Epidural 	  [X] Dressing intact	[X] Site non-tender	[X] Site without erythema, discharge, edema  [X] Epidural tubing and connection checked	[X] Gross neurological exam within normal limits  [ ] Catheter removed – tip intact		[X] Afebrile	            [ ] Febrile: ___    PT/INR - ( 13 May 2021 20:57 )   PT: 12.7 sec;   INR: 1.06 ratio         PTT - ( 13 May 2021 20:57 )  PTT:28.9 sec                      8.4    8.81  )-----------( 188      ( 14 May 2021 07:16 )             26.3     Vital Signs Last 24 Hrs  T(C): 36.5 (05-14-21 @ 06:48), Max: 37.3 (05-13-21 @ 13:20)  T(F): 97.7 (05-14-21 @ 06:48), Max: 99.2 (05-13-21 @ 13:20)  HR: 80 (05-14-21 @ 08:59) (76 - 86)  BP: 124/73 (05-14-21 @ 06:48) (89/57 - 124/73)  BP(mean): --  RR: 16 (05-14-21 @ 06:48) (16 - 20)  SpO2: 96% (05-14-21 @ 08:59) (96% - 100%)      Sedation Score:	[X] Alert 	[ ] Drowsy	[ ] Arousable  [ ] Asleep     [ ] Unresponsive    Side Effects:	[X] None	[ ] Nausea	[ ] Vomiting   [ ] Pruritus  		[ ] Weakness     [ ] Numbness	[ ] Other:    ASSESSMENT/ PLAN:    Therapy:	[X] Continue   [ ] Discontinue   [ ] Change to PRN Analgesics   [ ] Change to PCA    Documentation and Verification of current medications:  [X] Done	[ ] Not done, not eligible, reason:    COMMENTS: Pain controlled with PCEA. Transfused with PRBC - BP normalizing- 124\73

## 2021-05-14 NOTE — CONSULT NOTE ADULT - PROBLEM SELECTOR RECOMMENDATION 2
-per hx  -pt reports inconsistent use of CPAP at home  -home equipment approved for in hospital use, c/w bipap qhs & prn -per hx  -pt reports inconsistent use of CPAP at home  -home equipment approved for in hospital use, c/w cpap qhs & prn

## 2021-05-14 NOTE — CONSULT NOTE ADULT - ASSESSMENT
66 y/o F with PMH of JANE on CPAP, anxiety, depression, OA, chronic pain, psoriatic arthritis, HTN , anemia, mood disorder, history of falls, COVID19 infection (8/29/2020) required hospitalization c/w PE treated with Eliquis x 3 months. Reports she underwent VQ scan outpatient at [place] for which results were normal with no PE. Presented to [facility] 5/12 for scheduled incisional hernia repair with component separation. Post op course complicated by anemia and hypotension, s/p 2 units PRBCs. CT abdomen 5/13 read as negative for extravasation of surgical incision. Sees Dr. Calvillo outpatient for JANE management. Seen today breathing comfortably on 2L N/C but noted to have congested cough with difficulty expectorating secretions.

## 2021-05-14 NOTE — PROGRESS NOTE ADULT - SUBJECTIVE AND OBJECTIVE BOX
Events of lst night noted - at present patient is stable - CTA shows no extravasation  There is a left sided subcutaneous hematoma - awaiting 1 PM H/H and f ok will feed

## 2021-05-14 NOTE — PROGRESS NOTE ADULT - SUBJECTIVE AND OBJECTIVE BOX
POD #2    24hr events:  - Hypotensive to high 80s with downtrending H&H. CT angio negative for active extravasation. Transfused 1u pRBC with appropriate rise in H&H and improvement in BPs. 2nd unit pRBC transfused.    SUBJECTIVE:  No acute complaints. Pain well-controlled. Denies chest pain, SOB or dizziness.    OBJECTIVE:  Vital Signs Last 24 Hrs  T(C): 36.3 (14 May 2021 00:05), Max: 37.3 (13 May 2021 13:20)  T(F): 97.4 (14 May 2021 00:05), Max: 99.2 (13 May 2021 13:20)  HR: 83 (14 May 2021 00:05) (73 - 86)  BP: 105/70 (14 May 2021 00:05) (80/60 - 108/70)  BP(mean): --  RR: 16 (14 May 2021 00:05) (16 - 20)  SpO2: 97% (14 May 2021 00:05) (96% - 100%)      05-12-21 @ 07:01  -  05-13-21 @ 07:00  --------------------------------------------------------  IN: 2450 mL / OUT: 1535 mL / NET: 915 mL    05-13-21 @ 07:01  -  05-14-21 @ 00:52  --------------------------------------------------------  IN: 2030 mL / OUT: 2050 mL / NET: -20 mL        Physical Examination:  GEN: NAD, resting quietly  PULM: symmetric chest rise bilaterally, no increased WOB  ABD: soft, appropriately tender, nondistended, no rebound or guarding, incision CDI with prevena to suction, GABRIELLA with minimal serosang drainage  EXTR: no LE erythema, moving all extremities      LABS:                        7.3    9.10  )-----------( 175      ( 13 May 2021 20:57 )             22.8       05-13    137  |  101  |  8   ----------------------------<  85  4.2   |  25  |  0.48<L>    Ca    8.4      13 May 2021 07:21  Phos  4.3     05-13  Mg     1.8     05-13

## 2021-05-15 LAB
ANION GAP SERPL CALC-SCNC: 10 MMOL/L — SIGNIFICANT CHANGE UP (ref 5–17)
APTT BLD: 30.4 SEC — SIGNIFICANT CHANGE UP (ref 27.5–35.5)
BUN SERPL-MCNC: 8 MG/DL — SIGNIFICANT CHANGE UP (ref 7–23)
CALCIUM SERPL-MCNC: 8.6 MG/DL — SIGNIFICANT CHANGE UP (ref 8.4–10.5)
CHLORIDE SERPL-SCNC: 102 MMOL/L — SIGNIFICANT CHANGE UP (ref 96–108)
CO2 SERPL-SCNC: 27 MMOL/L — SIGNIFICANT CHANGE UP (ref 22–31)
CREAT SERPL-MCNC: 0.41 MG/DL — LOW (ref 0.5–1.3)
GLUCOSE SERPL-MCNC: 85 MG/DL — SIGNIFICANT CHANGE UP (ref 70–99)
HCT VFR BLD CALC: 27.9 % — LOW (ref 34.5–45)
HGB BLD-MCNC: 8.5 G/DL — LOW (ref 11.5–15.5)
INR BLD: 1.04 RATIO — SIGNIFICANT CHANGE UP (ref 0.88–1.16)
MAGNESIUM SERPL-MCNC: 1.7 MG/DL — SIGNIFICANT CHANGE UP (ref 1.6–2.6)
MCHC RBC-ENTMCNC: 27.8 PG — SIGNIFICANT CHANGE UP (ref 27–34)
MCHC RBC-ENTMCNC: 30.5 GM/DL — LOW (ref 32–36)
MCV RBC AUTO: 91.2 FL — SIGNIFICANT CHANGE UP (ref 80–100)
NRBC # BLD: 0 /100 WBCS — SIGNIFICANT CHANGE UP (ref 0–0)
PHOSPHATE SERPL-MCNC: 3.6 MG/DL — SIGNIFICANT CHANGE UP (ref 2.5–4.5)
PLATELET # BLD AUTO: 199 K/UL — SIGNIFICANT CHANGE UP (ref 150–400)
POTASSIUM SERPL-MCNC: 3.7 MMOL/L — SIGNIFICANT CHANGE UP (ref 3.5–5.3)
POTASSIUM SERPL-SCNC: 3.7 MMOL/L — SIGNIFICANT CHANGE UP (ref 3.5–5.3)
PROTHROM AB SERPL-ACNC: 12.4 SEC — SIGNIFICANT CHANGE UP (ref 10.6–13.6)
RBC # BLD: 3.06 M/UL — LOW (ref 3.8–5.2)
RBC # FLD: 14 % — SIGNIFICANT CHANGE UP (ref 10.3–14.5)
SODIUM SERPL-SCNC: 139 MMOL/L — SIGNIFICANT CHANGE UP (ref 135–145)
WBC # BLD: 9.72 K/UL — SIGNIFICANT CHANGE UP (ref 3.8–10.5)
WBC # FLD AUTO: 9.72 K/UL — SIGNIFICANT CHANGE UP (ref 3.8–10.5)

## 2021-05-15 RX ORDER — OXYCODONE HYDROCHLORIDE 5 MG/1
5 TABLET ORAL EVERY 4 HOURS
Refills: 0 | Status: DISCONTINUED | OUTPATIENT
Start: 2021-05-15 | End: 2021-05-17

## 2021-05-15 RX ORDER — PANTOPRAZOLE SODIUM 20 MG/1
40 TABLET, DELAYED RELEASE ORAL
Refills: 0 | Status: DISCONTINUED | OUTPATIENT
Start: 2021-05-15 | End: 2021-05-17

## 2021-05-15 RX ORDER — MAGNESIUM SULFATE 500 MG/ML
2 VIAL (ML) INJECTION ONCE
Refills: 0 | Status: COMPLETED | OUTPATIENT
Start: 2021-05-15 | End: 2021-05-15

## 2021-05-15 RX ADMIN — Medication 975 MILLIGRAM(S): at 00:08

## 2021-05-15 RX ADMIN — Medication 100 MILLIGRAM(S): at 00:08

## 2021-05-15 RX ADMIN — Medication 100 MILLIGRAM(S): at 21:43

## 2021-05-15 RX ADMIN — Medication 975 MILLIGRAM(S): at 05:57

## 2021-05-15 RX ADMIN — SODIUM CHLORIDE 3 MILLILITER(S): 9 INJECTION INTRAMUSCULAR; INTRAVENOUS; SUBCUTANEOUS at 21:51

## 2021-05-15 RX ADMIN — Medication 50 GRAM(S): at 15:53

## 2021-05-15 RX ADMIN — Medication 3 MILLILITER(S): at 18:04

## 2021-05-15 RX ADMIN — SODIUM CHLORIDE 3 MILLILITER(S): 9 INJECTION INTRAMUSCULAR; INTRAVENOUS; SUBCUTANEOUS at 05:25

## 2021-05-15 RX ADMIN — SODIUM CHLORIDE 3 MILLILITER(S): 9 INJECTION INTRAMUSCULAR; INTRAVENOUS; SUBCUTANEOUS at 17:13

## 2021-05-15 RX ADMIN — OXYCODONE HYDROCHLORIDE 5 MILLIGRAM(S): 5 TABLET ORAL at 00:00

## 2021-05-15 RX ADMIN — QUETIAPINE FUMARATE 300 MILLIGRAM(S): 200 TABLET, FILM COATED ORAL at 21:43

## 2021-05-15 RX ADMIN — Medication 3 MILLILITER(S): at 12:52

## 2021-05-15 RX ADMIN — Medication 975 MILLIGRAM(S): at 00:00

## 2021-05-15 RX ADMIN — Medication 975 MILLIGRAM(S): at 00:38

## 2021-05-15 RX ADMIN — SERTRALINE 200 MILLIGRAM(S): 25 TABLET, FILM COATED ORAL at 12:56

## 2021-05-15 RX ADMIN — Medication 1 MILLIGRAM(S): at 06:49

## 2021-05-15 RX ADMIN — Medication 3 MILLILITER(S): at 00:08

## 2021-05-15 RX ADMIN — Medication 975 MILLIGRAM(S): at 18:04

## 2021-05-15 RX ADMIN — HEPARIN SODIUM 5000 UNIT(S): 5000 INJECTION INTRAVENOUS; SUBCUTANEOUS at 15:52

## 2021-05-15 RX ADMIN — Medication 3 MILLILITER(S): at 05:26

## 2021-05-15 RX ADMIN — Medication 975 MILLIGRAM(S): at 18:00

## 2021-05-15 RX ADMIN — MONTELUKAST 10 MILLIGRAM(S): 4 TABLET, CHEWABLE ORAL at 21:43

## 2021-05-15 RX ADMIN — Medication 975 MILLIGRAM(S): at 12:52

## 2021-05-15 RX ADMIN — LAMOTRIGINE 150 MILLIGRAM(S): 25 TABLET, ORALLY DISINTEGRATING ORAL at 12:52

## 2021-05-15 RX ADMIN — Medication 1.5 MILLIGRAM(S): at 15:50

## 2021-05-15 RX ADMIN — OXYCODONE HYDROCHLORIDE 5 MILLIGRAM(S): 5 TABLET ORAL at 15:50

## 2021-05-15 RX ADMIN — Medication 2.5 MILLIGRAM(S): at 21:43

## 2021-05-15 RX ADMIN — PANTOPRAZOLE SODIUM 40 MILLIGRAM(S): 20 TABLET, DELAYED RELEASE ORAL at 12:52

## 2021-05-15 RX ADMIN — Medication 975 MILLIGRAM(S): at 05:27

## 2021-05-15 RX ADMIN — HEPARIN SODIUM 5000 UNIT(S): 5000 INJECTION INTRAVENOUS; SUBCUTANEOUS at 21:43

## 2021-05-15 NOTE — PROVIDER CONTACT NOTE (OTHER) - SITUATION
Pt Hypotensive BP 80/60
Per pain management do not give 0600 ordered dose of heparin, PCEA to be DC'd

## 2021-05-15 NOTE — PROGRESS NOTE ADULT - ASSESSMENT
64yo F with h/o anxiety, depression, OA, chronic pain, sleep apnea on CPAP, psoriatic arthritis, HTN , anemia, mood disorder, history of falls, COVID19 infection (8/29/2020) c/b hospitalization, PE treated with Eliquis x 3 months now POD3 s/p ventral hernia repair with mesh. Patient with hypotension and H/H drop, however no active extravasation on CT angio and appropriate response to transfusion, currently stable with improvement in BPs.    Plan:  - CT angio negative for active extravasation  - Consider PCEA removal today  - Turner removed at midnight, f/u trial of void  - Reg diet  - DVT ppx  - OOB and ambulating as tolerated  - F/u AM labs  - PT eval: home PT  - JANE on CPAP, however patient refusing to use hospital equipment    Green Team Surgery  [id number]. 64yo F with h/o anxiety, depression, OA, chronic pain, sleep apnea on CPAP, psoriatic arthritis, HTN , anemia, mood disorder, history of falls, COVID19 infection (8/29/2020) c/b hospitalization, PE treated with Eliquis x 3 months now POD3 s/p ventral hernia repair with mesh. Patient with hypotension and H/H drop, however no active extravasation on CT angio and appropriate response to transfusion, currently stable with improvement in BPs.    Plan:  - CT angio negative for active extravasation  - PCEA removal today  - Turner removed at midnight, f/u trial of void  - Reg diet  - DVT ppx  - OOB and ambulating as tolerated  - F/u AM labs  - PT eval: home PT  - JANE on CPAP, however patient refusing to use hospital equipment    Green Team Surgery  [id number].

## 2021-05-15 NOTE — CHART NOTE - NSCHARTNOTEFT_GEN_A_CORE
Anesthesia Pain Management Service    SUBJECTIVE:            Patient doing well with PCEA    OBJECTIVE:           Pain Scale Score:   Refer to charted pain scores            MEDICATIONS  (STANDING):  acetaminophen   Tablet .. 975 milliGRAM(s) Oral every 6 hours  albuterol/ipratropium for Nebulization 3 milliLiter(s) Nebulizer every 6 hours  chlorhexidine 2% Cloths 1 Application(s) Topical once  clonazePAM  Tablet 1.5 milliGRAM(s) Oral <User Schedule>  clonazePAM  Tablet 1 milliGRAM(s) Oral <User Schedule>  clonazePAM  Tablet 2.5 milliGRAM(s) Oral at bedtime  heparin   Injectable 5000 Unit(s) SubCutaneous every 8 hours  lamoTRIgine 150 milliGRAM(s) Oral daily  magnesium sulfate  IVPB 2 Gram(s) IV Intermittent once  montelukast 10 milliGRAM(s) Oral at bedtime  pantoprazole  Injectable 40 milliGRAM(s) IV Push daily  QUEtiapine 300 milliGRAM(s) Oral at bedtime  sertraline 200 milliGRAM(s) Oral daily  sodium chloride 0.9% lock flush 3 milliLiter(s) IV Push every 8 hours  traZODone 100 milliGRAM(s) Oral at bedtime    MEDICATIONS  (PRN):  oxyCODONE    IR 5 milliGRAM(s) Oral every 4 hours PRN Moderate Pain (4 - 6)              Assessment of Catheter Site:                      [X] Epidural 	                    [X] Dressing intact	[X] Site non-tender	[X] Site without erythema, discharge, edema                    [X] Epidural tubing and connection checked	[X] Gross neurological exam within normal limits                      [ ] Catheter removed – tip intact		[X] Afebrile	            [ ] Febrile: ___            PT/INR - ( 15 May 2021 07:25 )   PT: 12.4 sec;   INR: 1.04 ratio         PTT - ( 15 May 2021 07:25 )  PTT:30.4 sec                      8.5    9.72  )-----------( 199      ( 15 May 2021 08:23 )             27.9                           Vital Signs Last 24 Hrs  T(C): 37 (05-15-21 @ 09:28), Max: 37.2 (05-14-21 @ 17:18)  T(F): 98.6 (05-15-21 @ 09:28), Max: 99 (05-14-21 @ 17:18)  HR: 85 (05-15-21 @ 09:28) (74 - 85)  BP: 120/72 (05-15-21 @ 09:28) (100/67 - 120/72)  BP(mean): --  RR: 20 (05-15-21 @ 09:28) (18 - 20)  SpO2: 96% (05-15-21 @ 09:28) (94% - 98%)               Sedation Score:	[X] Alert	[ ] Drowsy	[ ] Arousable  [ ] Asleep     [ ] Unresponsive             Side Effects:             	[X] None	[ ] Nausea	[ ] Vomiting   [ ] Pruritus    [ ] Weakness     [ ] Numbness	[ ] Other:    ASSESSMENT/ PLAN:    Therapy:	[x ] Change to PRN Analgesics   [ ] Change to PCA    Documentation and Verification of current medications:    [X] Done	[ ] Not done, not eligible, reason:

## 2021-05-15 NOTE — PROVIDER CONTACT NOTE (OTHER) - ACTION/TREATMENT ORDERED:
PCEA con't rate decreased to 3 ml/hr. 500 cc LR bolus given. AM labs drawn per orders. Green surgery team aware. Will retake BP after fluid bolus & closely monitor.
MD Ketan Arzate notified and made aware. Do not give heparin. Will continue to monitor.

## 2021-05-15 NOTE — PROGRESS NOTE ADULT - SUBJECTIVE AND OBJECTIVE BOX
POD #3    24hr events:  - CBC stable s/p 2u pRBC  - Advanced to reg diet    Overnight events:   - No acute events    SUBJECTIVE:  Pain well-controlled. Endorses flatus. Tolerating diet. Denies n/v, dizziness or chest pain.    OBJECTIVE:  Vital Signs Last 24 Hrs  T(C): 36.9 (14 May 2021 21:50), Max: 37.2 (14 May 2021 17:18)  T(F): 98.4 (14 May 2021 21:50), Max: 99 (14 May 2021 17:18)  HR: 82 (14 May 2021 23:39) (78 - 82)  BP: 115/70 (14 May 2021 21:50) (94/62 - 124/73)  BP(mean): --  RR: 18 (14 May 2021 21:50) (16 - 20)  SpO2: 95% (14 May 2021 23:39) (94% - 98%)      05-13-21 @ 07:01  -  05-14-21 @ 07:00  --------------------------------------------------------  IN: 3730 mL / OUT: 3555 mL / NET: 175 mL    05-14-21 @ 07:01  -  05-15-21 @ 00:14  --------------------------------------------------------  IN: 1240 mL / OUT: 1875 mL / NET: -635 mL        Physical Examination:  GEN: NAD, resting quietly  PULM: symmetric chest rise bilaterally, no increased WOB  ABD: soft, appropriately tender, nondistended, no rebound or guarding, incision CDI with prevena to suction, GABRIELLA serosang  EXTR: no LE erythema, moving all extremities      LABS:                        9.0    9.13  )-----------( 190      ( 14 May 2021 14:28 )             28.7       05-14    143  |  104  |  5<L>  ----------------------------<  83  3.7   |  27  |  0.39<L>    Ca    8.6      14 May 2021 07:16  Phos  3.7     05-14  Mg     1.7     05-14           POD #3    24hr events:  - CBC stable s/p 2u pRBC  - Advanced to reg diet    Overnight events:   - No acute events    SUBJECTIVE:  Pain well-controlled. Endorses flatus. Tolerating diet. Denies n/v, dizziness or chest pain.    OBJECTIVE:  Vital Signs Last 24 Hrs  T(C): 36.9 (14 May 2021 21:50), Max: 37.2 (14 May 2021 17:18)  T(F): 98.4 (14 May 2021 21:50), Max: 99 (14 May 2021 17:18)  HR: 82 (14 May 2021 23:39) (78 - 82)  BP: 115/70 (14 May 2021 21:50) (94/62 - 124/73)  BP(mean): --  RR: 18 (14 May 2021 21:50) (16 - 20)  SpO2: 95% (14 May 2021 23:39) (94% - 98%)      I&O's Detail    14 May 2021 07:01  -  15 May 2021 07:00  --------------------------------------------------------  IN:    Lactated Ringers: 1000 mL    Oral Fluid: 480 mL  Total IN: 1480 mL    OUT:    Bulb (mL): 85 mL    Indwelling Catheter - Urethral (mL): 2150 mL  Total OUT: 2235 mL    Total NET: -755 mL          Physical Examination:  GEN: NAD, resting quietly  PULM: symmetric chest rise bilaterally, no increased WOB  ABD: soft, appropriately tender, nondistended, no rebound or guarding, incision CDI with prevena to suction, GABRIELLA serosang  EXTR: no LE erythema, moving all extremities        LABS:                          8.5    9.72  )-----------( 199      ( 15 May 2021 08:23 )             27.9     05-14    143  |  104  |  5<L>  ----------------------------<  83  3.7   |  27  |  0.39<L>    Ca    8.6      14 May 2021 07:16  Phos  3.7     05-14  Mg     1.7     05-14      PT/INR - ( 15 May 2021 07:25 )   PT: 12.4 sec;   INR: 1.04 ratio         PTT - ( 15 May 2021 07:25 )  PTT:30.4 sec

## 2021-05-16 LAB
ANION GAP SERPL CALC-SCNC: 11 MMOL/L — SIGNIFICANT CHANGE UP (ref 5–17)
BUN SERPL-MCNC: 8 MG/DL — SIGNIFICANT CHANGE UP (ref 7–23)
CALCIUM SERPL-MCNC: 8.7 MG/DL — SIGNIFICANT CHANGE UP (ref 8.4–10.5)
CHLORIDE SERPL-SCNC: 104 MMOL/L — SIGNIFICANT CHANGE UP (ref 96–108)
CO2 SERPL-SCNC: 26 MMOL/L — SIGNIFICANT CHANGE UP (ref 22–31)
CREAT SERPL-MCNC: 0.42 MG/DL — LOW (ref 0.5–1.3)
GLUCOSE SERPL-MCNC: 84 MG/DL — SIGNIFICANT CHANGE UP (ref 70–99)
HCT VFR BLD CALC: 26.6 % — LOW (ref 34.5–45)
HGB BLD-MCNC: 8.3 G/DL — LOW (ref 11.5–15.5)
MAGNESIUM SERPL-MCNC: 2 MG/DL — SIGNIFICANT CHANGE UP (ref 1.6–2.6)
MCHC RBC-ENTMCNC: 27.9 PG — SIGNIFICANT CHANGE UP (ref 27–34)
MCHC RBC-ENTMCNC: 31.2 GM/DL — LOW (ref 32–36)
MCV RBC AUTO: 89.6 FL — SIGNIFICANT CHANGE UP (ref 80–100)
NRBC # BLD: 0 /100 WBCS — SIGNIFICANT CHANGE UP (ref 0–0)
PHOSPHATE SERPL-MCNC: 3.4 MG/DL — SIGNIFICANT CHANGE UP (ref 2.5–4.5)
PLATELET # BLD AUTO: 222 K/UL — SIGNIFICANT CHANGE UP (ref 150–400)
POTASSIUM SERPL-MCNC: 3.7 MMOL/L — SIGNIFICANT CHANGE UP (ref 3.5–5.3)
POTASSIUM SERPL-SCNC: 3.7 MMOL/L — SIGNIFICANT CHANGE UP (ref 3.5–5.3)
RBC # BLD: 2.97 M/UL — LOW (ref 3.8–5.2)
RBC # FLD: 13.6 % — SIGNIFICANT CHANGE UP (ref 10.3–14.5)
SODIUM SERPL-SCNC: 141 MMOL/L — SIGNIFICANT CHANGE UP (ref 135–145)
WBC # BLD: 9.52 K/UL — SIGNIFICANT CHANGE UP (ref 3.8–10.5)
WBC # FLD AUTO: 9.52 K/UL — SIGNIFICANT CHANGE UP (ref 3.8–10.5)

## 2021-05-16 RX ADMIN — PANTOPRAZOLE SODIUM 40 MILLIGRAM(S): 20 TABLET, DELAYED RELEASE ORAL at 06:16

## 2021-05-16 RX ADMIN — Medication 975 MILLIGRAM(S): at 06:39

## 2021-05-16 RX ADMIN — Medication 975 MILLIGRAM(S): at 00:43

## 2021-05-16 RX ADMIN — Medication 1.5 MILLIGRAM(S): at 14:14

## 2021-05-16 RX ADMIN — Medication 1 MILLIGRAM(S): at 06:09

## 2021-05-16 RX ADMIN — HEPARIN SODIUM 5000 UNIT(S): 5000 INJECTION INTRAVENOUS; SUBCUTANEOUS at 06:09

## 2021-05-16 RX ADMIN — QUETIAPINE FUMARATE 300 MILLIGRAM(S): 200 TABLET, FILM COATED ORAL at 23:16

## 2021-05-16 RX ADMIN — MONTELUKAST 10 MILLIGRAM(S): 4 TABLET, CHEWABLE ORAL at 23:16

## 2021-05-16 RX ADMIN — Medication 975 MILLIGRAM(S): at 12:57

## 2021-05-16 RX ADMIN — Medication 975 MILLIGRAM(S): at 00:13

## 2021-05-16 RX ADMIN — Medication 975 MILLIGRAM(S): at 06:09

## 2021-05-16 RX ADMIN — Medication 975 MILLIGRAM(S): at 23:15

## 2021-05-16 RX ADMIN — OXYCODONE HYDROCHLORIDE 5 MILLIGRAM(S): 5 TABLET ORAL at 23:16

## 2021-05-16 RX ADMIN — SODIUM CHLORIDE 3 MILLILITER(S): 9 INJECTION INTRAMUSCULAR; INTRAVENOUS; SUBCUTANEOUS at 16:27

## 2021-05-16 RX ADMIN — HEPARIN SODIUM 5000 UNIT(S): 5000 INJECTION INTRAVENOUS; SUBCUTANEOUS at 14:13

## 2021-05-16 RX ADMIN — SODIUM CHLORIDE 3 MILLILITER(S): 9 INJECTION INTRAMUSCULAR; INTRAVENOUS; SUBCUTANEOUS at 06:17

## 2021-05-16 RX ADMIN — OXYCODONE HYDROCHLORIDE 5 MILLIGRAM(S): 5 TABLET ORAL at 00:43

## 2021-05-16 RX ADMIN — Medication 3 MILLILITER(S): at 06:09

## 2021-05-16 RX ADMIN — Medication 975 MILLIGRAM(S): at 13:27

## 2021-05-16 RX ADMIN — LAMOTRIGINE 150 MILLIGRAM(S): 25 TABLET, ORALLY DISINTEGRATING ORAL at 12:57

## 2021-05-16 RX ADMIN — Medication 975 MILLIGRAM(S): at 18:31

## 2021-05-16 RX ADMIN — HEPARIN SODIUM 5000 UNIT(S): 5000 INJECTION INTRAVENOUS; SUBCUTANEOUS at 23:16

## 2021-05-16 RX ADMIN — SERTRALINE 200 MILLIGRAM(S): 25 TABLET, FILM COATED ORAL at 12:57

## 2021-05-16 RX ADMIN — Medication 3 MILLILITER(S): at 00:13

## 2021-05-16 RX ADMIN — CHLORHEXIDINE GLUCONATE 1 APPLICATION(S): 213 SOLUTION TOPICAL at 14:15

## 2021-05-16 RX ADMIN — OXYCODONE HYDROCHLORIDE 5 MILLIGRAM(S): 5 TABLET ORAL at 02:34

## 2021-05-16 RX ADMIN — Medication 3 MILLILITER(S): at 18:31

## 2021-05-16 RX ADMIN — SODIUM CHLORIDE 3 MILLILITER(S): 9 INJECTION INTRAMUSCULAR; INTRAVENOUS; SUBCUTANEOUS at 21:30

## 2021-05-16 RX ADMIN — Medication 3 MILLILITER(S): at 12:56

## 2021-05-16 RX ADMIN — OXYCODONE HYDROCHLORIDE 5 MILLIGRAM(S): 5 TABLET ORAL at 00:13

## 2021-05-16 RX ADMIN — Medication 2.5 MILLIGRAM(S): at 23:16

## 2021-05-16 RX ADMIN — Medication 100 MILLIGRAM(S): at 23:16

## 2021-05-16 RX ADMIN — Medication 3 MILLILITER(S): at 23:16

## 2021-05-16 NOTE — PROGRESS NOTE ADULT - SUBJECTIVE AND OBJECTIVE BOX
Subjective:   Patient seen at bedside this AM. Reports pain control adequate. Was OOB to chair during day yesterday. Tolerating PO without N/V. Has been passing flatus. Denies chest pain, SOB.     24h Events:   - Passed TOV  - PCEA dc'ed  - Overnight, no acute events    Objective:  Vital Signs  T(C): 37 (05-15 @ 20:51), Max: 37.1 (05-15 @ 17:54)  HR: 82 (05-15 @ 23:49) (74 - 87)  BP: 137/79 (05-15 @ 20:51) (104/60 - 137/79)  RR: 20 (05-15 @ 20:51) (18 - 20)  SpO2: 95% (05-15 @ 23:49) (95% - 98%)  05-14-21 @ 07:01  -  05-15-21 @ 07:00  --------------------------------------------------------  IN:  Total IN: 0 mL    OUT:    Bulb (mL): 85 mL    Indwelling Catheter - Urethral (mL): 2150 mL  Total OUT: 2235 mL    Total NET: -2235 mL      05-15-21 @ 07:01  -  05-16-21 @ 00:56  --------------------------------------------------------  IN:  Total IN: 0 mL    OUT:    Bulb (mL): 15 mL    Indwelling Catheter - Urethral (mL): 0 mL    Voided (mL): 850 mL  Total OUT: 865 mL    Total NET: -865 mL        Physical Examination:  GEN: NAD, resting quietly  PULM: symmetric chest rise bilaterally, no increased WOB  ABD: soft, appropriately tender, nondistended, no rebound or guarding, incision CDI with prevena to suction, GABRIELLA with moderate serosang  EXTR: no LE erythema, moving all extremities      Labs:                        8.5    9.72  )-----------( 199      ( 15 May 2021 08:23 )             27.9   05-15    139  |  102  |  8   ----------------------------<  85  3.7   |  27  |  0.41<L>    Ca    8.6      15 May 2021 08:23  Phos  3.6     05-15  Mg     1.7     05-15      CAPILLARY BLOOD GLUCOSE          Medications:   MEDICATIONS  (STANDING):  acetaminophen   Tablet .. 975 milliGRAM(s) Oral every 6 hours  albuterol/ipratropium for Nebulization 3 milliLiter(s) Nebulizer every 6 hours  chlorhexidine 2% Cloths 1 Application(s) Topical once  clonazePAM  Tablet 1.5 milliGRAM(s) Oral <User Schedule>  clonazePAM  Tablet 1 milliGRAM(s) Oral <User Schedule>  clonazePAM  Tablet 2.5 milliGRAM(s) Oral at bedtime  heparin   Injectable 5000 Unit(s) SubCutaneous every 8 hours  lamoTRIgine 150 milliGRAM(s) Oral daily  montelukast 10 milliGRAM(s) Oral at bedtime  pantoprazole    Tablet 40 milliGRAM(s) Oral before breakfast  QUEtiapine 300 milliGRAM(s) Oral at bedtime  sertraline 200 milliGRAM(s) Oral daily  sodium chloride 0.9% lock flush 3 milliLiter(s) IV Push every 8 hours  traZODone 100 milliGRAM(s) Oral at bedtime    MEDICATIONS  (PRN):  oxyCODONE    IR 5 milliGRAM(s) Oral every 4 hours PRN Moderate Pain (4 - 6)   Subjective:   Patient seen at bedside this AM. Reports pain control adequate. Was OOB to chair during day yesterday. Tolerating PO without N/V. Has been passing flatus. Denies chest pain, SOB.     24h Events:   - Passed TOV  - PCEA dc'ed  - Overnight, no acute events    Objective:  Vital Signs  T(C): 37 (05-15 @ 20:51), Max: 37.1 (05-15 @ 17:54)  HR: 82 (05-15 @ 23:49) (74 - 87)  BP: 137/79 (05-15 @ 20:51) (104/60 - 137/79)  RR: 20 (05-15 @ 20:51) (18 - 20)  SpO2: 95% (05-15 @ 23:49) (95% - 98%)  05-14-21 @ 07:01  -  05-15-21 @ 07:00  --------------------------------------------------------  IN:  Total IN: 0 mL    OUT:    Bulb (mL): 85 mL    Indwelling Catheter - Urethral (mL): 2150 mL  Total OUT: 2235 mL    Total NET: -2235 mL      05-15-21 @ 07:01  -  05-16-21 @ 00:56  --------------------------------------------------------  IN:  Total IN: 0 mL    OUT:    Bulb (mL): 15 mL    Indwelling Catheter - Urethral (mL): 0 mL    Voided (mL): 850 mL  Total OUT: 865 mL    Total NET: -865 mL        Physical Examination:  GEN: NAD, resting quietly  PULM: symmetric chest rise bilaterally, no increased WOB  ABD: soft, non tender, nondistended, no rebound or guarding, incision CDI with prevena to suction, GABRIELLA with moderate serosang  EXTR: no LE erythema, moving all extremities      Labs:                        8.5    9.72  )-----------( 199      ( 15 May 2021 08:23 )             27.9   05-15    139  |  102  |  8   ----------------------------<  85  3.7   |  27  |  0.41<L>    Ca    8.6      15 May 2021 08:23  Phos  3.6     05-15  Mg     1.7     05-15      CAPILLARY BLOOD GLUCOSE          Medications:   MEDICATIONS  (STANDING):  acetaminophen   Tablet .. 975 milliGRAM(s) Oral every 6 hours  albuterol/ipratropium for Nebulization 3 milliLiter(s) Nebulizer every 6 hours  chlorhexidine 2% Cloths 1 Application(s) Topical once  clonazePAM  Tablet 1.5 milliGRAM(s) Oral <User Schedule>  clonazePAM  Tablet 1 milliGRAM(s) Oral <User Schedule>  clonazePAM  Tablet 2.5 milliGRAM(s) Oral at bedtime  heparin   Injectable 5000 Unit(s) SubCutaneous every 8 hours  lamoTRIgine 150 milliGRAM(s) Oral daily  montelukast 10 milliGRAM(s) Oral at bedtime  pantoprazole    Tablet 40 milliGRAM(s) Oral before breakfast  QUEtiapine 300 milliGRAM(s) Oral at bedtime  sertraline 200 milliGRAM(s) Oral daily  sodium chloride 0.9% lock flush 3 milliLiter(s) IV Push every 8 hours  traZODone 100 milliGRAM(s) Oral at bedtime    MEDICATIONS  (PRN):  oxyCODONE    IR 5 milliGRAM(s) Oral every 4 hours PRN Moderate Pain (4 - 6)

## 2021-05-16 NOTE — PROGRESS NOTE ADULT - ASSESSMENT
66yo F with h/o anxiety, depression, OA, chronic pain, sleep apnea on CPAP, psoriatic arthritis, HTN , anemia, mood disorder, history of falls, COVID19 infection (8/29/2020) c/b hospitalization, PE treated with Eliquis x 3 months now POD3 s/p ventral hernia repair with mesh. Patient with hypotension and H/H drop, however no active extravasation on CT angio and appropriate response to transfusion, currently stable with improvement in BPs.    Plan:  - CT angio negative for active extravasation, h/h stable  - Tylenol q6, oxy 4 PRN  - Reg diet  - DVT ppx  - OOB and ambulating as tolerated  - F/u AM labs  - PT eval: home PT  - JANE on home CPAP    Green Team Surgery  [id number]. 66yo F with h/o anxiety, depression, OA, chronic pain, sleep apnea on CPAP, psoriatic arthritis, HTN , anemia, mood disorder, history of falls, COVID19 infection (8/29/2020) c/b hospitalization, PE treated with Eliquis x 3 months now POD4 s/p ventral hernia repair with mesh. Patient with hypotension and H/H drop, however no active extravasation on CT angio and appropriate response to transfusion, currently stable with improvement in BPs.    Plan:  - CT angio negative for active extravasation, h/h stable  - Tylenol q6, oxy 4 PRN  - Reg diet  - DVT ppx  - OOB and ambulating as tolerated  - F/u AM labs  - PT eval: home PT  - JANE on home CPAP    Green Team Surgery  [id number].

## 2021-05-17 ENCOUNTER — TRANSCRIPTION ENCOUNTER (OUTPATIENT)
Age: 66
End: 2021-05-17

## 2021-05-17 VITALS
TEMPERATURE: 100 F | DIASTOLIC BLOOD PRESSURE: 91 MMHG | RESPIRATION RATE: 18 BRPM | OXYGEN SATURATION: 96 % | SYSTOLIC BLOOD PRESSURE: 137 MMHG | HEART RATE: 92 BPM

## 2021-05-17 LAB
ANION GAP SERPL CALC-SCNC: 12 MMOL/L — SIGNIFICANT CHANGE UP (ref 5–17)
BUN SERPL-MCNC: 8 MG/DL — SIGNIFICANT CHANGE UP (ref 7–23)
CALCIUM SERPL-MCNC: 8.9 MG/DL — SIGNIFICANT CHANGE UP (ref 8.4–10.5)
CHLORIDE SERPL-SCNC: 103 MMOL/L — SIGNIFICANT CHANGE UP (ref 96–108)
CO2 SERPL-SCNC: 26 MMOL/L — SIGNIFICANT CHANGE UP (ref 22–31)
CREAT SERPL-MCNC: 0.37 MG/DL — LOW (ref 0.5–1.3)
GLUCOSE SERPL-MCNC: 81 MG/DL — SIGNIFICANT CHANGE UP (ref 70–99)
HCT VFR BLD CALC: 25.9 % — LOW (ref 34.5–45)
HGB BLD-MCNC: 8 G/DL — LOW (ref 11.5–15.5)
MAGNESIUM SERPL-MCNC: 1.9 MG/DL — SIGNIFICANT CHANGE UP (ref 1.6–2.6)
MCHC RBC-ENTMCNC: 27.8 PG — SIGNIFICANT CHANGE UP (ref 27–34)
MCHC RBC-ENTMCNC: 30.9 GM/DL — LOW (ref 32–36)
MCV RBC AUTO: 89.9 FL — SIGNIFICANT CHANGE UP (ref 80–100)
NRBC # BLD: 0 /100 WBCS — SIGNIFICANT CHANGE UP (ref 0–0)
PHOSPHATE SERPL-MCNC: 3.3 MG/DL — SIGNIFICANT CHANGE UP (ref 2.5–4.5)
PLATELET # BLD AUTO: 251 K/UL — SIGNIFICANT CHANGE UP (ref 150–400)
POTASSIUM SERPL-MCNC: 3.4 MMOL/L — LOW (ref 3.5–5.3)
POTASSIUM SERPL-SCNC: 3.4 MMOL/L — LOW (ref 3.5–5.3)
RBC # BLD: 2.88 M/UL — LOW (ref 3.8–5.2)
RBC # FLD: 13.6 % — SIGNIFICANT CHANGE UP (ref 10.3–14.5)
SODIUM SERPL-SCNC: 141 MMOL/L — SIGNIFICANT CHANGE UP (ref 135–145)
WBC # BLD: 8.72 K/UL — SIGNIFICANT CHANGE UP (ref 3.8–10.5)
WBC # FLD AUTO: 8.72 K/UL — SIGNIFICANT CHANGE UP (ref 3.8–10.5)

## 2021-05-17 PROCEDURE — 86923 COMPATIBILITY TEST ELECTRIC: CPT

## 2021-05-17 PROCEDURE — 86900 BLOOD TYPING SEROLOGIC ABO: CPT

## 2021-05-17 PROCEDURE — 84100 ASSAY OF PHOSPHORUS: CPT

## 2021-05-17 PROCEDURE — 97110 THERAPEUTIC EXERCISES: CPT

## 2021-05-17 PROCEDURE — 85027 COMPLETE CBC AUTOMATED: CPT

## 2021-05-17 PROCEDURE — 36430 TRANSFUSION BLD/BLD COMPNT: CPT

## 2021-05-17 PROCEDURE — C1781: CPT

## 2021-05-17 PROCEDURE — 97116 GAIT TRAINING THERAPY: CPT

## 2021-05-17 PROCEDURE — 86901 BLOOD TYPING SEROLOGIC RH(D): CPT

## 2021-05-17 PROCEDURE — 85610 PROTHROMBIN TIME: CPT

## 2021-05-17 PROCEDURE — C9399: CPT

## 2021-05-17 PROCEDURE — 94640 AIRWAY INHALATION TREATMENT: CPT

## 2021-05-17 PROCEDURE — P9016: CPT

## 2021-05-17 PROCEDURE — U0003: CPT

## 2021-05-17 PROCEDURE — 83735 ASSAY OF MAGNESIUM: CPT

## 2021-05-17 PROCEDURE — 80048 BASIC METABOLIC PNL TOTAL CA: CPT

## 2021-05-17 PROCEDURE — U0005: CPT

## 2021-05-17 PROCEDURE — C9803: CPT

## 2021-05-17 PROCEDURE — 86850 RBC ANTIBODY SCREEN: CPT

## 2021-05-17 PROCEDURE — 85730 THROMBOPLASTIN TIME PARTIAL: CPT

## 2021-05-17 PROCEDURE — 71045 X-RAY EXAM CHEST 1 VIEW: CPT

## 2021-05-17 PROCEDURE — 88304 TISSUE EXAM BY PATHOLOGIST: CPT

## 2021-05-17 PROCEDURE — 97161 PT EVAL LOW COMPLEX 20 MIN: CPT

## 2021-05-17 PROCEDURE — 74177 CT ABD & PELVIS W/CONTRAST: CPT

## 2021-05-17 RX ORDER — OXYCODONE HYDROCHLORIDE 5 MG/1
1 TABLET ORAL
Qty: 0 | Refills: 0 | DISCHARGE

## 2021-05-17 RX ORDER — LAMOTRIGINE 25 MG/1
1 TABLET, ORALLY DISINTEGRATING ORAL
Qty: 0 | Refills: 0 | DISCHARGE
Start: 2021-05-17

## 2021-05-17 RX ORDER — OXYCODONE HYDROCHLORIDE 5 MG/1
1 TABLET ORAL
Qty: 0 | Refills: 0 | DISCHARGE
Start: 2021-05-17

## 2021-05-17 RX ORDER — LAMOTRIGINE 25 MG/1
1 TABLET, ORALLY DISINTEGRATING ORAL
Qty: 0 | Refills: 0 | DISCHARGE

## 2021-05-17 RX ORDER — ACETAMINOPHEN 500 MG
3 TABLET ORAL
Qty: 0 | Refills: 0 | DISCHARGE
Start: 2021-05-17

## 2021-05-17 RX ORDER — MONTELUKAST 4 MG/1
1 TABLET, CHEWABLE ORAL
Qty: 0 | Refills: 0 | DISCHARGE

## 2021-05-17 RX ORDER — MONTELUKAST 4 MG/1
1 TABLET, CHEWABLE ORAL
Qty: 0 | Refills: 0 | DISCHARGE
Start: 2021-05-17

## 2021-05-17 RX ORDER — POTASSIUM CHLORIDE 20 MEQ
20 PACKET (EA) ORAL
Refills: 0 | Status: DISCONTINUED | OUTPATIENT
Start: 2021-05-17 | End: 2021-05-17

## 2021-05-17 RX ORDER — LAMOTRIGINE 25 MG/1
1 TABLET, ORALLY DISINTEGRATING ORAL
Refills: 0 | DISCHARGE
Start: 2021-05-17

## 2021-05-17 RX ADMIN — Medication 975 MILLIGRAM(S): at 00:00

## 2021-05-17 RX ADMIN — Medication 3 MILLILITER(S): at 14:24

## 2021-05-17 RX ADMIN — Medication 3 MILLILITER(S): at 06:06

## 2021-05-17 RX ADMIN — Medication 975 MILLIGRAM(S): at 14:25

## 2021-05-17 RX ADMIN — HEPARIN SODIUM 5000 UNIT(S): 5000 INJECTION INTRAVENOUS; SUBCUTANEOUS at 06:10

## 2021-05-17 RX ADMIN — Medication 975 MILLIGRAM(S): at 06:06

## 2021-05-17 RX ADMIN — Medication 975 MILLIGRAM(S): at 06:12

## 2021-05-17 RX ADMIN — Medication 1.5 MILLIGRAM(S): at 14:26

## 2021-05-17 RX ADMIN — HEPARIN SODIUM 5000 UNIT(S): 5000 INJECTION INTRAVENOUS; SUBCUTANEOUS at 14:46

## 2021-05-17 RX ADMIN — LAMOTRIGINE 150 MILLIGRAM(S): 25 TABLET, ORALLY DISINTEGRATING ORAL at 14:26

## 2021-05-17 RX ADMIN — PANTOPRAZOLE SODIUM 40 MILLIGRAM(S): 20 TABLET, DELAYED RELEASE ORAL at 06:05

## 2021-05-17 RX ADMIN — Medication 20 MILLIEQUIVALENT(S): at 16:45

## 2021-05-17 RX ADMIN — Medication 1 MILLIGRAM(S): at 06:06

## 2021-05-17 RX ADMIN — SODIUM CHLORIDE 3 MILLILITER(S): 9 INJECTION INTRAMUSCULAR; INTRAVENOUS; SUBCUTANEOUS at 05:10

## 2021-05-17 RX ADMIN — Medication 20 MILLIEQUIVALENT(S): at 15:31

## 2021-05-17 RX ADMIN — SODIUM CHLORIDE 3 MILLILITER(S): 9 INJECTION INTRAMUSCULAR; INTRAVENOUS; SUBCUTANEOUS at 14:18

## 2021-05-17 NOTE — DISCHARGE NOTE NURSING/CASE MANAGEMENT/SOCIAL WORK - NSSCTYPOFSERV_GEN_ALL_CORE
Scheduled  for start of care   5/18/21   Skilled Rn eval,  reinforcement of  GABRIELLA drain care,  P/T eval,  HHA eval.  Agency will contact you to set up time of  visit.  IF you  have any questions to same  feel  free to contact agency.

## 2021-05-17 NOTE — DISCHARGE NOTE PROVIDER - NSDCMRMEDTOKEN_GEN_ALL_CORE_FT
acetaminophen 325 mg oral tablet: 3 tab(s) orally every 6 hours  aspirin 81 mg oral tablet: 1 tab(s) orally once a day  B-Complex 50 oral tablet: 1 tab(s) orally once a day  biotin 1000 mcg oral tablet: 1 tab(s) orally once a day  Calcium 600+D oral tablet: 2 tab(s) orally once a day  Centrum Silver oral tablet: 1 tab(s) orally once a day  cyanocobalamin 1000 mcg oral tablet: 1 tab(s) orally once a day  Ferocon oral capsule: 1 cap(s) orally once a day (at bedtime)  Flexeril 10 mg oral tablet: 1 tab(s) orally once a day, As Needed  fluconazole 200 mg oral tablet: 1 tab(s) orally once a day  recurring batool thrush prophylaxis  follwed by ID  KlonoPIN 1 mg oral tablet: 2.5 tab(s) orally once a day (at bedtime)  KlonoPIN 1 mg oral tablet: 1.5 milligram(s) orally once a day  KlonoPIN 1 mg oral tablet: 1 tab(s) orally once a day  lamoTRIgine 150 mg oral tablet: 1 tab(s) orally once a day  montelukast 10 mg oral tablet: 1 tab(s) orally once a day (at bedtime)  Myrbetriq 25 mg oral tablet, extended release: 1 tab(s) orally once a day  Neurontin 400 mg oral capsule: 1 cap(s) orally 2 times a day, As Needed  Neurontin 600 mg oral tablet: 1 tab(s) orally once a day (at bedtime)  Omega-3 1000 mg oral capsule: 1 cap(s) orally once a day  Otezla 30 mg oral tablet: 1 tab(s) orally 2 times a day  oxyCODONE 5 mg oral tablet: 1 tab(s) orally every 4 hours, As needed, Moderate Pain (4 - 6)  pantoprazole 40 mg oral delayed release tablet: 1 tab(s) orally once a day  probiotic: 1 dose(s) orally once a day (at bedtime)  SEROquel  mg oral tablet, extended release: 1 tab(s) orally once a day (in the evening)  traZODone 100 mg oral tablet: 1 tab(s) orally once a day (at bedtime)  Trintellix 10 mg oral tablet: 1 tab(s) orally once a day  Vasotec 5 mg oral tablet: 1 tab(s) orally once a day  VESIcare 10 mg oral tablet: 1 tab(s) orally once a day (at bedtime)  Vitamin C 1000 mg oral tablet: 1 tab(s) orally once a day  Vyvanse 60 mg oral capsule: 1 cap(s) orally once a day (in the morning)  Zoloft 100 mg oral tablet: 2 tab(s) orally once a day   acetaminophen 325 mg oral tablet: 3 tab(s) orally every 6 hours  aspirin 81 mg oral tablet: 1 tab(s) orally once a day  B-Complex 50 oral tablet: 1 tab(s) orally once a day  biotin 1000 mcg oral tablet: 1 tab(s) orally once a day  Calcium 600+D oral tablet: 2 tab(s) orally once a day  Centrum Silver oral tablet: 1 tab(s) orally once a day  cyanocobalamin 1000 mcg oral tablet: 1 tab(s) orally once a day  Ferocon oral capsule: 1 cap(s) orally once a day (at bedtime)  Flexeril 10 mg oral tablet: 1 tab(s) orally once a day, As Needed  fluconazole 200 mg oral tablet: 1 tab(s) orally once a day  recurring batool thrush prophylaxis  follwed by ID  KlonoPIN 1 mg oral tablet: 2.5 tab(s) orally once a day (at bedtime)  KlonoPIN 1 mg oral tablet: 1.5 milligram(s) orally once a day  KlonoPIN 1 mg oral tablet: 1 tab(s) orally once a day  lamoTRIgine 150 mg oral tablet: 1 tab(s) orally once a day  montelukast 10 mg oral tablet: 1 tab(s) orally once a day (at bedtime)  Myrbetriq 25 mg oral tablet, extended release: 1 tab(s) orally once a day  Neurontin 400 mg oral capsule: 1 cap(s) orally 2 times a day, As Needed  Neurontin 600 mg oral tablet: 1 tab(s) orally once a day (at bedtime)  Omega-3 1000 mg oral capsule: 1 cap(s) orally once a day  Otezla 30 mg oral tablet: 1 tab(s) orally 2 times a day  oxyCODONE 5 mg oral tablet: 1 tab(s) orally every 4 hours, As needed, Moderate Pain (4 - 6)  pantoprazole 40 mg oral delayed release tablet: 1 tab(s) orally once a day  probiotic: 1 dose(s) orally once a day (at bedtime)  SEROquel  mg oral tablet, extended release: 1 tab(s) orally once a day (in the evening)  Transfer bench :   traZODone 100 mg oral tablet: 1 tab(s) orally once a day (at bedtime)  Trintellix 10 mg oral tablet: 1 tab(s) orally once a day  Vasotec 5 mg oral tablet: 1 tab(s) orally once a day  VESIcare 10 mg oral tablet: 1 tab(s) orally once a day (at bedtime)  Vitamin C 1000 mg oral tablet: 1 tab(s) orally once a day  Vyvanse 60 mg oral capsule: 1 cap(s) orally once a day (in the morning)  Zoloft 100 mg oral tablet: 2 tab(s) orally once a day

## 2021-05-17 NOTE — PROGRESS NOTE ADULT - NSICDXPILOT_GEN_ALL_CORE
[place]

## 2021-05-17 NOTE — PROGRESS NOTE ADULT - PROBLEM SELECTOR PLAN 3
s/p ventral hernia repair 5/12  -Encourage splinting of abdominal incision with coughs and deep breaths  -Incentive spirometer  -DVT prophylaxis once cleared from surgery perspective.

## 2021-05-17 NOTE — DISCHARGE NOTE PROVIDER - HOSPITAL COURSE
64 yo lady with PMH of Anxiety, depression, OA,  chronic pain, sleep apnea on CPAP, psoriatic arthritis, HTN , anemia, mood disorder, history of falls, COVID19 infection (8/29/2020) required hospitalization c/w PE treated with Eliquis x 3 months  followed by pulmonary , s/p recent  X ray and VQ scan. Presents for scheduled Incisional Hernia repair with component Separation on 5/12/21.    On 5/12 patient underwent Patient is s/p ventral hernia repair with mesh. GABRIELLA placed. The patient tolerated the procedure well without complications, was extubated, and transferred to the PACU in stable condition. Post operative course complicated by - Hypotensive to high 80s with downtrending H&H. CT angio negative for active extravasation. Patient received PRBCs. Patient stabilized hemodynamically. On day of discharge, the patient was tolerating diet, ambulating well and pain controlled. patient to follow up with Dr. Diaz in 10-14 days.

## 2021-05-17 NOTE — PROGRESS NOTE ADULT - ASSESSMENT
64yo F with h/o anxiety, depression, OA, chronic pain, sleep apnea on CPAP, psoriatic arthritis, HTN , anemia, mood disorder, history of falls, COVID19 infection (8/29/2020) c/b hospitalization, PE treated with Eliquis x 3 months now POD5 s/p ventral hernia repair with mesh. Patient with hypotension and H/H drop, however no active extravasation on CT angio and appropriate response to transfusion, currently stable with improvement in BPs.    Plan:  - CT angio negative for active extravasation, h/h stable  - PO pain meds  - Reg diet  - DVT ppx  - OOB and ambulating as tolerated  - F/u AM labs  - PT eval: home PT  - JANE on home CPAP  - Discharge today    Green Team Surgery  [id number].   64yo F with h/o anxiety, depression, OA, chronic pain, sleep apnea on CPAP, psoriatic arthritis, HTN , anemia, mood disorder, history of falls, COVID19 infection (8/29/2020) c/b hospitalization, PE treated with Eliquis x 3 months now POD5 s/p ventral hernia repair with mesh. Patient with hypotension and H/H drop, however no active extravasation on CT angio and appropriate response to transfusion, currently stable with improvement in BPs.    Plan:  - CT angio negative for active extravasation, h/h stable  - PO pain meds  - Reg diet  - Wound vac removed on rounds, irritation and pinpoint bleeding at site of wound vac tape  - DVT ppx  - OOB and ambulating as tolerated  - F/u AM labs  - PT eval: home PT  - JANE on home CPAP  - Discharge today    Green Team Surgery  [id number].

## 2021-05-17 NOTE — PROGRESS NOTE ADULT - ASSESSMENT
66 y/o F with PMH of JANE on CPAP, anxiety, depression, OA, chronic pain, psoriatic arthritis, HTN , anemia, mood disorder, history of falls, COVID19 infection (8/29/2020) required hospitalization c/w PE treated with Eliquis x 3 months. Reports she underwent VQ scan outpatient at [facility] for which results were normal with no PE. Presented to [facility] 5/12 for scheduled incisional hernia repair with component separation. Post op course complicated by anemia and hypotension, s/p 2 units PRBCs. CT abdomen 5/13 read as negative for extravasation of surgical incision. Sees Dr. Calvillo outpatient for JANE management. Seen today breathing comfortably on 2L N/C but noted to have congested cough with difficulty expectorating secretions.

## 2021-05-17 NOTE — PROGRESS NOTE ADULT - PROVIDER SPECIALTY LIST ADULT
Anesthesia
Surgery
Anesthesia
Surgery
Pulmonology

## 2021-05-17 NOTE — PROGRESS NOTE ADULT - ATTENDING COMMENTS
See above
I saw and examined the patient, and reviewed  the history and data and intraoperative findings with the patient and staff  Agree with note which was also reviewed and edited where appropriate.  D/W patient, RN, residents and Fellow  Doing well POD#1 s/p complex incisional hernia repair.  needs OOB, cardoza removal and pain control.  check labs
doing well  d/c epidural, oob
Hct stable now after ventral hernia repair  Feels better, minimal pain  Ambulating well    Incision is clean and dry    Encourage ambulation  PT  Discharge planning likely tomorrow    Kyrie Matthews MD
fu in office 2 weeks

## 2021-05-17 NOTE — PROGRESS NOTE ADULT - SUBJECTIVE AND OBJECTIVE BOX
Follow-up Pulm Progress Note    Alert, OOB to chair   Sats 97% RA     Medications:  MEDICATIONS  (STANDING):  acetaminophen   Tablet .. 975 milliGRAM(s) Oral every 6 hours  albuterol/ipratropium for Nebulization 3 milliLiter(s) Nebulizer every 6 hours  clonazePAM  Tablet 1.5 milliGRAM(s) Oral <User Schedule>  clonazePAM  Tablet 1 milliGRAM(s) Oral <User Schedule>  clonazePAM  Tablet 2.5 milliGRAM(s) Oral at bedtime  heparin   Injectable 5000 Unit(s) SubCutaneous every 8 hours  lamoTRIgine 150 milliGRAM(s) Oral daily  montelukast 10 milliGRAM(s) Oral at bedtime  pantoprazole    Tablet 40 milliGRAM(s) Oral before breakfast  potassium chloride    Tablet ER 20 milliEquivalent(s) Oral every 2 hours  QUEtiapine 300 milliGRAM(s) Oral at bedtime  sertraline 200 milliGRAM(s) Oral daily  sodium chloride 0.9% lock flush 3 milliLiter(s) IV Push every 8 hours  traZODone 100 milliGRAM(s) Oral at bedtime    MEDICATIONS  (PRN):  oxyCODONE    IR 5 milliGRAM(s) Oral every 4 hours PRN Moderate Pain (4 - 6)          Vital Signs Last 24 Hrs  T(C): 37.4 (17 May 2021 13:57), Max: 37.4 (17 May 2021 13:57)  T(F): 99.4 (17 May 2021 13:57), Max: 99.4 (17 May 2021 13:57)  HR: 87 (17 May 2021 13:57) (72 - 88)  BP: 130/84 (17 May 2021 13:57) (111/66 - 144/79)  BP(mean): --  RR: 19 (17 May 2021 13:57) (18 - 19)  SpO2: 95% (17 May 2021 13:57) (76% - 98%)          05-16 @ 07:01  -  05-17 @ 07:00  --------------------------------------------------------  IN: 1020 mL / OUT: 1620 mL / NET: -600 mL          LABS:                        8.0    8.72  )-----------( 251      ( 17 May 2021 06:58 )             25.9     05-17    141  |  103  |  8   ----------------------------<  81  3.4<L>   |  26  |  0.37<L>    Ca    8.9      17 May 2021 07:02  Phos  3.3     05-17  Mg     1.9     05-17          Physical Examination:  PULM: Clear to auscultation bilaterally, no significant sputum production  CVS: S1, S2 heard    RADIOLOGY REVIEWED  CXR: grossly clear

## 2021-05-17 NOTE — PROGRESS NOTE ADULT - PROBLEM SELECTOR PLAN 2
-pt reports inconsistent use of CPAP at home  -home equipment approved for in hospital use, c/w cpap qhs & prn.

## 2021-05-17 NOTE — DISCHARGE NOTE NURSING/CASE MANAGEMENT/SOCIAL WORK - PATIENT PORTAL LINK FT
You can access the FollowMyHealth Patient Portal offered by [facility] by registering at the following website: [email or web address]. By joining [facility]’s FollowMyHealth portal, you will also be able to view your health information using other applications (apps) compatible with our system.

## 2021-05-17 NOTE — PROGRESS NOTE ADULT - SUBJECTIVE AND OBJECTIVE BOX
POD #5    Overnight events:   - No acute events    SUBJECTIVE:  No acute complaints. Pain well-controlled.     OBJECTIVE:  Vital Signs Last 24 Hrs  T(C): 36.7 (16 May 2021 21:01), Max: 37.1 (16 May 2021 13:45)  T(F): 98.1 (16 May 2021 21:01), Max: 98.7 (16 May 2021 13:45)  HR: 88 (16 May 2021 23:16) (72 - 99)  BP: 144/79 (16 May 2021 21:01) (111/75 - 144/79)  BP(mean): --  RR: 18 (16 May 2021 21:01) (18 - 20)  SpO2: 93% (16 May 2021 23:16) (76% - 96%)      05-15-21 @ 07:01  -  05-16-21 @ 07:00  --------------------------------------------------------  IN: 710 mL / OUT: 1670 mL / NET: -960 mL    05-16-21 @ 07:01  -  05-17-21 @ 00:28  --------------------------------------------------------  IN: 1020 mL / OUT: 1620 mL / NET: -600 mL        Physical Examination:  GEN: NAD, resting quietly  PULM: symmetric chest rise bilaterally, no increased WOB  ABD: soft, appropriately tender, nondistended, no rebound or guarding, incision CDI  EXTR: no LE erythema, moving all extremities      LABS:                        8.3    9.52  )-----------( 222      ( 16 May 2021 07:03 )             26.6       05-16    141  |  104  |  8   ----------------------------<  84  3.7   |  26  |  0.42<L>    Ca    8.7      16 May 2021 07:03  Phos  3.4     05-16  Mg     2.0     05-16           POD #5    Overnight events:   - No acute events    SUBJECTIVE:  No acute complaints. Pain well-controlled.     OBJECTIVE:  Vital Signs Last 24 Hrs  T(C): 36.7 (16 May 2021 21:01), Max: 37.1 (16 May 2021 13:45)  T(F): 98.1 (16 May 2021 21:01), Max: 98.7 (16 May 2021 13:45)  HR: 88 (16 May 2021 23:16) (72 - 99)  BP: 144/79 (16 May 2021 21:01) (111/75 - 144/79)  BP(mean): --  RR: 18 (16 May 2021 21:01) (18 - 20)  SpO2: 93% (16 May 2021 23:16) (76% - 96%)      05-15-21 @ 07:01  -  05-16-21 @ 07:00  --------------------------------------------------------  IN: 710 mL / OUT: 1670 mL / NET: -960 mL    05-16-21 @ 07:01  -  05-17-21 @ 00:28  --------------------------------------------------------  IN: 1020 mL / OUT: 1620 mL / NET: -600 mL        Physical Examination:  GEN: NAD, resting quietly  PULM: symmetric chest rise bilaterally, no increased WOB  ABD: soft, appropriately tender, nondistended, incision CDI, skin irritation with pinpoint bleeding   EXTR: no LE erythema, moving all extremities      LABS:                        8.3    9.52  )-----------( 222      ( 16 May 2021 07:03 )             26.6       05-16    141  |  104  |  8   ----------------------------<  84  3.7   |  26  |  0.42<L>    Ca    8.7      16 May 2021 07:03  Phos  3.4     05-16  Mg     2.0     05-16

## 2021-05-17 NOTE — PROGRESS NOTE ADULT - PROBLEM SELECTOR PLAN 1
mild SOB - suspect 2nd to retained secretions as she does have a significant congested cough at this time  - CXR grossly clear  - Duonebs q6h, can d/c on discharge   - OOB /ambulate as tolerated  - Incentive spirometer use encouraged  - Hypoxia now resolved, SOB resolved   - D/c planning per primary team, F/u with Dr. Calvillo in 2-3 weeks. D/w pt.

## 2021-05-17 NOTE — DISCHARGE NOTE PROVIDER - NSDCCPCAREPLAN_GEN_ALL_CORE_FT
PRINCIPAL DISCHARGE DIAGNOSIS  Diagnosis: Incisional hernia without obstruction or gangrene  Assessment and Plan of Treatment: WOUND CARE: Staples will be removed at follow up office visit. You have a GABRIELLA drain. monitor and record outputs.    BATHING: Please do not submerge wound underwater. You may shower and/or sponge bathe.  ACTIVITY: No heavy lifting anything more than 10-15lbs or straining. Otherwise, you may return to your usual level of physical activity. If you are taking narcotic pain medication (such as Percocet), do NOT drive a car, operate machinery or make important decisions.  DIET: Low fiber diet  NOTIFY YOUR SURGEON IF: You have any bleeding that does not stop, any pus draining from your wound, any fever (over 100.4 F) or chills, persistent nausea/vomiting with inability to tolerate food or liquids, persistent diarrhea, or if your pain is not controlled on your discharge pain medications.  FOLLOW-UP:  1. Please call to make a follow-up appointment within one week of discharge   2. Please follow up with your primary care physician in one week regarding your hospitalization.        SECONDARY DISCHARGE DIAGNOSES  Diagnosis: Anemia  Assessment and Plan of Treatment: you got blood transfusions over course of admission

## 2021-05-18 ENCOUNTER — NON-APPOINTMENT (OUTPATIENT)
Age: 66
End: 2021-05-18

## 2021-05-19 ENCOUNTER — NON-APPOINTMENT (OUTPATIENT)
Age: 66
End: 2021-05-19

## 2021-05-20 ENCOUNTER — NON-APPOINTMENT (OUTPATIENT)
Age: 66
End: 2021-05-20

## 2021-05-25 ENCOUNTER — OUTPATIENT (OUTPATIENT)
Dept: OUTPATIENT SERVICES | Facility: HOSPITAL | Age: 66
LOS: 1 days | Discharge: HOME | End: 2021-05-25

## 2021-05-25 DIAGNOSIS — D56.4 HEREDITARY PERSISTENCE OF FETAL HEMOGLOBIN [HPFH]: ICD-10-CM

## 2021-05-27 ENCOUNTER — APPOINTMENT (OUTPATIENT)
Dept: SURGERY | Facility: CLINIC | Age: 66
End: 2021-05-27
Payer: MEDICARE

## 2021-05-27 ENCOUNTER — APPOINTMENT (OUTPATIENT)
Dept: INFUSION THERAPY | Facility: HOSPITAL | Age: 66
End: 2021-05-27

## 2021-05-27 VITALS
BODY MASS INDEX: 42.69 KG/M2 | SYSTOLIC BLOOD PRESSURE: 130 MMHG | DIASTOLIC BLOOD PRESSURE: 81 MMHG | HEIGHT: 62 IN | WEIGHT: 232 LBS | OXYGEN SATURATION: 95 % | HEART RATE: 75 BPM | TEMPERATURE: 98 F

## 2021-05-27 PROCEDURE — 99024 POSTOP FOLLOW-UP VISIT: CPT

## 2021-05-27 NOTE — REVIEW OF SYSTEMS
[Constipation] : constipation [Negative] : Heme/Lymph [Abdominal Pain] : no abdominal pain [Vomiting] : no vomiting [Diarrhea] : no diarrhea [Heartburn] : no heartburn [Melena] : no melena [FreeTextEntry7] : + left sided abdominal pain

## 2021-05-27 NOTE — HISTORY OF PRESENT ILLNESS
[de-identified] : Ms. SHANTEL CRUZ is a 65 year-old woman who presented with incisional hernia s/p incisional hernia repair with implantation of mesh, posterior component separation bilateral myofascial flap advancement with intact neurovascular bundle, bilateral skin flaps > than 100cm; implantation of Ovitex mesh bridge placement of wound vacuum assisted closure and scar excision on 5/12/2021, who comes in today for a post op visit. Having some left sided abdominal pain. Denies fever, chills, nausea/vomiting, chest pain. Reports constipation. GABRIELLA drain in place.  No drainage from wound or fevers noted\par

## 2021-05-27 NOTE — PLAN
[FreeTextEntry1] : Abdominal support\par Continue to record drainage\par Return for drain removal when less than 30 cc x 48 hours\par Follow-up visit 1 month

## 2021-05-27 NOTE — PHYSICAL EXAM
[Normal Thyroid] : the thyroid was normal [Normal Breath Sounds] : Normal breath sounds [Normal Heart Sounds] : normal heart sounds [Normal Rate and Rhythm] : normal rate and rhythm [No Rash or Lesion] : No rash or lesion [Alert] : alert [Oriented to Person] : oriented to person [Oriented to Place] : oriented to place [Oriented to Time] : oriented to time [Calm] : calm [JVD] : no jugular venous distention  [Abdominal Masses] : No abdominal masses [Abdomen Tenderness] : ~T ~M No abdominal tenderness [de-identified] : NAD  [de-identified] : anicteric mucous membranes moist  [de-identified] : abdomen soft midline incision, clean and dry; GABRIELLA drain in place no redness swelling or drainage.  Clips removed [de-identified] : no CVAT [de-identified] : grossly intact [de-identified] : + bruising to left of midline of abdomen

## 2021-05-27 NOTE — ASSESSMENT
[FreeTextEntry1] : Status post incisional hernia repair with component separation and mesh with postoperative blood loss anemia doing well

## 2021-05-29 ENCOUNTER — APPOINTMENT (OUTPATIENT)
Dept: INFUSION THERAPY | Facility: HOSPITAL | Age: 66
End: 2021-05-29

## 2021-06-01 ENCOUNTER — APPOINTMENT (OUTPATIENT)
Dept: SURGERY | Facility: CLINIC | Age: 66
End: 2021-06-01
Payer: MEDICARE

## 2021-06-01 VITALS
HEART RATE: 83 BPM | TEMPERATURE: 97.6 F | DIASTOLIC BLOOD PRESSURE: 75 MMHG | SYSTOLIC BLOOD PRESSURE: 123 MMHG | OXYGEN SATURATION: 95 % | RESPIRATION RATE: 16 BRPM

## 2021-06-01 PROCEDURE — 99024 POSTOP FOLLOW-UP VISIT: CPT

## 2021-06-01 RX ORDER — SODIUM SULFATE, POTASSIUM SULFATE, MAGNESIUM SULFATE 17.5; 3.13; 1.6 G/ML; G/ML; G/ML
17.5-3.13-1.6 SOLUTION, CONCENTRATE ORAL
Qty: 1 | Refills: 0 | Status: DISCONTINUED | COMMUNITY
Start: 2020-07-30 | End: 2021-06-01

## 2021-06-01 RX ORDER — OXYCODONE HYDROCHLORIDE 15 MG/1
15 TABLET ORAL
Refills: 0 | Status: DISCONTINUED | COMMUNITY
End: 2021-06-01

## 2021-06-01 NOTE — ASSESSMENT
[FreeTextEntry1] : Wound care was discussed with the patient.  I have told her that I remove the drain a little sooner than I would have liked but as the white part of the drain was markedly exposed I felt it be better to remove the drain.  She is aware she may develop as fluid collection which will have to be drained with a needle

## 2021-06-01 NOTE — HISTORY OF PRESENT ILLNESS
[de-identified] : Alejandra is a 66 y/o female s/p Incisional hernia repair with implantation of  mesh, posterior component separation, bilateral myofascial flap advancement with intact neurovascular bundle, bilateral skin flaps greater than 100 cm, implantation of OviTex mesh bridge, placement of wound vacuumassisted closure, and scar excision on 05/12/21.  Leg drain was 2 nches out less was pulled.\par

## 2021-06-01 NOTE — PHYSICAL EXAM
[Abdominal Masses] : No abdominal masses [Abdomen Tenderness] : ~T ~M No abdominal tenderness [de-identified] : Incisional hernia repair is intact.

## 2021-06-02 DIAGNOSIS — D50.9 IRON DEFICIENCY ANEMIA, UNSPECIFIED: ICD-10-CM

## 2021-06-10 ENCOUNTER — NON-APPOINTMENT (OUTPATIENT)
Age: 66
End: 2021-06-10

## 2021-06-10 ENCOUNTER — RESULT REVIEW (OUTPATIENT)
Age: 66
End: 2021-06-10

## 2021-06-10 ENCOUNTER — APPOINTMENT (OUTPATIENT)
Dept: SURGERY | Facility: CLINIC | Age: 66
End: 2021-06-10
Payer: MEDICARE

## 2021-06-10 VITALS
SYSTOLIC BLOOD PRESSURE: 117 MMHG | WEIGHT: 235 LBS | HEART RATE: 96 BPM | RESPIRATION RATE: 18 BRPM | OXYGEN SATURATION: 96 % | HEIGHT: 62 IN | BODY MASS INDEX: 43.24 KG/M2 | TEMPERATURE: 97.3 F | DIASTOLIC BLOOD PRESSURE: 79 MMHG

## 2021-06-10 PROCEDURE — 99024 POSTOP FOLLOW-UP VISIT: CPT

## 2021-06-10 NOTE — HISTORY OF PRESENT ILLNESS
[Procedure: ___] : Procedure performed: [unfilled]  [Date of Surgery: ___] : Date of Surgery:   [unfilled] [Surgeon Name:   ___] : Surgeon Name: Dr. RODRIGES [___ Years Post Op] : [unfilled] years [de-identified] : Ms. SHANTEL CRUZ is a 65 year-old woman who presented with an incisional hernia s/p incisional hernia repair with implantation of mesh, posterior component separation bilateral myofascial flap advancement with intact neurovascular bundle, bilateral skin flaps greater than 100cm, implantation of OviTex mesh bridge, placement of wound vacuum-assisted closure, and scar excision on 5/12/2021 who comes in today for a post op visit. GABRIELLA drain removed by Dr. Wilks on 6/1/2021. Today patient reports pain 4/10 to area superior to removed GABRIELLA drain.  Wearing abdominal support.  Concerned about soreness in left costal region\par

## 2021-06-10 NOTE — ASSESSMENT
[FreeTextEntry1] : Status post incisional hernia repair with component separation and mesh placement doing well, no signs of seroma/hematoma/infection.\par Also no sign of recurrent hernia.

## 2021-06-10 NOTE — PHYSICAL EXAM
[Normal Thyroid] : the thyroid was normal [Normal Breath Sounds] : Normal breath sounds [Normal Heart Sounds] : normal heart sounds [Normal Rate and Rhythm] : normal rate and rhythm [No Rash or Lesion] : No rash or lesion [Calm] : calm [JVD] : no jugular venous distention  [Carotid Bruits] : no carotid bruits [Abdominal Masses] : No abdominal masses [Abdomen Tenderness] : ~T ~M No abdominal tenderness [Purpura] : no purpura  [Petechiae] : no petechiae [Skin Ulcer] : no ulcer [Skin Induration] : no induration [de-identified] : NAD  [de-identified] : mucous membranes moist  [de-identified] : abdomen soft nontender nondistended \par old GABRIELLA drain site, open drain wound granulating\par Midline wound intact no redness no drainage.  No palpable recurrence [de-identified] : no CVAT  [de-identified] : groslly intact

## 2021-06-10 NOTE — PLAN
[FreeTextEntry1] : Continue abdominal support\par Cover open wound with dry gauze\par Continue efforts at weight loss\par Follow-up 1 month

## 2021-06-10 NOTE — REASON FOR VISIT
[de-identified] : Ms. Alejandra Damico is a 65 year old female being seen for an incisional hernia repair with mesh and component separation post op visit

## 2021-06-16 ENCOUNTER — APPOINTMENT (OUTPATIENT)
Dept: PULMONOLOGY | Facility: CLINIC | Age: 66
End: 2021-06-16
Payer: MEDICARE

## 2021-06-16 VITALS
HEIGHT: 62 IN | HEART RATE: 91 BPM | RESPIRATION RATE: 16 BRPM | BODY MASS INDEX: 43.24 KG/M2 | TEMPERATURE: 97.6 F | SYSTOLIC BLOOD PRESSURE: 133 MMHG | WEIGHT: 235 LBS | OXYGEN SATURATION: 96 % | DIASTOLIC BLOOD PRESSURE: 88 MMHG

## 2021-06-16 PROCEDURE — 99204 OFFICE O/P NEW MOD 45 MIN: CPT

## 2021-06-16 NOTE — PHYSICAL EXAM
[Normal Appearance] : normal appearance [General Appearance - In No Acute Distress] : no acute distress [Normal Conjunctiva] : the conjunctiva exhibited no abnormalities [III] : III [Neck Appearance] : the appearance of the neck was normal [Heart Rate And Rhythm] : heart rate was normal and rhythm regular [Heart Sounds] : normal S1 and S2 [Respiration, Rhythm And Depth] : normal respiratory rhythm and effort [Auscultation Breath Sounds / Voice Sounds] : lungs were clear to auscultation bilaterally [Involuntary Movements] : no involuntary movements were seen [Nail Clubbing] : no clubbing of the fingernails [Non-Pitting] : non-pitting [Skin Color & Pigmentation] : normal skin color and pigmentation [No Focal Deficits] : no focal deficits [Oriented To Time, Place, And Person] : oriented to person, place, and time [Affect] : the affect was normal [Normal Oropharynx] : abnormal oropharynx

## 2021-06-16 NOTE — REVIEW OF SYSTEMS
[EDS: ESS=____] : daytime somnolence: ESS=[unfilled] [Fatigue] : fatigue [Snoring] : snoring [Negative] : Psychiatric [Witnessed Apneas] : witnessed apnea [Anemia] : anemia [Heartburn] : heartburn [Difficulty Maintaining Sleep] : difficulty maintaining sleep [Difficulty Initiating Sleep] : no difficulty falling asleep [Lower Extremity Discomfort] : no lower extremity discomfort [Unusual Sleep Behavior] : no unusual sleep behavior [Cataplexy] :  no cataplexy

## 2021-06-16 NOTE — CONSULT LETTER
[Dear  ___] : Dear  [unfilled], [Consult Letter:] : I had the pleasure of evaluating your patient, [unfilled]. [Please see my note below.] : Please see my note below. [Consult Closing:] : Thank you very much for allowing me to participate in the care of this patient.  If you have any questions, please do not hesitate to contact me. [Sincerely,] : Sincerely, [FreeTextEntry3] : Rosemary Be MD

## 2021-06-16 NOTE — ASSESSMENT
[FreeTextEntry1] : 64yo F with history of REM-related mild JANE, presenting  for follow up. She has not used her machine consistently. Been dealing with oral thrush since 2019. She also had PNA last year. She complains of poor quality sleep because she takes pain killers at night. She wakes up from pain intermittently. She often falls asleep without the machine and wakes up in the middle of the night and feels anxious and might not be able to fall back asleep. \par \par She had COVID in 8/20 and was hospitalized for this. She required oxygen for this and developed PEs in her right tish. She was on Eliquis, but no longer. \par \par Will order HSAT to determine current level of severity and the need for JANE treatment\par I explained the rationale for treatment of JANE -- to improve quality of life, daytime function and to decrease the cardiometabolic and other medical risks that are associated with untreated JANE. The patient verbalized understanding.\par I also explained that the patient can expect a follow up call once results of the above study becomes available.\par

## 2021-07-08 ENCOUNTER — APPOINTMENT (OUTPATIENT)
Dept: ULTRASOUND IMAGING | Facility: CLINIC | Age: 66
End: 2021-07-08
Payer: MEDICARE

## 2021-07-08 ENCOUNTER — APPOINTMENT (OUTPATIENT)
Dept: SURGERY | Facility: CLINIC | Age: 66
End: 2021-07-08
Payer: MEDICARE

## 2021-07-08 VITALS
HEIGHT: 62 IN | TEMPERATURE: 97.9 F | SYSTOLIC BLOOD PRESSURE: 135 MMHG | RESPIRATION RATE: 18 BRPM | HEART RATE: 89 BPM | WEIGHT: 236 LBS | OXYGEN SATURATION: 98 % | DIASTOLIC BLOOD PRESSURE: 78 MMHG | BODY MASS INDEX: 43.43 KG/M2

## 2021-07-08 PROCEDURE — 76830 TRANSVAGINAL US NON-OB: CPT

## 2021-07-08 PROCEDURE — 99024 POSTOP FOLLOW-UP VISIT: CPT

## 2021-07-08 PROCEDURE — 76856 US EXAM PELVIC COMPLETE: CPT

## 2021-07-08 NOTE — ASSESSMENT
[FreeTextEntry1] : 2 months status post incisional hernia repair with component separation and mesh doing well\par Continues to complain of excess skin and soft tissue on both arms causing neck pain and abdominal pannus causing gait disturbance

## 2021-07-08 NOTE — PLAN
[FreeTextEntry1] : Would benefit from abdominal plasty\par Increase Protonix to 40 twice daily per ENT\par Activities as tolerated\par Strict post gastric bypass diet\par Follow-up 3 months

## 2021-07-08 NOTE — HISTORY OF PRESENT ILLNESS
[de-identified] : Ms. SHANTEL CRUZ is a 65 year-old woman who presented with an incisional hernia s/p incisional hernia repair with implantation of mesh, posterior component separation bilateral myofascial flap advancement with intact neurovascular bundle, bilateral skin flaps greater than 100cm, implantation of OviTex mesh bridge, placement of wound vacuum-assisted closure, and scar excision on 5/12/2021 who comes in today for a post op visit. Reporting left side abdominal pain, tender to touch; unable to lie down on that side. Denies fevers, chills, nausea, vomiting. Tolerating diet.  No bulge, concerned about appearance of scar.\par

## 2021-07-08 NOTE — REASON FOR VISIT
[Post Op: _________] : a [unfilled] post op visit [FreeTextEntry1] : incisional hernia with mesh and component separation

## 2021-07-08 NOTE — PHYSICAL EXAM
[Normal Thyroid] : the thyroid was normal [Normal Breath Sounds] : Normal breath sounds [Normal Heart Sounds] : normal heart sounds [Normal Rate and Rhythm] : normal rate and rhythm [No HSM] : no hepatosplenomegaly [No Rash or Lesion] : No rash or lesion [Alert] : alert [Oriented to Person] : oriented to person [Oriented to Place] : oriented to place [Oriented to Time] : oriented to time [Calm] : calm [JVD] : no jugular venous distention  [Abdominal Masses] : No abdominal masses [Abdomen Tenderness] : ~T ~M No abdominal tenderness [Purpura] : no purpura  [Petechiae] : no petechiae [Skin Ulcer] : no ulcer [Skin Induration] : no induration [de-identified] : NAD [de-identified] : anicteric mucous membranes moist  [de-identified] : soft nontender nondistended\par surgical incision sites clean dry and approximated\par No palpable hernia\par old drain site clean dry intact  [de-identified] : no CVAT [de-identified] : grossly intact [de-identified] : Hanging pannus with grade 1–2 irritation/breakdown [de-identified] : Understood visit

## 2021-07-19 ENCOUNTER — LABORATORY RESULT (OUTPATIENT)
Age: 66
End: 2021-07-19

## 2021-07-20 ENCOUNTER — OUTPATIENT (OUTPATIENT)
Dept: OUTPATIENT SERVICES | Facility: HOSPITAL | Age: 66
LOS: 1 days | End: 2021-07-20
Payer: MEDICARE

## 2021-07-20 ENCOUNTER — APPOINTMENT (OUTPATIENT)
Dept: RADIOLOGY | Facility: CLINIC | Age: 66
End: 2021-07-20

## 2021-07-20 ENCOUNTER — OUTPATIENT (OUTPATIENT)
Dept: OUTPATIENT SERVICES | Facility: HOSPITAL | Age: 66
LOS: 1 days | Discharge: HOME | End: 2021-07-20

## 2021-07-20 DIAGNOSIS — D50.9 IRON DEFICIENCY ANEMIA, UNSPECIFIED: ICD-10-CM

## 2021-07-20 DIAGNOSIS — R07.81 PLEURODYNIA: ICD-10-CM

## 2021-07-20 LAB
ALBUMIN SERPL ELPH-MCNC: 4.3 G/DL
ALP BLD-CCNC: 94 U/L
ALT SERPL-CCNC: 13 U/L
ANION GAP SERPL CALC-SCNC: 11 MMOL/L
AST SERPL-CCNC: 18 U/L
BASOPHILS # BLD AUTO: 0 K/UL
BASOPHILS NFR BLD AUTO: 0 %
BILIRUB SERPL-MCNC: 0.2 MG/DL
BUN SERPL-MCNC: 10 MG/DL
CALCIUM SERPL-MCNC: 9.3 MG/DL
CHLORIDE SERPL-SCNC: 98 MMOL/L
CO2 SERPL-SCNC: 30 MMOL/L
CREAT SERPL-MCNC: 0.41 MG/DL
EOSINOPHIL # BLD AUTO: 0.39 K/UL
EOSINOPHIL NFR BLD AUTO: 2.6 %
FERRITIN SERPL-MCNC: 354 NG/ML
GLUCOSE SERPL-MCNC: 69 MG/DL
HCT VFR BLD CALC: 33.3 %
HGB BLD-MCNC: 10.2 G/DL
LYMPHOCYTES # BLD AUTO: 5.35 K/UL
LYMPHOCYTES NFR BLD AUTO: 35.9 %
MAN DIFF?: NORMAL
MCHC RBC-ENTMCNC: 28.6 PG
MCHC RBC-ENTMCNC: 30.6 GM/DL
MCV RBC AUTO: 93.3 FL
MONOCYTES # BLD AUTO: 1.4 K/UL
MONOCYTES NFR BLD AUTO: 9.4 %
NEUTROPHILS # BLD AUTO: 7.64 K/UL
NEUTROPHILS NFR BLD AUTO: 51.3 %
PLATELET # BLD AUTO: 257 K/UL
POTASSIUM SERPL-SCNC: 4.1 MMOL/L
PROT SERPL-MCNC: 6.6 G/DL
RBC # BLD: 3.57 M/UL
RBC # FLD: 13 %
SODIUM SERPL-SCNC: 139 MMOL/L
WBC # FLD AUTO: 14.89 K/UL

## 2021-07-20 PROCEDURE — 71111 X-RAY EXAM RIBS/CHEST4/> VWS: CPT

## 2021-07-20 PROCEDURE — 71111 X-RAY EXAM RIBS/CHEST4/> VWS: CPT | Mod: 26

## 2021-07-22 ENCOUNTER — APPOINTMENT (OUTPATIENT)
Age: 66
End: 2021-07-22
Payer: MEDICARE

## 2021-07-22 VITALS
WEIGHT: 230 LBS | RESPIRATION RATE: 17 BRPM | SYSTOLIC BLOOD PRESSURE: 134 MMHG | HEIGHT: 64 IN | BODY MASS INDEX: 39.27 KG/M2 | DIASTOLIC BLOOD PRESSURE: 83 MMHG | TEMPERATURE: 96.6 F | HEART RATE: 85 BPM

## 2021-07-22 PROCEDURE — 99214 OFFICE O/P EST MOD 30 MIN: CPT

## 2021-07-22 NOTE — REVIEW OF SYSTEMS
[Patient Intake Form Reviewed] : Patient intake form was reviewed [Fatigue] : fatigue [Recent Change In Weight] : ~T recent weight change [Negative] : Allergic/Immunologic [Fever] : no fever [Chills] : no chills [Night Sweats] : no night sweats [Easy Bleeding] : no tendency for easy bleeding [Easy Bruising] : no tendency for easy bruising [FreeTextEntry2] : ambulates with cane [FreeTextEntry7] : s/p abdominal surgery; present incisional hernia [de-identified] : bipolar disorder

## 2021-07-22 NOTE — ASSESSMENT
[FreeTextEntry1] : Ms. CRUZ 's questions were answered to her satisfaction. She  expressed her  understanding and willingness to comply with the above recommendations, and  will return to the office in  3 - 4 months.\par \par \par \par \par \par \par \par \par \par \par \par \par \par \par \par \par \par \par

## 2021-07-22 NOTE — HISTORY OF PRESENT ILLNESS
[de-identified] : 65 F with PMHx of psoriasis, psoriatic arthritis, on Otezla, GERD, hypertension, obstructive sleep apnea, bipolar disorder, hiatal hernia and morbid obesity, s/p revision Solis-en-Y gastric bypass and transoral gastric plication (for weight 421 lbs), returning for hematologic follow-up of leukocytosis and anemia.\par  [FreeTextEntry1] : expectant surveillance\par \par  [de-identified] : Last seen in office in April 2021.  On 5/12/2021 patient underwent repair of incisional hernia with OviTex mesh implantation, posterior component separation bilateral myofascial flap advancement with intact neurovascular bundle, bilateral skin flaps placement of wound vacuum-assisted closure, and scar excision (Dr. Alban Diaz).  Patient is recovering postoperatively.  Hematologic picture consistent with mild leukocytosis (WBC 14.89K with normal differential), anemia–hemoglobin stable at 10.2 g/dL, and ferritin at 354 ng/mL.  Earlier this month, patient has sustained a fall and fractured to left ribs; she was seen in postoperative follow-up on 7/8/2021, found stable.  Her chief complaint remains knee pain (left knee needs replacement), excess pannus and skin, and difficulty ambulating ( uses walker).  She is scheduled to see an orthopedist at [facility] ([facility]) next month.  Medication list reviewed; patient remains on immunosuppression with Otezla for psoriasis, as prescribed by her dermatologist.  Here accompanied by her daughter, Meaghan.

## 2021-07-22 NOTE — RESULTS/DATA
[FreeTextEntry1] : I reviewed recent + today's blood work results with patient.\par \par 4/14/2021:\par WBC 15.13, hemoglobin 10.3 g/dL, hematocrit 33.1%, platelet 311,000,\par Neutrophils 32%, lymphocytes 58% \par

## 2021-07-22 NOTE — REASON FOR VISIT
[Follow-Up Visit] : a follow-up visit for [Leukocytosis] : leukocytosis [Spouse] : spouse [FreeTextEntry2] : anemia

## 2021-07-22 NOTE — PHYSICAL EXAM
[Ambulatory and capable of all self care but unable to carry out any work activities] : Status 2- Ambulatory and capable of all self care but unable to carry out any work activities. Up and about more than 50% of waking hours [Obese] : obese [Normal] : affect appropriate [de-identified] : ambulates with difficulty [de-identified] : Status post incisional hernia repair; excess pannus [de-identified] : Midline scar post abdominal incision well-healed; excess pannus and skin on both upper extremities, abdomen, lower extremities bilaterally

## 2021-07-26 ENCOUNTER — NON-APPOINTMENT (OUTPATIENT)
Age: 66
End: 2021-07-26

## 2021-07-28 ENCOUNTER — APPOINTMENT (OUTPATIENT)
Dept: MAMMOGRAPHY | Facility: CLINIC | Age: 66
End: 2021-07-28
Payer: MEDICARE

## 2021-07-28 ENCOUNTER — APPOINTMENT (OUTPATIENT)
Dept: ULTRASOUND IMAGING | Facility: CLINIC | Age: 66
End: 2021-07-28
Payer: MEDICARE

## 2021-07-28 PROCEDURE — 76641 ULTRASOUND BREAST COMPLETE: CPT | Mod: 50

## 2021-07-28 PROCEDURE — 77067 SCR MAMMO BI INCL CAD: CPT

## 2021-07-28 PROCEDURE — 77063 BREAST TOMOSYNTHESIS BI: CPT

## 2021-08-24 ENCOUNTER — RX RENEWAL (OUTPATIENT)
Age: 66
End: 2021-08-24

## 2021-09-21 ENCOUNTER — RX RENEWAL (OUTPATIENT)
Age: 66
End: 2021-09-21

## 2021-10-14 ENCOUNTER — LABORATORY RESULT (OUTPATIENT)
Age: 66
End: 2021-10-14

## 2021-10-14 ENCOUNTER — APPOINTMENT (OUTPATIENT)
Dept: SURGERY | Facility: CLINIC | Age: 66
End: 2021-10-14
Payer: MEDICARE

## 2021-10-14 VITALS
OXYGEN SATURATION: 98 % | RESPIRATION RATE: 18 BRPM | DIASTOLIC BLOOD PRESSURE: 84 MMHG | SYSTOLIC BLOOD PRESSURE: 147 MMHG | TEMPERATURE: 98 F | HEART RATE: 95 BPM | HEIGHT: 64 IN | BODY MASS INDEX: 39.27 KG/M2 | WEIGHT: 230 LBS

## 2021-10-14 PROCEDURE — 99214 OFFICE O/P EST MOD 30 MIN: CPT

## 2021-10-14 PROCEDURE — 99204 OFFICE O/P NEW MOD 45 MIN: CPT

## 2021-10-14 RX ORDER — PANTOPRAZOLE 40 MG/1
40 TABLET, DELAYED RELEASE ORAL TWICE DAILY
Qty: 60 | Refills: 3 | Status: DISCONTINUED | OUTPATIENT
Start: 2021-07-29 | End: 2021-10-14

## 2021-10-14 RX ORDER — TAPENTADOL HYDROCHLORIDE 150 MG/1
150 TABLET, FILM COATED, EXTENDED RELEASE ORAL
Refills: 0 | Status: DISCONTINUED | COMMUNITY
End: 2021-10-14

## 2021-10-14 RX ORDER — PANTOPRAZOLE 40 MG/1
40 TABLET, DELAYED RELEASE ORAL TWICE DAILY
Qty: 60 | Refills: 2 | Status: DISCONTINUED | COMMUNITY
End: 2021-10-14

## 2021-10-14 NOTE — HISTORY OF PRESENT ILLNESS
[de-identified] : Ms. SHANTEL CRUZ is a 65 year-old woman who presented with an incisional hernia s/p incisional hernia repair with implantation of mesh, posterior component separation bilateral myofascial flap advancement with intact neurovascular bundle, bilateral skin flaps greater than 100cm, implantation of OviTex mesh bridge, placement of wound vacuum-assisted closure, and scar excision on 5/12/2021 who comes in today for a post op visit. [de-identified] : Patient status post fall with multiple rib fractures now complaining of discomfort below the incision of her prior complex abdominal wall hernia repair\par No GI  or pulmonary obstructive symptoms\par No nausea/vomiting\par No change in bowel habits

## 2021-10-14 NOTE — PLAN
[FreeTextEntry1] : Check abdominal CT\par Avoid heavy lifting\par Follow-up appointment after CT performed

## 2021-10-14 NOTE — PHYSICAL EXAM
[de-identified] : Well-appearing morbidly obese woman [de-identified] : Healed midline incision\par Palpable mass/irreducible below incision but above umbilicus approximately 3 to 4 cm in size

## 2021-10-14 NOTE — ASSESSMENT
[FreeTextEntry1] :  s/p incisional hernia repair with implantation of mesh, posterior component separation bilateral myofascial flap advancement with intact neurovascular bundle, bilateral skin flaps greater than 100cm, implantation of OviTex mesh bridge, placement of wound vacuum-assisted closure, and scar excision on 5/12/2021 \par \par Abdominal wall pain status post complex incisional hernia repair with mesh rule out recurrence

## 2021-10-15 ENCOUNTER — RESULT REVIEW (OUTPATIENT)
Age: 66
End: 2021-10-15

## 2021-10-15 ENCOUNTER — LABORATORY RESULT (OUTPATIENT)
Age: 66
End: 2021-10-15

## 2021-10-16 LAB
BASOPHILS # BLD AUTO: 0.08 K/UL
BASOPHILS NFR BLD AUTO: 0.5 %
EOSINOPHIL # BLD AUTO: 0.21 K/UL
EOSINOPHIL NFR BLD AUTO: 1.2 %
FERRITIN SERPL-MCNC: 336 NG/ML
HCT VFR BLD CALC: 33.5 %
HGB BLD-MCNC: 10.5 G/DL
IMM GRANULOCYTES NFR BLD AUTO: 0.2 %
LYMPHOCYTES # BLD AUTO: 9.55 K/UL
LYMPHOCYTES NFR BLD AUTO: 54.2 %
MAN DIFF?: NORMAL
MCHC RBC-ENTMCNC: 28.5 PG
MCHC RBC-ENTMCNC: 31.3 GM/DL
MCV RBC AUTO: 91 FL
MONOCYTES # BLD AUTO: 1.53 K/UL
MONOCYTES NFR BLD AUTO: 8.7 %
NEUTROPHILS # BLD AUTO: 6.2 K/UL
NEUTROPHILS NFR BLD AUTO: 35.2 %
PLATELET # BLD AUTO: 305 K/UL
RBC # BLD: 3.68 M/UL
RBC # FLD: 13.6 %
WBC # FLD AUTO: 17.61 K/UL

## 2021-10-18 LAB
25(OH)D3 SERPL-MCNC: 29.5 NG/ML
ALBUMIN SERPL ELPH-MCNC: 4.4 G/DL
ALP BLD-CCNC: 96 U/L
ALT SERPL-CCNC: 14 U/L
ANION GAP SERPL CALC-SCNC: 12 MMOL/L
AST SERPL-CCNC: 15 U/L
BASOPHILS # BLD AUTO: 0 K/UL
BASOPHILS NFR BLD AUTO: 0 %
BILIRUB SERPL-MCNC: 0.4 MG/DL
BUN SERPL-MCNC: 14 MG/DL
CALCIUM SERPL-MCNC: 9.2 MG/DL
CHLORIDE SERPL-SCNC: 98 MMOL/L
CO2 SERPL-SCNC: 25 MMOL/L
CREAT SERPL-MCNC: 0.38 MG/DL
EOSINOPHIL # BLD AUTO: 0 K/UL
EOSINOPHIL NFR BLD AUTO: 0 %
ESTIMATED AVERAGE GLUCOSE: 105 MG/DL
FOLATE SERPL-MCNC: >20 NG/ML
GLUCOSE SERPL-MCNC: 94 MG/DL
HBA1C MFR BLD HPLC: 5.3 %
HCT VFR BLD CALC: 33.4 %
HGB BLD-MCNC: 10.5 G/DL
IRON SERPL-MCNC: 58 UG/DL
LYMPHOCYTES # BLD AUTO: 12.15 K/UL
LYMPHOCYTES NFR BLD AUTO: 68 %
MAN DIFF?: NORMAL
MCHC RBC-ENTMCNC: 28.4 PG
MCHC RBC-ENTMCNC: 31.4 GM/DL
MCV RBC AUTO: 90.3 FL
MONOCYTES # BLD AUTO: 0.71 K/UL
MONOCYTES NFR BLD AUTO: 4 %
NEUTROPHILS # BLD AUTO: 4.47 K/UL
NEUTROPHILS NFR BLD AUTO: 25 %
PLATELET # BLD AUTO: 296 K/UL
POTASSIUM SERPL-SCNC: 4.1 MMOL/L
PROT SERPL-MCNC: 6.7 G/DL
RBC # BLD: 3.7 M/UL
RBC # FLD: 13.5 %
SODIUM SERPL-SCNC: 135 MMOL/L
VIT B12 SERPL-MCNC: >2000 PG/ML
WBC # FLD AUTO: 17.87 K/UL

## 2021-10-20 ENCOUNTER — OUTPATIENT (OUTPATIENT)
Dept: OUTPATIENT SERVICES | Facility: HOSPITAL | Age: 66
LOS: 1 days | Discharge: HOME | End: 2021-10-20

## 2021-10-20 ENCOUNTER — RX RENEWAL (OUTPATIENT)
Age: 66
End: 2021-10-20

## 2021-10-20 DIAGNOSIS — D50.9 IRON DEFICIENCY ANEMIA, UNSPECIFIED: ICD-10-CM

## 2021-10-21 ENCOUNTER — LABORATORY RESULT (OUTPATIENT)
Age: 66
End: 2021-10-21

## 2021-10-21 ENCOUNTER — RESULT REVIEW (OUTPATIENT)
Age: 66
End: 2021-10-21

## 2021-10-21 ENCOUNTER — APPOINTMENT (OUTPATIENT)
Age: 66
End: 2021-10-21
Payer: MEDICARE

## 2021-10-21 VITALS
HEIGHT: 64 IN | RESPIRATION RATE: 18 BRPM | BODY MASS INDEX: 40.46 KG/M2 | TEMPERATURE: 97.2 F | DIASTOLIC BLOOD PRESSURE: 87 MMHG | HEART RATE: 95 BPM | OXYGEN SATURATION: 99 % | WEIGHT: 237 LBS | SYSTOLIC BLOOD PRESSURE: 134 MMHG

## 2021-10-21 LAB
BASOPHILS # BLD AUTO: 0.06 K/UL — SIGNIFICANT CHANGE UP (ref 0–0.2)
BASOPHILS NFR BLD AUTO: 0.5 % — SIGNIFICANT CHANGE UP (ref 0–2)
EOSINOPHIL # BLD AUTO: 0.18 K/UL — SIGNIFICANT CHANGE UP (ref 0–0.5)
EOSINOPHIL NFR BLD AUTO: 1.4 % — SIGNIFICANT CHANGE UP (ref 0–6)
HCT VFR BLD CALC: 32.5 % — LOW (ref 34.5–45)
HGB BLD-MCNC: 10.4 G/DL — LOW (ref 11.5–15.5)
IMM GRANULOCYTES NFR BLD AUTO: 0.3 % — SIGNIFICANT CHANGE UP (ref 0–1.5)
LYMPHOCYTES # BLD AUTO: 53.5 % — HIGH (ref 13–44)
LYMPHOCYTES # BLD AUTO: 7.09 K/UL — HIGH (ref 1–3.3)
MCHC RBC-ENTMCNC: 29.5 PG — SIGNIFICANT CHANGE UP (ref 27–34)
MCHC RBC-ENTMCNC: 32 G/DL — SIGNIFICANT CHANGE UP (ref 32–36)
MCV RBC AUTO: 92.3 FL — SIGNIFICANT CHANGE UP (ref 80–100)
MONOCYTES # BLD AUTO: 1.23 K/UL — HIGH (ref 0–0.9)
MONOCYTES NFR BLD AUTO: 9.3 % — SIGNIFICANT CHANGE UP (ref 2–14)
NEUTROPHILS # BLD AUTO: 4.66 K/UL — SIGNIFICANT CHANGE UP (ref 1.8–7.4)
NEUTROPHILS NFR BLD AUTO: 35 % — LOW (ref 43–77)
NRBC # BLD: 0 /100 WBCS — SIGNIFICANT CHANGE UP (ref 0–0)
PLATELET # BLD AUTO: 261 K/UL — SIGNIFICANT CHANGE UP (ref 150–400)
RBC # BLD: 3.52 M/UL — LOW (ref 3.8–5.2)
RBC # FLD: 13.6 % — SIGNIFICANT CHANGE UP (ref 10.3–14.5)
WBC # BLD: 13.26 K/UL — HIGH (ref 3.8–10.5)
WBC # FLD AUTO: 13.26 K/UL — HIGH (ref 3.8–10.5)

## 2021-10-21 PROCEDURE — 99213 OFFICE O/P EST LOW 20 MIN: CPT

## 2021-10-21 NOTE — PHYSICAL EXAM
[Ambulatory and capable of all self care but unable to carry out any work activities] : Status 2- Ambulatory and capable of all self care but unable to carry out any work activities. Up and about more than 50% of waking hours [Obese] : obese [Normal] : affect appropriate [de-identified] : ambulates with difficulty [de-identified] : Status post incisional hernia repair; excess pannus [de-identified] : Midline scar post abdominal incision well-healed; excess pannus and skin on both upper extremities, abdomen, lower extremities bilaterally

## 2021-10-21 NOTE — REVIEW OF SYSTEMS
[Patient Intake Form Reviewed] : Patient intake form was reviewed [Fatigue] : fatigue [Recent Change In Weight] : ~T recent weight change [Negative] : Allergic/Immunologic [Fever] : no fever [Chills] : no chills [Night Sweats] : no night sweats [Easy Bleeding] : no tendency for easy bleeding [Easy Bruising] : no tendency for easy bruising [FreeTextEntry2] : ambulates with walker [FreeTextEntry7] : s/p abdominal surgery; present incisional hernia [de-identified] : bipolar disorder

## 2021-10-22 ENCOUNTER — OUTPATIENT (OUTPATIENT)
Dept: OUTPATIENT SERVICES | Facility: HOSPITAL | Age: 66
LOS: 1 days | End: 2021-10-22
Payer: MEDICARE

## 2021-10-22 ENCOUNTER — APPOINTMENT (OUTPATIENT)
Dept: CT IMAGING | Facility: IMAGING CENTER | Age: 66
End: 2021-10-22
Payer: MEDICARE

## 2021-10-22 DIAGNOSIS — Z00.8 ENCOUNTER FOR OTHER GENERAL EXAMINATION: ICD-10-CM

## 2021-10-22 PROCEDURE — 74177 CT ABD & PELVIS W/CONTRAST: CPT | Mod: 26,ME

## 2021-10-22 PROCEDURE — G1004: CPT

## 2021-10-22 PROCEDURE — 74177 CT ABD & PELVIS W/CONTRAST: CPT | Mod: ME

## 2021-10-25 ENCOUNTER — NON-APPOINTMENT (OUTPATIENT)
Age: 66
End: 2021-10-25

## 2021-11-01 ENCOUNTER — OUTPATIENT (OUTPATIENT)
Dept: OUTPATIENT SERVICES | Facility: HOSPITAL | Age: 66
LOS: 1 days | End: 2021-11-01
Payer: MEDICARE

## 2021-11-01 ENCOUNTER — APPOINTMENT (OUTPATIENT)
Dept: SLEEP CENTER | Facility: CLINIC | Age: 66
End: 2021-11-01
Payer: MEDICARE

## 2021-11-01 PROCEDURE — 95806 SLEEP STUDY UNATT&RESP EFFT: CPT | Mod: 26

## 2021-11-01 PROCEDURE — G0399: CPT

## 2021-11-12 DIAGNOSIS — G47.33 OBSTRUCTIVE SLEEP APNEA (ADULT) (PEDIATRIC): ICD-10-CM

## 2021-12-01 ENCOUNTER — APPOINTMENT (OUTPATIENT)
Dept: ULTRASOUND IMAGING | Facility: CLINIC | Age: 66
End: 2021-12-01
Payer: MEDICARE

## 2021-12-01 PROCEDURE — 76856 US EXAM PELVIC COMPLETE: CPT

## 2021-12-01 PROCEDURE — 76830 TRANSVAGINAL US NON-OB: CPT

## 2021-12-06 ENCOUNTER — RX RENEWAL (OUTPATIENT)
Age: 66
End: 2021-12-06

## 2021-12-09 ENCOUNTER — APPOINTMENT (OUTPATIENT)
Dept: SURGERY | Facility: CLINIC | Age: 66
End: 2021-12-09
Payer: MEDICARE

## 2021-12-09 VITALS
RESPIRATION RATE: 18 BRPM | HEIGHT: 64 IN | WEIGHT: 236 LBS | BODY MASS INDEX: 40.29 KG/M2 | HEART RATE: 95 BPM | DIASTOLIC BLOOD PRESSURE: 76 MMHG | OXYGEN SATURATION: 97 % | SYSTOLIC BLOOD PRESSURE: 137 MMHG | TEMPERATURE: 98.6 F

## 2021-12-09 PROCEDURE — 99214 OFFICE O/P EST MOD 30 MIN: CPT

## 2021-12-09 NOTE — PLAN
[FreeTextEntry1] : Abdominal wall support garment\par Avoid heavy lifting\par Continue care for bilateral rib fractures\par Consider tap block\par Would not recommend abdominoplasty at this time\par Follow-up 3 months

## 2021-12-09 NOTE — ASSESSMENT
[FreeTextEntry1] : Abdominal wall pain status post incisional hernia repair with mesh which appears intact on CAT scan.\par Recent history of bilateral rib fractures

## 2021-12-09 NOTE — PHYSICAL EXAM
[Normal Breath Sounds] : Normal breath sounds [Normal Heart Sounds] : normal heart sounds [Normal Rate and Rhythm] : normal rate and rhythm [No HSM] : no hepatosplenomegaly [No Rash or Lesion] : No rash or lesion [Alert] : alert [Oriented to Person] : oriented to person [Oriented to Place] : oriented to place [Oriented to Time] : oriented to time [Calm] : calm [JVD] : no jugular venous distention  [de-identified] : NAD [de-identified] : anicteric, mucous membranes moist  [de-identified] : soft nontender nondistended [de-identified] : no CVAT  [de-identified] : grossly intact

## 2021-12-09 NOTE — HISTORY OF PRESENT ILLNESS
[de-identified] : Ms. SHANTEL CRUZ is a 65 year-old woman who presented with an incisional hernia s/p incisional hernia repair with implantation of mesh, posterior component separation bilateral myofascial flap advancement with intact neurovascular bundle, bilateral skin flaps greater than 100cm, implantation of OviTex mesh bridge, placement of wound vacuum-assisted closure, and scar excision on 5/12/2021 who comes in today for a postop visit.  [de-identified] : CT abdomen/pelvis done on 11/1/2021 show postop changes s/p gastric bypass with reflux into the afferent limb, no evidence of bowel obstruction or recurrent ventral hernia\par \par Patient states pain still present and worse at the end of the day.\par Also wonders if an abdominal plasty will help with the discomfort\par \par Previous weight: 230\par Today's weight: 236\par Today's BMI: 40.5

## 2021-12-15 ENCOUNTER — APPOINTMENT (OUTPATIENT)
Dept: PULMONOLOGY | Facility: CLINIC | Age: 66
End: 2021-12-15

## 2022-01-04 NOTE — ED ADULT NURSE NOTE - OBJECTIVE STATEMENT
n/a
Pt is a 65F PMH HTN, JANE, asthma, anxiety p/w fever, cough, SOB and hypoxia. Dx w/ COVID ~10 days ago. DC'd from  on 3L home oxygen. Pt went to [facility] two days ago for worsening SOB. Presents today for worsening SOB, per pt O2 sat was high 80s on 3L at home. Pt sat 98% and comfortable on 3L on arrival. Pt denies chest pain, abd pain, fevers, numbness, weakness, urinary or bowel sx. A&Ox4, PERRY, lungs clear, distal pulses intact, abdomen soft nontender, skin intact. 20G IV placed, flushes, with blood return. Side rails up for safety, call bell and personal items within reach, instructed to call for assistance, verbalizes understanding. Will continue to monitor.

## 2022-01-05 ENCOUNTER — OUTPATIENT (OUTPATIENT)
Dept: OUTPATIENT SERVICES | Facility: HOSPITAL | Age: 67
LOS: 1 days | Discharge: HOME | End: 2022-01-05

## 2022-01-05 DIAGNOSIS — D50.9 IRON DEFICIENCY ANEMIA, UNSPECIFIED: ICD-10-CM

## 2022-01-06 ENCOUNTER — RESULT REVIEW (OUTPATIENT)
Age: 67
End: 2022-01-06

## 2022-01-06 ENCOUNTER — LABORATORY RESULT (OUTPATIENT)
Age: 67
End: 2022-01-06

## 2022-01-06 ENCOUNTER — APPOINTMENT (OUTPATIENT)
Age: 67
End: 2022-01-06
Payer: MEDICARE

## 2022-01-06 VITALS
HEART RATE: 83 BPM | OXYGEN SATURATION: 95 % | DIASTOLIC BLOOD PRESSURE: 78 MMHG | RESPIRATION RATE: 16 BRPM | HEIGHT: 64 IN | BODY MASS INDEX: 40.12 KG/M2 | WEIGHT: 234.99 LBS | TEMPERATURE: 96.7 F | SYSTOLIC BLOOD PRESSURE: 138 MMHG

## 2022-01-06 LAB
BASOPHILS # BLD AUTO: 0.11 K/UL — SIGNIFICANT CHANGE UP (ref 0–0.2)
BASOPHILS NFR BLD AUTO: 0.6 % — SIGNIFICANT CHANGE UP (ref 0–2)
EOSINOPHIL # BLD AUTO: 0.28 K/UL — SIGNIFICANT CHANGE UP (ref 0–0.5)
EOSINOPHIL NFR BLD AUTO: 1.5 % — SIGNIFICANT CHANGE UP (ref 0–6)
HCT VFR BLD CALC: 33.3 % — LOW (ref 34.5–45)
HGB BLD-MCNC: 11 G/DL — LOW (ref 11.5–15.5)
IMM GRANULOCYTES NFR BLD AUTO: 0.7 % — SIGNIFICANT CHANGE UP (ref 0–1.5)
LYMPHOCYTES # BLD AUTO: 10.84 K/UL — HIGH (ref 1–3.3)
LYMPHOCYTES # BLD AUTO: 56.3 % — HIGH (ref 13–44)
MCHC RBC-ENTMCNC: 30.1 PG — SIGNIFICANT CHANGE UP (ref 27–34)
MCHC RBC-ENTMCNC: 33 G/DL — SIGNIFICANT CHANGE UP (ref 32–36)
MCV RBC AUTO: 91 FL — SIGNIFICANT CHANGE UP (ref 80–100)
MONOCYTES # BLD AUTO: 1.72 K/UL — HIGH (ref 0–0.9)
MONOCYTES NFR BLD AUTO: 8.9 % — SIGNIFICANT CHANGE UP (ref 2–14)
NEUTROPHILS # BLD AUTO: 6.16 K/UL — SIGNIFICANT CHANGE UP (ref 1.8–7.4)
NEUTROPHILS NFR BLD AUTO: 32 % — LOW (ref 43–77)
NRBC # BLD: 0 /100 WBCS — SIGNIFICANT CHANGE UP (ref 0–0)
PLATELET # BLD AUTO: 195 K/UL — SIGNIFICANT CHANGE UP (ref 150–400)
RBC # BLD: 3.66 M/UL — LOW (ref 3.8–5.2)
RBC # FLD: 12.4 % — SIGNIFICANT CHANGE UP (ref 10.3–14.5)
WBC # BLD: 19.25 K/UL — HIGH (ref 3.8–10.5)
WBC # FLD AUTO: 19.25 K/UL — HIGH (ref 3.8–10.5)

## 2022-01-06 PROCEDURE — 38222 DX BONE MARROW BX & ASPIR: CPT | Mod: LT

## 2022-01-06 NOTE — PROCEDURE
[Bone Marrow Biopsy] : bone marrow biopsy [Bone Marrow Aspiration] : bone marrow aspiration  [Patient] : the patient [Patient identification verified] : patient identification verified [Correct positioning] : correct positioning [Prone] : prone [Lidocaine was injected and into the periosteum overlying the site.] : Lidocaine was injected and into the periosteum overlying the site. [Aspirate] : aspirate [Cytogenetics] : cytogenetics [FISH] : FISH [Biopsy] : biopsy [Flow Cytometry] : flow cytometry [] : The patient was instructed to remove the bandage the following AM. The patient may bathe. Acetaminophen may be taken for discomfort, as per package directions.If there are any other problems, the patient was instructed to call the office. The patient verbalized understanding, and is aware of the office contact numbers. [The left posterior iliac crest was prepped with betadine and draped, using sterile technique.] : The left posterior iliac crest was prepped with betadine and draped, using sterile technique. [FreeTextEntry1] : 65 yo female w/Leukocytosis. BMBx to assess for lymphoproliferative disorders.  [FreeTextEntry2] : CBC prior to procedure\par WBC 19.25\par Hgb  11.0 Hct 33.3\par Plt 195\par BM Bx and aspiration was performed by KALYN Dockery. 2 lavender + 2 green top tubes of BM aspirate and 1 cassette of BM core specimen sent to lab.\par \par

## 2022-01-06 NOTE — REASON FOR VISIT
[Bone Marrow Biopsy] : bone marrow biopsy [Bone Marrow Aspiration] : bone marrow aspiration [FreeTextEntry2] : 67 yo female w/Leukocytosis. BMBx to assess for lymphoproliferative disorders.

## 2022-01-10 ENCOUNTER — LABORATORY RESULT (OUTPATIENT)
Age: 67
End: 2022-01-10

## 2022-01-10 ENCOUNTER — APPOINTMENT (OUTPATIENT)
Dept: INFECTIOUS DISEASE | Facility: CLINIC | Age: 67
End: 2022-01-10
Payer: MEDICARE

## 2022-01-10 VITALS
WEIGHT: 242 LBS | OXYGEN SATURATION: 95 % | HEIGHT: 64 IN | DIASTOLIC BLOOD PRESSURE: 76 MMHG | TEMPERATURE: 98.7 F | BODY MASS INDEX: 41.32 KG/M2 | RESPIRATION RATE: 16 BRPM | SYSTOLIC BLOOD PRESSURE: 135 MMHG | HEART RATE: 105 BPM

## 2022-01-10 PROCEDURE — 99213 OFFICE O/P EST LOW 20 MIN: CPT

## 2022-01-10 NOTE — HISTORY OF PRESENT ILLNESS
[FreeTextEntry1] : I have been providine Ms Margaux with fluconazole 400 mg daily for reported oroesophaeal candidiasis. she has been seen by ENT and diagnosed with reflux pharyngitis for which she takes long term PPI. She notes sore throat and weak voice.\par Her multiple medical issues includes: psoriatic arthritis on Otezla, obesity, osteoarthritis, chronic pain, depression, hypertension, chronic pain. She had COVID 9/20 complicated by PE. She states she had long covid - now improved. She has had leukopenia with relative lymphocytosis. Flow cytometry of blood on 10/21/21 suggested Monoclonal B cell proliferation of undetermined significance. She had bone marrow biopsy last week.\par She has had falls complicated by rib fractures.

## 2022-01-10 NOTE — ASSESSMENT
[FreeTextEntry1] : appears to be tolerating long term fluconzole\par \par discussed risks including hepatopathy and azole resistance.\par I requested that she halt fluconazole to assess if oropharyngeal symptoms recur\par \par cmp and labs today

## 2022-01-10 NOTE — REVIEW OF SYSTEMS
[Feeling Tired] : feeling tired [Recent Weight Gain (___ Lbs)] : recent [unfilled] ~Ulb weight gain [Sore Throat] : sore throat [Hoarseness] : hoarseness [Shortness Of Breath] : shortness of breath [Abdominal Pain] : abdominal pain [Joint Pain] : joint pain [Limb Pain] : limb pain [Anxiety] : anxiety [Depression] : depression [Negative] : Heme/Lymph

## 2022-01-10 NOTE — PHYSICAL EXAM
[General Appearance - Alert] : alert [General Appearance - In No Acute Distress] : in no acute distress [Sclera] : the sclera and conjunctiva were normal [Extraocular Movements] : extraocular movements were intact [Outer Ear] : the ears and nose were normal in appearance [] : no respiratory distress [Respiration, Rhythm And Depth] : normal respiratory rhythm and effort [Exaggerated Use Of Accessory Muscles For Inspiration] : no accessory muscle use [Edema] : there was no peripheral edema [FreeTextEntry1] : marked limp, uses rolling walker [Skin Lesions] : no skin lesions

## 2022-01-11 LAB
25(OH)D3 SERPL-MCNC: 29.6 NG/ML
ALBUMIN SERPL ELPH-MCNC: 4.2 G/DL
ALP BLD-CCNC: 82 U/L
ALT SERPL-CCNC: 14 U/L
ANION GAP SERPL CALC-SCNC: 12 MMOL/L
AST SERPL-CCNC: 18 U/L
BASOPHILS # BLD AUTO: 0.3 K/UL
BASOPHILS NFR BLD AUTO: 1.7 %
BILIRUB SERPL-MCNC: 0.2 MG/DL
BUN SERPL-MCNC: 14 MG/DL
CALCIUM SERPL-MCNC: 9.6 MG/DL
CHLORIDE SERPL-SCNC: 103 MMOL/L
CO2 SERPL-SCNC: 26 MMOL/L
CREAT SERPL-MCNC: 0.48 MG/DL
EOSINOPHIL # BLD AUTO: 0.46 K/UL
EOSINOPHIL NFR BLD AUTO: 2.6 %
FOLATE SERPL-MCNC: >20 NG/ML
GLUCOSE SERPL-MCNC: 83 MG/DL
HCT VFR BLD CALC: 33.9 %
HGB BLD-MCNC: 10.3 G/DL
LYMPHOCYTES # BLD AUTO: 7.35 K/UL
LYMPHOCYTES NFR BLD AUTO: 41.4 %
MAN DIFF?: NORMAL
MCHC RBC-ENTMCNC: 28.1 PG
MCHC RBC-ENTMCNC: 30.4 GM/DL
MCV RBC AUTO: 92.6 FL
MONOCYTES # BLD AUTO: 1.07 K/UL
MONOCYTES NFR BLD AUTO: 6 %
NEUTROPHILS # BLD AUTO: 7.19 K/UL
NEUTROPHILS NFR BLD AUTO: 40.5 %
PLATELET # BLD AUTO: 221 K/UL
POTASSIUM SERPL-SCNC: 4.3 MMOL/L
PROT SERPL-MCNC: 6.4 G/DL
RBC # BLD: 3.66 M/UL
RBC # FLD: 12.6 %
SODIUM SERPL-SCNC: 142 MMOL/L
VIT B12 SERPL-MCNC: >2000 PG/ML
WBC # FLD AUTO: 17.76 K/UL

## 2022-01-19 ENCOUNTER — LABORATORY RESULT (OUTPATIENT)
Age: 67
End: 2022-01-19

## 2022-01-20 ENCOUNTER — APPOINTMENT (OUTPATIENT)
Age: 67
End: 2022-01-20

## 2022-01-23 LAB
BASOPHILS # BLD AUTO: 0.08 K/UL
BASOPHILS NFR BLD AUTO: 0.4 %
EOSINOPHIL # BLD AUTO: 0.18 K/UL
EOSINOPHIL NFR BLD AUTO: 0.9 %
HCT VFR BLD CALC: 37.6 %
HGB BLD-MCNC: 11.3 G/DL
IMM GRANULOCYTES NFR BLD AUTO: 0.3 %
LYMPHOCYTES # BLD AUTO: 10.43 K/UL
LYMPHOCYTES NFR BLD AUTO: 49.9 %
MAN DIFF?: NORMAL
MCHC RBC-ENTMCNC: 28.6 PG
MCHC RBC-ENTMCNC: 30.1 GM/DL
MCV RBC AUTO: 95.2 FL
MONOCYTES # BLD AUTO: 1.85 K/UL
MONOCYTES NFR BLD AUTO: 8.9 %
NEUTROPHILS # BLD AUTO: 8.29 K/UL
NEUTROPHILS NFR BLD AUTO: 39.6 %
PLATELET # BLD AUTO: 260 K/UL
RBC # BLD: 3.95 M/UL
RBC # FLD: 12.6 %
WBC # FLD AUTO: 20.9 K/UL

## 2022-02-08 ENCOUNTER — OUTPATIENT (OUTPATIENT)
Dept: OUTPATIENT SERVICES | Facility: HOSPITAL | Age: 67
LOS: 1 days | Discharge: HOME | End: 2022-02-08

## 2022-02-08 DIAGNOSIS — D50.9 IRON DEFICIENCY ANEMIA, UNSPECIFIED: ICD-10-CM

## 2022-02-10 ENCOUNTER — LABORATORY RESULT (OUTPATIENT)
Age: 67
End: 2022-02-10

## 2022-02-10 ENCOUNTER — RESULT REVIEW (OUTPATIENT)
Age: 67
End: 2022-02-10

## 2022-02-10 ENCOUNTER — APPOINTMENT (OUTPATIENT)
Age: 67
End: 2022-02-10
Payer: MEDICARE

## 2022-02-10 VITALS
OXYGEN SATURATION: 99 % | DIASTOLIC BLOOD PRESSURE: 91 MMHG | SYSTOLIC BLOOD PRESSURE: 165 MMHG | RESPIRATION RATE: 18 BRPM | BODY MASS INDEX: 40.64 KG/M2 | TEMPERATURE: 97.3 F | HEART RATE: 96 BPM | WEIGHT: 236.75 LBS

## 2022-02-10 LAB
BASOPHILS # BLD AUTO: 0 K/UL — SIGNIFICANT CHANGE UP (ref 0–0.2)
BASOPHILS NFR BLD AUTO: 0 % — SIGNIFICANT CHANGE UP (ref 0–2)
EOSINOPHIL # BLD AUTO: 0.63 K/UL — HIGH (ref 0–0.5)
EOSINOPHIL NFR BLD AUTO: 3 % — SIGNIFICANT CHANGE UP (ref 0–6)
ERYTHROCYTE [SEDIMENTATION RATE] IN BLOOD: 43 MM/HR — HIGH (ref 0–20)
HCT VFR BLD CALC: 34.7 % — SIGNIFICANT CHANGE UP (ref 34.5–45)
HGB BLD-MCNC: 11 G/DL — LOW (ref 11.5–15.5)
LYMPHOCYTES # BLD AUTO: 14.63 K/UL — HIGH (ref 1–3.3)
LYMPHOCYTES # BLD AUTO: 70 % — HIGH (ref 13–44)
LYMPHOCYTES # SPEC AUTO: 7 % — HIGH (ref 0–0)
MCHC RBC-ENTMCNC: 29 PG — SIGNIFICANT CHANGE UP (ref 27–34)
MCHC RBC-ENTMCNC: 31.7 G/DL — LOW (ref 32–36)
MCV RBC AUTO: 91.6 FL — SIGNIFICANT CHANGE UP (ref 80–100)
MONOCYTES # BLD AUTO: 1.25 K/UL — HIGH (ref 0–0.9)
MONOCYTES NFR BLD AUTO: 6 % — SIGNIFICANT CHANGE UP (ref 2–14)
NEUTROPHILS # BLD AUTO: 2.93 K/UL — SIGNIFICANT CHANGE UP (ref 1.8–7.4)
NEUTROPHILS NFR BLD AUTO: 14 % — LOW (ref 43–77)
NRBC # BLD: 0 /100 — SIGNIFICANT CHANGE UP (ref 0–0)
NRBC # BLD: SIGNIFICANT CHANGE UP /100 WBCS (ref 0–0)
PLAT MORPH BLD: NORMAL — SIGNIFICANT CHANGE UP
PLATELET # BLD AUTO: 296 K/UL — SIGNIFICANT CHANGE UP (ref 150–400)
RBC # BLD: 3.79 M/UL — LOW (ref 3.8–5.2)
RBC # FLD: 12.4 % — SIGNIFICANT CHANGE UP (ref 10.3–14.5)
RBC BLD AUTO: SIGNIFICANT CHANGE UP
WBC # BLD: 20.9 K/UL — HIGH (ref 3.8–10.5)
WBC # FLD AUTO: 20.9 K/UL — HIGH (ref 3.8–10.5)

## 2022-02-10 PROCEDURE — 99214 OFFICE O/P EST MOD 30 MIN: CPT

## 2022-02-12 RX ORDER — CHLORHEXIDINE GLUCONATE 4 %
1000 LIQUID (ML) TOPICAL
Qty: 90 | Refills: 0 | Status: DISCONTINUED | COMMUNITY
Start: 2021-05-05

## 2022-02-12 RX ORDER — LISDEXAMFETAMINE DIMESYLATE 50 MG/1
50 CAPSULE ORAL
Qty: 90 | Refills: 0 | Status: DISCONTINUED | COMMUNITY
Start: 2022-01-06

## 2022-02-12 RX ORDER — AMOXICILLIN 500 MG/1
500 TABLET, FILM COATED ORAL
Qty: 21 | Refills: 0 | Status: DISCONTINUED | COMMUNITY
Start: 2022-01-27

## 2022-02-12 RX ORDER — DICLOFENAC SODIUM 20 MG/G
2 SOLUTION TOPICAL
Qty: 112 | Refills: 0 | Status: DISCONTINUED | COMMUNITY
Start: 2022-01-31

## 2022-02-12 RX ORDER — CHLORHEXIDINE GLUCONATE, 0.12% ORAL RINSE 1.2 MG/ML
0.12 SOLUTION DENTAL
Qty: 473 | Refills: 0 | Status: DISCONTINUED | COMMUNITY
Start: 2022-02-04

## 2022-02-12 RX ORDER — CLINDAMYCIN HYDROCHLORIDE 150 MG/1
150 CAPSULE ORAL
Qty: 28 | Refills: 0 | Status: DISCONTINUED | COMMUNITY
Start: 2021-12-08

## 2022-02-12 RX ORDER — CYCLOBENZAPRINE HYDROCHLORIDE 10 MG/1
10 TABLET, FILM COATED ORAL
Qty: 180 | Refills: 0 | Status: DISCONTINUED | COMMUNITY
Start: 2021-08-12

## 2022-02-12 NOTE — ASSESSMENT
[FreeTextEntry1] : Ms. CRUZ 's questions were answered to her satisfaction. She  expressed her  understanding and willingness to comply with the above recommendations, and  will return to the office in  3  months.\par \par \par \par \par \par \par \par \par \par \par \par \par \par \par \par \par \par \par

## 2022-02-12 NOTE — REVIEW OF SYSTEMS
[Patient Intake Form Reviewed] : Patient intake form was reviewed [Fatigue] : fatigue [Recent Change In Weight] : ~T recent weight change [Negative] : Allergic/Immunologic [Fever] : no fever [Chills] : no chills [Night Sweats] : no night sweats [Easy Bleeding] : no tendency for easy bleeding [Easy Bruising] : no tendency for easy bruising [FreeTextEntry2] : ambulates with walker [FreeTextEntry7] : s/p abdominal surgery; present incisional hernia [de-identified] : bipolar disorder

## 2022-02-12 NOTE — PHYSICAL EXAM
[Ambulatory and capable of all self care but unable to carry out any work activities] : Status 2- Ambulatory and capable of all self care but unable to carry out any work activities. Up and about more than 50% of waking hours [Obese] : obese [Normal] : affect appropriate [de-identified] : ambulates with difficulty [de-identified] : s/p hernia repair; excess pannus; no open wounds [de-identified] : Midline scar post abdominal incision well-healed; excess pannus and skin on both upper extremities, abdomen, lower extremities bilaterally

## 2022-02-12 NOTE — HISTORY OF PRESENT ILLNESS
[de-identified] : 66 F with PMHx of psoriasis, psoriatic arthritis, on Otezla, GERD, hypertension, obstructive sleep apnea, bipolar disorder, hiatal hernia and morbid obesity, s/p revision Solis-en-Y gastric bypass and transoral gastric plication (for weight 421 lbs), returning for hematologic follow-up of  CLL and anemia.\par \par CASE SYNOPSIS:\par 10/22/2021: CT A/P–postoperative changes, s/p gastric bypass with reflux into the aferrent limb.  No evidence of bowel obstruction, no evidence of recurrent ventral hernia, no intra-abdominal lymphadenopathy or splenomegaly.\par \par 10/23/2021: CT neck soft tissue-mildly enlarged diffuse prominent size cervical and axillary lymph nodes of uncertain etiology.  Lymphoproliferative etiology should be considered\par \par 1/6/2022: Bone marrow biopsy/aspirate–CLL/small lymphocytic lymphoma (60-70% involvement).  Iron stores present, no ringed sideroblasts seen.  Flow cytometry: Monotypic B cells (91.5% of lymphocytes, 40.4% of cells), positive for dim kappa, CD19, dim CD20, CD23, CD5, CD38 positive and 18.7% of cells consistent with SLL/CLL.  The myeloid immunophenotypic findings show normal myeloid granularity, no increase in myeloid immaturity and normal myeloid antigen maturation pattern.  Cytogenetics–no mutations identified.  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 deletion and 12%, FAHEEM deletion in 15.5%.\par \par  [FreeTextEntry1] : expectant surveillance\par \par  [de-identified] : Ms. RCUZ is here for follow-up after initial visit for lymphocytosis. In interim patient's clinical status has not changed.  Patient reports no B symptoms, and is compliant with medication.  Denies recent hospitalization. On 1/6/2022 patient had a bone marrow biopsy/aspirate; results consistent with CLL/small lymphocytic lymphoma (60-70% involvement).  Iron stores present, no ringed sideroblasts seen.  Flow cytometry: Monotypic B cells (91.5% of lymphocytes, 40.4% of cells), positive for dim kappa, CD19, dim CD20, CD23, CD5, CD38 positive and 18.7% of cells consistent with SLL/CLL.  The myeloid immunophenotypic findings show normal myeloid granularity, no increase in myeloid immaturity and normal myeloid antigen maturation pattern.  Cytogenetics–no mutations identified.  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 deletion and 12%, FAHEEM deletion in 15.5%.  Patient tolerated procedure well with no significant side effects.  CT from 10/22/2021 consistent with postoperative changes s/p gastric bypass with reflux in afferent limb.  No evidence of bowel obstruction, recurrent ventral hernia or lymphadenopathy.  Patient continues to complain of chronic knee pain.  Hematologic profile consistent with anemia (hemoglobin 10.3 g/dL), and a WBC at 17.76 K with 41% lymphocytes, normal platelets.  B12 and folate in normal range.

## 2022-03-07 NOTE — PROVIDER CONTACT NOTE (MEDICATION) - NAME OF MD/NP/PA/DO NOTIFIED:
[Behavioral health counseling provided] : behavioral health  Yash TABARES [None] : None [Good understanding] : Patient has a good understanding of lifestyle changes and the steps needed to achieve self management goals

## 2022-03-10 ENCOUNTER — APPOINTMENT (OUTPATIENT)
Dept: SURGERY | Facility: CLINIC | Age: 67
End: 2022-03-10
Payer: MEDICARE

## 2022-03-10 VITALS
SYSTOLIC BLOOD PRESSURE: 139 MMHG | WEIGHT: 237 LBS | DIASTOLIC BLOOD PRESSURE: 87 MMHG | HEIGHT: 64 IN | TEMPERATURE: 98.6 F | RESPIRATION RATE: 16 BRPM | OXYGEN SATURATION: 99 % | HEART RATE: 87 BPM | BODY MASS INDEX: 40.46 KG/M2

## 2022-03-10 PROCEDURE — 99213 OFFICE O/P EST LOW 20 MIN: CPT

## 2022-03-10 NOTE — PHYSICAL EXAM
[Normal Thyroid] : the thyroid was normal [Normal Breath Sounds] : Normal breath sounds [Normal Heart Sounds] : normal heart sounds [Normal Rate and Rhythm] : normal rate and rhythm [No HSM] : no hepatosplenomegaly [Alert] : alert [Oriented to Person] : oriented to person [Oriented to Place] : oriented to place [Oriented to Time] : oriented to time [Calm] : calm [de-identified] : NAD  [de-identified] : anicteric, mucous membranes moist [de-identified] : abdomen soft nontender nondistended hernia repair palpably intact [de-identified] : no CVAT [de-identified] : grossly intact

## 2022-03-10 NOTE — ASSESSMENT
[FreeTextEntry1] : Status post incisional hernia repair with mesh and component separation is intact.  Abdominal wall pain most likely secondary to her weight and normal postop healing.

## 2022-03-10 NOTE — PLAN
[FreeTextEntry1] : Supportive care including abdominal binder\par Consider panniculectomy if continued symptoms and no oncologic/hematologic contraindication\par Continue vitamins with iron\par Labs per hematology\par Activities as tolerated\par Follow-up 6 months

## 2022-03-10 NOTE — HISTORY OF PRESENT ILLNESS
[de-identified] : Ms. SHANTEL CRUZ is a 65 year-old woman who presented with an incisional hernia s/p incisional hernia repair with implantation of mesh, posterior component separation bilateral myofascial flap advancement with intact neurovascular bundle, bilateral skin flaps greater than 100cm, implantation of OviTex mesh bridge, placement of wound vacuum-assisted closure, and scar excision on 5/12/2021 who comes in today for follow up visit. Reports occasional abdominal pain above umbilicus. Denies fever, chills. Recently diagnosed with CLL.  Patient more physically active [de-identified] : Procedure: Long Limb Solis-en-Y gastric bypass \par Procedure date: 6/27/2001\par Preop weight: 391 \par \par Procedure: Revision Solis-en-Y gastric bypass with transoral gastric plication\par Procedure date: 5/25/2010

## 2022-03-23 ENCOUNTER — RX RENEWAL (OUTPATIENT)
Age: 67
End: 2022-03-23

## 2022-03-30 ENCOUNTER — APPOINTMENT (OUTPATIENT)
Dept: INTERNAL MEDICINE | Facility: CLINIC | Age: 67
End: 2022-03-30
Payer: MEDICARE

## 2022-03-30 VITALS
HEIGHT: 64 IN | OXYGEN SATURATION: 99 % | WEIGHT: 238 LBS | BODY MASS INDEX: 40.63 KG/M2 | TEMPERATURE: 98.7 F | DIASTOLIC BLOOD PRESSURE: 86 MMHG | HEART RATE: 112 BPM | SYSTOLIC BLOOD PRESSURE: 161 MMHG

## 2022-03-30 DIAGNOSIS — R06.83 SNORING: ICD-10-CM

## 2022-03-30 DIAGNOSIS — N32.89 OTHER SPECIFIED DISORDERS OF BLADDER: ICD-10-CM

## 2022-03-30 DIAGNOSIS — Z09 ENCOUNTER FOR FOLLOW-UP EXAMINATION AFTER COMPLETED TREATMENT FOR CONDITIONS OTHER THAN MALIGNANT NEOPLASM: ICD-10-CM

## 2022-03-30 DIAGNOSIS — K21.9 GASTRO-ESOPHAGEAL REFLUX DISEASE W/OUT ESOPHAGITIS: ICD-10-CM

## 2022-03-30 DIAGNOSIS — G47.33 OBSTRUCTIVE SLEEP APNEA (ADULT) (PEDIATRIC): ICD-10-CM

## 2022-03-30 DIAGNOSIS — R35.0 FREQUENCY OF MICTURITION: ICD-10-CM

## 2022-03-30 DIAGNOSIS — E66.01 MORBID (SEVERE) OBESITY DUE TO EXCESS CALORIES: ICD-10-CM

## 2022-03-30 DIAGNOSIS — Z98.84 BARIATRIC SURGERY STATUS: ICD-10-CM

## 2022-03-30 DIAGNOSIS — G89.29 OTHER CHRONIC PAIN: ICD-10-CM

## 2022-03-30 DIAGNOSIS — Z87.19 PERSONAL HISTORY OF OTHER DISEASES OF THE DIGESTIVE SYSTEM: ICD-10-CM

## 2022-03-30 PROCEDURE — 99204 OFFICE O/P NEW MOD 45 MIN: CPT

## 2022-03-30 RX ORDER — PANTOPRAZOLE 40 MG/1
40 TABLET, DELAYED RELEASE ORAL TWICE DAILY
Qty: 60 | Refills: 3 | Status: DISCONTINUED | OUTPATIENT
Start: 2021-07-26 | End: 2022-03-30

## 2022-04-04 NOTE — H&P PST ADULT - NSWEIGHTCALCTOOLDRUG_GEN_A_CORE
Telemed self reported:  Gen: no distress. Eyes: no jaundice. Mouth: no thrush. CV: no chest pain. Resp: no wheezes. Abd: no pain. Ext: no edema.	 Neuro: no weakness.
 used

## 2022-04-27 NOTE — PHYSICAL EXAM
[No Respiratory Distress] : no respiratory distress  [Normal] : affect was normal and insight and judgment were intact [de-identified] : obese [de-identified] : walks with rollator
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
## 2022-04-29 ENCOUNTER — APPOINTMENT (OUTPATIENT)
Dept: INTERNAL MEDICINE | Facility: CLINIC | Age: 67
End: 2022-04-29
Payer: MEDICARE

## 2022-04-29 VITALS
TEMPERATURE: 97.7 F | WEIGHT: 238 LBS | BODY MASS INDEX: 40.63 KG/M2 | HEIGHT: 64 IN | DIASTOLIC BLOOD PRESSURE: 80 MMHG | SYSTOLIC BLOOD PRESSURE: 129 MMHG | HEART RATE: 92 BPM | OXYGEN SATURATION: 98 %

## 2022-04-29 DIAGNOSIS — G47.21 CIRCADIAN RHYTHM SLEEP DISORDER, DELAYED SLEEP PHASE TYPE: ICD-10-CM

## 2022-04-29 DIAGNOSIS — Z86.59 PERSONAL HISTORY OF OTHER MENTAL AND BEHAVIORAL DISORDERS: ICD-10-CM

## 2022-04-29 DIAGNOSIS — M70.50 OTHER BURSITIS OF KNEE, UNSPECIFIED KNEE: ICD-10-CM

## 2022-04-29 DIAGNOSIS — Z22.322 CARRIER OR SUSPECTED CARRIER OF METHICILLIN RESISTANT STAPHYLOCOCCUS AUREUS: ICD-10-CM

## 2022-04-29 DIAGNOSIS — G47.33 OBSTRUCTIVE SLEEP APNEA (ADULT) (PEDIATRIC): ICD-10-CM

## 2022-04-29 DIAGNOSIS — D72.829 ELEVATED WHITE BLOOD CELL COUNT, UNSPECIFIED: ICD-10-CM

## 2022-04-29 DIAGNOSIS — A49.01 METHICILLIN SUSCEPTIBLE STAPHYLOCOCCUS AUREUS INFECTION, UNSPECIFIED SITE: ICD-10-CM

## 2022-04-29 DIAGNOSIS — R13.10 DYSPHAGIA, UNSPECIFIED: ICD-10-CM

## 2022-04-29 DIAGNOSIS — G47.10 HYPERSOMNIA, UNSPECIFIED: ICD-10-CM

## 2022-04-29 DIAGNOSIS — D63.8 ANEMIA IN OTHER CHRONIC DISEASES CLASSIFIED ELSEWHERE: ICD-10-CM

## 2022-04-29 DIAGNOSIS — G47.34 IDIOPATHIC SLEEP RELATED NONOBSTRUCTIVE ALVEOLAR HYPOVENTILATION: ICD-10-CM

## 2022-04-29 DIAGNOSIS — B37.0 CANDIDAL STOMATITIS: ICD-10-CM

## 2022-04-29 DIAGNOSIS — G47.30 HYPERSOMNIA, UNSPECIFIED: ICD-10-CM

## 2022-04-29 DIAGNOSIS — R07.81 PLEURODYNIA: ICD-10-CM

## 2022-04-29 PROCEDURE — 99215 OFFICE O/P EST HI 40 MIN: CPT

## 2022-04-29 RX ORDER — MONTELUKAST SODIUM 10 MG/1
10 TABLET, FILM COATED ORAL
Refills: 0 | Status: DISCONTINUED | COMMUNITY
End: 2022-04-29

## 2022-04-29 RX ORDER — CLONAZEPAM 0.5 MG/1
0.5 TABLET ORAL
Refills: 0 | Status: ACTIVE | COMMUNITY

## 2022-04-29 RX ORDER — LEVOCETIRIZINE DIHYDROCHLORIDE 5 MG/1
TABLET, FILM COATED ORAL
Refills: 0 | Status: DISCONTINUED | COMMUNITY
End: 2022-04-29

## 2022-04-29 RX ORDER — CYCLOBENZAPRINE HCL 5 MG
TABLET ORAL
Refills: 0 | Status: ACTIVE | COMMUNITY

## 2022-04-29 RX ORDER — APREMILAST 30 MG/1
30 TABLET, FILM COATED ORAL
Refills: 0 | Status: ACTIVE | COMMUNITY
Start: 2022-01-18

## 2022-04-29 RX ORDER — MOMETASONE FUROATE 1 MG/G
0.1 OINTMENT TOPICAL
Qty: 45 | Refills: 0 | Status: DISCONTINUED | COMMUNITY
Start: 2022-01-11 | End: 2022-04-29

## 2022-04-29 RX ORDER — VORTIOXETINE 10 MG/1
10 TABLET, FILM COATED ORAL
Refills: 0 | Status: ACTIVE | COMMUNITY

## 2022-04-29 RX ORDER — LAMOTRIGINE 200 MG/1
200 TABLET ORAL
Refills: 0 | Status: ACTIVE | COMMUNITY
Start: 2021-12-08

## 2022-04-29 RX ORDER — CLONAZEPAM 2 MG/1
2 TABLET ORAL
Refills: 0 | Status: ACTIVE | COMMUNITY

## 2022-04-29 NOTE — ASSESSMENT
[FreeTextEntry1] : For tongue issues suggested pt use mouth guard, likely she is biting and irritating tongue during sleep.\par Discussed timing of booster, recommended this for pt.\par Suggested Shingrix, pt will go to pharmacy for this.\par Pt wants to have knee done at [facility], suggested she discuss possibly of coordination of plastic surgery to remove excess tissue - if this could be done at the same time. \par Pt repots having had DEXA in the past, remembers T score of -1.8. Pt has had previous fractures. Calculated pt's FRAX score, still below treatment cur off even with addition of inflammatory arthritis as RF. Will continue calcium and Vit D and repeat DEXA 3-5 years. \par

## 2022-04-29 NOTE — HISTORY OF PRESENT ILLNESS
[FreeTextEntry1] : f/u multiple issues [de-identified] : 67 yo F with complicated PMHx, here for f/u. Has a list with multiple things to discuss.\par 1.She increased enalapril to 10 mg as per my suggestion and states her BP is much better controlled.\par 2. She has tried 1000 mg tylenol  at night and other times and the pain relief is often as good as she was getting with the opioid, so she uses those less.\par 3. Has h/o  esophageal candidiasis with dysphagia refractory to topical rx, now taking oral fluconazole per ID indefinitely and doing better, was HIV (-). Then developed soreness with swallowing, seen by ENT< had esophagram which showed poorly functioning epiglottis and dysfunction of lower third of esophagus, needs to see GI. \par 4. Brought in list of supplements and their nutritional info to review what she should continue to take and not, has eliminate many of these due to redundancy at my suggestion and so has decreased her med list significantly.\par 5. Notes in am her tongue feels raw and painful, either side, then resolves through day. Does use c-pap at night, dose grind her teeth, used to use a mouth guard, currently not using. \par 6. Has bone-on-bone left knee, had all injections in past, likely needs TKR but also has significant skin/fat overhanging knee from weight loss following bariatric surgery, so unclear how to proceed to get this fixed. \par 7. Getting PT and balance therapy, still walks with rollator. \par 8. Plans on travel this summer, had questions about the COVID booster. Had COVID in 2020, vaccinated in 2021 and booster Nov 2021.

## 2022-05-23 ENCOUNTER — RX RENEWAL (OUTPATIENT)
Age: 67
End: 2022-05-23

## 2022-06-02 ENCOUNTER — OUTPATIENT (OUTPATIENT)
Dept: OUTPATIENT SERVICES | Facility: HOSPITAL | Age: 67
LOS: 1 days | Discharge: HOME | End: 2022-06-02

## 2022-06-02 DIAGNOSIS — D50.9 IRON DEFICIENCY ANEMIA, UNSPECIFIED: ICD-10-CM

## 2022-06-03 ENCOUNTER — LABORATORY RESULT (OUTPATIENT)
Age: 67
End: 2022-06-03

## 2022-06-04 LAB
ALBUMIN SERPL ELPH-MCNC: 4.6 G/DL
ALP BLD-CCNC: 97 U/L
ALT SERPL-CCNC: 14 U/L
ANION GAP SERPL CALC-SCNC: 15 MMOL/L
AST SERPL-CCNC: 24 U/L
B2 MICROGLOB SERPL-MCNC: 1.6 MG/L
BILIRUB SERPL-MCNC: 0.3 MG/DL
BUN SERPL-MCNC: 10 MG/DL
CALCIUM SERPL-MCNC: 9.7 MG/DL
CHLORIDE SERPL-SCNC: 91 MMOL/L
CO2 SERPL-SCNC: 28 MMOL/L
CREAT SERPL-MCNC: 0.41 MG/DL
DEPRECATED KAPPA LC FREE/LAMBDA SER: 2.26 RATIO
EGFR: 110 ML/MIN/1.73M2
ERYTHROCYTE [SEDIMENTATION RATE] IN BLOOD BY WESTERGREN METHOD: 36 MM/HR
GLUCOSE SERPL-MCNC: 117 MG/DL
IGA SER QL IEP: 143 MG/DL
IGG SER QL IEP: 767 MG/DL
IGM SER QL IEP: 159 MG/DL
KAPPA LC CSF-MCNC: 1.56 MG/DL
KAPPA LC SERPL-MCNC: 3.52 MG/DL
LDH SERPL-CCNC: 251 U/L
POTASSIUM SERPL-SCNC: 4.7 MMOL/L
PROT SERPL-MCNC: 6.9 G/DL
SODIUM SERPL-SCNC: 133 MMOL/L

## 2022-06-07 LAB
BASOPHILS # BLD AUTO: 0.1 K/UL
BASOPHILS NFR BLD AUTO: 0.4 %
EOSINOPHIL # BLD AUTO: 0.21 K/UL
EOSINOPHIL NFR BLD AUTO: 0.8 %
HCT VFR BLD CALC: 34.8 %
HGB BLD-MCNC: 10.9 G/DL
IMM GRANULOCYTES NFR BLD AUTO: 0.3 %
LYMPHOCYTES # BLD AUTO: 18.21 K/UL
LYMPHOCYTES NFR BLD AUTO: 67.2 %
MAN DIFF?: NORMAL
MCHC RBC-ENTMCNC: 28.8 PG
MCHC RBC-ENTMCNC: 31.3 GM/DL
MCV RBC AUTO: 92.1 FL
MONOCYTES # BLD AUTO: 0.84 K/UL
MONOCYTES NFR BLD AUTO: 3.1 %
NEUTROPHILS # BLD AUTO: 7.64 K/UL
NEUTROPHILS NFR BLD AUTO: 28.2 %
PLATELET # BLD AUTO: 219 K/UL
RBC # BLD: 3.78 M/UL
RBC # FLD: 12.6 %
WBC # FLD AUTO: 27.08 K/UL

## 2022-06-09 ENCOUNTER — APPOINTMENT (OUTPATIENT)
Age: 67
End: 2022-06-09
Payer: MEDICARE

## 2022-06-09 PROCEDURE — 99214 OFFICE O/P EST MOD 30 MIN: CPT

## 2022-06-09 NOTE — HISTORY OF PRESENT ILLNESS
[de-identified] : 66 F with PMHx of psoriasis, psoriatic arthritis, on Otezla, GERD, hypertension, obstructive sleep apnea, bipolar disorder, hiatal hernia and morbid obesity, s/p revision Solis-en-Y gastric bypass and transoral gastric plication (for weight 421 lbs), returning for hematologic follow-up of  CLL and anemia.\par \par CASE SYNOPSIS:\par 10/22/2021: CT A/P–postoperative changes, s/p gastric bypass with reflux into the aferrent limb.  No evidence of bowel obstruction, no evidence of recurrent ventral hernia, no intra-abdominal lymphadenopathy or splenomegaly.\par \par 10/23/2021: CT neck soft tissue-mildly enlarged diffuse prominent size cervical and axillary lymph nodes of uncertain etiology.  Lymphoproliferative etiology should be considered\par \par 1/6/2022: Bone marrow biopsy/aspirate–CLL/small lymphocytic lymphoma (60-70% involvement).  Iron stores present, no ringed sideroblasts seen.  Flow cytometry: Monotypic B cells (91.5% of lymphocytes, 40.4% of cells), positive for dim kappa, CD19, dim CD20, CD23, CD5, CD38 positive and 18.7% of cells consistent with SLL/CLL.  The myeloid immunophenotypic findings show normal myeloid granularity, no increase in myeloid immaturity and normal myeloid antigen maturation pattern.  Cytogenetics–no mutations identified.  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 deletion and 12%, FAHEEM deletion in 15.5%.\par \par  [FreeTextEntry1] : expectant surveillance\par \par  [de-identified] : Last seen in the office in April 2022; short-term follow-up visit.  No recent hospitalization.  \roberto Continues to complain of arthritic pain for which she is taking 1000 mg Tylenol at night.  In interim patient had esophagram which showed poorly functioning epiglottis and dysfunction of lower third of esophagus; she is to see GI.\par Recently diagnosed with a left side of tongue lesion; had treatment with topical steroid and currently is taking Augmentin and prednisone.\par She also reports occasional epistaxis.\par Chief complaint is arthritic pain, mostly in left knee, more pronounced due to morbid obesity.  Walks with a Rollator.\par Taking a long list of vitamin and mineral supplements, including vitamin D and calcium.\par Most recent blood work c/w hemoglobin 10.9 g/dL, WBC 27.08, 67.2% lymphocytes.\par Plans traveling to [place] and [place] next 3 months.  Here accompanied by her .\par \par \par

## 2022-06-09 NOTE — REASON FOR VISIT
[Follow-Up Visit] : a follow-up visit for [CLL] : chronic lymphocytic leukemia [Leukocytosis] : leukocytosis [Family Member] : family member [Spouse] : spouse

## 2022-06-09 NOTE — PHYSICAL EXAM
[Ambulatory and capable of all self care but unable to carry out any work activities] : Status 2- Ambulatory and capable of all self care but unable to carry out any work activities. Up and about more than 50% of waking hours [Obese] : obese [Normal] : affect appropriate [de-identified] : ambulates with difficulty [de-identified] : Left side tongue lesion [de-identified] : s/p hernia repair; excess pannus; no open wounds [de-identified] : Midline scar post abdominal incision well-healed; excess pannus and skin on both upper extremities, abdomen, lower extremities bilaterally

## 2022-06-09 NOTE — REVIEW OF SYSTEMS
[Fatigue] : fatigue [Recent Change In Weight] : ~T recent weight change [Negative] : Allergic/Immunologic [Fever] : no fever [Chills] : no chills [Night Sweats] : no night sweats [Nosebleeds] : nosebleeds [Joint Pain] : joint pain [Easy Bleeding] : no tendency for easy bleeding [Easy Bruising] : no tendency for easy bruising [FreeTextEntry2] : ambulates with walker [FreeTextEntry4] : Infrequent epistaxis [FreeTextEntry7] : s/p abdominal surgery; present incisional hernia [FreeTextEntry9] : Lower back and knees [de-identified] : bipolar disorder

## 2022-07-13 ENCOUNTER — APPOINTMENT (OUTPATIENT)
Dept: CT IMAGING | Facility: IMAGING CENTER | Age: 67
End: 2022-07-13

## 2022-07-13 ENCOUNTER — OUTPATIENT (OUTPATIENT)
Dept: OUTPATIENT SERVICES | Facility: HOSPITAL | Age: 67
LOS: 1 days | End: 2022-07-13
Payer: MEDICARE

## 2022-07-13 DIAGNOSIS — Z00.8 ENCOUNTER FOR OTHER GENERAL EXAMINATION: ICD-10-CM

## 2022-07-13 PROCEDURE — 70491 CT SOFT TISSUE NECK W/DYE: CPT | Mod: MH

## 2022-07-13 PROCEDURE — 70491 CT SOFT TISSUE NECK W/DYE: CPT | Mod: 26,MH

## 2022-07-15 ENCOUNTER — APPOINTMENT (OUTPATIENT)
Dept: GASTROENTEROLOGY | Facility: CLINIC | Age: 67
End: 2022-07-15

## 2022-07-26 ENCOUNTER — TRANSCRIPTION ENCOUNTER (OUTPATIENT)
Age: 67
End: 2022-07-26

## 2022-08-02 ENCOUNTER — APPOINTMENT (OUTPATIENT)
Dept: NUCLEAR MEDICINE | Facility: IMAGING CENTER | Age: 67
End: 2022-08-02

## 2022-08-02 ENCOUNTER — OUTPATIENT (OUTPATIENT)
Dept: OUTPATIENT SERVICES | Facility: HOSPITAL | Age: 67
LOS: 1 days | End: 2022-08-02
Payer: MEDICARE

## 2022-08-02 DIAGNOSIS — C91.10 CHRONIC LYMPHOCYTIC LEUKEMIA OF B-CELL TYPE NOT HAVING ACHIEVED REMISSION: ICD-10-CM

## 2022-08-02 PROCEDURE — 78815 PET IMAGE W/CT SKULL-THIGH: CPT

## 2022-08-02 PROCEDURE — 78815 PET IMAGE W/CT SKULL-THIGH: CPT | Mod: 26,PI,MH

## 2022-08-02 PROCEDURE — A9552: CPT

## 2022-08-03 ENCOUNTER — APPOINTMENT (OUTPATIENT)
Dept: INTERNAL MEDICINE | Facility: CLINIC | Age: 67
End: 2022-08-03

## 2022-08-03 PROCEDURE — 99442: CPT | Mod: 95

## 2022-08-03 NOTE — PHYSICAL EXAM
[Normal Voice/Communication] : normal voice/communication [Normal] : no acute distress, well nourished, well developed and well-appearing [Speech Grossly Normal] : speech grossly normal [Normal Affect] : the affect was normal

## 2022-08-03 NOTE — HISTORY OF PRESENT ILLNESS
[Home] : at home, [unfilled] , at the time of the visit. [Medical Office: ([facility])___] : at the medical office located in  [Spouse] : spouse [Verbal consent obtained from patient] : the patient, [unfilled] [FreeTextEntry1] : f/u multiple issues [de-identified] : 66 yo with multiple medical problems. Recent esophagram, found muscle dysfunction, seen by GI, referred to [facility], saw another GI there, pt to undergo additional testing (manometry, speech and language). 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 \par CT scan also showed enlargement of previously seen LN, pt will f/u with oncology for this. Pt also noted to have abnormalities of lab work. Pt had PET scan yesterday, which was c/w progression of CLL. \par Lesion under tongue persists despite 3 weeks of Difucan rx and steroids, ENT f/u for this, may get biopsy of this. \par Sent to ortho by rheum, will get TKR in October, will need preop clearance prior. \par Pt reports several falls since last seen, knee goes out, pt uses walker and gets PT, but not completely helpful.\par Pt still with hand tremors, seen by neuro, stable, benign, started on propranolol which helped a lot. \par Pt still having issues with swallowing, given rx for this by GI (SL nitro).

## 2022-08-03 NOTE — ASSESSMENT
[FreeTextEntry1] : Pt with multiple active issues, all undergoing appropriate evaluation and pt pleased with all her providers. She will call for preop appt and if she needs any meds or anything else.

## 2022-08-12 ENCOUNTER — EMERGENCY (EMERGENCY)
Facility: HOSPITAL | Age: 67
LOS: 1 days | Discharge: LEFT BEFORE DISCHARGE | End: 2022-08-12

## 2022-08-12 VITALS
WEIGHT: 240.08 LBS | SYSTOLIC BLOOD PRESSURE: 123 MMHG | HEART RATE: 94 BPM | DIASTOLIC BLOOD PRESSURE: 81 MMHG | TEMPERATURE: 99 F | RESPIRATION RATE: 19 BRPM | OXYGEN SATURATION: 96 % | HEIGHT: 64 IN

## 2022-08-12 PROCEDURE — L9991: CPT

## 2022-08-13 ENCOUNTER — EMERGENCY (EMERGENCY)
Facility: HOSPITAL | Age: 67
LOS: 1 days | Discharge: HOME | End: 2022-08-13
Attending: STUDENT IN AN ORGANIZED HEALTH CARE EDUCATION/TRAINING PROGRAM
Payer: MEDICARE

## 2022-08-13 VITALS
OXYGEN SATURATION: 95 % | SYSTOLIC BLOOD PRESSURE: 152 MMHG | HEART RATE: 85 BPM | DIASTOLIC BLOOD PRESSURE: 69 MMHG | TEMPERATURE: 98 F | RESPIRATION RATE: 18 BRPM

## 2022-08-13 VITALS
WEIGHT: 240.08 LBS | SYSTOLIC BLOOD PRESSURE: 150 MMHG | HEART RATE: 90 BPM | OXYGEN SATURATION: 99 % | TEMPERATURE: 98 F | DIASTOLIC BLOOD PRESSURE: 79 MMHG | HEIGHT: 64 IN | RESPIRATION RATE: 20 BRPM

## 2022-08-13 LAB
ALBUMIN SERPL ELPH-MCNC: 4.2 G/DL — SIGNIFICANT CHANGE UP (ref 3.3–5)
ALP SERPL-CCNC: 86 U/L — SIGNIFICANT CHANGE UP (ref 40–120)
ALT FLD-CCNC: 13 U/L — SIGNIFICANT CHANGE UP (ref 10–45)
ANION GAP SERPL CALC-SCNC: 12 MMOL/L — SIGNIFICANT CHANGE UP (ref 5–17)
AST SERPL-CCNC: 18 U/L — SIGNIFICANT CHANGE UP (ref 10–40)
BASOPHILS # BLD AUTO: 0 K/UL — SIGNIFICANT CHANGE UP (ref 0–0.2)
BASOPHILS NFR BLD AUTO: 0 % — SIGNIFICANT CHANGE UP (ref 0–2)
BILIRUB SERPL-MCNC: 0.2 MG/DL — SIGNIFICANT CHANGE UP (ref 0.2–1.2)
BUN SERPL-MCNC: 12 MG/DL — SIGNIFICANT CHANGE UP (ref 7–23)
CALCIUM SERPL-MCNC: 9.6 MG/DL — SIGNIFICANT CHANGE UP (ref 8.4–10.5)
CHLORIDE SERPL-SCNC: 97 MMOL/L — SIGNIFICANT CHANGE UP (ref 96–108)
CO2 SERPL-SCNC: 27 MMOL/L — SIGNIFICANT CHANGE UP (ref 22–31)
CREAT SERPL-MCNC: 0.36 MG/DL — LOW (ref 0.5–1.3)
EGFR: 113 ML/MIN/1.73M2 — SIGNIFICANT CHANGE UP
EOSINOPHIL # BLD AUTO: 0.42 K/UL — SIGNIFICANT CHANGE UP (ref 0–0.5)
EOSINOPHIL NFR BLD AUTO: 2 % — SIGNIFICANT CHANGE UP (ref 0–6)
GLUCOSE SERPL-MCNC: 99 MG/DL — SIGNIFICANT CHANGE UP (ref 70–99)
HCT VFR BLD CALC: 32.6 % — LOW (ref 34.5–45)
HGB BLD-MCNC: 10.5 G/DL — LOW (ref 11.5–15.5)
LYMPHOCYTES # BLD AUTO: 14.46 K/UL — HIGH (ref 1–3.3)
LYMPHOCYTES # BLD AUTO: 69 % — HIGH (ref 13–44)
MANUAL SMEAR VERIFICATION: SIGNIFICANT CHANGE UP
MCHC RBC-ENTMCNC: 29.5 PG — SIGNIFICANT CHANGE UP (ref 27–34)
MCHC RBC-ENTMCNC: 32.2 GM/DL — SIGNIFICANT CHANGE UP (ref 32–36)
MCV RBC AUTO: 91.6 FL — SIGNIFICANT CHANGE UP (ref 80–100)
MONOCYTES # BLD AUTO: 0.84 K/UL — SIGNIFICANT CHANGE UP (ref 0–0.9)
MONOCYTES NFR BLD AUTO: 4 % — SIGNIFICANT CHANGE UP (ref 2–14)
NEUTROPHILS # BLD AUTO: 5.24 K/UL — SIGNIFICANT CHANGE UP (ref 1.8–7.4)
NEUTROPHILS NFR BLD AUTO: 25 % — LOW (ref 43–77)
NRBC # BLD: 0 /100 — SIGNIFICANT CHANGE UP (ref 0–0)
PLAT MORPH BLD: NORMAL — SIGNIFICANT CHANGE UP
PLATELET # BLD AUTO: 277 K/UL — SIGNIFICANT CHANGE UP (ref 150–400)
POTASSIUM SERPL-MCNC: 3.9 MMOL/L — SIGNIFICANT CHANGE UP (ref 3.5–5.3)
POTASSIUM SERPL-SCNC: 3.9 MMOL/L — SIGNIFICANT CHANGE UP (ref 3.5–5.3)
PROT SERPL-MCNC: 6.8 G/DL — SIGNIFICANT CHANGE UP (ref 6–8.3)
RBC # BLD: 3.56 M/UL — LOW (ref 3.8–5.2)
RBC # FLD: 12.6 % — SIGNIFICANT CHANGE UP (ref 10.3–14.5)
RBC BLD AUTO: NORMAL — SIGNIFICANT CHANGE UP
SMUDGE CELLS # BLD: PRESENT — SIGNIFICANT CHANGE UP
SODIUM SERPL-SCNC: 136 MMOL/L — SIGNIFICANT CHANGE UP (ref 135–145)
WBC # BLD: 20.95 K/UL — HIGH (ref 3.8–10.5)
WBC # FLD AUTO: 20.95 K/UL — HIGH (ref 3.8–10.5)

## 2022-08-13 PROCEDURE — G1004: CPT

## 2022-08-13 PROCEDURE — 93010 ELECTROCARDIOGRAM REPORT: CPT | Mod: GC

## 2022-08-13 PROCEDURE — 71046 X-RAY EXAM CHEST 2 VIEWS: CPT | Mod: 26

## 2022-08-13 PROCEDURE — 71046 X-RAY EXAM CHEST 2 VIEWS: CPT

## 2022-08-13 PROCEDURE — 99284 EMERGENCY DEPT VISIT MOD MDM: CPT | Mod: 25,GC

## 2022-08-13 PROCEDURE — 36415 COLL VENOUS BLD VENIPUNCTURE: CPT

## 2022-08-13 PROCEDURE — 71250 CT THORAX DX C-: CPT | Mod: MG

## 2022-08-13 PROCEDURE — 99285 EMERGENCY DEPT VISIT HI MDM: CPT | Mod: 25

## 2022-08-13 PROCEDURE — 71250 CT THORAX DX C-: CPT | Mod: 26,MG

## 2022-08-13 PROCEDURE — 93005 ELECTROCARDIOGRAM TRACING: CPT

## 2022-08-13 PROCEDURE — 80053 COMPREHEN METABOLIC PANEL: CPT

## 2022-08-13 PROCEDURE — 85025 COMPLETE CBC W/AUTO DIFF WBC: CPT

## 2022-08-13 RX ORDER — AZITHROMYCIN 500 MG/1
1 TABLET, FILM COATED ORAL
Qty: 14 | Refills: 0
Start: 2022-08-13 | End: 2022-08-19

## 2022-08-13 RX ORDER — AZITHROMYCIN 500 MG/1
500 TABLET, FILM COATED ORAL ONCE
Refills: 0 | Status: DISCONTINUED | OUTPATIENT
Start: 2022-08-13 | End: 2022-08-13

## 2022-08-13 RX ORDER — AZITHROMYCIN 500 MG/1
1 TABLET, FILM COATED ORAL
Qty: 3 | Refills: 0
Start: 2022-08-13 | End: 2022-08-15

## 2022-08-13 RX ORDER — AZITHROMYCIN 500 MG/1
500 TABLET, FILM COATED ORAL ONCE
Refills: 0 | Status: COMPLETED | OUTPATIENT
Start: 2022-08-13 | End: 2022-08-13

## 2022-08-13 RX ADMIN — Medication 1 TABLET(S): at 19:38

## 2022-08-13 RX ADMIN — AZITHROMYCIN 500 MILLIGRAM(S): 500 TABLET, FILM COATED ORAL at 19:38

## 2022-08-13 NOTE — ED PROVIDER NOTE - NS ED ROS FT
GENERAL: No fever, no chills  EYES: No change in vision  HEENT: No trouble swallowing or speaking  CARDIAC: No chest pain, +left rib pain   PULMONARY: No cough, no SOB  GI: No abdominal pain, no nausea, no vomiting, no diarrhea, no constipation  : No changes in urination  SKIN: No rashes  NEURO: No headache, no numbness  MSK: No joint pain  Otherwise as HPI or negative.

## 2022-08-13 NOTE — ED PROVIDER NOTE - OBJECTIVE STATEMENT
68yo woman with PMH CLL, HTN, frequent falls, prior rib fractures, presenting with left sided pain after a recent fall on 8/8/22 and known fractures of ribs 9-11. Patient states that she has mild bilateral pleural effusion, concerned that the fractures may have worsened in the meantime. Denies fevers, midsternal chest pain, SOB, abd pain.

## 2022-08-13 NOTE — ED PROVIDER NOTE - PATIENT PORTAL LINK FT
You can access the FollowMyHealth Patient Portal offered by [facility] by registering at the following website: [email or web address]. By joining [facility]’s FollowMyHealth portal, you will also be able to view your health information using other applications (apps) compatible with our system.

## 2022-08-13 NOTE — ED ADULT NURSE NOTE - CHIEF COMPLAINT
The patient is a 67y Female complaining of L rib pain s/p fall, referred from [facility]

## 2022-08-13 NOTE — ED PROVIDER NOTE - INTERPRETATION
Patient arrived to [facility] via transport from ED. Reason for hospitalization is bilateral PE. Pt arrives on heparin infusion going at 20 units/kg/hr. Pt A&O x 4, VSS. Patient oriented to room, call light system, menu, bathroom, and phone. Will continue to monitor.    normal sinus rhythm

## 2022-08-13 NOTE — ED ADULT NURSE NOTE - NURSING NEURO ORIENTATION
Treatment Number: 0 Post-Care Instructions: I reviewed with the patient in detail post-care instructions. Patient should avoid sun exposure and wear sun protection. Consent: Prior to the procedure, written consent was obtained and risks were reviewed, including but not limited to: redness, peeling, blistering, pigmentary change, scarring, infection, and pain. Prep: The treated area was degreased with pre-peel cleanser, and vaseline was applied for protection of mucous membranes. Time (Mins): 2 Chemical Peel: 15% TCA Post Peel Care: After the procedure, the treatment area was washed with soap and water, and a post-peel cream was applied. Sun protection and post-care instructions were reviewed with the patient. Detail Level: Zone oriented to person, place and time

## 2022-08-13 NOTE — ED PROVIDER NOTE - PHYSICAL EXAMINATION
Gen: NAD, AOx3, able to make needs known, non-toxic  Head: NCAT  HEENT: EOMI, normal conjunctiva  Lung: CTAB, no respiratory distress, ?crackles of lower left lung, speaking in full sentences, saturating 99% on RA   CV: RRR, pulses bilaterally   Abd: soft, NTND, no guarding, no CVA tenderness  MSK: no visible bony deformities  Neuro: No focal sensory or motor deficits  Skin: Warm, well perfused, no rash  Psych: normal affect

## 2022-08-13 NOTE — ED ADULT NURSE NOTE - INTERVENTIONS DEFINITIONS
[place] to call system/Call bell, personal items and telephone within reach/Instruct patient to call for assistance/Room bathroom lighting operational/Physically safe environment: no spills, clutter or unnecessary equipment/Stretcher in lowest position, wheels locked, appropriate side rails in place/Monitor gait and stability

## 2022-08-13 NOTE — ED PROVIDER NOTE - NSFOLLOWUPINSTRUCTIONS_ED_ALL_ED_FT
You were seen in the Emergency Room today because of left chest pain. A copy of your results is included in your discharge paperwork.     Please follow-up with your Primary Care Physician within the week.     We have sent medication to your Pharmacy. It is called Azithromycin and Augmentin. These are antibiotics. Take them as prescribed.     Use your incentive spirometer as directed. Try to use it every 15 minutes at least 10-20 times a day.     DISCHARGE INSTRUCTIONS:  Please return to the Emergency Room if:   •Your signs and symptoms are the same or worse within 24 hours of treatment.   •You have shaking chills or a fever over 102°F.   •You have new pain, pressure, or tightness in your chest.   •You have a new or worse cough or wheezing, or you cough up blood.  •You feel like you cannot get enough air.  •The skin over your ribs or on your neck sinks in when you breathe.   •You have a severe headache with vomiting and abdominal pain.   •You feel confused or dizzy.

## 2022-08-13 NOTE — ED PROVIDER NOTE - PROGRESS NOTE DETAILS
Laisha Huizar MD (Attending Physician):    67 year old female presents for evaluation of rib fractures of 9-11 on the left s/p fall 5 days ago. was seen at  and shown to have rib fx from CT with pleural effusion. O2 sat 91% - 94% during the week and HCP at [facility] where she was seen told her to get checked out. PMHx of pulm embolism and was on eliquis but not anymore. No fever, chills, cp, leg swelling, nausea, vomiting, abdominal pain. on exam, CTAB, no crepitus, no ecchymosis, mild tenderness over left lateral ribs. Plan: CT and labs, trauma sx consult Narciso, PGY2 - possible pneumonia on left lower lung, will send azithromycin and augmentin to pharm. Patient stable for discharge. Understands the Emergency Room work-up and discharge precautions. Will follow-up with pcp Laisha Huizar MD (Attending Physician):    CT showing acute on chronic fx. only 1 acute fx. Pna seen. O2 94-95% on RA. Given abx for home with follow up to trauma surgery. IS also given. I discussed the results and answered all questions. Patient is amenable to plan of being discharged home. Strict ED return precautions given

## 2022-08-13 NOTE — ED PROVIDER NOTE - ATTENDING CONTRIBUTION TO CARE
I, Laisha Huizar, performed a history and physical exam of the patient and discussed their management with the resident and /or advanced care provider. I reviewed the resident and /or ACP's note and agree with the documented findings and plan of care. I was present and available for all procedures.

## 2022-08-13 NOTE — ED ADULT NURSE REASSESSMENT NOTE - NS ED NURSE REASSESS COMMENT FT1
Pt provided with incentive spirometer to go home with, pt accurately demonstrated to RN how to use spirometer.
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
## 2022-09-08 ENCOUNTER — APPOINTMENT (OUTPATIENT)
Dept: SURGERY | Facility: CLINIC | Age: 67
End: 2022-09-08

## 2022-09-08 VITALS
DIASTOLIC BLOOD PRESSURE: 82 MMHG | BODY MASS INDEX: 39.78 KG/M2 | SYSTOLIC BLOOD PRESSURE: 127 MMHG | RESPIRATION RATE: 16 BRPM | TEMPERATURE: 98.7 F | HEIGHT: 64 IN | HEART RATE: 94 BPM | OXYGEN SATURATION: 98 % | WEIGHT: 233 LBS

## 2022-09-08 VITALS — BODY MASS INDEX: 39.78 KG/M2 | WEIGHT: 233 LBS | HEIGHT: 64 IN

## 2022-09-08 PROCEDURE — 99213 OFFICE O/P EST LOW 20 MIN: CPT

## 2022-09-08 NOTE — PHYSICAL EXAM
[Normal Thyroid] : the thyroid was normal [Normal Breath Sounds] : Normal breath sounds [Wheezing] : wheezing was heard [Normal Heart Sounds] : normal heart sounds [Normal Rate and Rhythm] : normal rate and rhythm [No HSM] : no hepatosplenomegaly [No Rash or Lesion] : No rash or lesion [Alert] : alert [Oriented to Person] : oriented to person [Oriented to Place] : oriented to place [Oriented to Time] : oriented to time [Calm] : calm [de-identified] : NAD [de-identified] : mucous membranes moist [de-identified] : abdomen soft nontender nondistended no palpable hernia [de-identified] : no CVAT  [de-identified] : grossly intact, uses walker

## 2022-09-08 NOTE — HISTORY OF PRESENT ILLNESS
[de-identified] : Ms. SHANTEL CRUZ is a 65 year-old woman who presented with an incisional hernia s/p incisional hernia repair with implantation of mesh, posterior component separation bilateral myofascial flap advancement with intact neurovascular bundle, bilateral skin flaps greater than 100cm, implantation of OviTex mesh bridge, placement of wound vacuum-assisted closure, and scar excision on 5/12/2021 who comes in today for follow up visit. Doing well. No fever/chills/abdominal pain. \par Patient reports recent dysphagia, voice changes & pain upon swallowing. Recent esophagram shows weakness of throat muscles & tertiary contractions. BRAVO study done as well (done at [facility]), started Nexium in lieu of Protonix. \par Having knee replacement 10/2022 [de-identified] : Procedure: Long-limb Solis-en-Y gastric bypass\par Procedure date: 6/27/2001\par \par Procedure: Revision Solis-en-Y gastric bypass with transoral gastric plication \par Procedure date: 5/25/2010

## 2022-09-08 NOTE — PLAN
[FreeTextEntry1] : Okay to proceed with knee replacement if cleared by oncology and medicine\par Unclear etiology of dysphagia but would recommend following GI/motility doctors recommendation\par Continue weight maintenance effort\par Ambulation as tolerated\par Less than 25 g of carbohydrates per day\par Follow-up 6 months

## 2022-09-08 NOTE — ASSESSMENT
[FreeTextEntry1] : Previous visit weight: 237\par Today's weight:233\par Today's BMI: 39.9\par \par 4 months status post incisional hernia with mesh doing well from that point of view

## 2022-09-21 NOTE — ED ADULT TRIAGE NOTE - IDEAL BODY WEIGHT(KG)
Patient has been getting positive pregnancy tests at home.  Patient had     LMP 8/20  Now has a positive digital home UPT.  Would like to continue care with Dr Go   Serial HCGs ordered    Fili Meeks RN on 9/21/2022 at 11:05 AM       52

## 2022-09-27 ENCOUNTER — INPATIENT (INPATIENT)
Facility: HOSPITAL | Age: 67
LOS: 5 days | Discharge: HOME | DRG: 603 | End: 2022-10-03
Attending: INTERNAL MEDICINE | Admitting: STUDENT IN AN ORGANIZED HEALTH CARE EDUCATION/TRAINING PROGRAM
Payer: MEDICARE

## 2022-09-27 ENCOUNTER — APPOINTMENT (OUTPATIENT)
Dept: INTERNAL MEDICINE | Facility: CLINIC | Age: 67
End: 2022-09-27

## 2022-09-27 VITALS
HEART RATE: 97 BPM | OXYGEN SATURATION: 96 % | BODY MASS INDEX: 38.93 KG/M2 | DIASTOLIC BLOOD PRESSURE: 80 MMHG | TEMPERATURE: 97.6 F | WEIGHT: 228 LBS | SYSTOLIC BLOOD PRESSURE: 130 MMHG | HEIGHT: 64 IN

## 2022-09-27 VITALS
DIASTOLIC BLOOD PRESSURE: 76 MMHG | HEIGHT: 64 IN | WEIGHT: 227.96 LBS | SYSTOLIC BLOOD PRESSURE: 137 MMHG | HEART RATE: 87 BPM | TEMPERATURE: 98 F | OXYGEN SATURATION: 96 % | RESPIRATION RATE: 18 BRPM

## 2022-09-27 DIAGNOSIS — M79.89 OTHER SPECIFIED SOFT TISSUE DISORDERS: ICD-10-CM

## 2022-09-27 DIAGNOSIS — I10 ESSENTIAL (PRIMARY) HYPERTENSION: ICD-10-CM

## 2022-09-27 DIAGNOSIS — K22.4 DYSKINESIA OF ESOPHAGUS: ICD-10-CM

## 2022-09-27 DIAGNOSIS — C91.10 CHRONIC LYMPHOCYTIC LEUKEMIA OF B-CELL TYPE NOT HAVING ACHIEVED REMISSION: ICD-10-CM

## 2022-09-27 DIAGNOSIS — E66.9 OBESITY, UNSPECIFIED: ICD-10-CM

## 2022-09-27 DIAGNOSIS — L03.90 CELLULITIS, UNSPECIFIED: ICD-10-CM

## 2022-09-27 DIAGNOSIS — L40.50 ARTHROPATHIC PSORIASIS, UNSPECIFIED: ICD-10-CM

## 2022-09-27 DIAGNOSIS — G47.33 OBSTRUCTIVE SLEEP APNEA (ADULT) (PEDIATRIC): ICD-10-CM

## 2022-09-27 DIAGNOSIS — Z29.9 ENCOUNTER FOR PROPHYLACTIC MEASURES, UNSPECIFIED: ICD-10-CM

## 2022-09-27 DIAGNOSIS — Z98.890 OTHER SPECIFIED POSTPROCEDURAL STATES: Chronic | ICD-10-CM

## 2022-09-27 DIAGNOSIS — M19.90 UNSPECIFIED OSTEOARTHRITIS, UNSPECIFIED SITE: ICD-10-CM

## 2022-09-27 DIAGNOSIS — F31.9 BIPOLAR DISORDER, UNSPECIFIED: ICD-10-CM

## 2022-09-27 LAB
ALBUMIN SERPL ELPH-MCNC: 4.4 G/DL — SIGNIFICANT CHANGE UP (ref 3.3–5)
ALP SERPL-CCNC: 92 U/L — SIGNIFICANT CHANGE UP (ref 40–120)
ALT FLD-CCNC: 15 U/L — SIGNIFICANT CHANGE UP (ref 10–45)
ANION GAP SERPL CALC-SCNC: 11 MMOL/L — SIGNIFICANT CHANGE UP (ref 5–17)
AST SERPL-CCNC: 25 U/L — SIGNIFICANT CHANGE UP (ref 10–40)
BASE EXCESS BLDV CALC-SCNC: 3.2 MMOL/L — HIGH (ref -2–3)
BASOPHILS # BLD AUTO: 0.55 K/UL — HIGH (ref 0–0.2)
BASOPHILS NFR BLD AUTO: 2 % — SIGNIFICANT CHANGE UP (ref 0–2)
BILIRUB SERPL-MCNC: 0.2 MG/DL — SIGNIFICANT CHANGE UP (ref 0.2–1.2)
BUN SERPL-MCNC: 14 MG/DL — SIGNIFICANT CHANGE UP (ref 7–23)
CA-I SERPL-SCNC: 1.24 MMOL/L — SIGNIFICANT CHANGE UP (ref 1.15–1.33)
CALCIUM SERPL-MCNC: 9.6 MG/DL — SIGNIFICANT CHANGE UP (ref 8.4–10.5)
CHLORIDE BLDV-SCNC: 100 MMOL/L — SIGNIFICANT CHANGE UP (ref 96–108)
CHLORIDE SERPL-SCNC: 100 MMOL/L — SIGNIFICANT CHANGE UP (ref 96–108)
CO2 BLDV-SCNC: 32 MMOL/L — HIGH (ref 22–26)
CO2 SERPL-SCNC: 26 MMOL/L — SIGNIFICANT CHANGE UP (ref 22–31)
CREAT SERPL-MCNC: 0.34 MG/DL — LOW (ref 0.5–1.3)
CRP SERPL-MCNC: <3 MG/L — SIGNIFICANT CHANGE UP (ref 0–4)
EGFR: 114 ML/MIN/1.73M2 — SIGNIFICANT CHANGE UP
EOSINOPHIL # BLD AUTO: 0.27 K/UL — SIGNIFICANT CHANGE UP (ref 0–0.5)
EOSINOPHIL NFR BLD AUTO: 1 % — SIGNIFICANT CHANGE UP (ref 0–6)
GAS PNL BLDV: 135 MMOL/L — LOW (ref 136–145)
GAS PNL BLDV: SIGNIFICANT CHANGE UP
GAS PNL BLDV: SIGNIFICANT CHANGE UP
GLUCOSE BLDV-MCNC: 91 MG/DL — SIGNIFICANT CHANGE UP (ref 70–99)
GLUCOSE SERPL-MCNC: 97 MG/DL — SIGNIFICANT CHANGE UP (ref 70–99)
HCO3 BLDV-SCNC: 30 MMOL/L — HIGH (ref 22–29)
HCT VFR BLD CALC: 31.9 % — LOW (ref 34.5–45)
HCT VFR BLDA CALC: 30 % — LOW (ref 34.5–46.5)
HGB BLD CALC-MCNC: 10 G/DL — LOW (ref 11.7–16.1)
HGB BLD-MCNC: 9.8 G/DL — LOW (ref 11.5–15.5)
LACTATE BLDV-MCNC: 1.3 MMOL/L — SIGNIFICANT CHANGE UP (ref 0.5–2)
LYMPHOCYTES # BLD AUTO: 22.25 K/UL — HIGH (ref 1–3.3)
LYMPHOCYTES # BLD AUTO: 81 % — HIGH (ref 13–44)
MANUAL SMEAR VERIFICATION: SIGNIFICANT CHANGE UP
MCHC RBC-ENTMCNC: 28.7 PG — SIGNIFICANT CHANGE UP (ref 27–34)
MCHC RBC-ENTMCNC: 30.7 GM/DL — LOW (ref 32–36)
MCV RBC AUTO: 93.5 FL — SIGNIFICANT CHANGE UP (ref 80–100)
MONOCYTES # BLD AUTO: 0.82 K/UL — SIGNIFICANT CHANGE UP (ref 0–0.9)
MONOCYTES NFR BLD AUTO: 3 % — SIGNIFICANT CHANGE UP (ref 2–14)
NEUTROPHILS # BLD AUTO: 3.57 K/UL — SIGNIFICANT CHANGE UP (ref 1.8–7.4)
NEUTROPHILS NFR BLD AUTO: 13 % — LOW (ref 43–77)
NRBC # BLD: 0 /100 — SIGNIFICANT CHANGE UP (ref 0–0)
PCO2 BLDV: 57 MMHG — HIGH (ref 39–42)
PH BLDV: 7.33 — SIGNIFICANT CHANGE UP (ref 7.32–7.43)
PLAT MORPH BLD: NORMAL — SIGNIFICANT CHANGE UP
PLATELET # BLD AUTO: 248 K/UL — SIGNIFICANT CHANGE UP (ref 150–400)
PO2 BLDV: 33 MMHG — SIGNIFICANT CHANGE UP (ref 25–45)
POTASSIUM BLDV-SCNC: 3.8 MMOL/L — SIGNIFICANT CHANGE UP (ref 3.5–5.1)
POTASSIUM SERPL-MCNC: 4.1 MMOL/L — SIGNIFICANT CHANGE UP (ref 3.5–5.3)
POTASSIUM SERPL-SCNC: 4.1 MMOL/L — SIGNIFICANT CHANGE UP (ref 3.5–5.3)
PROT SERPL-MCNC: 6.9 G/DL — SIGNIFICANT CHANGE UP (ref 6–8.3)
RBC # BLD: 3.41 M/UL — LOW (ref 3.8–5.2)
RBC # FLD: 12.3 % — SIGNIFICANT CHANGE UP (ref 10.3–14.5)
RBC BLD AUTO: SIGNIFICANT CHANGE UP
SAO2 % BLDV: 50 % — LOW (ref 67–88)
SARS-COV-2 RNA SPEC QL NAA+PROBE: SIGNIFICANT CHANGE UP
SMUDGE CELLS # BLD: PRESENT — SIGNIFICANT CHANGE UP
SODIUM SERPL-SCNC: 137 MMOL/L — SIGNIFICANT CHANGE UP (ref 135–145)
WBC # BLD: 27.47 K/UL — HIGH (ref 3.8–10.5)
WBC # FLD AUTO: 27.47 K/UL — HIGH (ref 3.8–10.5)

## 2022-09-27 PROCEDURE — 73590 X-RAY EXAM OF LOWER LEG: CPT | Mod: 26,RT

## 2022-09-27 PROCEDURE — 90662 IIV NO PRSV INCREASED AG IM: CPT

## 2022-09-27 PROCEDURE — 93971 EXTREMITY STUDY: CPT | Mod: 26,RT

## 2022-09-27 PROCEDURE — 99214 OFFICE O/P EST MOD 30 MIN: CPT | Mod: 25

## 2022-09-27 PROCEDURE — G0008: CPT | Mod: 59

## 2022-09-27 PROCEDURE — 76882 US LMTD JT/FCL EVL NVASC XTR: CPT | Mod: 26,RT

## 2022-09-27 PROCEDURE — 99285 EMERGENCY DEPT VISIT HI MDM: CPT | Mod: GC

## 2022-09-27 RX ORDER — OXYCODONE HYDROCHLORIDE 5 MG/1
5 TABLET ORAL EVERY 4 HOURS
Refills: 0 | Status: DISCONTINUED | OUTPATIENT
Start: 2022-09-27 | End: 2022-10-03

## 2022-09-27 RX ORDER — CLONAZEPAM 1 MG
2 TABLET ORAL AT BEDTIME
Refills: 0 | Status: DISCONTINUED | OUTPATIENT
Start: 2022-09-27 | End: 2022-10-03

## 2022-09-27 RX ORDER — PANTOPRAZOLE SODIUM 20 MG/1
1 TABLET, DELAYED RELEASE ORAL
Qty: 0 | Refills: 0 | DISCHARGE

## 2022-09-27 RX ORDER — VANCOMYCIN HCL 1 G
1000 VIAL (EA) INTRAVENOUS ONCE
Refills: 0 | Status: COMPLETED | OUTPATIENT
Start: 2022-09-27 | End: 2022-09-27

## 2022-09-27 RX ORDER — CLONAZEPAM 1 MG
1 TABLET ORAL DAILY
Refills: 0 | Status: DISCONTINUED | OUTPATIENT
Start: 2022-09-27 | End: 2022-10-03

## 2022-09-27 RX ORDER — APREMILAST 10-20-30MG
30 KIT ORAL
Refills: 0 | Status: DISCONTINUED | OUTPATIENT
Start: 2022-09-27 | End: 2022-09-28

## 2022-09-27 RX ORDER — LACTOBACILLUS ACIDOPHILUS 100MM CELL
1 CAPSULE ORAL THREE TIMES A DAY
Refills: 0 | Status: DISCONTINUED | OUTPATIENT
Start: 2022-09-27 | End: 2022-10-03

## 2022-09-27 RX ORDER — ASPIRIN/CALCIUM CARB/MAGNESIUM 324 MG
1 TABLET ORAL
Qty: 0 | Refills: 0 | DISCHARGE

## 2022-09-27 RX ORDER — BENZOYL PEROXIDE MICRONIZED 5.8 %
1 TOWELETTE (EA) TOPICAL
Qty: 0 | Refills: 0 | DISCHARGE

## 2022-09-27 RX ORDER — ASCORBIC ACID 60 MG
1 TABLET,CHEWABLE ORAL
Qty: 0 | Refills: 0 | DISCHARGE

## 2022-09-27 RX ORDER — GABAPENTIN 400 MG/1
400 CAPSULE ORAL DAILY
Refills: 0 | Status: DISCONTINUED | OUTPATIENT
Start: 2022-09-27 | End: 2022-10-03

## 2022-09-27 RX ORDER — ENOXAPARIN SODIUM 100 MG/ML
40 INJECTION SUBCUTANEOUS EVERY 12 HOURS
Refills: 0 | Status: DISCONTINUED | OUTPATIENT
Start: 2022-09-27 | End: 2022-10-03

## 2022-09-27 RX ORDER — VORTIOXETINE 5 MG/1
10 TABLET, FILM COATED ORAL DAILY
Refills: 0 | Status: DISCONTINUED | OUTPATIENT
Start: 2022-09-27 | End: 2022-10-03

## 2022-09-27 RX ORDER — MIRABEGRON 50 MG/1
25 TABLET, EXTENDED RELEASE ORAL DAILY
Refills: 0 | Status: DISCONTINUED | OUTPATIENT
Start: 2022-09-27 | End: 2022-10-03

## 2022-09-27 RX ORDER — SERTRALINE 25 MG/1
2 TABLET, FILM COATED ORAL
Qty: 0 | Refills: 0 | DISCHARGE

## 2022-09-27 RX ORDER — CLONAZEPAM 1 MG
2.5 TABLET ORAL
Qty: 0 | Refills: 0 | DISCHARGE

## 2022-09-27 RX ORDER — VANCOMYCIN HCL 1 G
1500 VIAL (EA) INTRAVENOUS EVERY 12 HOURS
Refills: 0 | Status: DISCONTINUED | OUTPATIENT
Start: 2022-09-27 | End: 2022-09-27

## 2022-09-27 RX ORDER — SERTRALINE 25 MG/1
150 TABLET, FILM COATED ORAL DAILY
Refills: 0 | Status: DISCONTINUED | OUTPATIENT
Start: 2022-09-27 | End: 2022-10-03

## 2022-09-27 RX ORDER — FLUCONAZOLE 150 MG/1
1 TABLET ORAL
Qty: 0 | Refills: 0 | DISCHARGE

## 2022-09-27 RX ORDER — VANCOMYCIN HCL 1 G
1500 VIAL (EA) INTRAVENOUS EVERY 12 HOURS
Refills: 0 | Status: DISCONTINUED | OUTPATIENT
Start: 2022-09-28 | End: 2022-09-29

## 2022-09-27 RX ORDER — GABAPENTIN 400 MG/1
600 CAPSULE ORAL AT BEDTIME
Refills: 0 | Status: DISCONTINUED | OUTPATIENT
Start: 2022-09-27 | End: 2022-10-03

## 2022-09-27 RX ORDER — TRAZODONE HCL 50 MG
1 TABLET ORAL
Qty: 0 | Refills: 0 | DISCHARGE

## 2022-09-27 RX ORDER — OXYBUTYNIN CHLORIDE 5 MG
10 TABLET ORAL
Refills: 0 | Status: DISCONTINUED | OUTPATIENT
Start: 2022-09-27 | End: 2022-10-03

## 2022-09-27 RX ORDER — VORTIOXETINE 5 MG/1
1 TABLET, FILM COATED ORAL
Qty: 0 | Refills: 0 | DISCHARGE

## 2022-09-27 RX ORDER — CLONAZEPAM 1 MG
1.5 TABLET ORAL
Refills: 0 | Status: DISCONTINUED | OUTPATIENT
Start: 2022-09-27 | End: 2022-10-03

## 2022-09-27 RX ORDER — CLONAZEPAM 1 MG
1 TABLET ORAL
Qty: 0 | Refills: 0 | DISCHARGE

## 2022-09-27 RX ORDER — OXYCODONE HYDROCHLORIDE 5 MG/1
5 TABLET ORAL ONCE
Refills: 0 | Status: DISCONTINUED | OUTPATIENT
Start: 2022-09-27 | End: 2022-09-27

## 2022-09-27 RX ORDER — PANTOPRAZOLE SODIUM 20 MG/1
40 TABLET, DELAYED RELEASE ORAL
Refills: 0 | Status: DISCONTINUED | OUTPATIENT
Start: 2022-09-27 | End: 2022-09-28

## 2022-09-27 RX ORDER — ACETAMINOPHEN 500 MG
650 TABLET ORAL EVERY 6 HOURS
Refills: 0 | Status: DISCONTINUED | OUTPATIENT
Start: 2022-09-27 | End: 2022-10-03

## 2022-09-27 RX ORDER — OMEGA-3 ACID ETHYL ESTERS 1 G
1 CAPSULE ORAL
Qty: 0 | Refills: 0 | DISCHARGE

## 2022-09-27 RX ORDER — LAMOTRIGINE 25 MG/1
200 TABLET, ORALLY DISINTEGRATING ORAL AT BEDTIME
Refills: 0 | Status: DISCONTINUED | OUTPATIENT
Start: 2022-09-27 | End: 2022-09-28

## 2022-09-27 RX ORDER — FLUCONAZOLE 150 MG/1
200 TABLET ORAL
Refills: 0 | Status: DISCONTINUED | OUTPATIENT
Start: 2022-09-27 | End: 2022-10-03

## 2022-09-27 RX ORDER — GABAPENTIN 400 MG/1
1 CAPSULE ORAL
Qty: 0 | Refills: 0 | DISCHARGE

## 2022-09-27 RX ORDER — MIRABEGRON 50 MG/1
1 TABLET, EXTENDED RELEASE ORAL
Qty: 0 | Refills: 0 | DISCHARGE

## 2022-09-27 RX ORDER — QUETIAPINE FUMARATE 200 MG/1
200 TABLET, FILM COATED ORAL
Refills: 0 | Status: DISCONTINUED | OUTPATIENT
Start: 2022-09-27 | End: 2022-10-03

## 2022-09-27 RX ORDER — MULTIVIT-MIN/FERROUS GLUCONATE 9 MG/15 ML
1 LIQUID (ML) ORAL DAILY
Refills: 0 | Status: DISCONTINUED | OUTPATIENT
Start: 2022-09-27 | End: 2022-10-03

## 2022-09-27 RX ORDER — LISDEXAMFETAMINE DIMESYLATE 70 MG/1
1 CAPSULE ORAL
Qty: 0 | Refills: 0 | DISCHARGE

## 2022-09-27 RX ADMIN — Medication 250 MILLIGRAM(S): at 19:29

## 2022-09-27 RX ADMIN — OXYCODONE HYDROCHLORIDE 5 MILLIGRAM(S): 5 TABLET ORAL at 17:31

## 2022-09-27 NOTE — H&P ADULT - PROBLEM SELECTOR PLAN 10
Diet: soft diet due to history esophageal issues  DVT ppx: lovenox SQ  Dispo: pending clinical course
normal...

## 2022-09-27 NOTE — ED PROVIDER NOTE - OBJECTIVE STATEMENT
68 y/o female with pmhx of RA, CLL (no active chemo), MRSA, OA, psoriatic arthritis, HTN, anemia, mood disorder, PE treated (not on AC) presenting with RLE swelling x 1 month. Started after a fall in beginning of the month. Since fall swelling below right knee has gotten bigger with associated pain; went to [facility] MD who attempted to drain mass with little fluid obtained. Tactile fevers/chills with no n/v/d, cough, congestion, sore throat, rashes. Finished 2 week course of antibiotics (doxycycline) and currently on clindamycin with no improvement in symptoms.

## 2022-09-27 NOTE — H&P ADULT - PROBLEM SELECTOR PLAN 4
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

## 2022-09-27 NOTE — HISTORY OF PRESENT ILLNESS
[No Pertinent Cardiac History] : no history of aortic stenosis, atrial fibrillation, coronary artery disease, recent myocardial infarction, or implantable device/pacemaker [Sleep Apnea] : sleep apnea [No Adverse Anesthesia Reaction] : no adverse anesthesia reaction in self or family member [Unable to assess] : unable to assess [Asthma] : no asthma [COPD] : no COPD [Smoker] : not a smoker [Chronic Anticoagulation] : no chronic anticoagulation [Chronic Kidney Disease] : no chronic kidney disease [Diabetes] : no diabetes [FreeTextEntry1] : left total knee placement [FreeTextEntry2] : 10/06/2022 [FreeTextEntry4] : 68 yo F, here for pre-op eval. Previous surgeries include long-limb Solis-en-Y gastric bypass 6/27/20 with revision Solis-en-Y gastric bypass with transoral gastric plication 5/25/2010. Also s/p incisional hernia repair with implantation of mesh, posterior component separation bilateral myofascial flap advancement with intact neurovascular bundle, bilateral skin flaps and placement of mesh bridge, and scar excision on 5/12/2021. Multiple other surgeries and procedures. No issues with GA, no fam hx same. No h/o VTE related to surgeries, although pt had blood clots with COVID. \par \par Of note, pt fell in Aug, suffered 7 broken ribs, and then again 9/4/22, developed infection of right leg, seen in UC, had course of doxy, which helped only a little, went back, had some pus drained, pt told she should see a surgeon, and ATBs changed to clinda, which she states is hard on her stomach. Feels like the leg gets red and warm and then abates, is swollen and painful which is still there, and is not sure she can have surgery with this. No fevers or other constitutional symptoms. \par \par Has been getting GI eval for esophageal issues, told of severe reflux, lax LES and new dx of Eagle syndrome. Had chest CT which showed this in addition to increase in size and # of LN, and bronchiectasis. Seen by heme, no plan for chemo at this time. \par Apparently needs cardiac eval prior to surgery also, despite no h/o heart disease. Reviewed lisa recent EKG from Aug 2022 which was normal. \par Recent cellulitis right calf, s/p I&D x 1, completed course of doxycycline from 9/7/22 for 2 weeks, no real response, now on clindamycin past 3 days.  [FreeTextEntry8] : pt winded with walking around the exam room, but limited by leg pain and instability

## 2022-09-27 NOTE — ED ADULT NURSE NOTE - OBJECTIVE STATEMENT
68 y/o F A&Ox4 PMHx of RA, CLL (no active chemo), MRSA, OA, psoriatic arthritis, HTN, anemia, mood disorder, PE treated (not on AC) presenting with RLE swelling x 1 month. Pt stated that she had a fall last month and since then she had swelling to RLE. Pt stated that she went to the UC and was prescribed abx over 2 weeks with no results of getting better. Placed on monitor. Pt stated that pain is 8/10 to RLE. Denies n/v/d/ha/cp/sob/f. VSS, NAD, Safety precautions maintained.

## 2022-09-27 NOTE — ED PROVIDER NOTE - ATTENDING CONTRIBUTION TO CARE
67-year-old female who has a history of MRSA, rheumatoid arthritis on Otezla, CLL, hypertension, prior rib fractures presents to the emergency department with swelling on the right lower leg.  Patient with subjective fevers and chills.  She also reports mild nausea.  Patient initially was started on oral antibiotics was given a course of doxycycline for 2 weeks starting on September 12 then transitioned to clindamycin  plan for labs ivf and iv antibiotics, TBA for further managemetn concern for abscess,

## 2022-09-27 NOTE — ED PROVIDER NOTE - NS ED ROS FT
Gen: Denies fever  CV: Denies palpitations  Skin: Denies rash, erythema, color changes  Resp: Denies SOB, cough  GI: Denies diarrhea, constipation, nausea, vomiting  Msk: Denies back pain  : Denies dysuria, increased frequency  Neuro: Denies LOC, weakness

## 2022-09-27 NOTE — H&P ADULT - HISTORY OF PRESENT ILLNESS
68yo F with PMH psoriasis, psoriatic arthritis, CLL (no active chemo), remote MRSA infection 13 yrs ago, bipolar disorder, OA, JANE on bipap at night, HTN, anemia, h/o PE (not on AC), obesity s/p alexandria-en-y bypass with revision, and currently undergoing GI evaluation for esophageal motility issues (?Eagle syndrome) presenting for cellulitis of RLE after failing 2+ weeks of outpatient oral abx. Swelling started 1 month ago, believes it happened after a fall. Went to [facility] for this, they started her on doxycycline x2 weeks, which she completed, swelling/erythema fluctuated during this time but never resolved. They attempted I&D at [facility] with packing but only minimal drained, pt unsure if it was purulent. At home pt only noticed some bleeding and clear discharge from packing. Pt then returned to [facility] and was rx'ed a course of Clindamycin. Over the past few days, pain and swelling seems to have remained the same/even slightly worsening, erythema has improved. Reports intermittent subjective fever/chills during this time. No other infectious sxs, no URI sxs, n/v/d, other rashes.   Of note, pt reports remote h/o severe MRSA infection of abdominal wall that required ICU stay over 13 yrs ago. Believes she may have had another ep of MRSA after that but is unsure, no records in our system.     Pt with frequent falls resulting in multiple rib fractures 2/2 severe L knee OA, currently planned for knee replacement surgery, worried that this will hold her back.    In ED:  On arrival, VS afebrile 98.2F, /76, HR 87, RR 18, SpO2 96% on RA  66yo F with PMH psoriasis, psoriatic arthritis, CLL (no active chemo), remote MRSA infection 13 yrs ago, bipolar disorder, OA, JANE on bipap at night, HTN, anemia, h/o PE (not on AC), obesity s/p alexandria-en-y bypass with revision, and currently undergoing GI evaluation for esophageal motility issues (?Eagle syndrome) presenting for cellulitis of RLE after failing 2+ weeks of outpatient oral abx. Swelling started 1 month ago, believes it happened after a fall. Went to [facility] for this, they started her on doxycycline x2 weeks, which she completed, swelling/erythema fluctuated during this time but never resolved. They attempted I&D at [facility] with packing but only minimal drained, pt unsure if it was purulent. At home pt only noticed some bleeding and clear discharge from packing. Pt then returned to [facility] and was rx'ed a course of Clindamycin. Over the past few days, pain and swelling seems to have remained the same/even slightly worsening, erythema has improved. Reports intermittent subjective fever/chills during this time. No other infectious sxs, no URI sxs, n/v/d, other rashes.   Of note, pt reports remote h/o severe MRSA infection of abdominal wall that required ICU stay ~13 yrs ago. Believes she may have had another ep of MRSA after that but is unsure, no records in our system.     Pt with frequent falls resulting in multiple rib fractures 2/2 severe L knee OA, currently planned for knee replacement surgery, worried that this will hold her back.    In ED:  On arrival, VS afebrile 98.2F, /76, HR 87, RR 18, SpO2 96% on RA  68yo F with PMH psoriasis, psoriatic arthritis, CLL (no active chemo), remote MRSA infection 13 yrs ago, bipolar disorder, OA, JANE on bipap at night, HTN, anemia, h/o PE (not on AC), obesity s/p alexandria-en-y bypass with revision, and currently undergoing GI evaluation for esophageal motility issues (?Eagle syndrome) presenting for cellulitis of RLE after failing 2+ weeks of outpatient oral abx. Swelling started 1 month ago, believes it happened after a fall. Went to [facility] for this, they started her on doxycycline x2 weeks, which she completed, swelling/erythema fluctuated during this time but never resolved. They attempted I&D at [facility] with packing but only minimal drained, pt unsure if it was purulent. At home pt only noticed some bleeding and clear discharge from packing. Pt then returned to [facility] and was rx'ed a course of Clindamycin. Over the past few days, pain and swelling seems to have remained the same/even slightly worsening, erythema has improved. Reports intermittent subjective fever/chills during this time. No other infectious sxs, no URI sxs, n/v/d, other rashes.   Of note, pt reports remote h/o severe MRSA infection of abdominal wall that required ICU stay ~13 yrs ago. Believes she may have had another ep of MRSA after that but is unsure, no records in our system.     Pt with frequent falls resulting in multiple rib fractures 2/2 severe L knee OA, currently planned for knee replacement surgery, worried that this will hold her back.    In ED:  On arrival, VS afebrile 98.2F, /76, HR 87, RR 18, SpO2 96% on RA   Labs notable for leukocytosis to 27 w/ lymphocytic predominance  Imaging: US of LLE showing ill defined hypoechoic foci w/ small fluid collection, no DVT on duplex. XR of tib/fib unremarkable aside from soft tissue edema   Meds: given oxy 5mg POx1 and vanc 1gx1

## 2022-09-27 NOTE — H&P ADULT - PROBLEM SELECTOR PLAN 3
no active issues. continue w/ home meds  - lamictal 200mg AM  - klonopin 1mg AM, 1.5mg 2PM, 2mg qhs   - sertraline 150mg AM  - trintellix 10mg AM  - quetiapine XL 300mg qhs

## 2022-09-27 NOTE — H&P ADULT - PROBLEM SELECTOR PLAN 6
s/p alexandria-en-y gastric bypass 6/2001 w/ revision, s/p transoral gastric plication   - on vyvanse 50mg in AM at home, pharmacy does not have here  - c/w home vitamin supplementation

## 2022-09-27 NOTE — H&P ADULT - PROBLEM SELECTOR PLAN 1
h/o remote severe MRSA infection over 13 yrs ago requiring ICU stay p/w +swelling, erythema, and warmth of R anterolateral shin, no open drainage with small fluid collection seen on imaging, s/p failed 2+ weeks of outpt oral abx, now admitted for IV abx  - s/p 2 weeks doxy + clindamycin x few days   - subjective fevers at home, afebrile here, with elevated WBC to 27 but unclear if 2/2 infection or progressive CLL (WBC appears to have been uptrending with progression of disease on recent CT scan). either ways does not seem to h/o remote severe MRSA infection over 13 yrs ago requiring ICU stay p/w +swelling, erythema, and warmth of R anterolateral shin p/w cellulitis w/ associated small abscess s/p failed 2+ weeks of outpt oral abx, now admitted for IV abx  - s/p 2 weeks doxy + clindamycin x few days   - subjective fevers at home, afebrile here, with elevated WBC to 27 with lymphocytic predominance i/s/o CLL, so more likely 2/2 CLL. does not meet SIRS criteria at this time. lactate wnl   - LLE US: w/ ill-defined hypoechoic foci up to 0.6 x 0.8 x0.4 w/ possible small fluid collection/abscess  - LLE duplex: neg for DVT  - XR tib-fib: showing swelling w/o lytic or blastic lesions, no periosteal reactions, no fractures/dislocations  - s/p vanc 1g x 1 in ED    - given h/o severe MRSA infection, can c/w vanc 1.5g q12h  - check pre-4th trough   - f/u blood cxs, MRSA swab h/o remote severe MRSA infection over 13 yrs ago requiring ICU stay p/w +swelling, erythema, and warmth of R anterolateral shin p/w cellulitis w/ associated small abscess s/p failed 2+ weeks of outpt oral abx, now admitted for IV abx  - s/p 2 weeks doxy + clindamycin x few days   - subjective fevers at home, afebrile here, with elevated WBC to 27 with lymphocytic predominance i/s/o CLL, so more likely 2/2 CLL. does not meet SIRS criteria at this time. lactate wnl   - LLE US: w/ ill-defined hypoechoic foci up to 0.6 x 0.8 x0.4 w/ possible small fluid collection/abscess  -- fluid collection likely too small to drain  - LLE duplex: neg for DVT  - XR tib-fib: showing swelling w/o lytic or blastic lesions, no periosteal reactions, no fractures/dislocations  - s/p vanc 1g x 1 in ED    - given h/o severe MRSA infection, can c/w vanc 1.5g q12h  - check pre-4th trough   - f/u blood cxs, MRSA swab h/o remote severe MRSA infection ~13 yrs ago requiring ICU stay p/w +swelling, erythema, and warmth of R anterolateral shin p/w cellulitis w/ associated small abscess s/p failed 2+ weeks of outpt oral abx, now admitted for IV abx  - s/p 2 weeks doxy + clindamycin x few days   - subjective fevers at home, afebrile here, with elevated WBC to 27 with lymphocytic predominance i/s/o CLL, so more likely 2/2 CLL. does not meet SIRS criteria at this time. lactate wnl   - LLE US: w/ ill-defined hypoechoic foci up to 0.6 x 0.8 x0.4 w/ possible small fluid collection/abscess  -- fluid collection likely too small to drain  - LLE duplex: neg for DVT  - XR tib-fib: showing swelling w/o lytic or blastic lesions, no periosteal reactions, no fractures/dislocations  - s/p vanc 1g x 1 in ED    - given h/o severe MRSA infection, can c/w vanc 1.5g q12h  - check pre-4th trough   - f/u blood cxs, MRSA swab h/o remote severe MRSA infection ~13 yrs ago requiring ICU stay p/w +swelling, erythema, and warmth of R anterolateral shin p/w cellulitis w/ associated small abscess s/p failed oral abx (doxy x 2 wks, clinda x days) and I&D outpatient, now admitted for IV abx  - subjective fevers at home, afebrile here, with elevated WBC to 27 with lymphocytic predominance i/s/o CLL, so more likely 2/2 CLL. does not meet SIRS criteria at this time. lactate wnl   - LLE US: w/ ill-defined hypoechoic foci up to 0.6 x 0.8 x0.4 w/ possible small fluid collection/abscess  -- fluid collection likely too small to drain  - LLE duplex: neg for DVT  - XR tib-fib: showing swelling w/o lytic or blastic lesions, no periosteal reactions, no fractures/dislocations  - s/p vanc 1g x 1 in ED    - given h/o severe MRSA infection, can c/w vanc 1.5g q12h  - check pre-4th trough   - f/u blood cxs, MRSA swab h/o remote severe MRSA infection ~13 yrs ago requiring ICU stay p/w +swelling, erythema, and warmth of R anterolateral shin p/w cellulitis w/ associated small abscess s/p failed oral abx (doxy x 2 wks, clinda x days) and I&D outpatient w/ minimal to no drainage. Now admitted for IV abx   - subjective fevers at home, afebrile here, with elevated WBC to 27 with lymphocytic predominance i/s/o CLL, so more likely 2/2 CLL. does not meet SIRS criteria at this time. lactate wnl   - LLE US: w/ ill-defined hypoechoic foci up to 0.6 x 0.8 x0.4 w/ possible small fluid collection/abscess  -- fluid collection likely too small to drain  - LLE duplex: neg for DVT  - XR tib-fib: showing swelling w/o lytic or blastic lesions, no periosteal reactions, no fractures/dislocations  - s/p vanc 1g x 1 in ED    - given h/o severe MRSA infection, can c/w vanc 1.5g q12h  - check pre-4th trough   - f/u blood cxs, MRSA swab h/o remote severe MRSA infection ~13 yrs ago requiring ICU stay p/w cellulitis of R anterolateral shin w/ swelling, erythema, and warmth w/ associated small abscess s/p failed oral abx (doxy x 2 wks, clinda x days) and I&D outpatient w/ minimal to no drainage. Now admitted for IV abx   - subjective fevers at home, afebrile here, with elevated WBC to 27 with lymphocytic predominance i/s/o CLL, so more likely 2/2 CLL. does not meet SIRS criteria at this time. lactate wnl   - LLE US: w/ ill-defined hypoechoic foci up to 0.6 x 0.8 x0.4 w/ possible small fluid collection/abscess  -- fluid collection likely too small to drain  - LLE duplex: neg for DVT  - XR tib-fib: showing swelling w/o lytic or blastic lesions, no periosteal reactions, no fractures/dislocations  - s/p vanc 1g x 1 in ED    - given h/o severe MRSA infection, can c/w vanc 1.5g q12h  - check pre-4th trough   - f/u blood cxs, MRSA swab h/o remote severe MRSA infection ~13 yrs ago requiring ICU stay p/w cellulitis of R anterolateral shin w/ swelling, erythema, and warmth w/ associated small abscess s/p failed oral abx (doxy x 2 wks, clinda x days) and I&D outpatient w/ minimal to no drainage. Now admitted for IV abx   - subjective fevers at home, afebrile here, with elevated WBC to 27 with lymphocytic predominance i/s/o CLL, so more likely 2/2 CLL. does not meet SIRS criteria at this time. lactate wnl   - LLE US: w/ ill-defined hypoechoic foci up to 0.6 x 0.8 x0.4 w/ possible small fluid collection/abscess  -- fluid collection likely too small to drain  - LLE duplex: neg for DVT  - XR tib-fib: showing swelling w/o lytic or blastic lesions, no periosteal reactions, no fractures/dislocations  - s/p vanc 1g x 1 in ED    - given h/o severe MRSA infection, can c/w vanc 1.5g q12h  - check pre-4th trough   - f/u blood cxs, MRSA swab  - elevate RLE extremity, tylenol for mild-moderate home, home oxy 5mg q4h PRN severe pain, ice compresses PRN

## 2022-09-27 NOTE — H&P ADULT - NSICDXFAMILYHX_GEN_ALL_CORE_FT
FAMILY HISTORY:  FH: heart disease, Mother; sudden death @age 57 from large MI vs CVA (unknown)    Father  Still living? No  Family hx of colon cancer, Age at diagnosis: Age Unknown

## 2022-09-27 NOTE — ED PROVIDER NOTE - CLINICAL SUMMARY MEDICAL DECISION MAKING FREE TEXT BOX
66 y/o female with pmhx of RA, CLL (no active chemo), MRSA, OA, psoriatic arthritis, HTN, anemia, mood disorder, PE treated (not on AC) presenting with RLE swelling/mass below knee x 1 month after fall; drainage attempted in outpatient setting; extended course of abx and now on clindamycin; non toxic appearing/afebrile; mass mostly solid with no fluctuance and minimal overlying erythema; slightly warm to touch; c/f abscess vs. cellulitis vs. DVT; U/S RLE, labs, BCx2, ESR/CRP. Given immunocompromised state, drainage, long course of PO abx, and hx of multiple MRSA infections will likely require admission for IV abx and monitoring/f.u blood cultures

## 2022-09-27 NOTE — H&P ADULT - NSICDXPASTSURGICALHX_GEN_ALL_CORE_FT
PAST SURGICAL HISTORY:  Gastric bypass status for obesity alexandria-en-y , revised     H/O:  section x 2    History of cholecystectomy 1976    History of laminectomy lumbar, with fusion-     Plastic surgery for unacceptable cosmetic appearance arms from extra skin post weight loss after bypass surgery -

## 2022-09-27 NOTE — H&P ADULT - NSHPLABSRESULTS_GEN_ALL_CORE
LABS:                         9.8    27.47 )-----------( 248      ( 27 Sep 2022 17:40 )             31.9     09-27    137  |  100  |  14  ----------------------------<  97  4.1   |  26  |  0.34<L>    Ca    9.6      27 Sep 2022 17:40    TPro  6.9  /  Alb  4.4  /  TBili  0.2  /  DBili  x   /  AST  25  /  ALT  15  /  AlkPhos  92  09-27      RADIOLOGY, EKG & ADDITIONAL TESTS: Reviewed.   < from: Xray Tibia + Fibula 2 Views, Right (09.27.22 @ 18:40) >  INTERPRETATION:  CLINICAL INDICATION: Right anterior leg mass. Pain and   swelling  TECHNIQUE: 2 views of the right tibia/fibula  COMPARISON: Right knee x-ray 5/17/2013    FINDINGS/IMPRESSION:    Large area of soft tissue swelling anterior to the right proximal   tibia/fibula.  No evidence of acute fracture or dislocation.  No lytic or blastic lesion or periosteal reaction of the visualized bones.  Please correlate with findings from concurrently performed ultrasound.    --- End of Report ---  < end of copied text >    < from: US Extremity Nonvasc Limited, Right (09.27.22 @ 18:19) >    INTERPRETATION:  Clinical Information:  Right lower extremity swelling.   Mass of right leg. History of CLL.  Comparison: No priors available for comparison.  Technique: Limited field of view sonographic examination of the area of   clinical concern in the right lower leg.    Findings /Impression:    Within the subcutaneous tissues in the area of concern in the right   lateral lower extremity, there are ill-defined hypoechoic foci measuring   up to 0.6 x 0.8 x 0.4 cm, possibly representing a small fluid collection   or abscess.    Additional areas of prominent subcutaneous edema and trace fluid in the   right lateral lower extremity are identified. Cellulitis/soft tissue   infection with small abscesses can be considered. Other etiologies are   not excluded.    Deeper structures of the right lower extremity including the muscles and   bones are not imaged. Recommend CT or MRI of the right lower extremity   with IV contrast for further characterization.    --- End of Report ---  < end of copied text >    < from: VA Duplex Lower Ext Vein Scan, Right (09.27.22 @ 18:04) >      FINDINGS:  There is normal compressibility of the right common femoral, femoral and   popliteal veins.  The contralateral common femoral vein is patent.  Doppler examination shows normal spontaneous and phasic flow.    No calf vein thrombosis is detected.    IMPRESSION:  No evidence of right lower extremity deep venous thrombosis.    < end of copied text >

## 2022-09-27 NOTE — ED PROVIDER NOTE - PHYSICAL EXAMINATION
Gen: Elevated BMI, NAD, comfortable appearing, non-toxic appearing, afebrile   HEENT: No nasal discharge, mucous membranes moist  CV: RRR, +S1/S2, no M/R/G, equal b/l radial pulses 2+  Resp: CTAB, no W/R/R, no increased WOB   GI: Abdomen soft non-distended, NTTP, no masses/organomegaly   MSK/Skin: RLE swelling/mass below right knee; mostly solid with no fluctuance and minimal overlying erythema; slightly warm to touch; TTP. Associated calf TTP   Neuro: A&Ox4, moving all 4 extremities spontaneously, gross sensation intact in UE and LE BL  Psych: appropriate mood

## 2022-09-27 NOTE — H&P ADULT - PROBLEM SELECTOR PLAN 9
pharmacy does not have home otezla 30mg BID, pt may have to bring her own supply. No active issues at this time

## 2022-09-27 NOTE — ED ADULT NURSE NOTE - NSIMPLEMENTINTERV_GEN_ALL_ED
Implemented All Universal Safety Interventions:  [place] to call system. Call bell, personal items and telephone within reach. Instruct patient to call for assistance. Room bathroom lighting operational. Non-slip footwear when patient is off stretcher. Physically safe environment: no spills, clutter or unnecessary equipment. Stretcher in lowest position, wheels locked, appropriate side rails in place.

## 2022-09-27 NOTE — H&P ADULT - NSHPPHYSICALEXAM_GEN_ALL_CORE
VITALS:   Vital Signs Last 24 Hrs  T(C): 36.8 (27 Sep 2022 14:17), Max: 36.8 (27 Sep 2022 14:17)  T(F): 98.2 (27 Sep 2022 14:17), Max: 98.2 (27 Sep 2022 14:17)  HR: 87 (27 Sep 2022 14:17) (87 - 87)  BP: 137/76 (27 Sep 2022 14:17) (137/76 - 137/76)  BP(mean): --  RR: 18 (27 Sep 2022 14:17) (18 - 18)  SpO2: 96% (27 Sep 2022 14:17) (96% - 96%)    Parameters below as of 27 Sep 2022 14:17  Patient On (Oxygen Delivery Method): room air      I&O's Summary    CAPILLARY BLOOD GLUCOSE      Physical Exam:  General: NAD, non-toxic appearing, lying comfortably in bed   HEENT: PERRL, EOMi, no scleral icterus  CV: RRR, normal S1 and S2  Lungs: normal respiratory effort on RA, CTAB, no wheezes, rales, or rhonchi  Abd: soft, nontender, nondistended  Ext: ~10cm swelling, firm, not fluctuant, minimal to mild overlying erythema with small scabbed over incision, no drainage, nontender, mildly warm, LLE>RLE, 2+ peripheral pulses   Pysch: AAOx3, appropriate affect   Neuro: grossly non-focal, moving all extremities spontaneously Vital Signs Last 24 Hrs  T(C): 36.8 (27 Sep 2022 14:17), Max: 36.8 (27 Sep 2022 14:17)  T(F): 98.2 (27 Sep 2022 14:17), Max: 98.2 (27 Sep 2022 14:17)  HR: 87 (27 Sep 2022 14:17) (87 - 87)  BP: 137/76 (27 Sep 2022 14:17) (137/76 - 137/76)  BP(mean): --  RR: 18 (27 Sep 2022 14:17) (18 - 18)  SpO2: 96% (27 Sep 2022 14:17) (96% - 96%)    GENERAL: NAD, lying in bed comfortably  EYES: EOMI, PERRLA; conjunctiva and sclera clear  ENMT: Moist oral mucosa, no pharyngeal injection or exudates; normal dentition  NECK: Supple, no palpable masses; no JVD  CV: RRR, normal S1 and S2  Lungs: normal respiratory effort on RA, CTAB, no wheezes, rales, or rhonchi  Abd: soft, nontender, nondistended  Ext: ~10cm swelling, firm, not fluctuant, minimal to mild overlying erythema with small scabbed over incision, no drainage, nontender, mildly warm, LLE>RLE, 2+ peripheral pulses   Pysch: AAOx3, appropriate affect   Neuro: grossly non-focal, moving all extremities spontaneously

## 2022-09-27 NOTE — H&P ADULT - ASSESSMENT
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

## 2022-09-27 NOTE — H&P ADULT - NSHPREVIEWOFSYSTEMS_GEN_ALL_CORE
REVIEW OF SYSTEMS:  CONSTITUTIONAL: No fevers or chills  EYES/ENT: No visual changes;  No vertigo or throat pain   NECK: No pain or stiffness  RESPIRATORY: No cough, wheezing, hemoptysis; No shortness of breath  CARDIOVASCULAR: No chest pain or palpitations  GASTROINTESTINAL: No abdominal or epigastric pain. No nausea, vomiting, or hematemesis; No diarrhea or constipation. No melena or hematochezia.  GENITOURINARY: No dysuria, frequency or hematuria  NEUROLOGICAL: No syncope or dizziness  SKIN: see HPI REVIEW OF SYSTEMS:  CONSTITUTIONAL: No fevers or chills  EYES/ENT: No visual changes;  No vertigo or throat pain   NECK: No pain or stiffness  RESPIRATORY: No cough, wheezing, hemoptysis; No shortness of breath  CARDIOVASCULAR: No chest pain or palpitations  GASTROINTESTINAL: No abdominal or epigastric pain. No nausea, vomiting, or hematemesis; No diarrhea or constipation. No melena or hematochezia.  GENITOURINARY: No dysuria, frequency or hematuria  NEUROLOGICAL: No syncope or dizziness  SKIN: see HPI  MUSCULOSKELETAL: No joint pain or swelling  PSYCHIATRIC: No depression, anxiety, mood swings, or difficulty sleeping, bipolar stable  HEME/LYMPH: No easy bruising, or bleeding gums

## 2022-09-27 NOTE — RESULTS/DATA
[de-identified] : All testing done yesterday at [facility], including swabs of nasal, axillary and groin for MRSA, no available.

## 2022-09-27 NOTE — H&P ADULT - PROBLEM SELECTOR PLAN 2
h/o CLL diagnosed around 1/2022 with recent progression of disease on CT neck in July, under surveillance, no active treatment  - follows Dr. Tereso Nguyen outpt

## 2022-09-27 NOTE — H&P ADULT - PROBLEM SELECTOR PLAN 7
severe OA of L knee resulting in frequent falls with multiple rib fractures. currently planned for L knee replacement  - ambulates w/ cane/walker at baseline   - PT here severe OA of L knee resulting in frequent mechanical falls with multiple rib fractures (last was earlier this month per pt). currently planned for L knee replacement  - ambulates w/ cane/walker at baseline   - PT here  - fall risk

## 2022-09-27 NOTE — H&P ADULT - ATTENDING COMMENTS
66 yo f w PMH psoriatic arthritis, CLL, remote MRSA infection, bipolar disorder, OA, HTN, anemia, h/o PE (not on AC), obesity s/p alexandria-en-y bypass with revision, ?Eagle syndrome, p/w purulent cellulitis of RLE s/p failed 2+ weeks of outpatient oral antibiotics with likely small underlying collection on imaging, admitted to medicine for further mgmt.    subjective fevers at home, afebrile here, with elevated WBC to 27 with lymphocytic predominance likely CLL, so more likely 2/2 CLL; otherwise, hemodynamically stable  imaging as follows:  - LLE US: w/ ill-defined hypoechoic foci up to 0.6 x 0.8 x0.4 w/ possible small fluid collection/abscess  - LLE duplex: neg for DVT  - XR tib-fib: showing swelling w/o lytic or blastic lesions, no periosteal reactions, no fractures/dislocations  s/p vanc 1g x 1 in ED  f/u blood cxs, MRSA swab  c/w vanc 1.5g q12h; adjust according to trough; deescalate according to infectious work up  supportive measures with affected extremity elevation, cold compresses, analgesics + antipyretics as needed  repeat imaging if no clinical improvement    concur with a+p as described above by resident

## 2022-09-27 NOTE — PHYSICAL EXAM
[Normal Sclera/Conjunctiva] : normal sclera/conjunctiva [Supple] : supple [Normal Mood] : the mood was normal [Normal] : affect was normal and insight and judgment were intact [de-identified] : 10/12 cm erythematous, firm, warm mass on right mid calf, anterolateral, tender to palpation, with induration not flocculence [de-identified] : favors right leg, is unable to get up from chair

## 2022-09-28 LAB
ANION GAP SERPL CALC-SCNC: 9 MMOL/L — SIGNIFICANT CHANGE UP (ref 5–17)
BUN SERPL-MCNC: 12 MG/DL — SIGNIFICANT CHANGE UP (ref 7–23)
CALCIUM SERPL-MCNC: 9 MG/DL — SIGNIFICANT CHANGE UP (ref 8.4–10.5)
CHLORIDE SERPL-SCNC: 100 MMOL/L — SIGNIFICANT CHANGE UP (ref 96–108)
CO2 SERPL-SCNC: 27 MMOL/L — SIGNIFICANT CHANGE UP (ref 22–31)
CREAT SERPL-MCNC: 0.36 MG/DL — LOW (ref 0.5–1.3)
EGFR: 113 ML/MIN/1.73M2 — SIGNIFICANT CHANGE UP
ERYTHROCYTE [SEDIMENTATION RATE] IN BLOOD: 54 MM/HR — HIGH (ref 0–20)
GLUCOSE SERPL-MCNC: 85 MG/DL — SIGNIFICANT CHANGE UP (ref 70–99)
HCT VFR BLD CALC: 29.1 % — LOW (ref 34.5–45)
HGB BLD-MCNC: 9.2 G/DL — LOW (ref 11.5–15.5)
MCHC RBC-ENTMCNC: 28.9 PG — SIGNIFICANT CHANGE UP (ref 27–34)
MCHC RBC-ENTMCNC: 31.6 GM/DL — LOW (ref 32–36)
MCV RBC AUTO: 91.5 FL — SIGNIFICANT CHANGE UP (ref 80–100)
MRSA PCR RESULT.: SIGNIFICANT CHANGE UP
NRBC # BLD: 0 /100 WBCS — SIGNIFICANT CHANGE UP (ref 0–0)
PLATELET # BLD AUTO: 207 K/UL — SIGNIFICANT CHANGE UP (ref 150–400)
POTASSIUM SERPL-MCNC: 3.9 MMOL/L — SIGNIFICANT CHANGE UP (ref 3.5–5.3)
POTASSIUM SERPL-SCNC: 3.9 MMOL/L — SIGNIFICANT CHANGE UP (ref 3.5–5.3)
RBC # BLD: 3.18 M/UL — LOW (ref 3.8–5.2)
RBC # FLD: 12.4 % — SIGNIFICANT CHANGE UP (ref 10.3–14.5)
S AUREUS DNA NOSE QL NAA+PROBE: SIGNIFICANT CHANGE UP
SODIUM SERPL-SCNC: 136 MMOL/L — SIGNIFICANT CHANGE UP (ref 135–145)
WBC # BLD: 22.28 K/UL — HIGH (ref 3.8–10.5)
WBC # FLD AUTO: 22.28 K/UL — HIGH (ref 3.8–10.5)

## 2022-09-28 PROCEDURE — 99223 1ST HOSP IP/OBS HIGH 75: CPT | Mod: GC

## 2022-09-28 RX ORDER — APREMILAST 10-20-30MG
30 KIT ORAL
Refills: 0 | Status: DISCONTINUED | OUTPATIENT
Start: 2022-09-28 | End: 2022-10-03

## 2022-09-28 RX ORDER — LISDEXAMFETAMINE DIMESYLATE 70 MG/1
50 CAPSULE ORAL
Refills: 0 | Status: DISCONTINUED | OUTPATIENT
Start: 2022-09-28 | End: 2022-10-03

## 2022-09-28 RX ORDER — CYCLOBENZAPRINE HYDROCHLORIDE 10 MG/1
10 TABLET, FILM COATED ORAL THREE TIMES A DAY
Refills: 0 | Status: DISCONTINUED | OUTPATIENT
Start: 2022-09-28 | End: 2022-10-03

## 2022-09-28 RX ORDER — ESOMEPRAZOLE MAGNESIUM 40 MG/1
40 CAPSULE, DELAYED RELEASE ORAL
Refills: 0 | Status: DISCONTINUED | OUTPATIENT
Start: 2022-09-28 | End: 2022-10-03

## 2022-09-28 RX ORDER — INFLUENZA VIRUS VACCINE 15; 15; 15; 15 UG/.5ML; UG/.5ML; UG/.5ML; UG/.5ML
0.7 SUSPENSION INTRAMUSCULAR ONCE
Refills: 0 | Status: DISCONTINUED | OUTPATIENT
Start: 2022-09-28 | End: 2022-10-03

## 2022-09-28 RX ORDER — LAMOTRIGINE 25 MG/1
200 TABLET, ORALLY DISINTEGRATING ORAL DAILY
Refills: 0 | Status: DISCONTINUED | OUTPATIENT
Start: 2022-09-28 | End: 2022-10-03

## 2022-09-28 RX ORDER — CHLORHEXIDINE GLUCONATE 213 G/1000ML
1 SOLUTION TOPICAL DAILY
Refills: 0 | Status: DISCONTINUED | OUTPATIENT
Start: 2022-09-28 | End: 2022-10-03

## 2022-09-28 RX ADMIN — FLUCONAZOLE 200 MILLIGRAM(S): 150 TABLET ORAL at 09:37

## 2022-09-28 RX ADMIN — APREMILAST 30 MILLIGRAM(S): KIT ORAL at 23:04

## 2022-09-28 RX ADMIN — Medication 1 TABLET(S): at 11:51

## 2022-09-28 RX ADMIN — ENOXAPARIN SODIUM 40 MILLIGRAM(S): 100 INJECTION SUBCUTANEOUS at 18:13

## 2022-09-28 RX ADMIN — GABAPENTIN 400 MILLIGRAM(S): 400 CAPSULE ORAL at 08:56

## 2022-09-28 RX ADMIN — OXYCODONE HYDROCHLORIDE 5 MILLIGRAM(S): 5 TABLET ORAL at 01:08

## 2022-09-28 RX ADMIN — Medication 10 MILLIGRAM(S): at 18:14

## 2022-09-28 RX ADMIN — OXYCODONE HYDROCHLORIDE 5 MILLIGRAM(S): 5 TABLET ORAL at 00:09

## 2022-09-28 RX ADMIN — MIRABEGRON 25 MILLIGRAM(S): 50 TABLET, EXTENDED RELEASE ORAL at 11:52

## 2022-09-28 RX ADMIN — Medication 650 MILLIGRAM(S): at 16:30

## 2022-09-28 RX ADMIN — SERTRALINE 150 MILLIGRAM(S): 25 TABLET, FILM COATED ORAL at 11:43

## 2022-09-28 RX ADMIN — ENOXAPARIN SODIUM 40 MILLIGRAM(S): 100 INJECTION SUBCUTANEOUS at 06:23

## 2022-09-28 RX ADMIN — VORTIOXETINE 10 MILLIGRAM(S): 5 TABLET, FILM COATED ORAL at 11:52

## 2022-09-28 RX ADMIN — Medication 650 MILLIGRAM(S): at 15:43

## 2022-09-28 RX ADMIN — Medication 1 TABLET(S): at 22:57

## 2022-09-28 RX ADMIN — QUETIAPINE FUMARATE 200 MILLIGRAM(S): 200 TABLET, FILM COATED ORAL at 18:13

## 2022-09-28 RX ADMIN — Medication 1.5 MILLIGRAM(S): at 14:04

## 2022-09-28 RX ADMIN — Medication 10 MILLIGRAM(S): at 06:23

## 2022-09-28 RX ADMIN — GABAPENTIN 600 MILLIGRAM(S): 400 CAPSULE ORAL at 22:57

## 2022-09-28 RX ADMIN — Medication 1 TABLET(S): at 08:56

## 2022-09-28 RX ADMIN — ESOMEPRAZOLE MAGNESIUM 40 MILLIGRAM(S): 40 CAPSULE, DELAYED RELEASE ORAL at 23:04

## 2022-09-28 RX ADMIN — OXYCODONE HYDROCHLORIDE 5 MILLIGRAM(S): 5 TABLET ORAL at 12:55

## 2022-09-28 RX ADMIN — Medication 1 MILLIGRAM(S): at 08:56

## 2022-09-28 RX ADMIN — FLUCONAZOLE 200 MILLIGRAM(S): 150 TABLET ORAL at 20:53

## 2022-09-28 RX ADMIN — Medication 2 MILLIGRAM(S): at 23:27

## 2022-09-28 RX ADMIN — Medication 300 MILLIGRAM(S): at 22:59

## 2022-09-28 RX ADMIN — Medication 1 TABLET(S): at 14:04

## 2022-09-28 RX ADMIN — Medication 1 TABLET(S): at 06:23

## 2022-09-28 RX ADMIN — CHLORHEXIDINE GLUCONATE 1 APPLICATION(S): 213 SOLUTION TOPICAL at 13:12

## 2022-09-28 RX ADMIN — Medication 300 MILLIGRAM(S): at 06:24

## 2022-09-28 NOTE — PHYSICAL THERAPY INITIAL EVALUATION ADULT - ADDITIONAL COMMENTS
patient states she has a history of falls and has an impending L knee replacement surgery (10/6 at [facility]). She also states that she was recently on a cruise where she rented a scooter for longer excursions however does not typically use in daily life.

## 2022-09-28 NOTE — PROGRESS NOTE ADULT - PROBLEM SELECTOR PLAN 10
Diet: soft diet due to history esophageal issues  DVT ppx: lovenox SQ  Dispo: pending clinical course

## 2022-09-28 NOTE — PROGRESS NOTE ADULT - ASSESSMENT
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

## 2022-09-28 NOTE — PROGRESS NOTE ADULT - SUBJECTIVE AND OBJECTIVE BOX
PROGRESS NOTE:   Authored by Dr. Karlene Vargas    Patient is a 67y old  Female who presents with a chief complaint of cellulitis (27 Sep 2022 21:49)      SUBJECTIVE / OVERNIGHT EVENTS:    ADDITIONAL REVIEW OF SYSTEMS:    MEDICATIONS  (STANDING):  calcium carbonate 1250 mG  + Vitamin D (OsCal 500 + D) 1 Tablet(s) Oral daily  clonazePAM  Tablet 1.5 milliGRAM(s) Oral <User Schedule>  clonazePAM  Tablet 1 milliGRAM(s) Oral daily  clonazePAM  Tablet 2 milliGRAM(s) Oral at bedtime  enalapril 10 milliGRAM(s) Oral daily  enoxaparin Injectable 40 milliGRAM(s) SubCutaneous every 12 hours  fluconAZOLE   Tablet 200 milliGRAM(s) Oral <User Schedule>  gabapentin 600 milliGRAM(s) Oral at bedtime  gabapentin 400 milliGRAM(s) Oral daily  lactobacillus acidophilus 1 Tablet(s) Oral three times a day  lamoTRIgine 200 milliGRAM(s) Oral at bedtime  mirabegron ER 25 milliGRAM(s) Oral daily  multivitamin/minerals 1 Tablet(s) Oral daily  oxybutynin 10 milliGRAM(s) Oral two times a day  pantoprazole    Tablet 40 milliGRAM(s) Oral before breakfast  QUEtiapine 200 milliGRAM(s) Oral <User Schedule>  sertraline 150 milliGRAM(s) Oral daily  vancomycin  IVPB 1500 milliGRAM(s) IV Intermittent every 12 hours  vortioxetine 10 milliGRAM(s) Oral daily    MEDICATIONS  (PRN):  acetaminophen     Tablet .. 650 milliGRAM(s) Oral every 6 hours PRN Temp greater or equal to 38C (100.4F), Mild Pain (1 - 3)  oxyCODONE    IR 5 milliGRAM(s) Oral every 4 hours PRN Severe Pain (7 - 10)  propranolol 20 milliGRAM(s) Oral every 8 hours PRN hand tremors      CAPILLARY BLOOD GLUCOSE        I&O's Summary      PHYSICAL EXAM:  Vital Signs Last 24 Hrs  T(C): 37.1 (28 Sep 2022 04:45), Max: 37.1 (28 Sep 2022 04:45)  T(F): 98.7 (28 Sep 2022 04:45), Max: 98.7 (28 Sep 2022 04:45)  HR: 67 (28 Sep 2022 04:45) (67 - 87)  BP: 99/61 (28 Sep 2022 04:45) (99/61 - 137/76)  BP(mean): --  RR: 18 (28 Sep 2022 04:45) (18 - 18)  SpO2: 93% (28 Sep 2022 04:45) (93% - 98%)    Parameters below as of 28 Sep 2022 04:45  Patient On (Oxygen Delivery Method): room air        GENERAL: NAD, lying comfortably in bed  HEAD: Atraumatic, normocephalic  EYES: EOMI b/l, conjunctiva and sclera clear  NECK: Supple, No JVD, No LAD  RESPIRATORY: Normal respiratory effort; lungs are clear to auscultation bilaterally  CARDIOVASCULAR: Regular rate and rhythm, normal S1 and S2, no murmur/rub/gallop; No lower extremity edema  ABDOMEN: Nontender, normoactive bowel sounds, no rebound/guarding; No hepatosplenomegaly  MUSCULOSKELETAL: no clubbing or cyanosis of digits; no joint swelling or tenderness to palpation  NEURO: Non focal   SKIN:   PSYCH: A+O to person, place, and time; affect appropriate    LABS:                          9.2    x     )-----------( 207      ( 28 Sep 2022 06:47 )             29.1     09-27    137  |  100  |  14  ----------------------------<  97  4.1   |  26  |  0.34<L>    Ca    9.6      27 Sep 2022 17:40    TPro  6.9  /  Alb  4.4  /  TBili  0.2  /  DBili  x   /  AST  25  /  ALT  15  /  AlkPhos  92  09-27                  RADIOLOGY:    Consulted note reviewed  Reviewed Imaging personally

## 2022-09-28 NOTE — PROGRESS NOTE ADULT - PROBLEM SELECTOR PLAN 7
severe OA of L knee resulting in frequent mechanical falls with multiple rib fractures (last was earlier this month per pt). currently planned for L knee replacement  - ambulates w/ cane/walker at baseline   - PT here  - fall risk

## 2022-09-28 NOTE — PROGRESS NOTE ADULT - PROBLEM SELECTOR PLAN 4
with h/o GERD, oroesophageal candidiasis (was following ID outpt) and currently undergoing outpt w/u with GI at [facility] for esphageal motility disorders. Possible [place] syndrome   - at home, on nitroglycerin 0.3mg tab PRN swallowing aid, 2x in 5min  - supposedly eats a regular diet at home, will do a soft dysphagia diet here   - on nexium 40mg at home, pantoprazole 40 while inpatient  - c/w fluconazole 200mg BID  - aspiration precautions, hob elevation with h/o GERD, oroesophageal candidiasis (was following ID outpt) and currently undergoing outpt w/u with GI at [facility] for esphageal motility disorders. Possible [place] syndrome   Pt says she can tolerate diet if she eats slowly; no S&S eval indicated at this time.     - at home, on nitroglycerin 0.3mg tab PRN swallowing aid, 2x in 5min  - supposedly eats a regular diet at home, will do a soft dysphagia diet here   - on nexium 40mg at home, pantoprazole 40 while inpatient  - c/w fluconazole 200mg BID  - aspiration precautions, hob elevation

## 2022-09-28 NOTE — PROGRESS NOTE ADULT - PROBLEM SELECTOR PLAN 1
h/o remote severe MRSA infection ~13 yrs ago requiring ICU stay p/w cellulitis of R anterolateral shin w/ swelling, erythema, and warmth w/ associated small abscess s/p failed oral abx (doxy x 2 wks, clinda x days) and I&D outpatient w/ minimal to no drainage. Now admitted for IV abx   - subjective fevers at home, afebrile here, with elevated WBC to 27 with lymphocytic predominance i/s/o CLL, so more likely 2/2 CLL. does not meet SIRS criteria at this time. lactate wnl   - LLE US: w/ ill-defined hypoechoic foci up to 0.6 x 0.8 x0.4 w/ possible small fluid collection/abscess  -- fluid collection likely too small to drain  - LLE duplex: neg for DVT  - XR tib-fib: showing swelling w/o lytic or blastic lesions, no periosteal reactions, no fractures/dislocations  - s/p vanc 1g x 1 in ED    - given h/o severe MRSA infection, can c/w vanc 1.5g q12h  - check pre-4th trough   - f/u blood cxs, MRSA swab  - elevate RLE extremity, tylenol for mild-moderate home, home oxy 5mg q4h PRN severe pain, ice compresses PRN

## 2022-09-28 NOTE — PATIENT PROFILE ADULT - FALL HARM RISK - RISK INTERVENTIONS

## 2022-09-28 NOTE — PATIENT PROFILE ADULT - FALL HARM RISK - FALLEN IN PAST
Additional Notes: Patient has not received 21/22 flu vaccine Quality 110: Preventive Care And Screening: Influenza Immunization: Influenza Immunization not Administered for Documented Reasons. Detail Level: Detailed No

## 2022-09-28 NOTE — PROGRESS NOTE ADULT - PROBLEM SELECTOR PLAN 2
h/o CLL diagnosed around 1/2022 with recent progression of disease on CT neck in July, under surveillance, no active treatment  - follows Dr. Tereso Nguyen outpt h/o CLL diagnosed around 1/2022 with recent progression of disease on CT neck in July, under surveillance, no active treatment. Pt says they were waiting to initiate chemotherapy after her L knee surgery scheduled for next week given concerns for delayed healing.     - follows Dr. Tereso Nguyen outpt

## 2022-09-28 NOTE — PATIENT PROFILE ADULT - FUNCTIONAL ASSESSMENT - DAILY ACTIVITY ASSESSMENT TYPE
[facility]  Gastroenterology & Nutrition  [address], 4[address] Floor, [place] [address]  [place] [address]  Phone: [phone]  Fax:   Follow Up Time: 4-6 Days
Admission

## 2022-09-28 NOTE — PHYSICAL THERAPY INITIAL EVALUATION ADULT - PLANNED THERAPY INTERVENTIONS, PT EVAL
stairs  GOAL: Pt will negotiate up/down 3  steps with B handrail ascending independently in 2 weeks/bed mobility training/gait training/transfer training

## 2022-09-28 NOTE — PATIENT PROFILE ADULT - FUNCTIONAL ASSESSMENT - BASIC MOBILITY 6.
3-calculated by average/Not able to assess (calculate score using [facility] averaging method)

## 2022-09-28 NOTE — PROGRESS NOTE ADULT - SUBJECTIVE AND OBJECTIVE BOX
PROGRESS NOTE:   Authored by Dr. Karlene Vargas    Patient is a 67y old  Female who presents with a chief complaint of cellulitis (28 Sep 2022 07:23)      SUBJECTIVE / OVERNIGHT EVENTS:    ADDITIONAL REVIEW OF SYSTEMS:    MEDICATIONS  (STANDING):  calcium carbonate 1250 mG  + Vitamin D (OsCal 500 + D) 1 Tablet(s) Oral daily  clonazePAM  Tablet 1.5 milliGRAM(s) Oral <User Schedule>  clonazePAM  Tablet 1 milliGRAM(s) Oral daily  clonazePAM  Tablet 2 milliGRAM(s) Oral at bedtime  enalapril 10 milliGRAM(s) Oral daily  enoxaparin Injectable 40 milliGRAM(s) SubCutaneous every 12 hours  fluconAZOLE   Tablet 200 milliGRAM(s) Oral <User Schedule>  gabapentin 600 milliGRAM(s) Oral at bedtime  gabapentin 400 milliGRAM(s) Oral daily  lactobacillus acidophilus 1 Tablet(s) Oral three times a day  lamoTRIgine 200 milliGRAM(s) Oral at bedtime  mirabegron ER 25 milliGRAM(s) Oral daily  multivitamin/minerals 1 Tablet(s) Oral daily  oxybutynin 10 milliGRAM(s) Oral two times a day  pantoprazole    Tablet 40 milliGRAM(s) Oral before breakfast  QUEtiapine 200 milliGRAM(s) Oral <User Schedule>  sertraline 150 milliGRAM(s) Oral daily  vancomycin  IVPB 1500 milliGRAM(s) IV Intermittent every 12 hours  vortioxetine 10 milliGRAM(s) Oral daily    MEDICATIONS  (PRN):  acetaminophen     Tablet .. 650 milliGRAM(s) Oral every 6 hours PRN Temp greater or equal to 38C (100.4F), Mild Pain (1 - 3)  oxyCODONE    IR 5 milliGRAM(s) Oral every 4 hours PRN Severe Pain (7 - 10)  propranolol 20 milliGRAM(s) Oral every 8 hours PRN hand tremors      CAPILLARY BLOOD GLUCOSE        I&O's Summary      PHYSICAL EXAM:  Vital Signs Last 24 Hrs  T(C): 37.1 (28 Sep 2022 04:45), Max: 37.1 (28 Sep 2022 04:45)  T(F): 98.7 (28 Sep 2022 04:45), Max: 98.7 (28 Sep 2022 04:45)  HR: 67 (28 Sep 2022 04:45) (67 - 87)  BP: 99/61 (28 Sep 2022 04:45) (99/61 - 137/76)  BP(mean): --  RR: 18 (28 Sep 2022 04:45) (18 - 18)  SpO2: 93% (28 Sep 2022 04:45) (93% - 98%)    Parameters below as of 28 Sep 2022 04:45  Patient On (Oxygen Delivery Method): room air        GENERAL: NAD, lying comfortably in bed  HEAD: Atraumatic, normocephalic  EYES: EOMI b/l, conjunctiva and sclera clear  NECK: Supple, No JVD, No LAD  RESPIRATORY: Normal respiratory effort; lungs are clear to auscultation bilaterally  CARDIOVASCULAR: Regular rate and rhythm, normal S1 and S2, no murmur/rub/gallop; No lower extremity edema  ABDOMEN: Nontender, normoactive bowel sounds, no rebound/guarding; No hepatosplenomegaly  MUSCULOSKELETAL: no clubbing or cyanosis of digits; no joint swelling or tenderness to palpation  NEURO: Non focal   SKIN:   PSYCH: A+O to person, place, and time; affect appropriate    LABS:                          9.2    x     )-----------( 207      ( 28 Sep 2022 06:47 )             29.1     09-27    137  |  100  |  14  ----------------------------<  97  4.1   |  26  |  0.34<L>    Ca    9.6      27 Sep 2022 17:40    TPro  6.9  /  Alb  4.4  /  TBili  0.2  /  DBili  x   /  AST  25  /  ALT  15  /  AlkPhos  92  09-27                  RADIOLOGY:    Consulted note reviewed  Reviewed Imaging personally   PROGRESS NOTE:   Authored by Dr. Karlene Vargas    Patient is a 67y old  Female who presents with a chief complaint of cellulitis (28 Sep 2022 07:23)      SUBJECTIVE / OVERNIGHT EVENTS: Pt endorses pain in RLE, and concerns for delayed healing of her leg wound given her CLL. No other complaints, denies fever/chills.     ADDITIONAL REVIEW OF SYSTEMS:  No CP, sob  No abd pain, N/V/D    MEDICATIONS  (STANDING):  calcium carbonate 1250 mG  + Vitamin D (OsCal 500 + D) 1 Tablet(s) Oral daily  clonazePAM  Tablet 1.5 milliGRAM(s) Oral <User Schedule>  clonazePAM  Tablet 1 milliGRAM(s) Oral daily  clonazePAM  Tablet 2 milliGRAM(s) Oral at bedtime  enalapril 10 milliGRAM(s) Oral daily  enoxaparin Injectable 40 milliGRAM(s) SubCutaneous every 12 hours  fluconAZOLE   Tablet 200 milliGRAM(s) Oral <User Schedule>  gabapentin 600 milliGRAM(s) Oral at bedtime  gabapentin 400 milliGRAM(s) Oral daily  lactobacillus acidophilus 1 Tablet(s) Oral three times a day  lamoTRIgine 200 milliGRAM(s) Oral at bedtime  mirabegron ER 25 milliGRAM(s) Oral daily  multivitamin/minerals 1 Tablet(s) Oral daily  oxybutynin 10 milliGRAM(s) Oral two times a day  pantoprazole    Tablet 40 milliGRAM(s) Oral before breakfast  QUEtiapine 200 milliGRAM(s) Oral <User Schedule>  sertraline 150 milliGRAM(s) Oral daily  vancomycin  IVPB 1500 milliGRAM(s) IV Intermittent every 12 hours  vortioxetine 10 milliGRAM(s) Oral daily    MEDICATIONS  (PRN):  acetaminophen     Tablet .. 650 milliGRAM(s) Oral every 6 hours PRN Temp greater or equal to 38C (100.4F), Mild Pain (1 - 3)  oxyCODONE    IR 5 milliGRAM(s) Oral every 4 hours PRN Severe Pain (7 - 10)  propranolol 20 milliGRAM(s) Oral every 8 hours PRN hand tremors      CAPILLARY BLOOD GLUCOSE        I&O's Summary      PHYSICAL EXAM:  Vital Signs Last 24 Hrs  T(C): 37.1 (28 Sep 2022 04:45), Max: 37.1 (28 Sep 2022 04:45)  T(F): 98.7 (28 Sep 2022 04:45), Max: 98.7 (28 Sep 2022 04:45)  HR: 67 (28 Sep 2022 04:45) (67 - 87)  BP: 99/61 (28 Sep 2022 04:45) (99/61 - 137/76)  BP(mean): --  RR: 18 (28 Sep 2022 04:45) (18 - 18)  SpO2: 93% (28 Sep 2022 04:45) (93% - 98%)    Parameters below as of 28 Sep 2022 04:45  Patient On (Oxygen Delivery Method): room air        GENERAL: NAD, lying comfortably in bed  HEAD: Atraumatic, normocephalic  EYES: Conjunctiva and sclera clear  NECK: Supple  RESPIRATORY: Normal respiratory effort; lungs are clear to auscultation bilaterally  CARDIOVASCULAR: Regular rate and rhythm, normal S1 and S2, no murmur/rub/gallop; No lower extremity edema  ABDOMEN: mild tenderness diffusely, normoactive bowel sounds, no rebound/guarding   MUSCULOSKELETAL: ROM and strength intact; pt ambulating.  NEURO: Non focal   SKIN: Indurated lesion measuring about 6 inches in diameter with central crust, not acutely tender to palpation, no erythema or warmth.   PSYCH: A+O to person, place, and time; affect appropriate    LABS:                          9.2    22.28 )-----------( 207      ( 28 Sep 2022 06:47 )             29.1     09-28    136  |  100  |  12  ----------------------------<  85  3.9   |  27  |  0.36<L>    Ca    9.0      28 Sep 2022 06:47    TPro  6.9  /  Alb  4.4  /  TBili  0.2  /  DBili  x   /  AST  25  /  ALT  15  /  AlkPhos  92  09-27                RADIOLOGY:    Consulted note reviewed  Reviewed Imaging personally

## 2022-09-28 NOTE — PHYSICAL THERAPY INITIAL EVALUATION ADULT - PERTINENT HX OF CURRENT PROBLEM, REHAB EVAL
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  Xray R tibia fibula: Large area of soft tissue swelling anterior to the right proximal tibia/fibula. No evidence of acute fracture or dislocation.No lytic or blastic lesion or periosteal reaction of the visualized bones. Please correlate with findings from concurrently performed ultrasound.  R LE doppler: (-) for DVT

## 2022-09-28 NOTE — PROGRESS NOTE ADULT - PROBLEM SELECTOR PLAN 4
with h/o GERD, oroesophageal candidiasis (was following ID outpt) and currently undergoing outpt w/u with GI at [facility] for esphageal motility disorders. 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

## 2022-09-28 NOTE — CHART NOTE - NSCHARTNOTEFT_GEN_A_CORE
ISTOP checked and reviewed by me on 09-28-22 @ 00:04  Reference #: [id number]    Rx Written	Rx Dispensed	Drug	                                 Quantity	Days Supply         Prescriber Name		  08/10/2022	08/12/2022	clonazepam 2 mg tablet	        60	              30	             Rony Mesa MD	  07/06/2022	07/12/2022	vyvanse 50 mg capsule	        90	              90	             Rony Mesa MD	  07/06/2022	07/07/2022	oxycodone hcl (ir) 5 mg tablet     120	              30	             Alan Stuart	  07/06/2022	07/07/2022	clonazepam 0.5 mg tablet	        90	              30	             Rony Mesa MD, PGY2 ISTOP checked and reviewed by me on 09-28-22 @ 00:04  Reference #: [id number]      Rx Dispensed	Drug	                                 Quantity	Days Supply         Prescriber Name		  08/12/2022	clonazepam 2 mg tablet	        60	              30	             Rony Mesa MD	  07/12/2022	vyvanse 50 mg capsule	        90	              90	             Rony Mesa MD	  07/07/2022	oxycodone hcl (ir) 5 mg tablet     120	              30	             Alan Stuart	  07/07/2022	clonazepam 0.5 mg tablet	        90	              30	             Rony Mesa MD, PGY2 ISTOP checked and reviewed by me on 09-28-22 @ 00:04  Reference #: [id number]      Rx Dispensed	Drug	                                 Quantity	Days Supply     Prescriber Name		  08/12/2022	clonazepam 2 mg tablet	        60	              30	         Rony Mesa MD	  07/12/2022	vyvanse 50 mg capsule	        90	              90	         Rony Mesa MD	  07/07/2022	oxycodone hcl (ir) 5 mg tablet     120	              30	         Alan Stuart	  07/07/2022	clonazepam 0.5 mg tablet	        90	              30	         Rony Mesa MD, PGY2 ISTOP checked and reviewed by me on 09-28-22 @ 00:04  Reference #: [id number]      Rx Dispensed	Drug	                                 Quantity	Days Supply     Prescriber Name		  08/12/2022	clonazepam 2 mg tablet	        60	              30	         Rony Mesa MD	  07/12/2022	vyvanse 50 mg capsule	        90	              90	         Rony Mesa MD	  07/07/2022	oxycodone hcl (ir) 5 mg tablet     120	              30	         Alan Stuart	  07/07/2022	clonazepam 0.5 mg tablet	     90	            30	       Rony Mesa MD, PGY2

## 2022-09-29 ENCOUNTER — TRANSCRIPTION ENCOUNTER (OUTPATIENT)
Age: 67
End: 2022-09-29

## 2022-09-29 LAB
ANION GAP SERPL CALC-SCNC: 11 MMOL/L — SIGNIFICANT CHANGE UP (ref 5–17)
BUN SERPL-MCNC: 12 MG/DL — SIGNIFICANT CHANGE UP (ref 7–23)
CALCIUM SERPL-MCNC: 9.3 MG/DL — SIGNIFICANT CHANGE UP (ref 8.4–10.5)
CHLORIDE SERPL-SCNC: 94 MMOL/L — LOW (ref 96–108)
CO2 SERPL-SCNC: 26 MMOL/L — SIGNIFICANT CHANGE UP (ref 22–31)
CREAT SERPL-MCNC: 0.44 MG/DL — LOW (ref 0.5–1.3)
EGFR: 107 ML/MIN/1.73M2 — SIGNIFICANT CHANGE UP
GLUCOSE SERPL-MCNC: 79 MG/DL — SIGNIFICANT CHANGE UP (ref 70–99)
HCT VFR BLD CALC: 31.1 % — LOW (ref 34.5–45)
HGB BLD-MCNC: 9.9 G/DL — LOW (ref 11.5–15.5)
MCHC RBC-ENTMCNC: 28.9 PG — SIGNIFICANT CHANGE UP (ref 27–34)
MCHC RBC-ENTMCNC: 31.8 GM/DL — LOW (ref 32–36)
MCV RBC AUTO: 90.7 FL — SIGNIFICANT CHANGE UP (ref 80–100)
NRBC # BLD: 0 /100 WBCS — SIGNIFICANT CHANGE UP (ref 0–0)
PLATELET # BLD AUTO: 217 K/UL — SIGNIFICANT CHANGE UP (ref 150–400)
POTASSIUM SERPL-MCNC: 3.9 MMOL/L — SIGNIFICANT CHANGE UP (ref 3.5–5.3)
POTASSIUM SERPL-SCNC: 3.9 MMOL/L — SIGNIFICANT CHANGE UP (ref 3.5–5.3)
RBC # BLD: 3.43 M/UL — LOW (ref 3.8–5.2)
RBC # FLD: 12.1 % — SIGNIFICANT CHANGE UP (ref 10.3–14.5)
SODIUM SERPL-SCNC: 131 MMOL/L — LOW (ref 135–145)
VANCOMYCIN TROUGH SERPL-MCNC: 20.2 UG/ML — HIGH (ref 10–20)
WBC # BLD: 21.68 K/UL — HIGH (ref 3.8–10.5)
WBC # FLD AUTO: 21.68 K/UL — HIGH (ref 3.8–10.5)

## 2022-09-29 PROCEDURE — 99233 SBSQ HOSP IP/OBS HIGH 50: CPT | Mod: GC

## 2022-09-29 PROCEDURE — 99223 1ST HOSP IP/OBS HIGH 75: CPT

## 2022-09-29 PROCEDURE — 73701 CT LOWER EXTREMITY W/DYE: CPT | Mod: 26,RT

## 2022-09-29 RX ORDER — VANCOMYCIN HCL 1 G
1250 VIAL (EA) INTRAVENOUS EVERY 12 HOURS
Refills: 0 | Status: DISCONTINUED | OUTPATIENT
Start: 2022-09-29 | End: 2022-09-30

## 2022-09-29 RX ADMIN — LAMOTRIGINE 200 MILLIGRAM(S): 25 TABLET, ORALLY DISINTEGRATING ORAL at 10:08

## 2022-09-29 RX ADMIN — OXYCODONE HYDROCHLORIDE 5 MILLIGRAM(S): 5 TABLET ORAL at 01:10

## 2022-09-29 RX ADMIN — APREMILAST 30 MILLIGRAM(S): KIT ORAL at 09:12

## 2022-09-29 RX ADMIN — GABAPENTIN 400 MILLIGRAM(S): 400 CAPSULE ORAL at 10:13

## 2022-09-29 RX ADMIN — Medication 1.5 MILLIGRAM(S): at 14:20

## 2022-09-29 RX ADMIN — Medication 1 TABLET(S): at 12:04

## 2022-09-29 RX ADMIN — Medication 10 MILLIGRAM(S): at 20:36

## 2022-09-29 RX ADMIN — Medication 1 MILLIGRAM(S): at 10:13

## 2022-09-29 RX ADMIN — ENOXAPARIN SODIUM 40 MILLIGRAM(S): 100 INJECTION SUBCUTANEOUS at 17:32

## 2022-09-29 RX ADMIN — LISDEXAMFETAMINE DIMESYLATE 50 MILLIGRAM(S): 70 CAPSULE ORAL at 09:13

## 2022-09-29 RX ADMIN — Medication 166.67 MILLIGRAM(S): at 17:31

## 2022-09-29 RX ADMIN — VORTIOXETINE 10 MILLIGRAM(S): 5 TABLET, FILM COATED ORAL at 10:09

## 2022-09-29 RX ADMIN — Medication 2 MILLIGRAM(S): at 21:04

## 2022-09-29 RX ADMIN — Medication 1 TABLET(S): at 14:20

## 2022-09-29 RX ADMIN — Medication 1 TABLET(S): at 21:04

## 2022-09-29 RX ADMIN — APREMILAST 30 MILLIGRAM(S): KIT ORAL at 20:42

## 2022-09-29 RX ADMIN — FLUCONAZOLE 200 MILLIGRAM(S): 150 TABLET ORAL at 20:35

## 2022-09-29 RX ADMIN — ESOMEPRAZOLE MAGNESIUM 40 MILLIGRAM(S): 40 CAPSULE, DELAYED RELEASE ORAL at 09:13

## 2022-09-29 RX ADMIN — ESOMEPRAZOLE MAGNESIUM 40 MILLIGRAM(S): 40 CAPSULE, DELAYED RELEASE ORAL at 20:42

## 2022-09-29 RX ADMIN — MIRABEGRON 25 MILLIGRAM(S): 50 TABLET, EXTENDED RELEASE ORAL at 10:09

## 2022-09-29 RX ADMIN — OXYCODONE HYDROCHLORIDE 5 MILLIGRAM(S): 5 TABLET ORAL at 00:10

## 2022-09-29 RX ADMIN — GABAPENTIN 600 MILLIGRAM(S): 400 CAPSULE ORAL at 21:04

## 2022-09-29 RX ADMIN — Medication 1 TABLET(S): at 09:08

## 2022-09-29 RX ADMIN — QUETIAPINE FUMARATE 200 MILLIGRAM(S): 200 TABLET, FILM COATED ORAL at 17:31

## 2022-09-29 RX ADMIN — SERTRALINE 150 MILLIGRAM(S): 25 TABLET, FILM COATED ORAL at 09:09

## 2022-09-29 RX ADMIN — FLUCONAZOLE 200 MILLIGRAM(S): 150 TABLET ORAL at 10:08

## 2022-09-29 RX ADMIN — ENOXAPARIN SODIUM 40 MILLIGRAM(S): 100 INJECTION SUBCUTANEOUS at 06:42

## 2022-09-29 RX ADMIN — CHLORHEXIDINE GLUCONATE 1 APPLICATION(S): 213 SOLUTION TOPICAL at 12:04

## 2022-09-29 NOTE — PROGRESS NOTE ADULT - PROBLEM SELECTOR PLAN 4
with h/o GERD, oroesophageal candidiasis (was following ID outpt) and currently undergoing outpt w/u with GI at [facility] for esphageal motility disorders. Possible [place] syndrome   Pt says she can tolerate diet if she eats slowly; no S&S eval indicated at this time.     - at home, on nitroglycerin 0.3mg tab PRN swallowing aid, 2x in 5min  - supposedly eats a regular diet at home, will do a soft dysphagia diet here   - on nexium 40mg at home, pantoprazole 40 while inpatient  - c/w fluconazole 200mg BID  - aspiration precautions, hob elevation

## 2022-09-29 NOTE — DISCHARGE NOTE PROVIDER - PROVIDER TOKENS
PROVIDER:[TOKEN:[[id number]],FOLLOWUP:[2 weeks],ESTABLISHEDPATIENT:[T]],PROVIDER:[TOKEN:[[id number]],FOLLOWUP:[Routine],ESTABLISHEDPATIENT:[T]]

## 2022-09-29 NOTE — PROGRESS NOTE ADULT - PROBLEM SELECTOR PLAN 10
Diet: soft diet due to history esophageal issues  DVT ppx: lovenox SQ  Dispo: pending clinical course Diet: soft diet due to history esophageal issues  DVT ppx: lovenox SQ  Dispo: Home with Home PT

## 2022-09-29 NOTE — PROGRESS NOTE ADULT - PROBLEM SELECTOR PLAN 2
h/o CLL diagnosed around 1/2022 with recent progression of disease on CT neck in July, under surveillance, no active treatment. Pt says they were waiting to initiate chemotherapy after her L knee surgery scheduled for next week given concerns for delayed healing.     - follows Dr. Tereso Nguyen outpt

## 2022-09-29 NOTE — DISCHARGE NOTE PROVIDER - NSDCMRMEDTOKEN_GEN_ALL_CORE_FT
B-Complex 50 oral tablet: 1 tab(s) orally once a day  biotin 5000 mcg oral capsule: 2 tab(s) orally once a day  Calcium 600+D oral tablet: 1 tab(s) orally 2 times a day  Centrum Silver oral tablet: 1 tab(s) orally once a day  Flexeril 10 mg oral tablet: 1 tab(s) orally once a day, As Needed  up to 2x a day   fluconazole 200 mg oral tablet: 1 tab(s) orally 2 times a day  KlonoPIN 1 mg oral tablet: 1 tab(s) orally once a day (in the morning)  KlonoPIN 1 mg oral tablet: 2 tab(s) orally once a day (at bedtime)  KlonoPIN 1 mg oral tablet: 1.5 milligram(s) orally once a day at 2PM  LaMICtal 200 mg oral tablet: 1 tab(s) orally once a day (at bedtime)  Myrbetriq 25 mg oral tablet, extended release: 1 tab(s) orally once a day (in the morning)  Neurontin 400 mg oral capsule: 1 cap(s) orally once a day (in the morning)  Neurontin 600 mg oral tablet: 1 tab(s) orally once a day (at bedtime)  NexIUM 40 mg oral delayed release capsule: 1 cap(s) orally once a day  nitroglycerin: 0.3 milligram(s) orally 2x in a 5min interval , As Needed as swallowing aid   Omega-3 1000 mg oral capsule: 1 cap(s) orally 2 times a day  Otezla 30 mg oral tablet: 1 tab(s) orally 2 times a day  oxyCODONE 5 mg oral tablet: 1 tab(s) orally every 4 hours, As needed, Moderate Pain (4 - 6)  Pennsaid: Apply topically to affected area 4 times a day, As Needed  Probiotic Formula oral capsule: 1 cap(s) orally once a day  propranolol 20 mg oral tablet: 1 tab(s) orally every 8 hours, As Needed hand tremors  SEROquel  mg oral tablet, extended release: 1 tab(s) orally once a day (in the evening 6 PM)  Trintellix 10 mg oral tablet: 1 tab(s) orally once a day (in the morning)  Vasotec 10 mg oral tablet: 1 tab(s) orally once a day (in the morning)  VESIcare 10 mg oral tablet: 1 tab(s) orally once a day (at bedtime)  Vyvanse 50 mg oral capsule: 1 cap(s) orally once a day (in the morning)  Zoloft 100 mg oral tablet: 1.5 tab(s) orally once a day   B-Complex 50 oral tablet: 1 tab(s) orally once a day  biotin 5000 mcg oral capsule: 2 tab(s) orally once a day  Calcium 600+D oral tablet: 1 tab(s) orally 2 times a day  Centrum Silver oral tablet: 1 tab(s) orally once a day  Flexeril 10 mg oral tablet: 1 tab(s) orally once a day, As Needed  up to 2x a day   fluconazole 200 mg oral tablet: 1 tab(s) orally 2 times a day  KlonoPIN 1 mg oral tablet: 1 tab(s) orally once a day (in the morning)  KlonoPIN 1 mg oral tablet: 2 tab(s) orally once a day (at bedtime)  KlonoPIN 1 mg oral tablet: 1.5 milligram(s) orally once a day at 2PM  lactobacillus acidophilus oral capsule: 1 tab(s) orally 3 times a day  LaMICtal 200 mg oral tablet: 1 tab(s) orally once a day (at bedtime)  Myrbetriq 25 mg oral tablet, extended release: 1 tab(s) orally once a day (in the morning)  Neurontin 400 mg oral capsule: 1 cap(s) orally once a day (in the morning)  Neurontin 600 mg oral tablet: 1 tab(s) orally once a day (at bedtime)  NexIUM 40 mg oral delayed release capsule: 1 cap(s) orally once a day  nitroglycerin: 0.3 milligram(s) orally 2x in a 5min interval , As Needed as swallowing aid   Omega-3 1000 mg oral capsule: 1 cap(s) orally 2 times a day  Otezla 30 mg oral tablet: 1 tab(s) orally 2 times a day  Pennsaid: Apply topically to affected area 4 times a day, As Needed  Probiotic Formula oral capsule: 1 cap(s) orally once a day  propranolol 20 mg oral tablet: 1 tab(s) orally every 8 hours, As Needed hand tremors  SEROquel  mg oral tablet, extended release: 1 tab(s) orally once a day (in the evening 6 PM)  Trintellix 10 mg oral tablet: 1 tab(s) orally once a day (in the morning)  Vasotec 10 mg oral tablet: 1 tab(s) orally once a day (in the morning)  VESIcare 10 mg oral tablet: 1 tab(s) orally once a day (at bedtime)  Vyvanse 50 mg oral capsule: 1 cap(s) orally once a day (in the morning)  Zoloft 100 mg oral tablet: 1.5 tab(s) orally once a day   B-Complex 50 oral tablet: 1 tab(s) orally once a day  biotin 5000 mcg oral capsule: 2 tab(s) orally once a day  Calcium 600+D oral tablet: 1 tab(s) orally 2 times a day  Centrum Silver oral tablet: 1 tab(s) orally once a day  Flexeril 10 mg oral tablet: 1 tab(s) orally once a day, As Needed  up to 2x a day   fluconazole 200 mg oral tablet: 1 tab(s) orally 2 times a day  Home Physical Therapy : Home Physical Therapy for weakness (R53.1)  KlonoPIN 1 mg oral tablet: 1 tab(s) orally once a day (in the morning)  KlonoPIN 1 mg oral tablet: 2 tab(s) orally once a day (at bedtime)  KlonoPIN 1 mg oral tablet: 1.5 milligram(s) orally once a day at 2PM  lactobacillus acidophilus oral capsule: 1 tab(s) orally 3 times a day  LaMICtal 200 mg oral tablet: 1 tab(s) orally once a day (at bedtime)  Myrbetriq 25 mg oral tablet, extended release: 1 tab(s) orally once a day (in the morning)  Neurontin 400 mg oral capsule: 1 cap(s) orally once a day (in the morning)  Neurontin 600 mg oral tablet: 1 tab(s) orally once a day (at bedtime)  NexIUM 40 mg oral delayed release capsule: 1 cap(s) orally once a day  nitroglycerin: 0.3 milligram(s) orally 2x in a 5min interval , As Needed as swallowing aid   Omega-3 1000 mg oral capsule: 1 cap(s) orally 2 times a day  Otezla 30 mg oral tablet: 1 tab(s) orally 2 times a day  Pennsaid: Apply topically to affected area 4 times a day, As Needed  Probiotic Formula oral capsule: 1 cap(s) orally once a day  propranolol 20 mg oral tablet: 1 tab(s) orally every 8 hours, As Needed hand tremors  SEROquel  mg oral tablet, extended release: 1 tab(s) orally once a day (in the evening 6 PM)  Trintellix 10 mg oral tablet: 1 tab(s) orally once a day (in the morning)  Vasotec 10 mg oral tablet: 1 tab(s) orally once a day (in the morning)  VESIcare 10 mg oral tablet: 1 tab(s) orally once a day (at bedtime)  Vyvanse 50 mg oral capsule: 1 cap(s) orally once a day (in the morning)  Zoloft 100 mg oral tablet: 1.5 tab(s) orally once a day   acetaminophen 325 mg oral tablet: 2 tab(s) orally every 6 hours, As needed, Mild Pain (1 - 3)  B-Complex 50 oral tablet: 1 tab(s) orally once a day  biotin 5000 mcg oral capsule: 2 tab(s) orally once a day  Calcium 600+D oral tablet: 1 tab(s) orally 2 times a day  Centrum Silver oral tablet: 1 tab(s) orally once a day  Flexeril 10 mg oral tablet: 1 tab(s) orally once a day, As Needed  up to 2x a day   fluconazole 200 mg oral tablet: 1 tab(s) orally 2 times a day  Home Physical Therapy : Home Physical Therapy for weakness (R53.1)  KlonoPIN 1 mg oral tablet: 1 tab(s) orally once a day (in the morning)  KlonoPIN 1 mg oral tablet: 2 tab(s) orally once a day (at bedtime)  KlonoPIN 1 mg oral tablet: 1.5 milligram(s) orally once a day at 2PM  lactobacillus acidophilus oral capsule: 1 tab(s) orally 3 times a day  LaMICtal 200 mg oral tablet: 1 tab(s) orally once a day (at bedtime)  Myrbetriq 25 mg oral tablet, extended release: 1 tab(s) orally once a day (in the morning)  Neurontin 400 mg oral capsule: 1 cap(s) orally once a day (in the morning)  Neurontin 600 mg oral tablet: 1 tab(s) orally once a day (at bedtime)  NexIUM 40 mg oral delayed release capsule: 1 cap(s) orally once a day  nitroglycerin: 0.3 milligram(s) orally 2x in a 5min interval , As Needed as swallowing aid   Omega-3 1000 mg oral capsule: 1 cap(s) orally 2 times a day  Otezla 30 mg oral tablet: 1 tab(s) orally 2 times a day  Pennsaid: Apply topically to affected area 4 times a day, As Needed  Probiotic Formula oral capsule: 1 cap(s) orally once a day  propranolol 20 mg oral tablet: 1 tab(s) orally every 8 hours, As Needed hand tremors  SEROquel  mg oral tablet, extended release: 1 tab(s) orally once a day (in the evening 6 PM)  Trintellix 10 mg oral tablet: 1 tab(s) orally once a day (in the morning)  Vasotec 10 mg oral tablet: 1 tab(s) orally once a day (in the morning)  VESIcare 10 mg oral tablet: 1 tab(s) orally once a day (at bedtime)  Vyvanse 50 mg oral capsule: 1 cap(s) orally once a day (in the morning)  Zoloft 100 mg oral tablet: 1.5 tab(s) orally once a day

## 2022-09-29 NOTE — CHART NOTE - NSCHARTNOTEFT_GEN_A_CORE
Called to bedside to discuss pt's wish to stop using CPAP at night. Pt expressed that since losing weight, her pulmonologist has stated that she has mild JANE and can use her CPAP as needed. She understands the risks of not using her CPAP machine and understands that if she wants to use it again, she may request for it at a later time. Order discontinued.

## 2022-09-29 NOTE — DISCHARGE NOTE PROVIDER - NSDCCPCAREPLAN_GEN_ALL_CORE_FT
PRINCIPAL DISCHARGE DIAGNOSIS  Diagnosis: Cellulitis  Assessment and Plan of Treatment: Cellulitis is a skin infection caused by bacteria, most often strep or staph. It often occurs after a break in the skin from a scrape, cut, bite, or puncture, or after a rash.  Cellulitis may be treated without doing tests to find out what caused it. But your doctor may do tests, if needed, to look for a specific bacteria, like methicillin-resistant Staphylococcus aureus (MRSA). The doctor has checked you carefully and you have been treated with antibiotics which you should continue to take as directed. Problems can develop later so If you notice any problems or new symptoms, get medical treatment right away.  Follow-up care is a key part of your treatment and safety.   How can you care for yourself at home?  Take your antibiotics as directed. Do not stop taking them just because you feel better. You need to take the full course of antibiotics.  Prop up the infected area on pillows to reduce pain and swelling. Try to keep the area above the level of your heart as often as you can.  Wash the area with clean water 2 times a day. Don't use hydrogen peroxide or alcohol, which can slow healing.  You may cover the area with a thin layer of petroleum jelly, such as Vaseline, and a non-stick bandage.  Apply more petroleum jelly and replace the bandage as needed.  Don't use hydrogen peroxide or alcohol, which can slow healing.  Take care of your feet, especially if you have diabetes or other conditions that increase the risk of infection. Wear shoes and socks. Do not go barefoot. If you have athlete's foot or other skin problems on your feet, talk to your doctor about how to treat them.  When should you call for help?  	  Seek immediate medical care if:  You have signs that your infection is getting worse, such as:  Increased pain, swelling, warmth, or redness.  Red streaks leading from the area.  Pus draining from the area.  A fever.  You get a rash.       PRINCIPAL DISCHARGE DIAGNOSIS  Diagnosis: Cellulitis  Assessment and Plan of Treatment: Cellulitis is a skin infection caused by bacteria, most often strep or staph. It often occurs after a break in the skin from a scrape, cut, bite, or puncture, or after a rash.  Cellulitis may be treated without doing tests to find out what caused it. But your doctor may do tests, if needed, to look for a specific bacteria, like methicillin-resistant Staphylococcus aureus (MRSA). The doctor has checked you carefully and you have been treated with antibiotics which you should continue to take as directed. Problems can develop later so If you notice any problems or new symptoms, get medical treatment right away.  Follow-up care is a key part of your treatment and safety.   How can you care for yourself at home?  Prop up the infected area on pillows to reduce pain and swelling. Try to keep the area above the level of your heart as often as you can.  Wash the area with clean water 2 times a day. Don't use hydrogen peroxide or alcohol, which can slow healing.  You may cover the area with a thin layer of petroleum jelly, such as Vaseline, and a non-stick bandage.  Apply more petroleum jelly and replace the bandage as needed.  Don't use hydrogen peroxide or alcohol, which can slow healing.  Take care of your feet, especially if you have diabetes or other conditions that increase the risk of infection. Wear shoes and socks. Do not go barefoot. If you have athlete's foot or other skin problems on your feet, talk to your doctor about how to treat them.  When should you call for help?  	  Seek immediate medical care if:  You have signs that your infection is getting worse, such as:  Increased pain, swelling, warmth, or redness.  Red streaks leading from the area.  Pus draining from the area.  A fever.  You get a rash.

## 2022-09-29 NOTE — DISCHARGE NOTE PROVIDER - HOSPITAL COURSE
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 Swelling started 1 month ago, believes it happened after a fall. Went to [facility] for this, they started her on doxycycline x2 weeks, which she completed, swelling/erythema fluctuated during this time but never resolved. They attempted I&D at [facility] with packing but only minimal drained, pt unsure if it was purulent. At home pt only noticed some bleeding and clear discharge from packing. Pt then returned to [facility] and was rx'ed a course of Clindamycin. Over the past few days, pain and swelling seems to have remained the same/even slightly worsening, erythema has improved. Reports intermittent subjective fever/chills during this time. No other infectious sxs, no URI sxs, n/v/d, other rashes.   Of note, pt reports remote h/o severe MRSA infection of abdominal wall that required ICU stay ~13 yrs ago. Believes she may have had another ep of MRSA after that but is unsure, no records in our system.     Pt with frequent falls resulting in multiple rib fractures 2/2 severe L knee OA, currently planned for knee replacement surgery, worried that this will hold her back.    HOSPITAL COURSE:  Patient admitted for Cellulitis. X-rays and US showed fluid collection which was determined to be too small to aspirate. She was started on abx Vancomycin on admission and continued. She was later evaluated by ID. 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 The edema started 1 month ago. initially sought care at [facility] and finished 2 week course of doxycycline and had the area drained with small amount of purulent discharge per patient, but felt that it did not improve. Pt then returned to [facility] and was started on a course of Clindamycin. However, patient felt her pain and swelling seemed to have remained the same or mildly worsening, and decided to present to the hospital. No other infectious sxs, no URI sxs, n/v/d, other rashes.     Pt with frequent falls resulting in multiple rib fractures 2/2 severe L knee OA, currently planned for knee replacement surgery, worried that this will hold her back.    Patient admitted for Cellulitis. X-rays and US showed fluid collection which was determined to be too small to aspirate. She was started on abx Vancomycin on admission and continued. She was later evaluated by ID. 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 The edema started 1 month ago. Initially sought care at [facility] and finished 2 week course of doxycycline and had the area drained with small amount of purulent discharge per patient, but felt that it did not improve. Pt then returned to [facility] and was started on a course of Clindamycin. However, patient felt her pain and swelling seemed to have remained the same or mildly worsening, and decided to present to the hospital. No other infectious sxs, no URI sxs, n/v/d, other rashes.     X-rays and US showed fluid collection which was determined to be too small to aspirate. Given her history of MRSA and exam findings of erythema and indurated lesion, she was admitted for treatment of cellulitis and placed on Vancomycin. ID was consulted, and pt was continued on Vancomycin. CT tibia also performed, which showed subcutaneous edema consistent with cellulitis, but no abscess. Pt continued to improve with reduced size of induration and resolution of erythema, and discharge was planned with ______antibiotics for ______days/weeks.     Of note, she had leukocytosis throughout the admission in the 20s range with lymphocytic predominance, thought to be secondary to her underlying CLL. She has remained afebrile.     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 The edema started 1 month ago. Initially sought care at [facility] and finished 2 week course of doxycycline and had the area drained with small amount of purulent discharge per patient, but felt that it did not improve. Pt then returned to [facility] and was started on a course of Clindamycin. However, patient felt her pain and swelling seemed to have remained the same or mildly worsening, and decided to present to the hospital. No other infectious sxs, no URI sxs, n/v/d, other rashes.     X-rays and US showed fluid collection which was determined to be too small to aspirate. Given her history of MRSA and exam findings of erythema and indurated lesion, she was admitted for treatment of cellulitis and placed on Vancomycin. ID was consulted, and pt was continued on Vancomycin. CT tibia also performed, which showed subcutaneous edema consistent with cellulitis, but no abscess. Pt continued to improve with reduced size of induration and resolution of erythema, and discharge was planned with no need for further antibiotics.    Of note, she had leukocytosis throughout the admission in the 20s range with lymphocytic predominance, thought to be secondary to her underlying CLL. She has remained afebrile.     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 The edema started 1 month ago. Initially sought care at [facility] and finished 2 week course of doxycycline and had the area drained with small amount of purulent discharge per patient, but felt that it did not improve. Pt then returned to [facility] and was started on a course of Clindamycin. However, patient felt her pain and swelling seemed to have remained the same or mildly worsening, and decided to present to the hospital. No other infectious sxs, no URI sxs, n/v/d, other rashes.     X-rays and US showed fluid collection which was determined to be too small to aspirate. Given her history of MRSA and exam findings of erythema and indurated lesion, she was admitted for treatment of cellulitis and placed on Vancomycin. ID was consulted, and pt was continued on Vancomycin for likely MRSA infection. CT tibia also performed, which showed subcutaneous edema consistent with cellulitis, but no abscess. Pt continued to improve with reduced size of induration and resolution of erythema, and discharge was planned with no need for further antibiotics.    Of note, she had leukocytosis throughout the admission in the 20s range with lymphocytic predominance, thought to be secondary to her underlying CLL. She has remained afebrile.

## 2022-09-29 NOTE — DISCHARGE NOTE PROVIDER - NSDCFUSCHEDAPPT_GEN_ALL_CORE_FT
Reilly Beltrán  [facility]  [address]  Scheduled Appointment: 10/11/2022    Tereso Nguyen  [facility]  [address]  Scheduled Appointment: 12/09/2022     Tereso Nguyen  [facility]  [address]  Scheduled Appointment: 12/09/2022

## 2022-09-29 NOTE — DISCHARGE NOTE PROVIDER - NSDCCPTREATMENT_GEN_ALL_CORE_FT
PRINCIPAL PROCEDURE  Procedure: Ultrasound of calf  Findings and Treatment: PROCEDURE DATE:  09/27/2022    INTERPRETATION:  Clinical Information:  Right lower extremity swelling.   Mass of right leg. History of CLL.  Comparison: No priors availablefor comparison.  Technique: Limited field of view sonographic examination of the area of   clinical concern in the right lower leg.  Findings /  Impression:  Within the subcutaneous tissues in the area of concern in the right   lateral lower extremity, there are ill-defined hypoechoic foci measuring   up to 0.6 x 0.8 x 0.4 cm, possibly representing a small fluid collection   or abscess.  Additional areas of prominent subcutaneous edema and trace fluid in the   right lateral lower extremity are identified. Cellulitis/soft tissue   infection with small abscesses can be considered. Other etiologies are   not excluded.  Deeper structures of the right lower extremity including the muscles and   bones are not imaged. Recommend CT or MRI of the right lower extremity   with IV contrast for further characterization.         PRINCIPAL PROCEDURE  Procedure: Ultrasound of calf  Findings and Treatment: PROCEDURE DATE:  09/27/2022    INTERPRETATION:  Clinical Information:  Right lower extremity swelling. Mass of right leg. History of CLL.  Comparison: No priors availablefor comparison.  Technique: Limited field of view sonographic examination of the area of clinical concern in the right lower leg.  Impression:  Within the subcutaneous tissues in the area of concern in the right   lateral lower extremity, there are ill-defined hypoechoic foci measuring up to 0.6 x 0.8 x 0.4 cm, possibly representing a small fluid collection or abscess.  Additional areas of prominent subcutaneous edema and trace fluid in the right lateral lower extremity are identified. Cellulitis/soft tissue   infection with small abscesses can be considered. Other etiologies are not excluded.  Deeper structures of the right lower extremity including the muscles and bones are not imaged. Recommend CT or MRI of the right lower extremity with IV contrast for further characterization.        SECONDARY PROCEDURE  Procedure: Xray right tibia/fibula  Findings and Treatment: EXAM:  XR TIB FIB AP LAT 2 VIEWS RT                        PROCEDURE DATE:  09/27/2022    INTERPRETATION:  CLINICAL INDICATION: Right anterior leg mass. Pain and swelling  TECHNIQUE: 2 views of the right tibia/fibula  COMPARISON: Right knee x-ray 5/17/2013  FINDINGS/IMPRESSION:  Large area of soft tissue swelling anterior to the right proximal   tibia/fibula.  No evidence of acute fracture or dislocation.  No lytic or blastic lesion or periosteal reaction of the visualized bones.  Please correlate with findings from concurrently performed ultrasound.      Procedure: CT tibia and fibula right w contrast  Findings and Treatment: EXAM:  CT TIB FIB ONLY IC RT                        PROCEDURE DATE:  09/29/2022    INTERPRETATION:  EXAMINATION: CT TIBIA FIBULA WITH IV CONTRAST RIGHT  CLINICAL INDICATION:Cellulitis with underlying abscess. Concern for   deeper abscess.  COMPARISON: Ultrasound and radiographs dated of the lower leg dated 9/27/2022  TECHNIQUE: CT of the right lower leg was performed with intravenous contrast. Contralateral lower leg was partly included.  INTERPRETATION:  Bones and joint: There is a small effusion at the knee. There are small   tricompartment osteophytes present. There is mild medial compartment joint space narrowing.  [At the contralateral left lower leg there is moderate medial compartment joint space with tricompartment osteophyte formation and chondrocalcinosis.]  Soft tissues: Ultrasound demonstrated subcentimeter focus of   hypoechogenicity potentially concerning for abscess. On this examination, there is mild subcutaneous edema withskin thickening localized to the distal lateral lower leg, without a rim-enhancing abscess identified. The findings are consistent with subcutaneous edema, which may be seen in cellulitis in this clinical setting of a leg wound.  There are prominent varicosities in the lower leg medially.  There is distal Achilles tendinosis with foci of mineralization within   the distal Achilles tendon several centimeters above its insertion.  IMPRESSION:  1.  Clinical setting of a leg wound. Skinthickening with feathery   infiltration of the subcutaneous fat consistent with subcutaneous edema, which may be seen in cellulitis. No localized abscess demonstrated on this CT. MRI could provide a more sensitive evaluation for very small collections; of note recent ultrasound demonstrated a subcentimeter collection. This could be performed if clinically warranted.  2.  Small knee effusion with mild/moderate degenerative changes.  3.  Distal Achilles tendinosis.

## 2022-09-29 NOTE — PROGRESS NOTE ADULT - PROBLEM SELECTOR PLAN 3
no active issues. continue w/ home meds  - lamictal 200mg AM  - klonopin 1mg AM, 1.5mg 2PM, 2mg qhs   - sertraline 150mg AM  - trintellix 10mg AM  - quetiapine XL 300mg qhs - no active issues. continue w/ home meds  - Patient taking some of her home meds. Verified with pharmacy

## 2022-09-29 NOTE — PROGRESS NOTE ADULT - SUBJECTIVE AND OBJECTIVE BOX
PROGRESS NOTE:   Authored by Juvenal Rodriguez MD  Pager:  [phone]    Patient is a 67y old  Female who presents with a chief complaint of Cellulitis (28 Sep 2022 07:35)      SUBJECTIVE / OVERNIGHT EVENTS:    ADDITIONAL REVIEW OF SYSTEMS:    MEDICATIONS  (STANDING):  apremilast 30 milliGRAM(s) Oral two times a day  calcium carbonate 1250 mG  + Vitamin D (OsCal 500 + D) 1 Tablet(s) Oral daily  chlorhexidine 2% Cloths 1 Application(s) Topical daily  clonazePAM  Tablet 1.5 milliGRAM(s) Oral <User Schedule>  clonazePAM  Tablet 1 milliGRAM(s) Oral daily  clonazePAM  Tablet 2 milliGRAM(s) Oral at bedtime  enalapril 10 milliGRAM(s) Oral daily  enoxaparin Injectable 40 milliGRAM(s) SubCutaneous every 12 hours  esOMEPRAZOLE 40 milliGRAM(s) Oral two times a day  fluconAZOLE   Tablet 200 milliGRAM(s) Oral <User Schedule>  gabapentin 600 milliGRAM(s) Oral at bedtime  gabapentin 400 milliGRAM(s) Oral daily  influenza  Vaccine (HIGH DOSE) 0.7 milliLiter(s) IntraMuscular once  lactobacillus acidophilus 1 Tablet(s) Oral three times a day  lamoTRIgine 200 milliGRAM(s) Oral daily  lisdexamfetamine 50 milliGRAM(s) Oral with breakfast  mirabegron ER 25 milliGRAM(s) Oral daily  multivitamin/minerals 1 Tablet(s) Oral daily  oxybutynin 10 milliGRAM(s) Oral two times a day  QUEtiapine 200 milliGRAM(s) Oral <User Schedule>  sertraline 150 milliGRAM(s) Oral daily  vancomycin  IVPB 1500 milliGRAM(s) IV Intermittent every 12 hours  vortioxetine 10 milliGRAM(s) Oral daily    MEDICATIONS  (PRN):  acetaminophen     Tablet .. 650 milliGRAM(s) Oral every 6 hours PRN Temp greater or equal to 38C (100.4F), Mild Pain (1 - 3)  cyclobenzaprine 10 milliGRAM(s) Oral three times a day PRN Muscle Spasm  oxyCODONE    IR 5 milliGRAM(s) Oral every 4 hours PRN Severe Pain (7 - 10)  propranolol 20 milliGRAM(s) Oral every 8 hours PRN hand tremors      CAPILLARY BLOOD GLUCOSE        I&O's Summary    28 Sep 2022 07:01  -  29 Sep 2022 07:00  --------------------------------------------------------  IN: 50 mL / OUT: 0 mL / NET: 50 mL        PHYSICAL EXAM:  Vital Signs Last 24 Hrs  T(C): 36.3 (29 Sep 2022 06:40), Max: 37.2 (28 Sep 2022 20:15)  T(F): 97.3 (29 Sep 2022 06:40), Max: 98.9 (28 Sep 2022 20:15)  HR: 72 (29 Sep 2022 06:40) (69 - 85)  BP: 121/74 (29 Sep 2022 06:40) (101/65 - 137/87)  BP(mean): --  RR: 17 (29 Sep 2022 06:40) (17 - 18)  SpO2: 99% (29 Sep 2022 06:40) (94% - 99%)    Parameters below as of 29 Sep 2022 06:40  Patient On (Oxygen Delivery Method): room air        GENERAL: No acute distress, well-developed  HEAD:  Atraumatic, Normocephalic  EYES: EOMI, PERRLA, conjunctiva and sclera clear  NECK: Supple, no lymphadenopathy, no JVD  CHEST/LUNG: CTAB; No wheezes, rales, or rhonchi  HEART: Regular rate and rhythm; No murmurs, rubs, or gallops  ABDOMEN: Soft, non-tender, non-distended; normal bowel sounds, no organomegaly  EXTREMITIES:  2+ peripheral pulses b/l, No clubbing, cyanosis, or edema  NEUROLOGY: A&O x 3, no focal deficits  SKIN: No rashes or lesions    LABS:                        9.2    22.28 )-----------( 207      ( 28 Sep 2022 06:47 )             29.1     09-28    136  |  100  |  12  ----------------------------<  85  3.9   |  27  |  0.36<L>    Ca    9.0      28 Sep 2022 06:47    TPro  6.9  /  Alb  4.4  /  TBili  0.2  /  DBili  x   /  AST  25  /  ALT  15  /  AlkPhos  92  09-27              Culture - Blood (collected 27 Sep 2022 17:20)  Source: .Blood Blood-Peripheral  Preliminary Report (28 Sep 2022 23:02):    No growth to date.    Culture - Blood (collected 27 Sep 2022 17:15)  Source: .Blood Blood-Peripheral  Preliminary Report (28 Sep 2022 23:02):    No growth to date.        RADIOLOGY & ADDITIONAL TESTS:  Results Reviewed:   Imaging Personally Reviewed:  Electrocardiogram Personally Reviewed:    COORDINATION OF CARE:  Care Discussed with Consultants/Other Providers [Y/N]:  Prior or Outpatient Records Reviewed [Y/N]:   PROGRESS NOTE:   Authored by Juvenal Rodriguez MD  Pager:  [phone]    Patient is a 67y old  Female who presents with a chief complaint of Cellulitis (28 Sep 2022 07:35)      SUBJECTIVE / OVERNIGHT EVENTS: NAEO. Patient endorses no complaints this morning.     ADDITIONAL REVIEW OF SYSTEMS:  REVIEW OF SYSTEMS:    CONSTITUTIONAL: No weakness, fevers or chills  RESPIRATORY: No cough, wheezing, hemoptysis; No shortness of breath  CARDIOVASCULAR: No chest pain or palpitations  GASTROINTESTINAL: No abdominal or epigastric pain. No nausea, vomiting, or hematemesis; No diarrhea or constipation. No melena or hematochezia.  SKIN: No itching, rashes      MEDICATIONS  (STANDING):  apremilast 30 milliGRAM(s) Oral two times a day  calcium carbonate 1250 mG  + Vitamin D (OsCal 500 + D) 1 Tablet(s) Oral daily  chlorhexidine 2% Cloths 1 Application(s) Topical daily  clonazePAM  Tablet 1.5 milliGRAM(s) Oral <User Schedule>  clonazePAM  Tablet 1 milliGRAM(s) Oral daily  clonazePAM  Tablet 2 milliGRAM(s) Oral at bedtime  enalapril 10 milliGRAM(s) Oral daily  enoxaparin Injectable 40 milliGRAM(s) SubCutaneous every 12 hours  esOMEPRAZOLE 40 milliGRAM(s) Oral two times a day  fluconAZOLE   Tablet 200 milliGRAM(s) Oral <User Schedule>  gabapentin 600 milliGRAM(s) Oral at bedtime  gabapentin 400 milliGRAM(s) Oral daily  influenza  Vaccine (HIGH DOSE) 0.7 milliLiter(s) IntraMuscular once  lactobacillus acidophilus 1 Tablet(s) Oral three times a day  lamoTRIgine 200 milliGRAM(s) Oral daily  lisdexamfetamine 50 milliGRAM(s) Oral with breakfast  mirabegron ER 25 milliGRAM(s) Oral daily  multivitamin/minerals 1 Tablet(s) Oral daily  oxybutynin 10 milliGRAM(s) Oral two times a day  QUEtiapine 200 milliGRAM(s) Oral <User Schedule>  sertraline 150 milliGRAM(s) Oral daily  vancomycin  IVPB 1500 milliGRAM(s) IV Intermittent every 12 hours  vortioxetine 10 milliGRAM(s) Oral daily    MEDICATIONS  (PRN):  acetaminophen     Tablet .. 650 milliGRAM(s) Oral every 6 hours PRN Temp greater or equal to 38C (100.4F), Mild Pain (1 - 3)  cyclobenzaprine 10 milliGRAM(s) Oral three times a day PRN Muscle Spasm  oxyCODONE    IR 5 milliGRAM(s) Oral every 4 hours PRN Severe Pain (7 - 10)  propranolol 20 milliGRAM(s) Oral every 8 hours PRN hand tremors      CAPILLARY BLOOD GLUCOSE        I&O's Summary    28 Sep 2022 07:01  -  29 Sep 2022 07:00  --------------------------------------------------------  IN: 50 mL / OUT: 0 mL / NET: 50 mL        PHYSICAL EXAM:  Vital Signs Last 24 Hrs  T(C): 36.3 (29 Sep 2022 06:40), Max: 37.2 (28 Sep 2022 20:15)  T(F): 97.3 (29 Sep 2022 06:40), Max: 98.9 (28 Sep 2022 20:15)  HR: 72 (29 Sep 2022 06:40) (69 - 85)  BP: 121/74 (29 Sep 2022 06:40) (101/65 - 137/87)  BP(mean): --  RR: 17 (29 Sep 2022 06:40) (17 - 18)  SpO2: 99% (29 Sep 2022 06:40) (94% - 99%)    Parameters below as of 29 Sep 2022 06:40  Patient On (Oxygen Delivery Method): room air        GENERAL: NAD, lying comfortably in bed  HEAD: Atraumatic, normocephalic  EYES: Conjunctiva and sclera clear  NECK: Supple  RESPIRATORY: Normal respiratory effort; lungs are clear to auscultation bilaterally  CARDIOVASCULAR: Regular rate and rhythm, normal S1 and S2, no murmur/rub/gallop; No lower extremity edema  ABDOMEN: mild tenderness diffusely, normoactive bowel sounds, no rebound/guarding   MUSCULOSKELETAL: ROM and strength intact; pt ambulating.  NEURO: Non focal   SKIN: Indurated lesion measuring about 6 inches in diameter with central crust, not acutely tender to palpation, no erythema or warmth.   PSYCH: A+O to person, place, and time; affect appropriate    LABS:                        9.2    22.28 )-----------( 207      ( 28 Sep 2022 06:47 )             29.1     09-28    136  |  100  |  12  ----------------------------<  85  3.9   |  27  |  0.36<L>    Ca    9.0      28 Sep 2022 06:47    TPro  6.9  /  Alb  4.4  /  TBili  0.2  /  DBili  x   /  AST  25  /  ALT  15  /  AlkPhos  92  09-27              Culture - Blood (collected 27 Sep 2022 17:20)  Source: .Blood Blood-Peripheral  Preliminary Report (28 Sep 2022 23:02):    No growth to date.    Culture - Blood (collected 27 Sep 2022 17:15)  Source: .Blood Blood-Peripheral  Preliminary Report (28 Sep 2022 23:02):    No growth to date.        RADIOLOGY & ADDITIONAL TESTS:  Results Reviewed:   Imaging Personally Reviewed:  Electrocardiogram Personally Reviewed:    COORDINATION OF CARE:  Care Discussed with Consultants/Other Providers [Y/N]:  Prior or Outpatient Records Reviewed [Y/N]:

## 2022-09-29 NOTE — CONSULT NOTE ADULT - ASSESSMENT
68yo F with PMH psoriasis, psoriatic arthritis, CLL (no active chemo), remote MRSA infection 13 yrs ago, bipolar disorder, OA, HTN, anemia, h/o PE (not on AC), presenting for cellulitis of RLE. Surgery consulted for possible I&D.    Plan:    - No surgical intervention at this time  - C/w antibiotics  - Care per primary team    Contact surgery with any questions    Keaton Flores MD, PGY2  [phone]
67 f with HTN, psoriasis, psoriatic arthritis, CLL (no active chemo), JANE, bipolar, anemia, h/o PE (not on AC), obesity s/p alexandria-en-y bypass with revision MRSA infection of abd 2009 and prepatellar bursitis due to MSSA 2013, had R leg cellulitis was given doxy for 2 weeks and small aspiration at [facility] (no culture done as per the patient) but still had edema and erythema, then was given clinda which she took for a few days and came to ER with no improvement  afebrile, WBC 27 but pt has CLL  tibia xray: no fx or dislocation just soft tissue edema  u/s: lateral lower extremity, there are ill-defined hypoechoic foci measuring up to 0.6 x 0.8 x 0.4 cm, possibly representing a small fluid collection or abscess. Additional areas of prominent subcutaneous edema and trace fluid in the right lateral lower extremity are identified.     R leg cellulitis and abscess, already had 2 weeks of doxy and a few days clinda, still with area of firmness, edema, erythema and tenderness, likely a deeper abscess  u/s showed a small collection and additional areas of edema and fluid  pt has h/o MRSA and MSSA in the past, now MRSA/MSSA PCR negative but already had about 3 weeks of antibiotics    * LE CT with contrast  * surgery eval for I&D  * c/w vanco but level today was 20.5, so decreased to 1250 q 12 and one dose was held  * monitor renal function and vanco trough to prevent nephrotoxicity    The above assessment and plan was discussed with the primary team    Tamar Caraballo MD  contact on teams  After 5pm and on weekends call [phone]
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
## 2022-09-29 NOTE — PROGRESS NOTE ADULT - PROBLEM SELECTOR PLAN 1
h/o remote severe MRSA infection ~13 yrs ago requiring ICU stay p/w cellulitis of R anterolateral shin w/ swelling, erythema, and warmth w/ associated small abscess s/p failed oral abx (doxy x 2 wks, clinda x days) and I&D outpatient w/ minimal to no drainage. Now admitted for IV abx   - subjective fevers at home, afebrile here, with elevated WBC to 27 with lymphocytic predominance i/s/o CLL, so more likely 2/2 CLL. does not meet SIRS criteria at this time. lactate wnl   - LLE US: w/ ill-defined hypoechoic foci up to 0.6 x 0.8 x0.4 w/ possible small fluid collection/abscess  -- fluid collection likely too small to drain  - LLE duplex: neg for DVT  - XR tib-fib: showing swelling w/o lytic or blastic lesions, no periosteal reactions, no fractures/dislocations  - s/p vanc 1g x 1 in ED    - given h/o severe MRSA infection, can c/w vanc 1.5g q12h  - check pre-4th trough   - f/u blood cxs, MRSA swab  - elevate RLE extremity, tylenol for mild-moderate home, home oxy 5mg q4h PRN severe pain, ice compresses PRN h/o remote severe MRSA infection ~13 yrs ago requiring ICU stay p/w cellulitis of R anterolateral shin w/ swelling, erythema, and warmth w/ associated small abscess s/p failed oral abx (doxy x 2 wks, clinda x days) and I&D outpatient w/ minimal to no drainage. Now admitted for IV abx   - subjective fevers at home, afebrile here, with elevated WBC to 27 with lymphocytic predominance i/s/o CLL, so more likely 2/2 CLL. does not meet SIRS criteria at this time. lactate wnl   - LLE US: w/ ill-defined hypoechoic foci up to 0.6 x 0.8 x0.4 w/ possible small fluid collection/abscess  -- fluid collection likely too small to drain  - LLE duplex: neg for DVT  - XR tib-fib: showing swelling w/o lytic or blastic lesions, no periosteal reactions, no fractures/dislocations  - s/p vanc 1g x 1 in ED  - given h/o severe MRSA infection, can c/w vanc 1.5g q12h  - check pre-4th trough   - f/u blood cxs NGTD and MRSA negative.   - Can discharge on Augmnentin and Doxycycline PO.   - elevate RLE extremity, tylenol for mild-moderate home, home oxy 5mg q4h PRN severe pain, ice compresses PRN

## 2022-09-29 NOTE — DISCHARGE NOTE PROVIDER - CARE PROVIDER_API CALL
Jim Smith)  Internal Medicine  [address] [place] [address]  Phone: [phone]  Fax: [phone]  Established Patient  Follow Up Time: 2 weeks    Tereso Nguyen)  Hematology; Medical Oncology  [address] [place] [address]  Phone: [phone]  Fax: [phone]  Established Patient  Follow Up Time: Routine

## 2022-09-29 NOTE — PROGRESS NOTE ADULT - ASSESSMENT
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

## 2022-09-30 LAB
ANION GAP SERPL CALC-SCNC: 10 MMOL/L — SIGNIFICANT CHANGE UP (ref 5–17)
BUN SERPL-MCNC: 10 MG/DL — SIGNIFICANT CHANGE UP (ref 7–23)
CALCIUM SERPL-MCNC: 9.3 MG/DL — SIGNIFICANT CHANGE UP (ref 8.4–10.5)
CHLORIDE SERPL-SCNC: 97 MMOL/L — SIGNIFICANT CHANGE UP (ref 96–108)
CO2 SERPL-SCNC: 25 MMOL/L — SIGNIFICANT CHANGE UP (ref 22–31)
CREAT SERPL-MCNC: 0.39 MG/DL — LOW (ref 0.5–1.3)
EGFR: 110 ML/MIN/1.73M2 — SIGNIFICANT CHANGE UP
GLUCOSE SERPL-MCNC: 80 MG/DL — SIGNIFICANT CHANGE UP (ref 70–99)
HCT VFR BLD CALC: 30.9 % — LOW (ref 34.5–45)
HGB BLD-MCNC: 10 G/DL — LOW (ref 11.5–15.5)
MCHC RBC-ENTMCNC: 28.6 PG — SIGNIFICANT CHANGE UP (ref 27–34)
MCHC RBC-ENTMCNC: 32.4 GM/DL — SIGNIFICANT CHANGE UP (ref 32–36)
MCV RBC AUTO: 88.3 FL — SIGNIFICANT CHANGE UP (ref 80–100)
NRBC # BLD: 0 /100 WBCS — SIGNIFICANT CHANGE UP (ref 0–0)
PLATELET # BLD AUTO: 211 K/UL — SIGNIFICANT CHANGE UP (ref 150–400)
POTASSIUM SERPL-MCNC: 4.3 MMOL/L — SIGNIFICANT CHANGE UP (ref 3.5–5.3)
POTASSIUM SERPL-SCNC: 4.3 MMOL/L — SIGNIFICANT CHANGE UP (ref 3.5–5.3)
RBC # BLD: 3.5 M/UL — LOW (ref 3.8–5.2)
RBC # FLD: 12.2 % — SIGNIFICANT CHANGE UP (ref 10.3–14.5)
SARS-COV-2 RNA SPEC QL NAA+PROBE: SIGNIFICANT CHANGE UP
SODIUM SERPL-SCNC: 132 MMOL/L — LOW (ref 135–145)
VANCOMYCIN TROUGH SERPL-MCNC: 11.7 UG/ML — SIGNIFICANT CHANGE UP (ref 10–20)
WBC # BLD: 22.11 K/UL — HIGH (ref 3.8–10.5)
WBC # FLD AUTO: 22.11 K/UL — HIGH (ref 3.8–10.5)

## 2022-09-30 PROCEDURE — 99232 SBSQ HOSP IP/OBS MODERATE 35: CPT

## 2022-09-30 PROCEDURE — 99232 SBSQ HOSP IP/OBS MODERATE 35: CPT | Mod: GC

## 2022-09-30 RX ORDER — VANCOMYCIN HCL 1 G
1500 VIAL (EA) INTRAVENOUS EVERY 12 HOURS
Refills: 0 | Status: DISCONTINUED | OUTPATIENT
Start: 2022-09-30 | End: 2022-09-30

## 2022-09-30 RX ORDER — VANCOMYCIN HCL 1 G
1500 VIAL (EA) INTRAVENOUS EVERY 12 HOURS
Refills: 0 | Status: DISCONTINUED | OUTPATIENT
Start: 2022-09-30 | End: 2022-10-03

## 2022-09-30 RX ADMIN — Medication 1 MILLIGRAM(S): at 09:11

## 2022-09-30 RX ADMIN — Medication 1 TABLET(S): at 21:45

## 2022-09-30 RX ADMIN — GABAPENTIN 600 MILLIGRAM(S): 400 CAPSULE ORAL at 21:45

## 2022-09-30 RX ADMIN — Medication 166.67 MILLIGRAM(S): at 09:22

## 2022-09-30 RX ADMIN — SERTRALINE 150 MILLIGRAM(S): 25 TABLET, FILM COATED ORAL at 09:05

## 2022-09-30 RX ADMIN — APREMILAST 30 MILLIGRAM(S): KIT ORAL at 06:08

## 2022-09-30 RX ADMIN — Medication 1 TABLET(S): at 12:57

## 2022-09-30 RX ADMIN — FLUCONAZOLE 200 MILLIGRAM(S): 150 TABLET ORAL at 20:26

## 2022-09-30 RX ADMIN — Medication 1.5 MILLIGRAM(S): at 14:11

## 2022-09-30 RX ADMIN — APREMILAST 30 MILLIGRAM(S): KIT ORAL at 22:00

## 2022-09-30 RX ADMIN — Medication 10 MILLIGRAM(S): at 05:30

## 2022-09-30 RX ADMIN — MIRABEGRON 25 MILLIGRAM(S): 50 TABLET, EXTENDED RELEASE ORAL at 09:05

## 2022-09-30 RX ADMIN — Medication 650 MILLIGRAM(S): at 16:54

## 2022-09-30 RX ADMIN — OXYCODONE HYDROCHLORIDE 5 MILLIGRAM(S): 5 TABLET ORAL at 01:26

## 2022-09-30 RX ADMIN — Medication 1 TABLET(S): at 05:32

## 2022-09-30 RX ADMIN — Medication 650 MILLIGRAM(S): at 16:12

## 2022-09-30 RX ADMIN — Medication 10 MILLIGRAM(S): at 21:45

## 2022-09-30 RX ADMIN — OXYCODONE HYDROCHLORIDE 5 MILLIGRAM(S): 5 TABLET ORAL at 00:26

## 2022-09-30 RX ADMIN — LAMOTRIGINE 200 MILLIGRAM(S): 25 TABLET, ORALLY DISINTEGRATING ORAL at 09:11

## 2022-09-30 RX ADMIN — ESOMEPRAZOLE MAGNESIUM 40 MILLIGRAM(S): 40 CAPSULE, DELAYED RELEASE ORAL at 06:08

## 2022-09-30 RX ADMIN — Medication 1 TABLET(S): at 14:06

## 2022-09-30 RX ADMIN — QUETIAPINE FUMARATE 200 MILLIGRAM(S): 200 TABLET, FILM COATED ORAL at 17:57

## 2022-09-30 RX ADMIN — VORTIOXETINE 10 MILLIGRAM(S): 5 TABLET, FILM COATED ORAL at 09:05

## 2022-09-30 RX ADMIN — ESOMEPRAZOLE MAGNESIUM 40 MILLIGRAM(S): 40 CAPSULE, DELAYED RELEASE ORAL at 23:41

## 2022-09-30 RX ADMIN — FLUCONAZOLE 200 MILLIGRAM(S): 150 TABLET ORAL at 09:06

## 2022-09-30 RX ADMIN — Medication 1 TABLET(S): at 12:56

## 2022-09-30 RX ADMIN — ENOXAPARIN SODIUM 40 MILLIGRAM(S): 100 INJECTION SUBCUTANEOUS at 05:30

## 2022-09-30 RX ADMIN — ENOXAPARIN SODIUM 40 MILLIGRAM(S): 100 INJECTION SUBCUTANEOUS at 17:57

## 2022-09-30 RX ADMIN — LISDEXAMFETAMINE DIMESYLATE 50 MILLIGRAM(S): 70 CAPSULE ORAL at 09:04

## 2022-09-30 RX ADMIN — Medication 166.67 MILLIGRAM(S): at 21:45

## 2022-09-30 RX ADMIN — GABAPENTIN 400 MILLIGRAM(S): 400 CAPSULE ORAL at 09:11

## 2022-09-30 RX ADMIN — CHLORHEXIDINE GLUCONATE 1 APPLICATION(S): 213 SOLUTION TOPICAL at 14:06

## 2022-09-30 RX ADMIN — Medication 2 MILLIGRAM(S): at 21:45

## 2022-09-30 NOTE — PROGRESS NOTE ADULT - SUBJECTIVE AND OBJECTIVE BOX
Follow Up:  leg trauma/ cellulitis    Interval History/ROS:  anxious to have Left TKR    Allergies  No Known Allergies      ANTIMICROBIALS:  fluconAZOLE   Tablet 200 <User Schedule>  vancomycin  IVPB 1250 every 12 hours    OTHER MEDS:  MEDICATIONS  (STANDING):  acetaminophen     Tablet .. 650 every 6 hours PRN  clonazePAM  Tablet 1.5 <User Schedule>  clonazePAM  Tablet 1 daily  clonazePAM  Tablet 2 at bedtime  cyclobenzaprine 10 three times a day PRN  enalapril 10 daily  enoxaparin Injectable 40 every 12 hours  esOMEPRAZOLE 40 two times a day  gabapentin 600 at bedtime  gabapentin 400 daily  influenza  Vaccine (HIGH DOSE) 0.7 once  lamoTRIgine 200 daily  lisdexamfetamine 50 with breakfast  mirabegron ER 25 daily  oxybutynin 10 two times a day  oxyCODONE    IR 5 every 4 hours PRN  propranolol 20 every 8 hours PRN  QUEtiapine 200 <User Schedule>  sertraline 150 daily  vortioxetine 10 daily      Vital Signs Last 24 Hrs  T(C): 36.7 (30 Sep 2022 05:53), Max: 36.7 (30 Sep 2022 05:53)  T(F): 98.1 (30 Sep 2022 05:53), Max: 98.1 (30 Sep 2022 05:53)  HR: 71 (30 Sep 2022 05:53) (71 - 89)  BP: 149/72 (30 Sep 2022 05:53) (100/65 - 149/72)  BP(mean): --  RR: 18 (30 Sep 2022 05:53) (18 - 18)  SpO2: 99% (30 Sep 2022 05:53) (96% - 99%)    Parameters below as of 30 Sep 2022 05:53  Patient On (Oxygen Delivery Method): room air        PHYSICAL EXAM:  General: WN/WD NAD, Non-toxic  Neurology: A&Ox3, nonfocal  Respiratory: Clear to auscultation bilaterally  CV: RRR, S1S2, no murmurs, rubs or gallops  Abdominal: Soft, Non-tender, non-distended, normal bowel sounds  Extremities: No edema,    Right anterior area induration with dull purplish discloration  Line Sites: Clear  Skin: No rash                          10.0   22.11 )-----------( 211      ( 30 Sep 2022 06:59 )             30.9   WBC Count: 22.11 (09-30 @ 06:59)  WBC Count: 21.68 (09-29 @ 07:09)  WBC Count: 22.28 (09-28 @ 06:47)  WBC Count: 27.47 (09-27 @ 17:40)      09-30    132<L>  |  97  |  10  ----------------------------<  80  4.3   |  25  |  0.39<L>    Ca    9.3      30 Sep 2022 06:54    (09.27.22 @ 19:41)  MRSA PCR Result.: NotDete: MRSA/MSSA    MICROBIOLOGY:  .Blood Blood-Peripheral  09-27-22   No growth to date.  --  --      .Blood Blood-Peripheral  09-27-22   No growth to date.  --  --    RADIOLOGY:  < from: CT Tibia/Fibia w/ IV Cont, Right (09.29.22 @ 17:11) >  INTERPRETATION:    Bones and joint: There is a small effusion at the knee. There are small   tricompartment osteophytes present. There is mild medial compartment   joint space narrowing.    [At the contralateral left lower leg there is moderate medial compartment   joint space with tricompartment osteophyte formation and   chondrocalcinosis.]    Soft tissues: Ultrasound demonstrated subcentimeter focus of   hypoechogenicity potentially concerning for abscess. On this examination,   there is mild subcutaneous edema withskin thickening localized to the   distal lateral lower leg, without a rim-enhancing abscess identified. The   findings are consistent with subcutaneous edema, which may be seen in   cellulitis in this clinical setting of a leg wound.    There are prominent varicosities in the lower leg medially.    There is distal Achilles tendinosis with foci of mineralization within   the distal Achilles tendon several centimeters above its insertion.    IMPRESSION:    1.  Clinical setting of a leg wound. Skinthickening with feathery   infiltration of the subcutaneous fat consistent with subcutaneous edema,   which may be seen in cellulitis. No localized abscess demonstrated on   this CT. MRI could provide a more sensitive evaluation for very small   collections; of note recent ultrasound demonstrated a subcentimeter   collection. This could be performed if clinically warranted.    2.  Small knee effusion with mild/moderate degenerative changes.    3.  Distal Achilles tendinosis.    < end of copied text >      Jim Smith MD; Division of Infectious Disease; Pager: [phone]; nights and weekends: [phone]

## 2022-09-30 NOTE — PROGRESS NOTE ADULT - PROBLEM SELECTOR PLAN 4
with h/o GERD, oroesophageal candidiasis (was following ID outpt) and currently undergoing outpt w/u with GI at [facility] for esphageal motility disorders. Possible [place] syndrome   Pt says she can tolerate diet if she eats slowly; no S&S eval indicated at this time.     - at home, on nitroglycerin 0.3mg tab PRN swallowing aid, 2x in 5min  - supposedly eats a regular diet at home, will do a soft dysphagia diet here   - on nexium 40mg at home, pantoprazole 40 while inpatient  - c/w fluconazole 200mg BID  - aspiration precautions, hob elevation

## 2022-09-30 NOTE — PROGRESS NOTE ADULT - ASSESSMENT
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

## 2022-09-30 NOTE — DIETITIAN INITIAL EVALUATION ADULT - PERTINENT MEDS FT
MEDICATIONS  (STANDING):  apremilast 30 milliGRAM(s) Oral two times a day  calcium carbonate 1250 mG  + Vitamin D (OsCal 500 + D) 1 Tablet(s) Oral daily  chlorhexidine 2% Cloths 1 Application(s) Topical daily  clonazePAM  Tablet 1.5 milliGRAM(s) Oral <User Schedule>  clonazePAM  Tablet 1 milliGRAM(s) Oral daily  clonazePAM  Tablet 2 milliGRAM(s) Oral at bedtime  enalapril 10 milliGRAM(s) Oral daily  enoxaparin Injectable 40 milliGRAM(s) SubCutaneous every 12 hours  esOMEPRAZOLE 40 milliGRAM(s) Oral two times a day  fluconAZOLE   Tablet 200 milliGRAM(s) Oral <User Schedule>  gabapentin 600 milliGRAM(s) Oral at bedtime  gabapentin 400 milliGRAM(s) Oral daily  influenza  Vaccine (HIGH DOSE) 0.7 milliLiter(s) IntraMuscular once  lactobacillus acidophilus 1 Tablet(s) Oral three times a day  lamoTRIgine 200 milliGRAM(s) Oral daily  lisdexamfetamine 50 milliGRAM(s) Oral with breakfast  mirabegron ER 25 milliGRAM(s) Oral daily  multivitamin/minerals 1 Tablet(s) Oral daily  oxybutynin 10 milliGRAM(s) Oral two times a day  QUEtiapine 200 milliGRAM(s) Oral <User Schedule>  sertraline 150 milliGRAM(s) Oral daily  vancomycin  IVPB 1250 milliGRAM(s) IV Intermittent every 12 hours  vortioxetine 10 milliGRAM(s) Oral daily    MEDICATIONS  (PRN):  acetaminophen     Tablet .. 650 milliGRAM(s) Oral every 6 hours PRN Temp greater or equal to 38C (100.4F), Mild Pain (1 - 3)  cyclobenzaprine 10 milliGRAM(s) Oral three times a day PRN Muscle Spasm  oxyCODONE    IR 5 milliGRAM(s) Oral every 4 hours PRN Severe Pain (7 - 10)  propranolol 20 milliGRAM(s) Oral every 8 hours PRN hand tremors

## 2022-09-30 NOTE — DIETITIAN INITIAL EVALUATION ADULT - ENERGY INTAKE
pt previously on soft and bite sized in hospital, diet changed within the past hour. did not like soft and bite sized./Fair (50-75%)

## 2022-09-30 NOTE — DIETITIAN INITIAL EVALUATION ADULT - PERTINENT LABORATORY DATA
09-30    132<L>  |  97  |  10  ----------------------------<  80  4.3   |  25  |  0.39<L>    Ca    9.3      30 Sep 2022 06:54

## 2022-09-30 NOTE — PROGRESS NOTE ADULT - PROBLEM SELECTOR PLAN 1
h/o remote severe MRSA infection ~13 yrs ago requiring ICU stay p/w cellulitis of R anterolateral shin w/ swelling, erythema, and warmth w/ associated small abscess s/p failed oral abx (doxy x 2 wks, clinda x days) and I&D outpatient w/ minimal to no drainage. Now admitted for IV abx   - subjective fevers at home, afebrile here, with elevated WBC to 27 with lymphocytic predominance i/s/o CLL, so more likely 2/2 CLL. does not meet SIRS criteria at this time. lactate wnl   - LLE US: w/ ill-defined hypoechoic foci up to 0.6 x 0.8 x0.4 w/ possible small fluid collection/abscess  -- fluid collection likely too small to drain  - LLE duplex: neg for DVT  - XR tib-fib: showing swelling w/o lytic or blastic lesions, no periosteal reactions, no fractures/dislocations  - s/p vanc 1g x 1 in ED  - given h/o severe MRSA infection, can c/w vanc 1.5g q12h  - check pre-4th trough   - f/u blood cxs NGTD and MRSA negative.   - Can discharge on Augmnentin and Doxycycline PO.   - elevate RLE extremity, tylenol for mild-moderate home, home oxy 5mg q4h PRN severe pain, ice compresses PRN h/o remote severe MRSA infection ~13 yrs ago requiring ICU stay p/w cellulitis of R anterolateral shin w/ swelling, erythema, and warmth w/ associated small abscess s/p failed oral abx (doxy x 2 wks, clinda x days) and I&D outpatient w/ minimal to no drainage. Now admitted for IV abx   - subjective fevers at home, afebrile here, with elevated WBC to 27 with lymphocytic predominance i/s/o CLL, so more likely 2/2 CLL. does not meet SIRS criteria at this time. lactate wnl   - LLE US: w/ ill-defined hypoechoic foci up to 0.6 x 0.8 x0.4 w/ possible small fluid collection/abscess  -- fluid collection likely too small to drain  - LLE duplex: neg for DVT  - XR tib-fib: showing swelling w/o lytic or blastic lesions, no periosteal reactions, no fractures/dislocations  - CT: subcutaneous edema consistent with cellulitis but without localized abscess, very small collection.  - s/p vanc 1g x 1 in ED  - given h/o severe MRSA infection, can c/w vanc 1.5g q12h  - check pre-4th trough   - f/u blood cxs NGTD and MRSA negative.   - Can discharge on Augmnentin and Doxycycline PO.   - elevate RLE extremity, tylenol for mild-moderate home, home oxy 5mg q4h PRN severe pain, ice compresses PRN

## 2022-09-30 NOTE — PROGRESS NOTE ADULT - SUBJECTIVE AND OBJECTIVE BOX
PROGRESS NOTE:   Authored by Dr. Karlene Vargas    Patient is a 67y old  Female who presents with a chief complaint of cellulitis (29 Sep 2022 20:46)      SUBJECTIVE / OVERNIGHT EVENTS:    ADDITIONAL REVIEW OF SYSTEMS:    MEDICATIONS  (STANDING):  apremilast 30 milliGRAM(s) Oral two times a day  calcium carbonate 1250 mG  + Vitamin D (OsCal 500 + D) 1 Tablet(s) Oral daily  chlorhexidine 2% Cloths 1 Application(s) Topical daily  clonazePAM  Tablet 1.5 milliGRAM(s) Oral <User Schedule>  clonazePAM  Tablet 1 milliGRAM(s) Oral daily  clonazePAM  Tablet 2 milliGRAM(s) Oral at bedtime  enalapril 10 milliGRAM(s) Oral daily  enoxaparin Injectable 40 milliGRAM(s) SubCutaneous every 12 hours  esOMEPRAZOLE 40 milliGRAM(s) Oral two times a day  fluconAZOLE   Tablet 200 milliGRAM(s) Oral <User Schedule>  gabapentin 600 milliGRAM(s) Oral at bedtime  gabapentin 400 milliGRAM(s) Oral daily  influenza  Vaccine (HIGH DOSE) 0.7 milliLiter(s) IntraMuscular once  lactobacillus acidophilus 1 Tablet(s) Oral three times a day  lamoTRIgine 200 milliGRAM(s) Oral daily  lisdexamfetamine 50 milliGRAM(s) Oral with breakfast  mirabegron ER 25 milliGRAM(s) Oral daily  multivitamin/minerals 1 Tablet(s) Oral daily  oxybutynin 10 milliGRAM(s) Oral two times a day  QUEtiapine 200 milliGRAM(s) Oral <User Schedule>  sertraline 150 milliGRAM(s) Oral daily  vancomycin  IVPB 1250 milliGRAM(s) IV Intermittent every 12 hours  vortioxetine 10 milliGRAM(s) Oral daily    MEDICATIONS  (PRN):  acetaminophen     Tablet .. 650 milliGRAM(s) Oral every 6 hours PRN Temp greater or equal to 38C (100.4F), Mild Pain (1 - 3)  cyclobenzaprine 10 milliGRAM(s) Oral three times a day PRN Muscle Spasm  oxyCODONE    IR 5 milliGRAM(s) Oral every 4 hours PRN Severe Pain (7 - 10)  propranolol 20 milliGRAM(s) Oral every 8 hours PRN hand tremors      CAPILLARY BLOOD GLUCOSE        I&O's Summary    29 Sep 2022 07:01  -  30 Sep 2022 07:00  --------------------------------------------------------  IN: 1410 mL / OUT: 400 mL / NET: 1010 mL        PHYSICAL EXAM:  Vital Signs Last 24 Hrs  T(C): 36.7 (30 Sep 2022 05:53), Max: 36.8 (29 Sep 2022 09:19)  T(F): 98.1 (30 Sep 2022 05:53), Max: 98.2 (29 Sep 2022 09:19)  HR: 71 (30 Sep 2022 05:53) (67 - 89)  BP: 149/72 (30 Sep 2022 05:53) (100/65 - 149/72)  BP(mean): --  RR: 18 (30 Sep 2022 05:53) (18 - 18)  SpO2: 99% (30 Sep 2022 05:53) (95% - 99%)    Parameters below as of 30 Sep 2022 05:53  Patient On (Oxygen Delivery Method): room air        GENERAL: NAD, lying comfortably in bed  HEAD: Atraumatic, normocephalic  EYES: EOMI b/l, conjunctiva and sclera clear  NECK: Supple, No JVD, No LAD  RESPIRATORY: Normal respiratory effort; lungs are clear to auscultation bilaterally  CARDIOVASCULAR: Regular rate and rhythm, normal S1 and S2, no murmur/rub/gallop; No lower extremity edema  ABDOMEN: Nontender, normoactive bowel sounds, no rebound/guarding; No hepatosplenomegaly  MUSCULOSKELETAL: no clubbing or cyanosis of digits; no joint swelling or tenderness to palpation  NEURO: Non focal   SKIN:   PSYCH: A+O to person, place, and time; affect appropriate    LABS:                          9.9    21.68 )-----------( 217      ( 29 Sep 2022 07:09 )             31.1     09-29    131<L>  |  94<L>  |  12  ----------------------------<  79  3.9   |  26  |  0.44<L>    Ca    9.3      29 Sep 2022 07:09                Culture - Blood (collected 27 Sep 2022 17:20)  Source: .Blood Blood-Peripheral  Preliminary Report (28 Sep 2022 23:02):    No growth to date.    Culture - Blood (collected 27 Sep 2022 17:15)  Source: .Blood Blood-Peripheral  Preliminary Report (28 Sep 2022 23:02):    No growth to date.          RADIOLOGY:    Consulted note reviewed  Reviewed Imaging personally   PROGRESS NOTE:   Authored by Dr. Karlene Vargas    Patient is a 67y old  Female who presents with a chief complaint of cellulitis (29 Sep 2022 20:46)      SUBJECTIVE / OVERNIGHT EVENTS: Pt uses CPAP at home as needed, and did not want to use here so the CPAP order was cancelled. Pt's picc line also no longer patent, and had to be pulled. Request for midline order per nursing. This morning, pt says     ADDITIONAL REVIEW OF SYSTEMS:        MEDICATIONS  (STANDING):  apremilast 30 milliGRAM(s) Oral two times a day  calcium carbonate 1250 mG  + Vitamin D (OsCal 500 + D) 1 Tablet(s) Oral daily  chlorhexidine 2% Cloths 1 Application(s) Topical daily  clonazePAM  Tablet 1.5 milliGRAM(s) Oral <User Schedule>  clonazePAM  Tablet 1 milliGRAM(s) Oral daily  clonazePAM  Tablet 2 milliGRAM(s) Oral at bedtime  enalapril 10 milliGRAM(s) Oral daily  enoxaparin Injectable 40 milliGRAM(s) SubCutaneous every 12 hours  esOMEPRAZOLE 40 milliGRAM(s) Oral two times a day  fluconAZOLE   Tablet 200 milliGRAM(s) Oral <User Schedule>  gabapentin 600 milliGRAM(s) Oral at bedtime  gabapentin 400 milliGRAM(s) Oral daily  influenza  Vaccine (HIGH DOSE) 0.7 milliLiter(s) IntraMuscular once  lactobacillus acidophilus 1 Tablet(s) Oral three times a day  lamoTRIgine 200 milliGRAM(s) Oral daily  lisdexamfetamine 50 milliGRAM(s) Oral with breakfast  mirabegron ER 25 milliGRAM(s) Oral daily  multivitamin/minerals 1 Tablet(s) Oral daily  oxybutynin 10 milliGRAM(s) Oral two times a day  QUEtiapine 200 milliGRAM(s) Oral <User Schedule>  sertraline 150 milliGRAM(s) Oral daily  vancomycin  IVPB 1250 milliGRAM(s) IV Intermittent every 12 hours  vortioxetine 10 milliGRAM(s) Oral daily    MEDICATIONS  (PRN):  acetaminophen     Tablet .. 650 milliGRAM(s) Oral every 6 hours PRN Temp greater or equal to 38C (100.4F), Mild Pain (1 - 3)  cyclobenzaprine 10 milliGRAM(s) Oral three times a day PRN Muscle Spasm  oxyCODONE    IR 5 milliGRAM(s) Oral every 4 hours PRN Severe Pain (7 - 10)  propranolol 20 milliGRAM(s) Oral every 8 hours PRN hand tremors      CAPILLARY BLOOD GLUCOSE        I&O's Summary    29 Sep 2022 07:01  -  30 Sep 2022 07:00  --------------------------------------------------------  IN: 1410 mL / OUT: 400 mL / NET: 1010 mL        PHYSICAL EXAM:  Vital Signs Last 24 Hrs  T(C): 36.7 (30 Sep 2022 05:53), Max: 36.8 (29 Sep 2022 09:19)  T(F): 98.1 (30 Sep 2022 05:53), Max: 98.2 (29 Sep 2022 09:19)  HR: 71 (30 Sep 2022 05:53) (67 - 89)  BP: 149/72 (30 Sep 2022 05:53) (100/65 - 149/72)  BP(mean): --  RR: 18 (30 Sep 2022 05:53) (18 - 18)  SpO2: 99% (30 Sep 2022 05:53) (95% - 99%)    Parameters below as of 30 Sep 2022 05:53  Patient On (Oxygen Delivery Method): room air        GENERAL: NAD, lying comfortably in bed  HEAD: Atraumatic, normocephalic  EYES: EOMI b/l, conjunctiva and sclera clear  NECK: Supple, No JVD, No LAD  RESPIRATORY: Normal respiratory effort; lungs are clear to auscultation bilaterally  CARDIOVASCULAR: Regular rate and rhythm, normal S1 and S2, no murmur/rub/gallop; No lower extremity edema  ABDOMEN: Nontender, normoactive bowel sounds, no rebound/guarding; No hepatosplenomegaly  MUSCULOSKELETAL: no clubbing or cyanosis of digits; no joint swelling or tenderness to palpation  NEURO: Non focal   SKIN:   PSYCH: A+O to person, place, and time; affect appropriate    LABS:                          9.9    21.68 )-----------( 217      ( 29 Sep 2022 07:09 )             31.1     09-29    131<L>  |  94<L>  |  12  ----------------------------<  79  3.9   |  26  |  0.44<L>    Ca    9.3      29 Sep 2022 07:09                Culture - Blood (collected 27 Sep 2022 17:20)  Source: .Blood Blood-Peripheral  Preliminary Report (28 Sep 2022 23:02):    No growth to date.    Culture - Blood (collected 27 Sep 2022 17:15)  Source: .Blood Blood-Peripheral  Preliminary Report (28 Sep 2022 23:02):    No growth to date.          RADIOLOGY:    Consulted note reviewed  Reviewed Imaging personally   PROGRESS NOTE:   Authored by Dr. Karlene Vargas    Patient is a 67y old  Female who presents with a chief complaint of cellulitis (29 Sep 2022 20:46)      SUBJECTIVE / OVERNIGHT EVENTS: Pt uses CPAP at home as needed, and did not want to use here so the CPAP order was cancelled. Pt's picc line also no longer patent, and had to be pulled. Request for midline order per nursing. This morning, pt says her lesion in her leg is unchanged from yesterday. Denies pain when still, but it becomes painful with red streaks when she ambulates. No other active complaints    ADDITIONAL REVIEW OF SYSTEMS:  + chronic abd pain  No N/V  No CP, SOB  No fevers/chills    MEDICATIONS  (STANDING):  apremilast 30 milliGRAM(s) Oral two times a day  calcium carbonate 1250 mG  + Vitamin D (OsCal 500 + D) 1 Tablet(s) Oral daily  chlorhexidine 2% Cloths 1 Application(s) Topical daily  clonazePAM  Tablet 1.5 milliGRAM(s) Oral <User Schedule>  clonazePAM  Tablet 1 milliGRAM(s) Oral daily  clonazePAM  Tablet 2 milliGRAM(s) Oral at bedtime  enalapril 10 milliGRAM(s) Oral daily  enoxaparin Injectable 40 milliGRAM(s) SubCutaneous every 12 hours  esOMEPRAZOLE 40 milliGRAM(s) Oral two times a day  fluconAZOLE   Tablet 200 milliGRAM(s) Oral <User Schedule>  gabapentin 600 milliGRAM(s) Oral at bedtime  gabapentin 400 milliGRAM(s) Oral daily  influenza  Vaccine (HIGH DOSE) 0.7 milliLiter(s) IntraMuscular once  lactobacillus acidophilus 1 Tablet(s) Oral three times a day  lamoTRIgine 200 milliGRAM(s) Oral daily  lisdexamfetamine 50 milliGRAM(s) Oral with breakfast  mirabegron ER 25 milliGRAM(s) Oral daily  multivitamin/minerals 1 Tablet(s) Oral daily  oxybutynin 10 milliGRAM(s) Oral two times a day  QUEtiapine 200 milliGRAM(s) Oral <User Schedule>  sertraline 150 milliGRAM(s) Oral daily  vancomycin  IVPB 1250 milliGRAM(s) IV Intermittent every 12 hours  vortioxetine 10 milliGRAM(s) Oral daily    MEDICATIONS  (PRN):  acetaminophen     Tablet .. 650 milliGRAM(s) Oral every 6 hours PRN Temp greater or equal to 38C (100.4F), Mild Pain (1 - 3)  cyclobenzaprine 10 milliGRAM(s) Oral three times a day PRN Muscle Spasm  oxyCODONE    IR 5 milliGRAM(s) Oral every 4 hours PRN Severe Pain (7 - 10)  propranolol 20 milliGRAM(s) Oral every 8 hours PRN hand tremors      CAPILLARY BLOOD GLUCOSE        I&O's Summary    29 Sep 2022 07:01  -  30 Sep 2022 07:00  --------------------------------------------------------  IN: 1410 mL / OUT: 400 mL / NET: 1010 mL        PHYSICAL EXAM:  Vital Signs Last 24 Hrs  T(C): 36.7 (30 Sep 2022 05:53), Max: 36.8 (29 Sep 2022 09:19)  T(F): 98.1 (30 Sep 2022 05:53), Max: 98.2 (29 Sep 2022 09:19)  HR: 71 (30 Sep 2022 05:53) (67 - 89)  BP: 149/72 (30 Sep 2022 05:53) (100/65 - 149/72)  BP(mean): --  RR: 18 (30 Sep 2022 05:53) (18 - 18)  SpO2: 99% (30 Sep 2022 05:53) (95% - 99%)    Parameters below as of 30 Sep 2022 05:53  Patient On (Oxygen Delivery Method): room air      GENERAL: NAD, lying comfortably in bed  HEAD: Atraumatic, normocephalic  EYES: Conjunctiva and sclera clear  NECK: Supple  RESPIRATORY: Normal respiratory effort; lungs are clear to auscultation bilaterally  CARDIOVASCULAR: Regular rate and rhythm, normal S1 and S2, no murmur/rub/gallop; No lower extremity pitting edema  ABDOMEN: Mild tenderness on LUQ not changed from baseline, no rebound/guarding   MUSCULOSKELETAL: Moving all extremities, ambulating  NEURO: Non focal   SKIN: round area of localized edema and induration measuring about 5-6 inches in diameter. Mild erythema around the lesion, no tenderness to palpation.   PSYCH: A+O to person, place, and time; affect appropriate        LABS:                          10.0   22.11 )-----------( 211      ( 30 Sep 2022 06:59 )             30.9     09-30    132<L>  |  97  |  10  ----------------------------<  80  4.3   |  25  |  0.39<L>    Ca    9.3      30 Sep 2022 06:54            RADIOLOGY, EKG & ADDITIONAL TESTS: Reviewed.

## 2022-09-30 NOTE — PROGRESS NOTE ADULT - PROBLEM SELECTOR PLAN 3
- no active issues. continue w/ home meds  - Patient taking some of her home meds. Verified with pharmacy

## 2022-09-30 NOTE — DIETITIAN INITIAL EVALUATION ADULT - ORAL INTAKE PTA/DIET HISTORY
visited pt at bedside this afternoon per request. chops food up at home due to previous gastric bypass surgery and has "delayed esophageal sphincters". confirms NKFA.

## 2022-09-30 NOTE — DIETITIAN INITIAL EVALUATION ADULT - REASON FOR ADMISSION
Other disorders of soft tissue  .  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

## 2022-09-30 NOTE — PROGRESS NOTE ADULT - ASSESSMENT
67F with HTN, psoriasis, psoriatic arthritis, CLL (Stage 1 - no active chemo), JANE, bipolar, anemia, h/o PE (not on AC), obesity s/p alexandria-en-y bypass with revision MRSA infection of abd 2009 and prepatellar bursitis due to MSSA 2013, had R leg cellulitis was given doxy for 2 weeks and small aspiration at [facility] MD (no culture done as per the patient) but still had edema and erythema, then was given clinda which she took for a few days and came to ER with no improvement    She has severe degeneration of  left knee with gait instability, multiple falls, rib fractures  Had fall 9/6/22 and injured right ant tib region - took antibiotics  2 weeks of doxy and a few days clinda, still with area of firmness, edema, discoloration and tenderness, --no  abscess on most recent CT scan  pt has h/o MRSA and MSSA in the past, now MRSA/MSSA PCR negative     reports 30 - 40# intentional wieght loss over past year  swallowing disturbance - esophageal dysmotility and reflux  she states bronchiectasis was noted on previous imaging  history of thrush    On exam, the leg appears to have residual inflammation from previous trauma/infection  -does not look infected at present    Suggest  Continue Vanco 1250 q 12   Check ProCalcitonin, Quantitative Immunoglobulins    Please call ID if needed over weekend  I anticipate being able to clear for TKR from ID perspective on Monday - if remains stable

## 2022-10-01 LAB
ANION GAP SERPL CALC-SCNC: 14 MMOL/L — SIGNIFICANT CHANGE UP (ref 5–17)
BUN SERPL-MCNC: 10 MG/DL — SIGNIFICANT CHANGE UP (ref 7–23)
CALCIUM SERPL-MCNC: 9.6 MG/DL — SIGNIFICANT CHANGE UP (ref 8.4–10.5)
CHLORIDE SERPL-SCNC: 98 MMOL/L — SIGNIFICANT CHANGE UP (ref 96–108)
CO2 SERPL-SCNC: 24 MMOL/L — SIGNIFICANT CHANGE UP (ref 22–31)
CREAT SERPL-MCNC: 0.35 MG/DL — LOW (ref 0.5–1.3)
EGFR: 113 ML/MIN/1.73M2 — SIGNIFICANT CHANGE UP
GLUCOSE SERPL-MCNC: 85 MG/DL — SIGNIFICANT CHANGE UP (ref 70–99)
HCT VFR BLD CALC: 30 % — LOW (ref 34.5–45)
HGB BLD-MCNC: 9.9 G/DL — LOW (ref 11.5–15.5)
IGA FLD-MCNC: 116 MG/DL — SIGNIFICANT CHANGE UP (ref 84–499)
IGG FLD-MCNC: 699 MG/DL — SIGNIFICANT CHANGE UP (ref 610–1660)
IGM SERPL-MCNC: 144 MG/DL — SIGNIFICANT CHANGE UP (ref 35–242)
MAGNESIUM SERPL-MCNC: 1.8 MG/DL — SIGNIFICANT CHANGE UP (ref 1.6–2.6)
MCHC RBC-ENTMCNC: 28.9 PG — SIGNIFICANT CHANGE UP (ref 27–34)
MCHC RBC-ENTMCNC: 33 GM/DL — SIGNIFICANT CHANGE UP (ref 32–36)
MCV RBC AUTO: 87.5 FL — SIGNIFICANT CHANGE UP (ref 80–100)
NRBC # BLD: 0 /100 WBCS — SIGNIFICANT CHANGE UP (ref 0–0)
PHOSPHATE SERPL-MCNC: 4.2 MG/DL — SIGNIFICANT CHANGE UP (ref 2.5–4.5)
PLATELET # BLD AUTO: 218 K/UL — SIGNIFICANT CHANGE UP (ref 150–400)
POTASSIUM SERPL-MCNC: 4.2 MMOL/L — SIGNIFICANT CHANGE UP (ref 3.5–5.3)
POTASSIUM SERPL-SCNC: 4.2 MMOL/L — SIGNIFICANT CHANGE UP (ref 3.5–5.3)
PROCALCITONIN SERPL-MCNC: <0.03 NG/ML — SIGNIFICANT CHANGE UP (ref 0.02–0.1)
RBC # BLD: 3.43 M/UL — LOW (ref 3.8–5.2)
RBC # FLD: 11.9 % — SIGNIFICANT CHANGE UP (ref 10.3–14.5)
SODIUM SERPL-SCNC: 136 MMOL/L — SIGNIFICANT CHANGE UP (ref 135–145)
WBC # BLD: 21.47 K/UL — HIGH (ref 3.8–10.5)
WBC # FLD AUTO: 21.47 K/UL — HIGH (ref 3.8–10.5)

## 2022-10-01 PROCEDURE — 99232 SBSQ HOSP IP/OBS MODERATE 35: CPT | Mod: GC

## 2022-10-01 RX ORDER — LACTOBACILLUS ACIDOPHILUS 100MM CELL
1 CAPSULE ORAL
Qty: 0 | Refills: 0 | DISCHARGE
Start: 2022-10-01

## 2022-10-01 RX ADMIN — VORTIOXETINE 10 MILLIGRAM(S): 5 TABLET, FILM COATED ORAL at 09:38

## 2022-10-01 RX ADMIN — QUETIAPINE FUMARATE 200 MILLIGRAM(S): 200 TABLET, FILM COATED ORAL at 18:09

## 2022-10-01 RX ADMIN — ESOMEPRAZOLE MAGNESIUM 40 MILLIGRAM(S): 40 CAPSULE, DELAYED RELEASE ORAL at 06:05

## 2022-10-01 RX ADMIN — GABAPENTIN 400 MILLIGRAM(S): 400 CAPSULE ORAL at 09:46

## 2022-10-01 RX ADMIN — OXYCODONE HYDROCHLORIDE 5 MILLIGRAM(S): 5 TABLET ORAL at 16:25

## 2022-10-01 RX ADMIN — Medication 1 TABLET(S): at 13:11

## 2022-10-01 RX ADMIN — Medication 300 MILLIGRAM(S): at 17:26

## 2022-10-01 RX ADMIN — FLUCONAZOLE 200 MILLIGRAM(S): 150 TABLET ORAL at 21:21

## 2022-10-01 RX ADMIN — APREMILAST 30 MILLIGRAM(S): KIT ORAL at 22:11

## 2022-10-01 RX ADMIN — Medication 1 TABLET(S): at 21:21

## 2022-10-01 RX ADMIN — LISDEXAMFETAMINE DIMESYLATE 50 MILLIGRAM(S): 70 CAPSULE ORAL at 09:37

## 2022-10-01 RX ADMIN — Medication 300 MILLIGRAM(S): at 05:44

## 2022-10-01 RX ADMIN — MIRABEGRON 25 MILLIGRAM(S): 50 TABLET, EXTENDED RELEASE ORAL at 09:37

## 2022-10-01 RX ADMIN — ENOXAPARIN SODIUM 40 MILLIGRAM(S): 100 INJECTION SUBCUTANEOUS at 05:43

## 2022-10-01 RX ADMIN — ESOMEPRAZOLE MAGNESIUM 40 MILLIGRAM(S): 40 CAPSULE, DELAYED RELEASE ORAL at 22:11

## 2022-10-01 RX ADMIN — FLUCONAZOLE 200 MILLIGRAM(S): 150 TABLET ORAL at 09:38

## 2022-10-01 RX ADMIN — Medication 10 MILLIGRAM(S): at 21:21

## 2022-10-01 RX ADMIN — Medication 1.5 MILLIGRAM(S): at 13:50

## 2022-10-01 RX ADMIN — CHLORHEXIDINE GLUCONATE 1 APPLICATION(S): 213 SOLUTION TOPICAL at 13:36

## 2022-10-01 RX ADMIN — APREMILAST 30 MILLIGRAM(S): KIT ORAL at 06:04

## 2022-10-01 RX ADMIN — ENOXAPARIN SODIUM 40 MILLIGRAM(S): 100 INJECTION SUBCUTANEOUS at 17:33

## 2022-10-01 RX ADMIN — Medication 1 TABLET(S): at 05:42

## 2022-10-01 RX ADMIN — Medication 1 MILLIGRAM(S): at 09:46

## 2022-10-01 RX ADMIN — Medication 10 MILLIGRAM(S): at 05:43

## 2022-10-01 RX ADMIN — CYCLOBENZAPRINE HYDROCHLORIDE 10 MILLIGRAM(S): 10 TABLET, FILM COATED ORAL at 17:28

## 2022-10-01 RX ADMIN — LAMOTRIGINE 200 MILLIGRAM(S): 25 TABLET, ORALLY DISINTEGRATING ORAL at 09:46

## 2022-10-01 RX ADMIN — GABAPENTIN 600 MILLIGRAM(S): 400 CAPSULE ORAL at 21:21

## 2022-10-01 RX ADMIN — SERTRALINE 150 MILLIGRAM(S): 25 TABLET, FILM COATED ORAL at 09:38

## 2022-10-01 RX ADMIN — Medication 2 MILLIGRAM(S): at 21:21

## 2022-10-01 RX ADMIN — OXYCODONE HYDROCHLORIDE 5 MILLIGRAM(S): 5 TABLET ORAL at 15:34

## 2022-10-01 NOTE — PROGRESS NOTE ADULT - PROBLEM SELECTOR PLAN 10
Diet: soft diet due to history esophageal issues  DVT ppx: lovenox SQ  Dispo: Home with Home PT Diet: regular diet per patient's request; says she can tolerate if she eats slowly  DVT ppx: lovenox SQ  Dispo: Home with Home PT

## 2022-10-01 NOTE — PROGRESS NOTE ADULT - ASSESSMENT
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

## 2022-10-01 NOTE — PROGRESS NOTE ADULT - PROBLEM SELECTOR PLAN 1
h/o remote severe MRSA infection ~13 yrs ago requiring ICU stay p/w cellulitis of R anterolateral shin w/ swelling, erythema, and warmth w/ associated small abscess s/p failed oral abx (doxy x 2 wks, clinda x days) and I&D outpatient w/ minimal to no drainage. Now admitted for IV abx   - subjective fevers at home, afebrile here, with elevated WBC to 27 with lymphocytic predominance i/s/o CLL, so more likely 2/2 CLL. does not meet SIRS criteria at this time. lactate wnl   - LLE US: w/ ill-defined hypoechoic foci up to 0.6 x 0.8 x0.4 w/ possible small fluid collection/abscess  -- fluid collection likely too small to drain  - LLE duplex: neg for DVT  - XR tib-fib: showing swelling w/o lytic or blastic lesions, no periosteal reactions, no fractures/dislocations  - CT: subcutaneous edema consistent with cellulitis but without localized abscess, very small collection.  - s/p vanc 1g x 1 in ED  - given h/o severe MRSA infection, can c/w vanc 1.5g q12h  - check pre-4th trough   - f/u blood cxs NGTD and MRSA negative.   - Can discharge on Augmnentin and Doxycycline PO.   - elevate RLE extremity, tylenol for mild-moderate home, home oxy 5mg q4h PRN severe pain, ice compresses PRN h/o remote severe MRSA infection ~13 yrs ago requiring ICU stay p/w cellulitis of R anterolateral shin w/ swelling, erythema, and warmth w/ associated small abscess s/p failed oral abx (doxy x 2 wks, clinda x days) and I&D outpatient w/ minimal to no drainage. Now admitted for IV abx   Much improved from yesterday, no erythema   - subjective fevers at home, afebrile here, with elevated WBC to 27 with lymphocytic predominance i/s/o CLL, so more likely 2/2 CLL. does not meet SIRS criteria at this time. lactate wnl   - LLE US: w/ ill-defined hypoechoic foci up to 0.6 x 0.8 x0.4 w/ possible small fluid collection/abscess  -- fluid collection likely too small to drain  - LLE duplex: neg for DVT  - XR tib-fib: showing swelling w/o lytic or blastic lesions, no periosteal reactions, no fractures/dislocations  - CT: subcutaneous edema consistent with cellulitis but without localized abscess, very small collection.  - s/p vanc 1g x 1 in ED  - given h/o severe MRSA infection, can c/w vanc 1.5g q12h  - check pre-4th trough   - f/u blood cxs NGTD and MRSA negative.   - Can discharge on Augmnentin and Doxycycline PO.   - elevate RLE extremity, tylenol for mild-moderate home, home oxy 5mg q4h PRN severe pain, ice compresses PRN

## 2022-10-01 NOTE — PROGRESS NOTE ADULT - PROBLEM SELECTOR PLAN 4
with h/o GERD, oroesophageal candidiasis (was following ID outpt) and currently undergoing outpt w/u with GI at [facility] for esphageal motility disorders. Possible [place] syndrome   Pt says she can tolerate diet if she eats slowly; no S&S eval indicated at this time.     - at home, on nitroglycerin 0.3mg tab PRN swallowing aid, 2x in 5min  - supposedly eats a regular diet at home, will do a soft dysphagia diet here   - on nexium 40mg at home, pantoprazole 40 while inpatient  - c/w fluconazole 200mg BID  - aspiration precautions, hob elevation

## 2022-10-01 NOTE — PROGRESS NOTE ADULT - SUBJECTIVE AND OBJECTIVE BOX
PROGRESS NOTE:   Authored by Dr. Karlene Vargas    Patient is a 67y old  Female who presents with a chief complaint of cellulitis (30 Sep 2022 14:26)      SUBJECTIVE / OVERNIGHT EVENTS: No events overnight. Patient's vanc dose was adjusted to 1500 mg BID from 1250mg BID based on trough level.     ADDITIONAL REVIEW OF SYSTEMS:        MEDICATIONS  (STANDING):  apremilast 30 milliGRAM(s) Oral two times a day  calcium carbonate 1250 mG  + Vitamin D (OsCal 500 + D) 1 Tablet(s) Oral daily  chlorhexidine 2% Cloths 1 Application(s) Topical daily  clonazePAM  Tablet 1.5 milliGRAM(s) Oral <User Schedule>  clonazePAM  Tablet 1 milliGRAM(s) Oral daily  clonazePAM  Tablet 2 milliGRAM(s) Oral at bedtime  enalapril 10 milliGRAM(s) Oral daily  enoxaparin Injectable 40 milliGRAM(s) SubCutaneous every 12 hours  esOMEPRAZOLE 40 milliGRAM(s) Oral two times a day  fluconAZOLE   Tablet 200 milliGRAM(s) Oral <User Schedule>  gabapentin 600 milliGRAM(s) Oral at bedtime  gabapentin 400 milliGRAM(s) Oral daily  influenza  Vaccine (HIGH DOSE) 0.7 milliLiter(s) IntraMuscular once  lactobacillus acidophilus 1 Tablet(s) Oral three times a day  lamoTRIgine 200 milliGRAM(s) Oral daily  lisdexamfetamine 50 milliGRAM(s) Oral with breakfast  mirabegron ER 25 milliGRAM(s) Oral daily  multivitamin/minerals 1 Tablet(s) Oral daily  oxybutynin 10 milliGRAM(s) Oral two times a day  QUEtiapine 200 milliGRAM(s) Oral <User Schedule>  sertraline 150 milliGRAM(s) Oral daily  vancomycin  IVPB 1500 milliGRAM(s) IV Intermittent every 12 hours  vortioxetine 10 milliGRAM(s) Oral daily    MEDICATIONS  (PRN):  acetaminophen     Tablet .. 650 milliGRAM(s) Oral every 6 hours PRN Temp greater or equal to 38C (100.4F), Mild Pain (1 - 3)  cyclobenzaprine 10 milliGRAM(s) Oral three times a day PRN Muscle Spasm  oxyCODONE    IR 5 milliGRAM(s) Oral every 4 hours PRN Severe Pain (7 - 10)  propranolol 20 milliGRAM(s) Oral every 8 hours PRN hand tremors      CAPILLARY BLOOD GLUCOSE        I&O's Summary    30 Sep 2022 07:01  -  01 Oct 2022 07:00  --------------------------------------------------------  IN: 1330 mL / OUT: 0 mL / NET: 1330 mL        PHYSICAL EXAM:  Vital Signs Last 24 Hrs  T(C): 37 (01 Oct 2022 04:56), Max: 37 (01 Oct 2022 04:56)  T(F): 98.6 (01 Oct 2022 04:56), Max: 98.6 (01 Oct 2022 04:56)  HR: 71 (01 Oct 2022 04:56) (67 - 80)  BP: 136/68 (01 Oct 2022 04:56) (123/77 - 136/68)  BP(mean): --  RR: 18 (01 Oct 2022 04:56) (18 - 18)  SpO2: 96% (01 Oct 2022 04:56) (95% - 96%)    Parameters below as of 01 Oct 2022 04:56  Patient On (Oxygen Delivery Method): room air        GENERAL: NAD, lying comfortably in bed  HEAD: Atraumatic, normocephalic  EYES: EOMI b/l, conjunctiva and sclera clear  NECK: Supple, No JVD, No LAD  RESPIRATORY: Normal respiratory effort; lungs are clear to auscultation bilaterally  CARDIOVASCULAR: Regular rate and rhythm, normal S1 and S2, no murmur/rub/gallop; No lower extremity edema  ABDOMEN: Nontender, normoactive bowel sounds, no rebound/guarding; No hepatosplenomegaly  MUSCULOSKELETAL: no clubbing or cyanosis of digits; no joint swelling or tenderness to palpation  NEURO: Non focal   SKIN:   PSYCH: A+O to person, place, and time; affect appropriate    LABS:                          9.9    21.47 )-----------( 218      ( 01 Oct 2022 05:42 )             30.0     10-01    136  |  98  |  10  ----------------------------<  85  4.2   |  24  |  0.35<L>    Ca    9.6      01 Oct 2022 05:39  Phos  4.2     10-01  Mg     1.8     10-01                    RADIOLOGY:    Consulted note reviewed  Reviewed Imaging personally   PROGRESS NOTE:   Authored by Dr. Karlene Vargas    Patient is a 67y old  Female who presents with a chief complaint of cellulitis (30 Sep 2022 14:26)      SUBJECTIVE / OVERNIGHT EVENTS: No events overnight. Patient's vanc dose was adjusted to 1500 mg BID from 1250mg BID based on trough level. Patient says she feels the leg inflammation and redness are improving. No complaints of pain, denies fever/chills.     ADDITIONAL REVIEW OF SYSTEMS:  No fever/chills  + chronic abdominal pain, unchanged  No N/V/D  + leg edema, improved       MEDICATIONS  (STANDING):  apremilast 30 milliGRAM(s) Oral two times a day  calcium carbonate 1250 mG  + Vitamin D (OsCal 500 + D) 1 Tablet(s) Oral daily  chlorhexidine 2% Cloths 1 Application(s) Topical daily  clonazePAM  Tablet 1.5 milliGRAM(s) Oral <User Schedule>  clonazePAM  Tablet 1 milliGRAM(s) Oral daily  clonazePAM  Tablet 2 milliGRAM(s) Oral at bedtime  enalapril 10 milliGRAM(s) Oral daily  enoxaparin Injectable 40 milliGRAM(s) SubCutaneous every 12 hours  esOMEPRAZOLE 40 milliGRAM(s) Oral two times a day  fluconAZOLE   Tablet 200 milliGRAM(s) Oral <User Schedule>  gabapentin 600 milliGRAM(s) Oral at bedtime  gabapentin 400 milliGRAM(s) Oral daily  influenza  Vaccine (HIGH DOSE) 0.7 milliLiter(s) IntraMuscular once  lactobacillus acidophilus 1 Tablet(s) Oral three times a day  lamoTRIgine 200 milliGRAM(s) Oral daily  lisdexamfetamine 50 milliGRAM(s) Oral with breakfast  mirabegron ER 25 milliGRAM(s) Oral daily  multivitamin/minerals 1 Tablet(s) Oral daily  oxybutynin 10 milliGRAM(s) Oral two times a day  QUEtiapine 200 milliGRAM(s) Oral <User Schedule>  sertraline 150 milliGRAM(s) Oral daily  vancomycin  IVPB 1500 milliGRAM(s) IV Intermittent every 12 hours  vortioxetine 10 milliGRAM(s) Oral daily    MEDICATIONS  (PRN):  acetaminophen     Tablet .. 650 milliGRAM(s) Oral every 6 hours PRN Temp greater or equal to 38C (100.4F), Mild Pain (1 - 3)  cyclobenzaprine 10 milliGRAM(s) Oral three times a day PRN Muscle Spasm  oxyCODONE    IR 5 milliGRAM(s) Oral every 4 hours PRN Severe Pain (7 - 10)  propranolol 20 milliGRAM(s) Oral every 8 hours PRN hand tremors      CAPILLARY BLOOD GLUCOSE        I&O's Summary    30 Sep 2022 07:01  -  01 Oct 2022 07:00  --------------------------------------------------------  IN: 1330 mL / OUT: 0 mL / NET: 1330 mL        PHYSICAL EXAM:  Vital Signs Last 24 Hrs  T(C): 37 (01 Oct 2022 04:56), Max: 37 (01 Oct 2022 04:56)  T(F): 98.6 (01 Oct 2022 04:56), Max: 98.6 (01 Oct 2022 04:56)  HR: 71 (01 Oct 2022 04:56) (67 - 80)  BP: 136/68 (01 Oct 2022 04:56) (123/77 - 136/68)  BP(mean): --  RR: 18 (01 Oct 2022 04:56) (18 - 18)  SpO2: 96% (01 Oct 2022 04:56) (95% - 96%)    Parameters below as of 01 Oct 2022 04:56  Patient On (Oxygen Delivery Method): room air        GENERAL: NAD, lying comfortably in bed  HEAD: Atraumatic, normocephalic  EYES: Conjunctiva and sclera clear  NECK: Supple   RESPIRATORY: Normal respiratory effort; lungs are clear to auscultation bilaterally  CARDIOVASCULAR: Regular rate and rhythm, normal S1 and S2, no murmur/rub/gallop; No lower extremity pitting edema  ABDOMEN: mild tenderness epigastrium otherwise nontender, normoactive bowel sounds, no rebound/guarding   MUSCULOSKELETAL: No gross deformity, able to ambulate.   NEURO: Non focal   SKIN: RLE localized indurated area measuring 2-3inches in diameter, nontender, no erythema.   PSYCH: A+O to person, place, and time; affect appropriate    LABS:                          9.9    21.47 )-----------( 218      ( 01 Oct 2022 05:42 )             30.0     10-01    136  |  98  |  10  ----------------------------<  85  4.2   |  24  |  0.35<L>    Ca    9.6      01 Oct 2022 05:39  Phos  4.2     10-01  Mg     1.8     10-01                    RADIOLOGY:    Consulted note reviewed  Reviewed Imaging personally

## 2022-10-01 NOTE — PROGRESS NOTE ADULT - PROBLEM SELECTOR PLAN 2
h/o CLL diagnosed around 1/2022 with recent progression of disease on CT neck in July, under surveillance, no active treatment. Pt says they were waiting to initiate chemotherapy after her L knee surgery scheduled for next week given concerns for delayed healing.     - follows Dr. Tereso Nguyen outpt h/o CLL diagnosed around 1/2022 with recent progression of disease on CT neck in July, under surveillance, no active treatment. Pt says they were waiting to initiate chemotherapy after her L knee surgery scheduled for next week given concerns for delayed healing.   Follows Dr. Tereso Nguyen outpt  - f/u immunoglobulins, procalcitonin per ID

## 2022-10-02 DIAGNOSIS — R05.8 OTHER SPECIFIED COUGH: ICD-10-CM

## 2022-10-02 LAB
ANION GAP SERPL CALC-SCNC: 11 MMOL/L — SIGNIFICANT CHANGE UP (ref 5–17)
BUN SERPL-MCNC: 8 MG/DL — SIGNIFICANT CHANGE UP (ref 7–23)
CALCIUM SERPL-MCNC: 9.1 MG/DL — SIGNIFICANT CHANGE UP (ref 8.4–10.5)
CHLORIDE SERPL-SCNC: 95 MMOL/L — LOW (ref 96–108)
CO2 SERPL-SCNC: 26 MMOL/L — SIGNIFICANT CHANGE UP (ref 22–31)
CREAT SERPL-MCNC: 0.38 MG/DL — LOW (ref 0.5–1.3)
CULTURE RESULTS: SIGNIFICANT CHANGE UP
CULTURE RESULTS: SIGNIFICANT CHANGE UP
EGFR: 111 ML/MIN/1.73M2 — SIGNIFICANT CHANGE UP
GLUCOSE SERPL-MCNC: 83 MG/DL — SIGNIFICANT CHANGE UP (ref 70–99)
HCT VFR BLD CALC: 30 % — LOW (ref 34.5–45)
HGB BLD-MCNC: 9.6 G/DL — LOW (ref 11.5–15.5)
MCHC RBC-ENTMCNC: 29.2 PG — SIGNIFICANT CHANGE UP (ref 27–34)
MCHC RBC-ENTMCNC: 32 GM/DL — SIGNIFICANT CHANGE UP (ref 32–36)
MCV RBC AUTO: 91.2 FL — SIGNIFICANT CHANGE UP (ref 80–100)
NRBC # BLD: 0 /100 WBCS — SIGNIFICANT CHANGE UP (ref 0–0)
PLATELET # BLD AUTO: 210 K/UL — SIGNIFICANT CHANGE UP (ref 150–400)
POTASSIUM SERPL-MCNC: 3.8 MMOL/L — SIGNIFICANT CHANGE UP (ref 3.5–5.3)
POTASSIUM SERPL-SCNC: 3.8 MMOL/L — SIGNIFICANT CHANGE UP (ref 3.5–5.3)
RBC # BLD: 3.29 M/UL — LOW (ref 3.8–5.2)
RBC # FLD: 11.9 % — SIGNIFICANT CHANGE UP (ref 10.3–14.5)
SODIUM SERPL-SCNC: 132 MMOL/L — LOW (ref 135–145)
SPECIMEN SOURCE: SIGNIFICANT CHANGE UP
SPECIMEN SOURCE: SIGNIFICANT CHANGE UP
VANCOMYCIN TROUGH SERPL-MCNC: 14.3 UG/ML — SIGNIFICANT CHANGE UP (ref 10–20)
WBC # BLD: 24.97 K/UL — HIGH (ref 3.8–10.5)
WBC # FLD AUTO: 24.97 K/UL — HIGH (ref 3.8–10.5)

## 2022-10-02 PROCEDURE — 99232 SBSQ HOSP IP/OBS MODERATE 35: CPT | Mod: GC

## 2022-10-02 RX ORDER — BACITRACIN ZINC NEOMYCIN SULFATE POLYMYXIN B SULFATE 400; 3.5; 5 [IU]/G; MG/G; [IU]/G
1 OINTMENT TOPICAL
Refills: 0 | Status: DISCONTINUED | OUTPATIENT
Start: 2022-10-02 | End: 2022-10-03

## 2022-10-02 RX ADMIN — Medication 300 MILLIGRAM(S): at 19:13

## 2022-10-02 RX ADMIN — Medication 1 TABLET(S): at 13:42

## 2022-10-02 RX ADMIN — CHLORHEXIDINE GLUCONATE 1 APPLICATION(S): 213 SOLUTION TOPICAL at 14:46

## 2022-10-02 RX ADMIN — Medication 2 MILLIGRAM(S): at 21:27

## 2022-10-02 RX ADMIN — ENOXAPARIN SODIUM 40 MILLIGRAM(S): 100 INJECTION SUBCUTANEOUS at 05:26

## 2022-10-02 RX ADMIN — MIRABEGRON 25 MILLIGRAM(S): 50 TABLET, EXTENDED RELEASE ORAL at 09:09

## 2022-10-02 RX ADMIN — FLUCONAZOLE 200 MILLIGRAM(S): 150 TABLET ORAL at 09:11

## 2022-10-02 RX ADMIN — Medication 10 MILLIGRAM(S): at 05:26

## 2022-10-02 RX ADMIN — Medication 1 TABLET(S): at 13:43

## 2022-10-02 RX ADMIN — Medication 1.5 MILLIGRAM(S): at 13:43

## 2022-10-02 RX ADMIN — Medication 1 TABLET(S): at 21:27

## 2022-10-02 RX ADMIN — LISDEXAMFETAMINE DIMESYLATE 50 MILLIGRAM(S): 70 CAPSULE ORAL at 09:10

## 2022-10-02 RX ADMIN — Medication 1 TABLET(S): at 05:28

## 2022-10-02 RX ADMIN — APREMILAST 30 MILLIGRAM(S): KIT ORAL at 05:56

## 2022-10-02 RX ADMIN — Medication 1 MILLIGRAM(S): at 09:15

## 2022-10-02 RX ADMIN — APREMILAST 30 MILLIGRAM(S): KIT ORAL at 22:19

## 2022-10-02 RX ADMIN — FLUCONAZOLE 200 MILLIGRAM(S): 150 TABLET ORAL at 21:27

## 2022-10-02 RX ADMIN — Medication 300 MILLIGRAM(S): at 05:28

## 2022-10-02 RX ADMIN — Medication 10 MILLIGRAM(S): at 21:28

## 2022-10-02 RX ADMIN — ESOMEPRAZOLE MAGNESIUM 40 MILLIGRAM(S): 40 CAPSULE, DELAYED RELEASE ORAL at 22:19

## 2022-10-02 RX ADMIN — ENOXAPARIN SODIUM 40 MILLIGRAM(S): 100 INJECTION SUBCUTANEOUS at 18:36

## 2022-10-02 RX ADMIN — VORTIOXETINE 10 MILLIGRAM(S): 5 TABLET, FILM COATED ORAL at 09:13

## 2022-10-02 RX ADMIN — GABAPENTIN 400 MILLIGRAM(S): 400 CAPSULE ORAL at 09:09

## 2022-10-02 RX ADMIN — SERTRALINE 150 MILLIGRAM(S): 25 TABLET, FILM COATED ORAL at 09:11

## 2022-10-02 RX ADMIN — GABAPENTIN 600 MILLIGRAM(S): 400 CAPSULE ORAL at 21:27

## 2022-10-02 RX ADMIN — ESOMEPRAZOLE MAGNESIUM 40 MILLIGRAM(S): 40 CAPSULE, DELAYED RELEASE ORAL at 05:56

## 2022-10-02 RX ADMIN — LAMOTRIGINE 200 MILLIGRAM(S): 25 TABLET, ORALLY DISINTEGRATING ORAL at 09:09

## 2022-10-02 RX ADMIN — QUETIAPINE FUMARATE 200 MILLIGRAM(S): 200 TABLET, FILM COATED ORAL at 18:36

## 2022-10-02 NOTE — PROGRESS NOTE ADULT - ASSESSMENT
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

## 2022-10-02 NOTE — PROGRESS NOTE ADULT - PROBLEM SELECTOR PLAN 2
h/o CLL diagnosed around 1/2022 with recent progression of disease on CT neck in July, under surveillance, no active treatment. Pt says they were waiting to initiate chemotherapy after her L knee surgery scheduled for next week given concerns for delayed healing.   Follows Dr. Tereso Nguyen outpt  - f/u immunoglobulins, procalcitonin per ID h/o CLL diagnosed around 1/2022 with recent progression of disease on CT neck in July, under surveillance, no active treatment. Pt says they were waiting to initiate chemotherapy after her L knee surgery scheduled for next week given concerns for delayed healing.   Follows Dr. Tereso Nguyen outpt  Normal procalcitonin  normal immunoglobulin history of bronchietasis. c/o productive cough with green sputum. afebrile. intermittent cough  - f/u RVP  - Robitussin for cough

## 2022-10-02 NOTE — PROGRESS NOTE ADULT - PROBLEM SELECTOR PLAN 8
- c/w home enalapril w/ hold parameters severe OA of L knee resulting in frequent mechanical falls with multiple rib fractures (last was earlier this month per pt). currently planned for L knee replacement  - ambulates w/ cane/walker at baseline   - PT here  - fall risk

## 2022-10-02 NOTE — PROGRESS NOTE ADULT - PROBLEM SELECTOR PLAN 4
with h/o GERD, oroesophageal candidiasis (was following ID outpt) and currently undergoing outpt w/u with GI at [facility] for esphageal motility disorders. Possible [place] syndrome   Pt says she can tolerate diet if she eats slowly; no S&S eval indicated at this time.     - at home, on nitroglycerin 0.3mg tab PRN swallowing aid, 2x in 5min  - supposedly eats a regular diet at home, will do a soft dysphagia diet here   - on nexium 40mg at home, pantoprazole 40 while inpatient  - c/w fluconazole 200mg BID  - aspiration precautions, hob elevation - no active issues. continue w/ home meds  - Patient taking some of her home meds. Verified with pharmacy

## 2022-10-02 NOTE — PROVIDER CONTACT NOTE (OTHER) - SITUATION
Pt refused protonix, pt takes Nexium
pt complaining of chest discomfort since the day time after she had coughing fit
see above

## 2022-10-02 NOTE — PROGRESS NOTE ADULT - PROBLEM SELECTOR PLAN 7
severe OA of L knee resulting in frequent mechanical falls with multiple rib fractures (last was earlier this month per pt). currently planned for L knee replacement  - ambulates w/ cane/walker at baseline   - PT here  - fall risk s/p alexandria-en-y gastric bypass 6/2001 w/ revision, s/p transoral gastric plication   - on vyvanse 50mg in AM at home, pharmacy does not have here  - c/w home vitamin supplementation

## 2022-10-02 NOTE — PROGRESS NOTE ADULT - PROBLEM SELECTOR PLAN 5
on CPAP (8) at home occasionally, but does not want to use it while inpatient. with h/o GERD, oroesophageal candidiasis (was following ID outpt) and currently undergoing outpt w/u with GI at [facility] for esphageal motility disorders. Possible [place] syndrome   Pt says she can tolerate diet if she eats slowly; no S&S eval indicated at this time.     - at home, on nitroglycerin 0.3mg tab PRN swallowing aid, 2x in 5min  - supposedly eats a regular diet at home, will do a soft dysphagia diet here   - on nexium 40mg at home, pantoprazole 40 while inpatient  - c/w fluconazole 200mg BID  - aspiration precautions, hob elevation

## 2022-10-02 NOTE — PROVIDER CONTACT NOTE (OTHER) - ACTION/TREATMENT ORDERED:
Pt can take her own med, not protonix
will come assess pt at bedside
MD will come to speak to patient.

## 2022-10-02 NOTE — PROGRESS NOTE ADULT - PROBLEM SELECTOR PLAN 6
s/p alexandria-en-y gastric bypass 6/2001 w/ revision, s/p transoral gastric plication   - on vyvanse 50mg in AM at home, pharmacy does not have here  - c/w home vitamin supplementation on CPAP (8) at home occasionally, but does not want to use it while inpatient.

## 2022-10-02 NOTE — PROGRESS NOTE ADULT - SUBJECTIVE AND OBJECTIVE BOX
PROGRESS NOTE:   Authored by Dr. Karlene Vargas    Patient is a 67y old  Female who presents with a chief complaint of cellulitis (01 Oct 2022 07:39)      SUBJECTIVE / OVERNIGHT EVENTS:    ADDITIONAL REVIEW OF SYSTEMS:    MEDICATIONS  (STANDING):  apremilast 30 milliGRAM(s) Oral two times a day  calcium carbonate 1250 mG  + Vitamin D (OsCal 500 + D) 1 Tablet(s) Oral daily  chlorhexidine 2% Cloths 1 Application(s) Topical daily  clonazePAM  Tablet 1.5 milliGRAM(s) Oral <User Schedule>  clonazePAM  Tablet 1 milliGRAM(s) Oral daily  clonazePAM  Tablet 2 milliGRAM(s) Oral at bedtime  enalapril 10 milliGRAM(s) Oral daily  enoxaparin Injectable 40 milliGRAM(s) SubCutaneous every 12 hours  esOMEPRAZOLE 40 milliGRAM(s) Oral two times a day  fluconAZOLE   Tablet 200 milliGRAM(s) Oral <User Schedule>  gabapentin 600 milliGRAM(s) Oral at bedtime  gabapentin 400 milliGRAM(s) Oral daily  influenza  Vaccine (HIGH DOSE) 0.7 milliLiter(s) IntraMuscular once  lactobacillus acidophilus 1 Tablet(s) Oral three times a day  lamoTRIgine 200 milliGRAM(s) Oral daily  lisdexamfetamine 50 milliGRAM(s) Oral with breakfast  mirabegron ER 25 milliGRAM(s) Oral daily  multivitamin/minerals 1 Tablet(s) Oral daily  oxybutynin 10 milliGRAM(s) Oral two times a day  QUEtiapine 200 milliGRAM(s) Oral <User Schedule>  sertraline 150 milliGRAM(s) Oral daily  vancomycin  IVPB 1500 milliGRAM(s) IV Intermittent every 12 hours  vortioxetine 10 milliGRAM(s) Oral daily    MEDICATIONS  (PRN):  acetaminophen     Tablet .. 650 milliGRAM(s) Oral every 6 hours PRN Temp greater or equal to 38C (100.4F), Mild Pain (1 - 3)  cyclobenzaprine 10 milliGRAM(s) Oral three times a day PRN Muscle Spasm  oxyCODONE    IR 5 milliGRAM(s) Oral every 4 hours PRN Severe Pain (7 - 10)  propranolol 20 milliGRAM(s) Oral every 8 hours PRN hand tremors      CAPILLARY BLOOD GLUCOSE        I&O's Summary    01 Oct 2022 07:01  -  02 Oct 2022 07:00  --------------------------------------------------------  IN: 1280 mL / OUT: 0 mL / NET: 1280 mL        PHYSICAL EXAM:  Vital Signs Last 24 Hrs  T(C): 37.2 (02 Oct 2022 05:20), Max: 37.2 (02 Oct 2022 05:20)  T(F): 98.9 (02 Oct 2022 05:20), Max: 98.9 (02 Oct 2022 05:20)  HR: 84 (02 Oct 2022 05:20) (84 - 88)  BP: 123/80 (02 Oct 2022 05:20) (117/70 - 141/82)  BP(mean): --  RR: 17 (02 Oct 2022 05:20) (17 - 18)  SpO2: 96% (02 Oct 2022 05:20) (96% - 98%)    Parameters below as of 02 Oct 2022 05:20  Patient On (Oxygen Delivery Method): room air        GENERAL: NAD, lying comfortably in bed  HEAD: Atraumatic, normocephalic  EYES: EOMI b/l, conjunctiva and sclera clear  NECK: Supple, No JVD, No LAD  RESPIRATORY: Normal respiratory effort; lungs are clear to auscultation bilaterally  CARDIOVASCULAR: Regular rate and rhythm, normal S1 and S2, no murmur/rub/gallop; No lower extremity edema  ABDOMEN: Nontender, normoactive bowel sounds, no rebound/guarding; No hepatosplenomegaly  MUSCULOSKELETAL: no clubbing or cyanosis of digits; no joint swelling or tenderness to palpation  NEURO: Non focal   SKIN:   PSYCH: A+O to person, place, and time; affect appropriate    LABS:                          9.6    24.97 )-----------( 210      ( 02 Oct 2022 06:54 )             30.0     10-02    132<L>  |  95<L>  |  8   ----------------------------<  83  3.8   |  26  |  0.38<L>    Ca    9.1      02 Oct 2022 06:51  Phos  4.2     10-01  Mg     1.8     10-01                    RADIOLOGY:    Consulted note reviewed  Reviewed Imaging personally   PROGRESS NOTE:   Authored by Dr. Karlene Vargas    Patient is a 67y old  Female who presents with a chief complaint of cellulitis (01 Oct 2022 07:39)    SUBJECTIVE / OVERNIGHT EVENTS: Pt with new productive cough overnight, green sputum. afebrile, no sob. Says she thinks her leg is about the same as yesterday. no pain. No other active complaints.    ADDITIONAL REVIEW OF SYSTEMS:  + pleuritic cp, no sob  + productive cough, green sputum  + chronic abdominal pain but tolerating diet well, no N/v  No leg pain    MEDICATIONS  (STANDING):  apremilast 30 milliGRAM(s) Oral two times a day  calcium carbonate 1250 mG  + Vitamin D (OsCal 500 + D) 1 Tablet(s) Oral daily  chlorhexidine 2% Cloths 1 Application(s) Topical daily  clonazePAM  Tablet 1.5 milliGRAM(s) Oral <User Schedule>  clonazePAM  Tablet 1 milliGRAM(s) Oral daily  clonazePAM  Tablet 2 milliGRAM(s) Oral at bedtime  enalapril 10 milliGRAM(s) Oral daily  enoxaparin Injectable 40 milliGRAM(s) SubCutaneous every 12 hours  esOMEPRAZOLE 40 milliGRAM(s) Oral two times a day  fluconAZOLE   Tablet 200 milliGRAM(s) Oral <User Schedule>  gabapentin 600 milliGRAM(s) Oral at bedtime  gabapentin 400 milliGRAM(s) Oral daily  influenza  Vaccine (HIGH DOSE) 0.7 milliLiter(s) IntraMuscular once  lactobacillus acidophilus 1 Tablet(s) Oral three times a day  lamoTRIgine 200 milliGRAM(s) Oral daily  lisdexamfetamine 50 milliGRAM(s) Oral with breakfast  mirabegron ER 25 milliGRAM(s) Oral daily  multivitamin/minerals 1 Tablet(s) Oral daily  oxybutynin 10 milliGRAM(s) Oral two times a day  QUEtiapine 200 milliGRAM(s) Oral <User Schedule>  sertraline 150 milliGRAM(s) Oral daily  vancomycin  IVPB 1500 milliGRAM(s) IV Intermittent every 12 hours  vortioxetine 10 milliGRAM(s) Oral daily    MEDICATIONS  (PRN):  acetaminophen     Tablet .. 650 milliGRAM(s) Oral every 6 hours PRN Temp greater or equal to 38C (100.4F), Mild Pain (1 - 3)  cyclobenzaprine 10 milliGRAM(s) Oral three times a day PRN Muscle Spasm  oxyCODONE    IR 5 milliGRAM(s) Oral every 4 hours PRN Severe Pain (7 - 10)  propranolol 20 milliGRAM(s) Oral every 8 hours PRN hand tremors      CAPILLARY BLOOD GLUCOSE        I&O's Summary    01 Oct 2022 07:01  -  02 Oct 2022 07:00  --------------------------------------------------------  IN: 1280 mL / OUT: 0 mL / NET: 1280 mL      PHYSICAL EXAM:  Vital Signs Last 24 Hrs  T(C): 37.2 (02 Oct 2022 05:20), Max: 37.2 (02 Oct 2022 05:20)  T(F): 98.9 (02 Oct 2022 05:20), Max: 98.9 (02 Oct 2022 05:20)  HR: 84 (02 Oct 2022 05:20) (84 - 88)  BP: 123/80 (02 Oct 2022 05:20) (117/70 - 141/82)  BP(mean): --  RR: 17 (02 Oct 2022 05:20) (17 - 18)  SpO2: 96% (02 Oct 2022 05:20) (96% - 98%)    Parameters below as of 02 Oct 2022 05:20  Patient On (Oxygen Delivery Method): room air      GENERAL: NAD, sitting comfortably in bed  HEAD: Atraumatic, normocephalic  EYES: EOMI b/l, conjunctiva and sclera clear  NECK: Supple, No JVD, No LAD  RESPIRATORY: Normal respiratory effort; lungs are clear to auscultation bilaterally  CARDIOVASCULAR: Regular rate and rhythm, normal S1 and S2, no murmur/rub/gallop; No lower extremity edema  ABDOMEN: Nontender, normoactive bowel sounds, no rebound/guarding; No hepatosplenomegaly  MUSCULOSKELETAL: no clubbing or cyanosis of digits; no joint swelling or tenderness to palpation  NEURO: Non focal   SKIN:   PSYCH: A+O to person, place, and time; affect appropriate    LABS:                          9.6    24.97 )-----------( 210      ( 02 Oct 2022 06:54 )             30.0     10-02    132<L>  |  95<L>  |  8   ----------------------------<  83  3.8   |  26  |  0.38<L>    Ca    9.1      02 Oct 2022 06:51  Phos  4.2     10-01  Mg     1.8     10-01                    RADIOLOGY:    Consulted note reviewed  Reviewed Imaging personally   PROGRESS NOTE:   Authored by Dr. Karlene Vargas    Patient is a 67y old  Female who presents with a chief complaint of cellulitis (01 Oct 2022 07:39)    SUBJECTIVE / OVERNIGHT EVENTS: Pt with new productive cough overnight, green sputum. afebrile, no sob. Says she thinks her leg is about the same as yesterday. no pain. No other active complaints.    ADDITIONAL REVIEW OF SYSTEMS:  + pleuritic cp, no sob  + productive cough, green sputum  + chronic abdominal pain but tolerating diet well, no N/v  No leg pain    MEDICATIONS  (STANDING):  apremilast 30 milliGRAM(s) Oral two times a day  calcium carbonate 1250 mG  + Vitamin D (OsCal 500 + D) 1 Tablet(s) Oral daily  chlorhexidine 2% Cloths 1 Application(s) Topical daily  clonazePAM  Tablet 1.5 milliGRAM(s) Oral <User Schedule>  clonazePAM  Tablet 1 milliGRAM(s) Oral daily  clonazePAM  Tablet 2 milliGRAM(s) Oral at bedtime  enalapril 10 milliGRAM(s) Oral daily  enoxaparin Injectable 40 milliGRAM(s) SubCutaneous every 12 hours  esOMEPRAZOLE 40 milliGRAM(s) Oral two times a day  fluconAZOLE   Tablet 200 milliGRAM(s) Oral <User Schedule>  gabapentin 600 milliGRAM(s) Oral at bedtime  gabapentin 400 milliGRAM(s) Oral daily  influenza  Vaccine (HIGH DOSE) 0.7 milliLiter(s) IntraMuscular once  lactobacillus acidophilus 1 Tablet(s) Oral three times a day  lamoTRIgine 200 milliGRAM(s) Oral daily  lisdexamfetamine 50 milliGRAM(s) Oral with breakfast  mirabegron ER 25 milliGRAM(s) Oral daily  multivitamin/minerals 1 Tablet(s) Oral daily  oxybutynin 10 milliGRAM(s) Oral two times a day  QUEtiapine 200 milliGRAM(s) Oral <User Schedule>  sertraline 150 milliGRAM(s) Oral daily  vancomycin  IVPB 1500 milliGRAM(s) IV Intermittent every 12 hours  vortioxetine 10 milliGRAM(s) Oral daily    MEDICATIONS  (PRN):  acetaminophen     Tablet .. 650 milliGRAM(s) Oral every 6 hours PRN Temp greater or equal to 38C (100.4F), Mild Pain (1 - 3)  cyclobenzaprine 10 milliGRAM(s) Oral three times a day PRN Muscle Spasm  oxyCODONE    IR 5 milliGRAM(s) Oral every 4 hours PRN Severe Pain (7 - 10)  propranolol 20 milliGRAM(s) Oral every 8 hours PRN hand tremors      CAPILLARY BLOOD GLUCOSE        I&O's Summary    01 Oct 2022 07:01  -  02 Oct 2022 07:00  --------------------------------------------------------  IN: 1280 mL / OUT: 0 mL / NET: 1280 mL      PHYSICAL EXAM:  Vital Signs Last 24 Hrs  T(C): 37.2 (02 Oct 2022 05:20), Max: 37.2 (02 Oct 2022 05:20)  T(F): 98.9 (02 Oct 2022 05:20), Max: 98.9 (02 Oct 2022 05:20)  HR: 84 (02 Oct 2022 05:20) (84 - 88)  BP: 123/80 (02 Oct 2022 05:20) (117/70 - 141/82)  BP(mean): --  RR: 17 (02 Oct 2022 05:20) (17 - 18)  SpO2: 96% (02 Oct 2022 05:20) (96% - 98%)    Parameters below as of 02 Oct 2022 05:20  Patient On (Oxygen Delivery Method): room air      GENERAL: NAD, sitting comfortably in bed eating breakfast  HEAD: Atraumatic, normocephalic  EYES: Conjunctiva and sclera clear  NECK: Supple   RESPIRATORY: Normal respiratory effort; lungs are clear to auscultation bilaterally  CARDIOVASCULAR: Regular rate and rhythm, normal S1 and S2, no murmur/rub/gallop; No lower extremity edema  ABDOMEN: mild tenderness in epigastrium unchanged from yesterda, normoactive bowel sounds, no rebound/guarding   MUSCULOSKELETAL: no gross deformity, ambulating  NEURO: Non focal   SKIN: trace erythema and unchanged size of induration from yesterday. non tender  PSYCH: A+O to person, place, and time; anxious affect    LABS:                          9.6    24.97 )-----------( 210      ( 02 Oct 2022 06:54 )             30.0     10-02    132<L>  |  95<L>  |  8   ----------------------------<  83  3.8   |  26  |  0.38<L>    Ca    9.1      02 Oct 2022 06:51  Phos  4.2     10-01  Mg     1.8     10-01                    RADIOLOGY:    Consulted note reviewed  Reviewed Imaging personally

## 2022-10-02 NOTE — PROGRESS NOTE ADULT - PROBLEM SELECTOR PLAN 9
pharmacy does not have home otezla 30mg BID, pt may have to bring her own supply. No active issues at this time - c/w home enalapril w/ hold parameters

## 2022-10-02 NOTE — PROVIDER CONTACT NOTE (OTHER) - BACKGROUND
disorders of soft tissue
Admit: Other disorders of soft tissue
Pt admitted with right knee swelling and abscess, pt has a history of MRSA, Eagles disease that affects swallowing, pt was placed on Nexium recently instead of protonix

## 2022-10-02 NOTE — PROGRESS NOTE ADULT - PROBLEM SELECTOR PLAN 10
Diet: regular diet per patient's request; says she can tolerate if she eats slowly  DVT ppx: lovenox SQ  Dispo: Home with Home PT pharmacy does not have home otezla 30mg BID, pt may have to bring her own supply. No active issues at this time

## 2022-10-02 NOTE — PROVIDER CONTACT NOTE (OTHER) - ASSESSMENT
Pt is AxOx4, all VSS, no complaints of pain or SOB. Pt has Nexium with her.
Pt a&ox4
pt complaining of chest discomfort since the day time after she had coughing fit; would like provider to come evaluate had wheeze earlier in day currently diminished breath sounds on LLL no wheeze covid swabbed 9/30 negative vitals stable

## 2022-10-02 NOTE — PROGRESS NOTE ADULT - PROBLEM SELECTOR PLAN 1
h/o remote severe MRSA infection ~13 yrs ago requiring ICU stay p/w cellulitis of R anterolateral shin w/ swelling, erythema, and warmth w/ associated small abscess s/p failed oral abx (doxy x 2 wks, clinda x days) and I&D outpatient w/ minimal to no drainage. Now admitted for IV abx   Much improved from yesterday, no erythema   - subjective fevers at home, afebrile here, with elevated WBC to 27 with lymphocytic predominance i/s/o CLL, so more likely 2/2 CLL. does not meet SIRS criteria at this time. lactate wnl   - LLE US: w/ ill-defined hypoechoic foci up to 0.6 x 0.8 x0.4 w/ possible small fluid collection/abscess  -- fluid collection likely too small to drain  - LLE duplex: neg for DVT  - XR tib-fib: showing swelling w/o lytic or blastic lesions, no periosteal reactions, no fractures/dislocations  - CT: subcutaneous edema consistent with cellulitis but without localized abscess, very small collection.  - s/p vanc 1g x 1 in ED  - given h/o severe MRSA infection, can c/w vanc 1.5g q12h  - check pre-4th trough   - f/u blood cxs NGTD and MRSA negative.   - Can discharge on Augmnentin and Doxycycline PO.   - elevate RLE extremity, tylenol for mild-moderate home, home oxy 5mg q4h PRN severe pain, ice compresses PRN h/o remote severe MRSA infection ~13 yrs ago requiring ICU stay p/w cellulitis of R anterolateral shin w/ swelling, erythema, and warmth w/ associated small abscess s/p failed oral abx (doxy x 2 wks, clinda x days) and I&D outpatient w/ minimal to no drainage. Now admitted for IV abx   Much improved from yesterday, no erythema   - subjective fevers at home, afebrile here, with elevated WBC to 27 with lymphocytic predominance i/s/o CLL, so more likely 2/2 CLL. does not meet SIRS criteria at this time. lactate wnl   - LLE US: w/ ill-defined hypoechoic foci up to 0.6 x 0.8 x0.4 w/ possible small fluid collection/abscess  -- fluid collection likely too small to drain  - LLE duplex: neg for DVT  - XR tib-fib: showing swelling w/o lytic or blastic lesions, no periosteal reactions, no fractures/dislocations  - CT: subcutaneous edema consistent with cellulitis but without localized abscess, very small collection.  - s/p vanc 1g x 1 in ED  - given h/o severe MRSA infection, can c/w vanc 1.5g q12h  - check pre-4th trough   - f/u blood cxs NGTD and MRSA negative.   - Can discharge on Augmnentin and Doxycycline PO.   - f/u ID recommendations tomorrow regarding disposition  - elevate RLE extremity, tylenol for mild-moderate home, home oxy 5mg q4h PRN severe pain, ice compresses PRN

## 2022-10-02 NOTE — PROGRESS NOTE ADULT - PROBLEM SELECTOR PLAN 3
- no active issues. continue w/ home meds  - Patient taking some of her home meds. Verified with pharmacy h/o CLL diagnosed around 1/2022 with recent progression of disease on CT neck in July, under surveillance, no active treatment. Pt says they were waiting to initiate chemotherapy after her L knee surgery scheduled for next week given concerns for delayed healing.   Follows Dr. Tereso Nguyen outpt  Normal procalcitonin  normal immunoglobulin

## 2022-10-02 NOTE — PROVIDER CONTACT NOTE (OTHER) - REASON
Pt on Cpap need continuous pulse ox, pt to be transferred to  JOYCE hernandez spoke to patient and states "I don't want to move to a different floor because I use the Cpap sometimes at night"
pt complaining of chest discomfort
Pt refused protonix, pt takes Nexium

## 2022-10-03 ENCOUNTER — TRANSCRIPTION ENCOUNTER (OUTPATIENT)
Age: 67
End: 2022-10-03

## 2022-10-03 ENCOUNTER — NON-APPOINTMENT (OUTPATIENT)
Age: 67
End: 2022-10-03

## 2022-10-03 VITALS
OXYGEN SATURATION: 98 % | SYSTOLIC BLOOD PRESSURE: 128 MMHG | RESPIRATION RATE: 18 BRPM | DIASTOLIC BLOOD PRESSURE: 77 MMHG | HEART RATE: 66 BPM | TEMPERATURE: 98 F

## 2022-10-03 LAB
ANION GAP SERPL CALC-SCNC: 11 MMOL/L — SIGNIFICANT CHANGE UP (ref 5–17)
BUN SERPL-MCNC: 10 MG/DL — SIGNIFICANT CHANGE UP (ref 7–23)
CALCIUM SERPL-MCNC: 9 MG/DL — SIGNIFICANT CHANGE UP (ref 8.4–10.5)
CHLORIDE SERPL-SCNC: 99 MMOL/L — SIGNIFICANT CHANGE UP (ref 96–108)
CO2 SERPL-SCNC: 26 MMOL/L — SIGNIFICANT CHANGE UP (ref 22–31)
CREAT SERPL-MCNC: 0.37 MG/DL — LOW (ref 0.5–1.3)
EGFR: 112 ML/MIN/1.73M2 — SIGNIFICANT CHANGE UP
GLUCOSE SERPL-MCNC: 79 MG/DL — SIGNIFICANT CHANGE UP (ref 70–99)
HCT VFR BLD CALC: 30.1 % — LOW (ref 34.5–45)
HGB BLD-MCNC: 9.4 G/DL — LOW (ref 11.5–15.5)
MCHC RBC-ENTMCNC: 28.6 PG — SIGNIFICANT CHANGE UP (ref 27–34)
MCHC RBC-ENTMCNC: 31.2 GM/DL — LOW (ref 32–36)
MCV RBC AUTO: 91.5 FL — SIGNIFICANT CHANGE UP (ref 80–100)
NRBC # BLD: 0 /100 WBCS — SIGNIFICANT CHANGE UP (ref 0–0)
PLATELET # BLD AUTO: 204 K/UL — SIGNIFICANT CHANGE UP (ref 150–400)
POTASSIUM SERPL-MCNC: 3.8 MMOL/L — SIGNIFICANT CHANGE UP (ref 3.5–5.3)
POTASSIUM SERPL-SCNC: 3.8 MMOL/L — SIGNIFICANT CHANGE UP (ref 3.5–5.3)
RAPID RVP RESULT: SIGNIFICANT CHANGE UP
RBC # BLD: 3.29 M/UL — LOW (ref 3.8–5.2)
RBC # FLD: 12.1 % — SIGNIFICANT CHANGE UP (ref 10.3–14.5)
SARS-COV-2 RNA SPEC QL NAA+PROBE: SIGNIFICANT CHANGE UP
SODIUM SERPL-SCNC: 136 MMOL/L — SIGNIFICANT CHANGE UP (ref 135–145)
WBC # BLD: 19.39 K/UL — HIGH (ref 3.8–10.5)
WBC # FLD AUTO: 19.39 K/UL — HIGH (ref 3.8–10.5)

## 2022-10-03 PROCEDURE — 99239 HOSP IP/OBS DSCHRG MGMT >30: CPT

## 2022-10-03 PROCEDURE — 99232 SBSQ HOSP IP/OBS MODERATE 35: CPT

## 2022-10-03 RX ORDER — ACETAMINOPHEN 500 MG
2 TABLET ORAL
Qty: 0 | Refills: 0 | DISCHARGE
Start: 2022-10-03

## 2022-10-03 RX ADMIN — QUETIAPINE FUMARATE 200 MILLIGRAM(S): 200 TABLET, FILM COATED ORAL at 18:08

## 2022-10-03 RX ADMIN — APREMILAST 30 MILLIGRAM(S): KIT ORAL at 08:07

## 2022-10-03 RX ADMIN — Medication 650 MILLIGRAM(S): at 12:31

## 2022-10-03 RX ADMIN — SERTRALINE 150 MILLIGRAM(S): 25 TABLET, FILM COATED ORAL at 07:51

## 2022-10-03 RX ADMIN — MIRABEGRON 25 MILLIGRAM(S): 50 TABLET, EXTENDED RELEASE ORAL at 07:50

## 2022-10-03 RX ADMIN — Medication 1 TABLET(S): at 12:29

## 2022-10-03 RX ADMIN — Medication 650 MILLIGRAM(S): at 13:30

## 2022-10-03 RX ADMIN — Medication 1 TABLET(S): at 05:03

## 2022-10-03 RX ADMIN — OXYCODONE HYDROCHLORIDE 5 MILLIGRAM(S): 5 TABLET ORAL at 03:33

## 2022-10-03 RX ADMIN — ENOXAPARIN SODIUM 40 MILLIGRAM(S): 100 INJECTION SUBCUTANEOUS at 05:03

## 2022-10-03 RX ADMIN — FLUCONAZOLE 200 MILLIGRAM(S): 150 TABLET ORAL at 07:49

## 2022-10-03 RX ADMIN — Medication 1 MILLIGRAM(S): at 07:51

## 2022-10-03 RX ADMIN — OXYCODONE HYDROCHLORIDE 5 MILLIGRAM(S): 5 TABLET ORAL at 16:56

## 2022-10-03 RX ADMIN — ENOXAPARIN SODIUM 40 MILLIGRAM(S): 100 INJECTION SUBCUTANEOUS at 18:08

## 2022-10-03 RX ADMIN — OXYCODONE HYDROCHLORIDE 5 MILLIGRAM(S): 5 TABLET ORAL at 04:03

## 2022-10-03 RX ADMIN — BACITRACIN ZINC NEOMYCIN SULFATE POLYMYXIN B SULFATE 1 APPLICATION(S): 400; 3.5; 5 OINTMENT TOPICAL at 18:02

## 2022-10-03 RX ADMIN — Medication 1 TABLET(S): at 14:22

## 2022-10-03 RX ADMIN — Medication 1.5 MILLIGRAM(S): at 14:22

## 2022-10-03 RX ADMIN — Medication 10 MILLIGRAM(S): at 08:08

## 2022-10-03 RX ADMIN — GABAPENTIN 400 MILLIGRAM(S): 400 CAPSULE ORAL at 08:11

## 2022-10-03 RX ADMIN — ESOMEPRAZOLE MAGNESIUM 40 MILLIGRAM(S): 40 CAPSULE, DELAYED RELEASE ORAL at 08:08

## 2022-10-03 RX ADMIN — LAMOTRIGINE 200 MILLIGRAM(S): 25 TABLET, ORALLY DISINTEGRATING ORAL at 08:12

## 2022-10-03 RX ADMIN — VORTIOXETINE 10 MILLIGRAM(S): 5 TABLET, FILM COATED ORAL at 07:51

## 2022-10-03 RX ADMIN — BACITRACIN ZINC NEOMYCIN SULFATE POLYMYXIN B SULFATE 1 APPLICATION(S): 400; 3.5; 5 OINTMENT TOPICAL at 05:03

## 2022-10-03 RX ADMIN — Medication 300 MILLIGRAM(S): at 05:04

## 2022-10-03 RX ADMIN — CHLORHEXIDINE GLUCONATE 1 APPLICATION(S): 213 SOLUTION TOPICAL at 15:34

## 2022-10-03 RX ADMIN — LISDEXAMFETAMINE DIMESYLATE 50 MILLIGRAM(S): 70 CAPSULE ORAL at 08:08

## 2022-10-03 NOTE — GOALS OF CARE CONVERSATION - ADVANCED CARE PLANNING - CONVERSATION DETAILS
This alert and oriented x 4 patient was seen today for goals of care planning and conversation. Introduced self and role of PCE. Patient states understanding of health status  and prognosis. Patient states she lives with her   and has been independent with all aspects of ADLs prior to admission. Patient states she is very satisfied with the nursing staff, physician care and the overall hospital stay. Patient identified a few nurses that provided exceptional care which was communicate to unit manager and staff member present at the time of my visit.  Patient states  she has prior HCP and Living Will  and identifies Duncan Damico (spouse) [phone] or  [phone]  as the agent  and No alternate identified. Will provide copy for medical records.    CPR , intubation, artificial nutrition  procedures and possible complications explained. Patient watched informational video on Advanced Directives. Patient expressed wish to have trial period of CPR/Intubation as well as artificial nutrition. Patient states her trial period is 30 days which she has discussed with her  in the past.  Explained hospital base Full Code policy, verbalise understanding. Patient aware she will remain FULL CODE .    Pt instructed to provide a copy of HCP/ Living Will T for medical records.  Contact information for further needs provided.  No Yarsani exemptions noted.      Cornelia Edmonds  MSN -ED RN OCN     [phone]

## 2022-10-03 NOTE — PROGRESS NOTE ADULT - ASSESSMENT
67F with HTN, psoriasis, psoriatic arthritis, CLL (Stage 1 - no active chemo), JANE, bipolar, anemia, h/o PE (not on AC), obesity s/p alexandria-en-y bypass with revision MRSA infection of abd 2009 and prepatellar bursitis due to MSSA 2013.  After fall on 9/6/22 with RLE injury, she was given doxy for 2 weeks and small aspiration at [facility] (no culture done as per the patient) but still had edema and erythema, then was given clinda which she took for a few days and admitted 9/27/22 via  ER     She has severe degeneration of  left knee with gait instability, multiple falls, rib fractures  Had fall 9/6/22 and injured right ant tib region - took antibiotics  2 weeks of doxy and a few days clinda, still with area of firmness, edema, discoloration and tenderness, --no  abscess on most recent CT scan  remote h/o MRSA and MSSA in the past, but 9/27/22 MRSA/MSSA PCR  NEGATIVE     reports 30 - 40# intentional wieght loss over past year; current BMI = 39.1  swallowing disturbance - esophageal dysmotility and reflux  she states bronchiectasis was noted on previous imaging  history of thrush    10/1 ProCalcitonin <0.03,   10/1 Quantitative Immunoglobulins  wnl  IgG= 699; IgM =144   IgA = 116    Suggest  STOP IV Vanco 9/27 --10/3      NO Active infection  No additional antibiotics required    from ID perspective, there is no contraindication to performing TKR    discussed with resident

## 2022-10-03 NOTE — DISCHARGE NOTE NURSING/CASE MANAGEMENT/SOCIAL WORK - NSDCPNINST_GEN_ALL_CORE
call for  follow up appointment with  primary care md diet  meds  activity as per md   any  severe pain   increased swelling purulent drainage fevers any problems  call md   call 911 [facility] EMERGENCY ROOM

## 2022-10-03 NOTE — PROGRESS NOTE ADULT - PROVIDER SPECIALTY LIST ADULT
Internal Medicine
Infectious Disease
Internal Medicine
Infectious Disease
Internal Medicine

## 2022-10-03 NOTE — PROGRESS NOTE ADULT - PROBLEM SELECTOR PLAN 1
Pt has a h/o remote MRSA infection ~13 yrs ago requiring ICU stay p/w cellulitis of R anterolateral shin w/ swelling, erythema, and warmth w/ associated small abscess s/p failed oral abx (doxy x 2 wks, clinda x days). I&D attempted outpatient w/ minimal to no drainage. Pt admitted to medicine for IV abx   - Leukocytosis w/ lymphocytitic predominance likely d/t CLL. WBC improved today to 19.4 (27 at presentation)  - f/u blood cxs neg and MRSA negative.   - given h/o severe MRSA infection, pt was started on vanc 1.5g q12h  - Procal negative. Per ID recs, discontinue abx and ok to d/c w/o abx.   - elevate RLE extremity, tylenol for mild-moderate home, home oxy 5mg q4h PRN severe pain, ice compresses PRN

## 2022-10-03 NOTE — PROGRESS NOTE ADULT - PROBLEM SELECTOR PLAN 11
Diet: regular diet per patient's request; says she can tolerate if she eats slowly  DVT ppx: lovenox SQ  Dispo: Home with Home PT
Diet: regular diet per patient's request; says she can tolerate if she eats slowly  DVT ppx: lovenox SQ  Dispo: Home with Home PT

## 2022-10-03 NOTE — PROGRESS NOTE ADULT - PROBLEM SELECTOR PLAN 3
h/o CLL diagnosed around 1/2022 with recent progression of disease on CT neck in July, under surveillance, no active treatment. Pt says they were waiting to initiate chemotherapy after her L knee surgery scheduled for next week given concerns for delayed healing.   Follows Dr. Tereso Nguyen outpt  Normal procalcitonin  normal immunoglobulin

## 2022-10-03 NOTE — DISCHARGE NOTE NURSING/CASE MANAGEMENT/SOCIAL WORK - PATIENT PORTAL LINK FT
You can access the FollowMyHealth Patient Portal offered by [facility] by registering at the following website: [email or web address]. By joining [employer]’s FollowMyHealth portal, you will also be able to view your health information using other applications (apps) compatible with our system.

## 2022-10-03 NOTE — PROGRESS NOTE ADULT - PROBLEM SELECTOR PLAN 8
severe OA of L knee resulting in frequent mechanical falls with multiple rib fractures (last was earlier this month per pt). currently planned for L knee replacement  - ambulates w/ cane/walker at baseline   - PT here  - fall risk  - Knee replacement w/ Dr. Ibanez at [facility] in November.

## 2022-10-03 NOTE — PROGRESS NOTE ADULT - PROBLEM SELECTOR PLAN 2
history of bronchietasis. c/o productive cough with green sputum. afebrile. intermittent cough  - RVP negative  - Robitussin for cough

## 2022-10-03 NOTE — PROGRESS NOTE ADULT - PROBLEM SELECTOR PLAN 5
with h/o GERD, oroesophageal candidiasis (was following ID outpt) and currently undergoing outpt w/u with GI at [facility] for esphageal motility disorders. Possible [place] syndrome   Pt says she can tolerate diet if she eats slowly; no S&S eval indicated at this time.     - at home, on nitroglycerin 0.3mg tab PRN swallowing aid, 2x in 5min  - supposedly eats a regular diet at home, will do a soft dysphagia diet while in hospital  - on nexium 40mg at home, pantoprazole 40 while inpatient  - c/w fluconazole 200mg BID  - aspiration precautions, hob elevation

## 2022-10-03 NOTE — DISCHARGE NOTE NURSING/CASE MANAGEMENT/SOCIAL WORK - NSDCPEFALRISK_GEN_ALL_CORE
For information on Fall & Injury Prevention, visit: [email or web address] OR  [email or web address] OR  https://www.cdc.gov/steadi/patient.html

## 2022-10-03 NOTE — PROGRESS NOTE ADULT - SUBJECTIVE AND OBJECTIVE BOX
Radha Loving, PGY1    SHANTEL CRUZ  67y  Female    Complaints:  Subjective:           FAMILY HISTORY:  FH: heart disease  Mother; sudden death @age 57 from large MI vs CVA (unknown)    Family hx of colon cancer (Father)   in late 50s      67yVital Signs Last 24 Hrs  T(C): 36.6 (03 Oct 2022 05:00), Max: 36.8 (02 Oct 2022 19:10)  T(F): 97.8 (03 Oct 2022 05:00), Max: 98.3 (02 Oct 2022 19:10)  HR: 60 (03 Oct 2022 05:00) (60 - 85)  BP: 106/69 (03 Oct 2022 05:00) (103/65 - 138/82)  BP(mean): --  RR: 17 (03 Oct 2022 05:00) (17 - 18)  SpO2: 94% (03 Oct 2022 05:00) (94% - 97%)    Parameters below as of 03 Oct 2022 05:00  Patient On (Oxygen Delivery Method): room air          PHYSICAL EXAM:  GENERAL: NAD, well-groomed, well-developed  HEAD:  Atraumatic, Normocephalic  EYES: EOMI, PERRLA, conjunctiva and sclera clear  ENMT: No tonsillar erythema, exudates, or enlargement; Moist mucous membranes, Good dentition, No lesions  NECK: Supple, No JVD, Normal thyroid  NERVOUS SYSTEM:  Alert & Oriented X3, Good concentration; Motor Strength 5/5 B/L upper and lower extremities; DTRs 2+ intact and symmetric  CHEST/LUNG: Clear to percussion bilaterally; No rales, rhonchi, wheezing, or rubs  HEART: Regular rate and rhythm; No murmurs, rubs, or gallops  ABDOMEN: Soft, Nontender, Nondistended; Bowel sounds present  EXTREMITIES:  2+ Peripheral Pulses, No clubbing, cyanosis, or edema  LYMPH: No lymphadenopathy noted  SKIN: No rashes or lesions      Consultant(s) Notes Reviewed:  [x ] YES  [ ] NO  Care Discussed with Consultants/Other Providers [ x] YES  [ ] NO    LABS:                Female  RADIOLOGY & ADDITIONAL TESTS:    Imaging Personally Reviewed:  [ ] YES  [ ] NO    MedsMEDICATIONS  (STANDING):  apremilast 30 milliGRAM(s) Oral two times a day  calcium carbonate 1250 mG  + Vitamin D (OsCal 500 + D) 1 Tablet(s) Oral daily  chlorhexidine 2% Cloths 1 Application(s) Topical daily  clonazePAM  Tablet 1.5 milliGRAM(s) Oral <User Schedule>  clonazePAM  Tablet 1 milliGRAM(s) Oral daily  clonazePAM  Tablet 2 milliGRAM(s) Oral at bedtime  enalapril 10 milliGRAM(s) Oral daily  enoxaparin Injectable 40 milliGRAM(s) SubCutaneous every 12 hours  esOMEPRAZOLE 40 milliGRAM(s) Oral two times a day  fluconAZOLE   Tablet 200 milliGRAM(s) Oral <User Schedule>  gabapentin 600 milliGRAM(s) Oral at bedtime  gabapentin 400 milliGRAM(s) Oral daily  influenza  Vaccine (HIGH DOSE) 0.7 milliLiter(s) IntraMuscular once  lactobacillus acidophilus 1 Tablet(s) Oral three times a day  lamoTRIgine 200 milliGRAM(s) Oral daily  lisdexamfetamine 50 milliGRAM(s) Oral with breakfast  mirabegron ER 25 milliGRAM(s) Oral daily  multivitamin/minerals 1 Tablet(s) Oral daily  neomycin/bacitracin/polymyxin Topical Ointment 1 Application(s) Topical two times a day  oxybutynin 10 milliGRAM(s) Oral two times a day  QUEtiapine 200 milliGRAM(s) Oral <User Schedule>  sertraline 150 milliGRAM(s) Oral daily  vancomycin  IVPB 1500 milliGRAM(s) IV Intermittent every 12 hours  vortioxetine 10 milliGRAM(s) Oral daily    MEDICATIONS  (PRN):  acetaminophen     Tablet .. 650 milliGRAM(s) Oral every 6 hours PRN Temp greater or equal to 38C (100.4F), Mild Pain (1 - 3)  cyclobenzaprine 10 milliGRAM(s) Oral three times a day PRN Muscle Spasm  guaiFENesin Oral Liquid (Sugar-Free) 100 milliGRAM(s) Oral every 6 hours PRN Cough  oxyCODONE    IR 5 milliGRAM(s) Oral every 4 hours PRN Severe Pain (7 - 10)  propranolol 20 milliGRAM(s) Oral every 8 hours PRN hand tremors      HEALTH ISSUES - PROBLEM Dx:  Cellulitis    Bipolar mood disorder    Hypertension    CLL (chronic lymphocytic leukemia)    Need for prophylactic measure    JANE on CPAP    OA (osteoarthritis)    Esophageal motility disorder    Obesity    Psoriatic arthritis    Productive cough                   Radha Loving, PGY1    SHANTEL CRUZ  67y  Female    Complaints:  Subjective:     Pt had no acute o/n events. She reports feeling well today morning. She endorses improvement in her RLE pain. She denies any headache, dizziness, SoB, chest pain, n/v, abd pain, dysuria, diarrhea/constipation.       FAMILY HISTORY:  FH: heart disease  Mother; sudden death @age 57 from large MI vs CVA (unknown)    Family hx of colon cancer (Father)   in late 50s      67yVital Signs Last 24 Hrs  T(C): 36.6 (03 Oct 2022 05:00), Max: 36.8 (02 Oct 2022 19:10)  T(F): 97.8 (03 Oct 2022 05:00), Max: 98.3 (02 Oct 2022 19:10)  HR: 60 (03 Oct 2022 05:00) (60 - 85)  BP: 106/69 (03 Oct 2022 05:00) (103/65 - 138/82)  BP(mean): --  RR: 17 (03 Oct 2022 05:00) (17 - 18)  SpO2: 94% (03 Oct 2022 05:00) (94% - 97%)    Parameters below as of 03 Oct 2022 05:00  Patient On (Oxygen Delivery Method): room air          PHYSICAL EXAM:  GENERAL: NAD, well-groomed, well-developed  HEAD:  Atraumatic, Normocephalic  EYES: EOMI, PERRLA, conjunctiva and sclera clear  ENMT: No tonsillar erythema, exudates, or enlargement; Moist mucous membranes, Good dentition, No lesions  NECK: Supple, No JVD, Normal thyroid  NERVOUS SYSTEM:  Alert & Oriented X3, Good concentration; Motor Strength 5/5 B/L upper and lower extremities;   CHEST/LUNG: Clear to percussion bilaterally; No rales, rhonchi, wheezing, or rubs  HEART: Regular rate and rhythm; No murmurs, rubs, or gallops  ABDOMEN: Soft, Nontender, Nondistended; Bowel sounds present  EXTREMITIES:  2+ Peripheral Pulses, No clubbing, cyanosis. Erythema in RLE at mid-shin, mildly tender to palpation. No induration   LYMPH: No lymphadenopathy noted  SKIN:  Erythema in RLE at mid-shin, mildly tender to palpation. No induration      Consultant(s) Notes Reviewed:  [x ] YES  [ ] NO  Care Discussed with Consultants/Other Providers [ x] YES  [ ] NO    LABS:                          9.4    19.39 )-----------( 204      ( 03 Oct 2022 07:31 )             30.1       10    136  |  99  |  10  ----------------------------<  79  3.8   |  26  |  0.37<L>    Ca    9.0      03 Oct 2022 07:37              CAPILLARY BLOOD GLUCOSE        Female  RADIOLOGY & ADDITIONAL TESTS:    Imaging Personally Reviewed:  [ ] YES  [ ] NO    MedsMEDICATIONS  (STANDING):  apremilast 30 milliGRAM(s) Oral two times a day  calcium carbonate 1250 mG  + Vitamin D (OsCal 500 + D) 1 Tablet(s) Oral daily  chlorhexidine 2% Cloths 1 Application(s) Topical daily  clonazePAM  Tablet 1.5 milliGRAM(s) Oral <User Schedule>  clonazePAM  Tablet 1 milliGRAM(s) Oral daily  clonazePAM  Tablet 2 milliGRAM(s) Oral at bedtime  enalapril 10 milliGRAM(s) Oral daily  enoxaparin Injectable 40 milliGRAM(s) SubCutaneous every 12 hours  esOMEPRAZOLE 40 milliGRAM(s) Oral two times a day  fluconAZOLE   Tablet 200 milliGRAM(s) Oral <User Schedule>  gabapentin 600 milliGRAM(s) Oral at bedtime  gabapentin 400 milliGRAM(s) Oral daily  influenza  Vaccine (HIGH DOSE) 0.7 milliLiter(s) IntraMuscular once  lactobacillus acidophilus 1 Tablet(s) Oral three times a day  lamoTRIgine 200 milliGRAM(s) Oral daily  lisdexamfetamine 50 milliGRAM(s) Oral with breakfast  mirabegron ER 25 milliGRAM(s) Oral daily  multivitamin/minerals 1 Tablet(s) Oral daily  neomycin/bacitracin/polymyxin Topical Ointment 1 Application(s) Topical two times a day  oxybutynin 10 milliGRAM(s) Oral two times a day  QUEtiapine 200 milliGRAM(s) Oral <User Schedule>  sertraline 150 milliGRAM(s) Oral daily  vancomycin  IVPB 1500 milliGRAM(s) IV Intermittent every 12 hours  vortioxetine 10 milliGRAM(s) Oral daily    MEDICATIONS  (PRN):  acetaminophen     Tablet .. 650 milliGRAM(s) Oral every 6 hours PRN Temp greater or equal to 38C (100.4F), Mild Pain (1 - 3)  cyclobenzaprine 10 milliGRAM(s) Oral three times a day PRN Muscle Spasm  guaiFENesin Oral Liquid (Sugar-Free) 100 milliGRAM(s) Oral every 6 hours PRN Cough  oxyCODONE    IR 5 milliGRAM(s) Oral every 4 hours PRN Severe Pain (7 - 10)  propranolol 20 milliGRAM(s) Oral every 8 hours PRN hand tremors      HEALTH ISSUES - PROBLEM Dx:  Cellulitis    Bipolar mood disorder    Hypertension    CLL (chronic lymphocytic leukemia)    Need for prophylactic measure    JANE on CPAP    OA (osteoarthritis)    Esophageal motility disorder    Obesity    Psoriatic arthritis    Productive cough

## 2022-10-03 NOTE — PROGRESS NOTE ADULT - SUBJECTIVE AND OBJECTIVE BOX
Follow Up:  leg injury    Interval History/ROS:  no physical complaints    Allergies  No Known Allergies    ANTIMICROBIALS:  fluconAZOLE   Tablet 200 <User Schedule>  vancomycin  IVPB 1500 every 12 hours      OTHER MEDS:  MEDICATIONS  (STANDING):  acetaminophen     Tablet .. 650 every 6 hours PRN  clonazePAM  Tablet 1.5 <User Schedule>  clonazePAM  Tablet 1 daily  clonazePAM  Tablet 2 at bedtime  cyclobenzaprine 10 three times a day PRN  enalapril 10 daily  enoxaparin Injectable 40 every 12 hours  esOMEPRAZOLE 40 two times a day  gabapentin 600 at bedtime  gabapentin 400 daily  guaiFENesin Oral Liquid (Sugar-Free) 100 every 6 hours PRN  influenza  Vaccine (HIGH DOSE) 0.7 once  lamoTRIgine 200 daily  lisdexamfetamine 50 with breakfast  mirabegron ER 25 daily  oxybutynin 10 two times a day  oxyCODONE    IR 5 every 4 hours PRN  propranolol 20 every 8 hours PRN  QUEtiapine 200 <User Schedule>  sertraline 150 daily  vortioxetine 10 daily      Vital Signs Last 24 Hrs  T(C): 37.1 (03 Oct 2022 12:22), Max: 37.1 (03 Oct 2022 12:22)  T(F): 98.7 (03 Oct 2022 12:22), Max: 98.7 (03 Oct 2022 12:22)  HR: 76 (03 Oct 2022 12:22) (60 - 76)  BP: 141/83 (03 Oct 2022 12:22) (103/65 - 141/83)  BP(mean): --  RR: 18 (03 Oct 2022 12:22) (17 - 18)  SpO2: 97% (03 Oct 2022 12:22) (94% - 97%)    Parameters below as of 03 Oct 2022 12:22  Patient On (Oxygen Delivery Method): room air        PHYSICAL EXAM:  General: WN/WD NAD, Non-toxic  Neurology: A&Ox3, nonfocal  Respiratory: Clear to auscultation bilaterally  CV: RRR, S1S2, no murmurs, rubs or gallops  Abdominal: Soft, Non-tender, non-distended, normal bowel sounds  Extremities: No edema, area of previous injury left anterior tib region without warmth, There is no discloration. small superficial eschar  - underlying tenderness and induration suggestive of previous injury, resolving hematoma noted  Line Sites: Clear  Skin: No rash                        9.4    19.39 )-----------( 204      ( 03 Oct 2022 07:31 )             30.1   WBC Count: 19.39 (10-03 @ 07:31)  WBC Count: 24.97 (10-02 @ 06:54)  WBC Count: 21.47 (10-01 @ 05:42)  WBC Count: 22.11 (09-30 @ 06:59)  WBC Count: 21.68 (09-29 @ 07:09)      10-03    136  |  99  |  10  ----------------------------<  79  3.8   |  26  |  0.37<L>    Ca    9.0      03 Oct 2022 07:37    10.01.22 @ 05:39)  Procalcitonin, Serum: <0.03:    MICROBIOLOGY:  .Blood Blood-Peripheral  09-27-22   No Growth Final  --  --      .Blood Blood-Peripheral  09-27-22   No Growth Final  --  --      Vancomycin Level, Trough: 14.3 ug/mL (10-02-22 @ 16:10)      Rapid RVP Result: NotDetec (10-03 @ 04:26)    RADIOLOGY:  < from: CT Tibia/Fibia w/ IV Cont, Right (09.29.22 @ 17:11) >  Soft tissues: Ultrasound demonstrated subcentimeter focus of   hypoechogenicity potentially concerning for abscess. On this examination,   there is mild subcutaneous edema withskin thickening localized to the   distal lateral lower leg, without a rim-enhancing abscess identified. The   findings are consistent with subcutaneous edema, which may be seen in   cellulitis in this clinical setting of a leg wound.    There are prominent varicosities in the lower leg medially.    There is distal Achilles tendinosis with foci of mineralization within   the distal Achilles tendon several centimeters above its insertion.    IMPRESSION:    1.  Clinical setting of a leg wound. Skinthickening with feathery   infiltration of the subcutaneous fat consistent with subcutaneous edema,   which may be seen in cellulitis. No localized abscess demonstrated on   this CT. MRI could provide a more sensitive evaluation for very small   collections; of note recent ultrasound demonstrated a subcentimeter   collection. This could be performed if clinically warranted.    2.  Small knee effusion with mild/moderate degenerative changes.    3.  Distal Achilles tendinosis.    < end of copied text >      Jim Smith MD; Division of Infectious Disease; Pager: [phone]; nights and weekends: [phone]

## 2022-10-04 ENCOUNTER — NON-APPOINTMENT (OUTPATIENT)
Age: 67
End: 2022-10-04

## 2022-10-05 ENCOUNTER — NON-APPOINTMENT (OUTPATIENT)
Age: 67
End: 2022-10-05

## 2022-10-11 ENCOUNTER — APPOINTMENT (OUTPATIENT)
Dept: GASTROENTEROLOGY | Facility: CLINIC | Age: 67
End: 2022-10-11

## 2022-10-19 ENCOUNTER — RESULT REVIEW (OUTPATIENT)
Age: 67
End: 2022-10-19

## 2022-10-20 PROCEDURE — 84145 PROCALCITONIN (PCT): CPT

## 2022-10-20 PROCEDURE — 85027 COMPLETE CBC AUTOMATED: CPT

## 2022-10-20 PROCEDURE — 0225U NFCT DS DNA&RNA 21 SARSCOV2: CPT

## 2022-10-20 PROCEDURE — 76882 US LMTD JT/FCL EVL NVASC XTR: CPT

## 2022-10-20 PROCEDURE — 85652 RBC SED RATE AUTOMATED: CPT

## 2022-10-20 PROCEDURE — C1751: CPT

## 2022-10-20 PROCEDURE — 97530 THERAPEUTIC ACTIVITIES: CPT

## 2022-10-20 PROCEDURE — 87635 SARS-COV-2 COVID-19 AMP PRB: CPT

## 2022-10-20 PROCEDURE — 86140 C-REACTIVE PROTEIN: CPT

## 2022-10-20 PROCEDURE — 85014 HEMATOCRIT: CPT

## 2022-10-20 PROCEDURE — 73701 CT LOWER EXTREMITY W/DYE: CPT

## 2022-10-20 PROCEDURE — 84100 ASSAY OF PHOSPHORUS: CPT

## 2022-10-20 PROCEDURE — 97116 GAIT TRAINING THERAPY: CPT

## 2022-10-20 PROCEDURE — 82803 BLOOD GASES ANY COMBINATION: CPT

## 2022-10-20 PROCEDURE — 80048 BASIC METABOLIC PNL TOTAL CA: CPT

## 2022-10-20 PROCEDURE — 80202 ASSAY OF VANCOMYCIN: CPT

## 2022-10-20 PROCEDURE — 73590 X-RAY EXAM OF LOWER LEG: CPT

## 2022-10-20 PROCEDURE — 87641 MR-STAPH DNA AMP PROBE: CPT

## 2022-10-20 PROCEDURE — 82947 ASSAY GLUCOSE BLOOD QUANT: CPT

## 2022-10-20 PROCEDURE — U0005: CPT

## 2022-10-20 PROCEDURE — 83735 ASSAY OF MAGNESIUM: CPT

## 2022-10-20 PROCEDURE — 93971 EXTREMITY STUDY: CPT

## 2022-10-20 PROCEDURE — 97161 PT EVAL LOW COMPLEX 20 MIN: CPT

## 2022-10-20 PROCEDURE — 80053 COMPREHEN METABOLIC PANEL: CPT

## 2022-10-20 PROCEDURE — U0003: CPT

## 2022-10-20 PROCEDURE — 82330 ASSAY OF CALCIUM: CPT

## 2022-10-20 PROCEDURE — 85025 COMPLETE CBC W/AUTO DIFF WBC: CPT

## 2022-10-20 PROCEDURE — 83605 ASSAY OF LACTIC ACID: CPT

## 2022-10-20 PROCEDURE — 84132 ASSAY OF SERUM POTASSIUM: CPT

## 2022-10-20 PROCEDURE — 87040 BLOOD CULTURE FOR BACTERIA: CPT

## 2022-10-20 PROCEDURE — 36415 COLL VENOUS BLD VENIPUNCTURE: CPT

## 2022-10-20 PROCEDURE — 82435 ASSAY OF BLOOD CHLORIDE: CPT

## 2022-10-20 PROCEDURE — 84295 ASSAY OF SERUM SODIUM: CPT

## 2022-10-20 PROCEDURE — 87640 STAPH A DNA AMP PROBE: CPT

## 2022-10-20 PROCEDURE — 36569 INSJ PICC 5 YR+ W/O IMAGING: CPT

## 2022-10-20 PROCEDURE — 82784 ASSAY IGA/IGD/IGG/IGM EACH: CPT

## 2022-10-20 PROCEDURE — 99285 EMERGENCY DEPT VISIT HI MDM: CPT | Mod: 25

## 2022-10-20 PROCEDURE — 85018 HEMOGLOBIN: CPT

## 2022-10-24 ENCOUNTER — NON-APPOINTMENT (OUTPATIENT)
Age: 67
End: 2022-10-24

## 2022-10-31 ENCOUNTER — NON-APPOINTMENT (OUTPATIENT)
Age: 67
End: 2022-10-31

## 2022-11-03 ENCOUNTER — APPOINTMENT (OUTPATIENT)
Dept: ULTRASOUND IMAGING | Facility: CLINIC | Age: 67
End: 2022-11-03

## 2022-11-03 ENCOUNTER — NON-APPOINTMENT (OUTPATIENT)
Age: 67
End: 2022-11-03

## 2022-11-03 ENCOUNTER — APPOINTMENT (OUTPATIENT)
Dept: MAMMOGRAPHY | Facility: CLINIC | Age: 67
End: 2022-11-03

## 2022-11-03 PROCEDURE — 76641 ULTRASOUND BREAST COMPLETE: CPT | Mod: 50

## 2022-11-03 PROCEDURE — 77063 BREAST TOMOSYNTHESIS BI: CPT

## 2022-11-03 PROCEDURE — 77067 SCR MAMMO BI INCL CAD: CPT

## 2022-11-14 ENCOUNTER — NON-APPOINTMENT (OUTPATIENT)
Age: 67
End: 2022-11-14

## 2022-12-05 ENCOUNTER — LABORATORY RESULT (OUTPATIENT)
Age: 67
End: 2022-12-05

## 2022-12-06 LAB
ALBUMIN SERPL ELPH-MCNC: 3.9 G/DL
ALP BLD-CCNC: 126 U/L
ALT SERPL-CCNC: 10 U/L
ANION GAP SERPL CALC-SCNC: 11 MMOL/L
AST SERPL-CCNC: 17 U/L
B2 MICROGLOB SERPL-MCNC: 2.7 MG/L
BILIRUB SERPL-MCNC: 0.3 MG/DL
BUN SERPL-MCNC: 14 MG/DL
CALCIUM SERPL-MCNC: 9 MG/DL
CHLORIDE SERPL-SCNC: 99 MMOL/L
CO2 SERPL-SCNC: 27 MMOL/L
CREAT SERPL-MCNC: 0.45 MG/DL
EGFR: 107 ML/MIN/1.73M2
ERYTHROCYTE [SEDIMENTATION RATE] IN BLOOD BY WESTERGREN METHOD: 26 MM/HR
GLUCOSE SERPL-MCNC: 101 MG/DL
LDH SERPL-CCNC: 174 U/L
POTASSIUM SERPL-SCNC: 4.1 MMOL/L
PROT SERPL-MCNC: 6.2 G/DL
SODIUM SERPL-SCNC: 137 MMOL/L

## 2022-12-07 ENCOUNTER — OUTPATIENT (OUTPATIENT)
Dept: OUTPATIENT SERVICES | Facility: HOSPITAL | Age: 67
LOS: 1 days | Discharge: HOME | End: 2022-12-07

## 2022-12-07 DIAGNOSIS — D50.9 IRON DEFICIENCY ANEMIA, UNSPECIFIED: ICD-10-CM

## 2022-12-07 LAB
BASOPHILS # BLD AUTO: 0.16 K/UL
BASOPHILS NFR BLD AUTO: 0.4 %
EOSINOPHIL # BLD AUTO: 0.4 K/UL
EOSINOPHIL NFR BLD AUTO: 1.1 %
HCT VFR BLD CALC: 30.9 %
HGB BLD-MCNC: 9.5 G/DL
IMM GRANULOCYTES NFR BLD AUTO: 0.3 %
LYMPHOCYTES # BLD AUTO: 29 K/UL
LYMPHOCYTES NFR BLD AUTO: 80.9 %
MAN DIFF?: NORMAL
MCHC RBC-ENTMCNC: 28.9 PG
MCHC RBC-ENTMCNC: 30.7 GM/DL
MCV RBC AUTO: 93.9 FL
MONOCYTES # BLD AUTO: 1.96 K/UL
MONOCYTES NFR BLD AUTO: 5.5 %
NEUTROPHILS # BLD AUTO: 4.23 K/UL
NEUTROPHILS NFR BLD AUTO: 11.8 %
PLATELET # BLD AUTO: 332 K/UL
RBC # BLD: 3.29 M/UL
RBC # FLD: 14.2 %
WBC # FLD AUTO: 35.84 K/UL

## 2022-12-09 ENCOUNTER — APPOINTMENT (OUTPATIENT)
Age: 67
End: 2022-12-09

## 2022-12-09 VITALS
RESPIRATION RATE: 16 BRPM | BODY MASS INDEX: 38.62 KG/M2 | TEMPERATURE: 97.3 F | WEIGHT: 225 LBS | DIASTOLIC BLOOD PRESSURE: 75 MMHG | OXYGEN SATURATION: 97 % | HEART RATE: 83 BPM | SYSTOLIC BLOOD PRESSURE: 133 MMHG

## 2022-12-09 PROCEDURE — 99214 OFFICE O/P EST MOD 30 MIN: CPT

## 2022-12-09 NOTE — REASON FOR VISIT
[Follow-Up Visit] : a follow-up visit for [CLL] : chronic lymphocytic leukemia [Leukocytosis] : leukocytosis [Spouse] : spouse [Family Member] : family member

## 2022-12-09 NOTE — H&P PST ADULT - ENERGY EXPENDITURE (METS)
Pt is discharged home at this time. Pt verbalizes understanding of DC instructions and follow up care. Pt respirations even and unlabored. Pt ambulates out of department with steady, even gait.   4.73 by dasi mets

## 2022-12-10 NOTE — HISTORY OF PRESENT ILLNESS
[de-identified] : 67F with PMHx of psoriasis, psoriatic arthritis, on Otezla, GERD, hypertension, obstructive sleep apnea, bipolar disorder, hiatal hernia and morbid obesity, s/p revision Solis-en-Y gastric bypass and transoral gastric plication (for weight 421 lbs), returning for hematologic follow-up of  CLL and anemia.\par \par CASE SYNOPSIS:\par 10/22/2021: CT A/P–postoperative changes, s/p gastric bypass with reflux into the aferrent limb.  No evidence of bowel obstruction, no evidence of recurrent ventral hernia, no intra-abdominal lymphadenopathy or splenomegaly.\par \par 10/23/2021: CT neck soft tissue-mildly enlarged diffuse prominent size cervical and axillary lymph nodes of uncertain etiology.  Lymphoproliferative etiology should be considered\par \par 1/6/2022: Bone marrow biopsy/aspirate–CLL/small lymphocytic lymphoma (60-70% involvement).  Iron stores present, no ringed sideroblasts seen.  Flow cytometry: Monotypic B cells (91.5% of lymphocytes, 40.4% of cells), positive for dim kappa, CD19, dim CD20, CD23, CD5, CD38 positive and 18.7% of cells consistent with SLL/CLL.  The myeloid immunophenotypic findings show normal myeloid granularity, no increase in myeloid immaturity and normal myeloid antigen maturation pattern.  Cytogenetics–no mutations identified.  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 deletion and 12%, FAHEEM deletion in 15.5%.\par \par 8/2/2022: PET–minimal to no significant FDG uptake numerous subcentimeter and enlarged multistation cervical lymph nodes, left supraclavicular lymph node, bilateral axillary lymph nodes and bilateral iliac and inguinal lymph nodes.  Cervical and left cervical lymphadenopathy without significant change in size when compared with CT neck 7/13/2022, but increased in size when compared to CT neck 10/11/22.\par \par [facility]10/[facility]par  [FreeTextEntry1] : expectant surveillance\par \par  [de-identified] : Patient recovered well since left TKR on 11/10/2022 performed at [facility] for [facility] surgeries.  Recent WBC at 35.84 doubled since January 2022.\roberto Has numerous chronic complaints including laryngeal reflux (recommended Nexium), arthritic pain, depression, etc.\roberto Has recovered well with PT and today she walked into the office with only a cane; no longer using Rollator.\roberto Denies falls.  Her left knee surgery was postponed due to episode of left thigh cellulitis, resolved with antibiotics.\par Medication list reviewed.\par \par

## 2022-12-10 NOTE — REVIEW OF SYSTEMS
[Fatigue] : fatigue [Recent Change In Weight] : ~T recent weight change [Nosebleeds] : nosebleeds [Joint Pain] : joint pain [Negative] : Allergic/Immunologic [Fever] : no fever [Chills] : no chills [Night Sweats] : no night sweats [Easy Bleeding] : no tendency for easy bleeding [Easy Bruising] : no tendency for easy bruising [FreeTextEntry2] : ambulates with walker [FreeTextEntry4] : Infrequent epistaxis [FreeTextEntry7] : s/p abdominal surgery; present incisional hernia [FreeTextEntry9] : Lower back and knees [de-identified] : bipolar disorder

## 2022-12-10 NOTE — PHYSICAL EXAM
[Ambulatory and capable of all self care but unable to carry out any work activities] : Status 2- Ambulatory and capable of all self care but unable to carry out any work activities. Up and about more than 50% of waking hours [Obese] : obese [Normal] : affect appropriate [de-identified] : ambulates with difficulty [de-identified] : Left side tongue lesion [de-identified] : s/p hernia repair; excess pannus; no open wounds [de-identified] : s/p L TKR; scar well healed [de-identified] : old scars abdomen

## 2022-12-19 ENCOUNTER — APPOINTMENT (OUTPATIENT)
Dept: OTOLARYNGOLOGY | Facility: CLINIC | Age: 67
End: 2022-12-19
Payer: MEDICARE

## 2023-01-01 ENCOUNTER — APPOINTMENT (OUTPATIENT)
Dept: INTERNAL MEDICINE | Facility: CLINIC | Age: 68
End: 2023-01-01
Payer: MEDICARE

## 2023-01-01 ENCOUNTER — NON-APPOINTMENT (OUTPATIENT)
Age: 68
End: 2023-01-01

## 2023-01-01 ENCOUNTER — OUTPATIENT (OUTPATIENT)
Dept: OUTPATIENT SERVICES | Facility: HOSPITAL | Age: 68
LOS: 1 days | End: 2023-01-01
Payer: MEDICARE

## 2023-01-01 ENCOUNTER — LABORATORY RESULT (OUTPATIENT)
Age: 68
End: 2023-01-01

## 2023-01-01 ENCOUNTER — RESULT REVIEW (OUTPATIENT)
Age: 68
End: 2023-01-01

## 2023-01-01 ENCOUNTER — APPOINTMENT (OUTPATIENT)
Dept: MRI IMAGING | Facility: CLINIC | Age: 68
End: 2023-01-01
Payer: MEDICARE

## 2023-01-01 ENCOUNTER — APPOINTMENT (OUTPATIENT)
Dept: CT IMAGING | Facility: CLINIC | Age: 68
End: 2023-01-01
Payer: MEDICARE

## 2023-01-01 ENCOUNTER — RX RENEWAL (OUTPATIENT)
Age: 68
End: 2023-01-01

## 2023-01-01 ENCOUNTER — APPOINTMENT (OUTPATIENT)
Age: 68
End: 2023-01-01
Payer: MEDICARE

## 2023-01-01 ENCOUNTER — OUTPATIENT (OUTPATIENT)
Dept: OUTPATIENT SERVICES | Facility: HOSPITAL | Age: 68
LOS: 1 days | Discharge: HOME | End: 2023-01-01

## 2023-01-01 ENCOUNTER — APPOINTMENT (OUTPATIENT)
Dept: INTERNAL MEDICINE | Facility: CLINIC | Age: 68
End: 2023-01-01

## 2023-01-01 ENCOUNTER — APPOINTMENT (OUTPATIENT)
Dept: NUCLEAR MEDICINE | Facility: IMAGING CENTER | Age: 68
End: 2023-01-01
Payer: MEDICARE

## 2023-01-01 ENCOUNTER — APPOINTMENT (OUTPATIENT)
Dept: ULTRASOUND IMAGING | Facility: IMAGING CENTER | Age: 68
End: 2023-01-01
Payer: MEDICARE

## 2023-01-01 ENCOUNTER — APPOINTMENT (OUTPATIENT)
Dept: HEMATOLOGY ONCOLOGY | Facility: CLINIC | Age: 68
End: 2023-01-01
Payer: MEDICARE

## 2023-01-01 ENCOUNTER — APPOINTMENT (OUTPATIENT)
Dept: MAMMOGRAPHY | Facility: IMAGING CENTER | Age: 68
End: 2023-01-01
Payer: MEDICARE

## 2023-01-01 ENCOUNTER — APPOINTMENT (OUTPATIENT)
Dept: SURGERY | Facility: CLINIC | Age: 68
End: 2023-01-01
Payer: MEDICARE

## 2023-01-01 ENCOUNTER — MED ADMIN CHARGE (OUTPATIENT)
Age: 68
End: 2023-01-01

## 2023-01-01 ENCOUNTER — APPOINTMENT (OUTPATIENT)
Dept: INTERNAL MEDICINE | Facility: CLINIC | Age: 68
End: 2023-01-01
Payer: COMMERCIAL

## 2023-01-01 VITALS
HEIGHT: 64 IN | WEIGHT: 227 LBS | SYSTOLIC BLOOD PRESSURE: 132 MMHG | HEART RATE: 87 BPM | OXYGEN SATURATION: 95 % | BODY MASS INDEX: 38.76 KG/M2 | DIASTOLIC BLOOD PRESSURE: 74 MMHG

## 2023-01-01 VITALS
HEIGHT: 64 IN | HEART RATE: 83 BPM | RESPIRATION RATE: 16 BRPM | OXYGEN SATURATION: 95 % | SYSTOLIC BLOOD PRESSURE: 136 MMHG | DIASTOLIC BLOOD PRESSURE: 83 MMHG

## 2023-01-01 VITALS
RESPIRATION RATE: 16 BRPM | HEART RATE: 87 BPM | TEMPERATURE: 96.6 F | BODY MASS INDEX: 37.76 KG/M2 | DIASTOLIC BLOOD PRESSURE: 83 MMHG | OXYGEN SATURATION: 96 % | SYSTOLIC BLOOD PRESSURE: 138 MMHG | WEIGHT: 220 LBS

## 2023-01-01 VITALS
BODY MASS INDEX: 38.58 KG/M2 | DIASTOLIC BLOOD PRESSURE: 77 MMHG | SYSTOLIC BLOOD PRESSURE: 146 MMHG | RESPIRATION RATE: 19 BRPM | HEIGHT: 64 IN | HEART RATE: 93 BPM | WEIGHT: 226 LBS | OXYGEN SATURATION: 97 % | TEMPERATURE: 97.8 F

## 2023-01-01 VITALS
TEMPERATURE: 93.9 F | BODY MASS INDEX: 37.98 KG/M2 | HEIGHT: 64 IN | DIASTOLIC BLOOD PRESSURE: 87 MMHG | HEART RATE: 97 BPM | OXYGEN SATURATION: 96 % | RESPIRATION RATE: 18 BRPM | SYSTOLIC BLOOD PRESSURE: 156 MMHG | WEIGHT: 222.5 LBS

## 2023-01-01 VITALS
WEIGHT: 225 LBS | TEMPERATURE: 97.3 F | DIASTOLIC BLOOD PRESSURE: 78 MMHG | HEART RATE: 91 BPM | HEIGHT: 64 IN | BODY MASS INDEX: 38.41 KG/M2 | OXYGEN SATURATION: 96 % | SYSTOLIC BLOOD PRESSURE: 152 MMHG

## 2023-01-01 DIAGNOSIS — E66.9 OBESITY, UNSPECIFIED: ICD-10-CM

## 2023-01-01 DIAGNOSIS — Z13.0 ENCOUNTER FOR SCREENING FOR OTHER SUSPECTED ENDOCRINE DISORDER: ICD-10-CM

## 2023-01-01 DIAGNOSIS — Z13.21 ENCOUNTER FOR SCREENING FOR OTHER SUSPECTED ENDOCRINE DISORDER: ICD-10-CM

## 2023-01-01 DIAGNOSIS — C91.10 CHRONIC LYMPHOCYTIC LEUKEMIA OF B-CELL TYPE NOT HAVING ACHIEVED REMISSION: ICD-10-CM

## 2023-01-01 DIAGNOSIS — M85.80 OTHER SPECIFIED DISORDERS OF BONE DENSITY AND STRUCTURE, UNSPECIFIED SITE: ICD-10-CM

## 2023-01-01 DIAGNOSIS — R07.89 OTHER CHEST PAIN: ICD-10-CM

## 2023-01-01 DIAGNOSIS — D50.9 IRON DEFICIENCY ANEMIA, UNSPECIFIED: ICD-10-CM

## 2023-01-01 DIAGNOSIS — Z13.21 ENCOUNTER FOR SCREENING FOR NUTRITIONAL DISORDER: ICD-10-CM

## 2023-01-01 DIAGNOSIS — L02.415 CUTANEOUS ABSCESS OF RIGHT LOWER LIMB: ICD-10-CM

## 2023-01-01 DIAGNOSIS — Z00.8 ENCOUNTER FOR OTHER GENERAL EXAMINATION: ICD-10-CM

## 2023-01-01 DIAGNOSIS — Z13.228 ENCOUNTER FOR SCREENING FOR OTHER SUSPECTED ENDOCRINE DISORDER: ICD-10-CM

## 2023-01-01 DIAGNOSIS — R05.9 COUGH, UNSPECIFIED: ICD-10-CM

## 2023-01-01 DIAGNOSIS — R19.7 DIARRHEA, UNSPECIFIED: ICD-10-CM

## 2023-01-01 DIAGNOSIS — N39.41 URGE INCONTINENCE: ICD-10-CM

## 2023-01-01 DIAGNOSIS — M81.0 AGE-RELATED OSTEOPOROSIS W/OUT CURRENT PATHOLOGICAL FRACTURE: ICD-10-CM

## 2023-01-01 DIAGNOSIS — I10 ESSENTIAL (PRIMARY) HYPERTENSION: ICD-10-CM

## 2023-01-01 DIAGNOSIS — M54.51 VERTEBROGENIC LOW BACK PAIN: ICD-10-CM

## 2023-01-01 DIAGNOSIS — K21.9 GASTRO-ESOPHAGEAL REFLUX DISEASE WITHOUT ESOPHAGITIS: ICD-10-CM

## 2023-01-01 DIAGNOSIS — J18.9 PNEUMONIA, UNSPECIFIED ORGANISM: ICD-10-CM

## 2023-01-01 DIAGNOSIS — Z13.29 ENCOUNTER FOR SCREENING FOR OTHER SUSPECTED ENDOCRINE DISORDER: ICD-10-CM

## 2023-01-01 DIAGNOSIS — S63.8X1A SPRAIN OF OTHER PART OF RIGHT WRIST AND HAND, INITIAL ENCOUNTER: ICD-10-CM

## 2023-01-01 DIAGNOSIS — K43.2 INCISIONAL HERNIA W/OUT OBSTRUCTION OR GANGRENE: ICD-10-CM

## 2023-01-01 DIAGNOSIS — Z23 ENCOUNTER FOR IMMUNIZATION: ICD-10-CM

## 2023-01-01 LAB
25(OH)D3 SERPL-MCNC: 60.4 NG/ML
25(OH)D3 SERPL-MCNC: 60.7 NG/ML
ALBUMIN SERPL ELPH-MCNC: 4.3 G/DL
ALBUMIN SERPL ELPH-MCNC: 4.4 G/DL
ALBUMIN SERPL ELPH-MCNC: 4.5 G/DL
ALBUMIN SERPL ELPH-MCNC: 4.6 G/DL
ALBUMIN SERPL ELPH-MCNC: 4.6 G/DL
ALBUMIN SERPL ELPH-MCNC: 4.7 G/DL
ALP BLD-CCNC: 100 U/L
ALP BLD-CCNC: 126 U/L
ALP BLD-CCNC: 127 U/L
ALP BLD-CCNC: 75 U/L
ALP BLD-CCNC: 83 U/L
ALP BLD-CCNC: 85 U/L
ALT SERPL-CCNC: 11 U/L
ALT SERPL-CCNC: 12 U/L
ALT SERPL-CCNC: 19 U/L
ALT SERPL-CCNC: 35 U/L
ALT SERPL-CCNC: 36 U/L
ALT SERPL-CCNC: 9 U/L
ANION GAP SERPL CALC-SCNC: 10 MMOL/L
ANION GAP SERPL CALC-SCNC: 11 MMOL/L
ANION GAP SERPL CALC-SCNC: 12 MMOL/L
ANION GAP SERPL CALC-SCNC: 13 MMOL/L
ANION GAP SERPL CALC-SCNC: 14 MMOL/L
ANION GAP SERPL CALC-SCNC: 9 MMOL/L
AST SERPL-CCNC: 15 U/L
AST SERPL-CCNC: 16 U/L
AST SERPL-CCNC: 19 U/L
AST SERPL-CCNC: 20 U/L
AST SERPL-CCNC: 48 U/L
AST SERPL-CCNC: 49 U/L
B2 MICROGLOB SERPL-MCNC: 2.4 MG/L
B2 MICROGLOB SERPL-MCNC: 2.4 MG/L
B2 MICROGLOB SERPL-MCNC: 2.5 MG/L
B2 MICROGLOB SERPL-MCNC: 2.6 MG/L
B2 MICROGLOB SERPL-MCNC: 2.9 MG/L
BASOPHILS # BLD AUTO: 0 K/UL
BASOPHILS # BLD AUTO: 0 K/UL
BASOPHILS # BLD AUTO: 0 K/UL — SIGNIFICANT CHANGE UP (ref 0–0.2)
BASOPHILS NFR BLD AUTO: 0 %
BASOPHILS NFR BLD AUTO: 0 %
BASOPHILS NFR BLD AUTO: 0 % — SIGNIFICANT CHANGE UP (ref 0–2)
BILIRUB SERPL-MCNC: 0.2 MG/DL
BILIRUB SERPL-MCNC: 0.3 MG/DL
BILIRUB SERPL-MCNC: 0.5 MG/DL
BUN SERPL-MCNC: 12 MG/DL
BUN SERPL-MCNC: 12 MG/DL
BUN SERPL-MCNC: 14 MG/DL
BUN SERPL-MCNC: 15 MG/DL
BUN SERPL-MCNC: 16 MG/DL
BUN SERPL-MCNC: 22 MG/DL
CALCIUM SERPL-MCNC: 9.2 MG/DL
CALCIUM SERPL-MCNC: 9.2 MG/DL
CALCIUM SERPL-MCNC: 9.4 MG/DL
CALCIUM SERPL-MCNC: 9.5 MG/DL
CALCIUM SERPL-MCNC: 9.6 MG/DL
CALCIUM SERPL-MCNC: 9.8 MG/DL
CHLORIDE SERPL-SCNC: 100 MMOL/L
CHLORIDE SERPL-SCNC: 100 MMOL/L
CHLORIDE SERPL-SCNC: 101 MMOL/L
CHLORIDE SERPL-SCNC: 101 MMOL/L
CHLORIDE SERPL-SCNC: 104 MMOL/L
CHLORIDE SERPL-SCNC: 104 MMOL/L
CO2 SERPL-SCNC: 28 MMOL/L
CO2 SERPL-SCNC: 30 MMOL/L
CREAT SERPL-MCNC: 0.4 MG/DL
CREAT SERPL-MCNC: 0.42 MG/DL
CREAT SERPL-MCNC: 0.44 MG/DL
CREAT SERPL-MCNC: 0.47 MG/DL
CREAT SERPL-MCNC: 0.47 MG/DL
CREAT SERPL-MCNC: 0.6 MG/DL
EGFR: 105 ML/MIN/1.73M2
EGFR: 105 ML/MIN/1.73M2
EGFR: 107 ML/MIN/1.73M2
EGFR: 108 ML/MIN/1.73M2
EGFR: 110 ML/MIN/1.73M2
EGFR: 99 ML/MIN/1.73M2
EOSINOPHIL # BLD AUTO: 0 K/UL
EOSINOPHIL # BLD AUTO: 0 K/UL — SIGNIFICANT CHANGE UP (ref 0–0.5)
EOSINOPHIL # BLD AUTO: 0 K/UL — SIGNIFICANT CHANGE UP (ref 0–0.5)
EOSINOPHIL # BLD AUTO: 0.39 K/UL
EOSINOPHIL # BLD AUTO: 0.52 K/UL — HIGH (ref 0–0.5)
EOSINOPHIL NFR BLD AUTO: 0 %
EOSINOPHIL NFR BLD AUTO: 0 % — SIGNIFICANT CHANGE UP (ref 0–6)
EOSINOPHIL NFR BLD AUTO: 0 % — SIGNIFICANT CHANGE UP (ref 0–6)
EOSINOPHIL NFR BLD AUTO: 0.9 %
EOSINOPHIL NFR BLD AUTO: 1 % — SIGNIFICANT CHANGE UP (ref 0–6)
ERYTHROCYTE [SEDIMENTATION RATE] IN BLOOD BY WESTERGREN METHOD: 15 MM/HR
ERYTHROCYTE [SEDIMENTATION RATE] IN BLOOD BY WESTERGREN METHOD: 24 MM/HR
ERYTHROCYTE [SEDIMENTATION RATE] IN BLOOD BY WESTERGREN METHOD: 52 MM/HR
ERYTHROCYTE [SEDIMENTATION RATE] IN BLOOD: 25 MM/HR — HIGH (ref 0–20)
ERYTHROCYTE [SEDIMENTATION RATE] IN BLOOD: 28 MM/HR — HIGH (ref 0–20)
ERYTHROCYTE [SEDIMENTATION RATE] IN BLOOD: 28 MM/HR — HIGH (ref 0–20)
ESTIMATED AVERAGE GLUCOSE: 103 MG/DL
FOLATE SERPL-MCNC: >20 NG/ML
GLUCOSE SERPL-MCNC: 105 MG/DL
GLUCOSE SERPL-MCNC: 105 MG/DL
GLUCOSE SERPL-MCNC: 83 MG/DL
GLUCOSE SERPL-MCNC: 84 MG/DL
GLUCOSE SERPL-MCNC: 90 MG/DL
GLUCOSE SERPL-MCNC: 93 MG/DL
HBA1C MFR BLD HPLC: 5.2 %
HCT VFR BLD CALC: 31.6 %
HCT VFR BLD CALC: 31.6 % — LOW (ref 34.5–45)
HCT VFR BLD CALC: 32.3 % — LOW (ref 34.5–45)
HCT VFR BLD CALC: 32.3 % — LOW (ref 34.5–45)
HCT VFR BLD CALC: 33.1 %
HGB BLD-MCNC: 9.3 G/DL
HGB BLD-MCNC: 9.6 G/DL — LOW (ref 11.5–15.5)
HGB BLD-MCNC: 9.8 G/DL
HGB BLD-MCNC: 9.9 G/DL — LOW (ref 11.5–15.5)
HGB BLD-MCNC: 9.9 G/DL — LOW (ref 11.5–15.5)
IRON SERPL-MCNC: 53 UG/DL
LDH SERPL-CCNC: 172 U/L
LDH SERPL-CCNC: 175 U/L
LDH SERPL-CCNC: 175 U/L
LDH SERPL-CCNC: 180 U/L
LDH SERPL-CCNC: 185 U/L
LYMPHOCYTES # BLD AUTO: 30.29 K/UL
LYMPHOCYTES # BLD AUTO: 44.73 K/UL — HIGH (ref 1–3.3)
LYMPHOCYTES # BLD AUTO: 68.78 K/UL — HIGH (ref 1–3.3)
LYMPHOCYTES # BLD AUTO: 68.78 K/UL — HIGH (ref 1–3.3)
LYMPHOCYTES # BLD AUTO: 80.2 K/UL
LYMPHOCYTES # BLD AUTO: 81 % — HIGH (ref 13–44)
LYMPHOCYTES # BLD AUTO: 81 % — HIGH (ref 13–44)
LYMPHOCYTES # BLD AUTO: 85.5 % — HIGH (ref 13–44)
LYMPHOCYTES # SPEC AUTO: 7 % — HIGH (ref 0–0)
LYMPHOCYTES # SPEC AUTO: 9 % — HIGH (ref 0–0)
LYMPHOCYTES # SPEC AUTO: 9 % — HIGH (ref 0–0)
LYMPHOCYTES NFR BLD AUTO: 70.1 %
LYMPHOCYTES NFR BLD AUTO: 91 %
MAN DIFF?: NORMAL
MAN DIFF?: NORMAL
MCHC RBC-ENTMCNC: 26.7 PG — LOW (ref 27–34)
MCHC RBC-ENTMCNC: 27.7 PG
MCHC RBC-ENTMCNC: 27.8 PG
MCHC RBC-ENTMCNC: 27.9 PG — SIGNIFICANT CHANGE UP (ref 27–34)
MCHC RBC-ENTMCNC: 27.9 PG — SIGNIFICANT CHANGE UP (ref 27–34)
MCHC RBC-ENTMCNC: 29.4 GM/DL
MCHC RBC-ENTMCNC: 29.6 GM/DL
MCHC RBC-ENTMCNC: 30.4 G/DL — LOW (ref 32–36)
MCHC RBC-ENTMCNC: 30.7 G/DL — LOW (ref 32–36)
MCHC RBC-ENTMCNC: 30.7 G/DL — LOW (ref 32–36)
MCV RBC AUTO: 88 FL — SIGNIFICANT CHANGE UP (ref 80–100)
MCV RBC AUTO: 91 FL — SIGNIFICANT CHANGE UP (ref 80–100)
MCV RBC AUTO: 91 FL — SIGNIFICANT CHANGE UP (ref 80–100)
MCV RBC AUTO: 93.8 FL
MCV RBC AUTO: 94 FL
MONOCYTES # BLD AUTO: 0 K/UL — SIGNIFICANT CHANGE UP (ref 0–0.9)
MONOCYTES # BLD AUTO: 0 K/UL — SIGNIFICANT CHANGE UP (ref 0–0.9)
MONOCYTES # BLD AUTO: 0.26 K/UL — SIGNIFICANT CHANGE UP (ref 0–0.9)
MONOCYTES # BLD AUTO: 2.2 K/UL
MONOCYTES # BLD AUTO: 4.41 K/UL
MONOCYTES NFR BLD AUTO: 0 % — LOW (ref 2–14)
MONOCYTES NFR BLD AUTO: 0 % — LOW (ref 2–14)
MONOCYTES NFR BLD AUTO: 0.5 % — LOW (ref 2–14)
MONOCYTES NFR BLD AUTO: 5 %
MONOCYTES NFR BLD AUTO: 5.1 %
NEUTROPHILS # BLD AUTO: 0.88 K/UL
NEUTROPHILS # BLD AUTO: 3.14 K/UL — SIGNIFICANT CHANGE UP (ref 1.8–7.4)
NEUTROPHILS # BLD AUTO: 7 K/UL
NEUTROPHILS # BLD AUTO: 8.49 K/UL — HIGH (ref 1.8–7.4)
NEUTROPHILS # BLD AUTO: 8.49 K/UL — HIGH (ref 1.8–7.4)
NEUTROPHILS NFR BLD AUTO: 1 %
NEUTROPHILS NFR BLD AUTO: 10 % — LOW (ref 43–77)
NEUTROPHILS NFR BLD AUTO: 10 % — LOW (ref 43–77)
NEUTROPHILS NFR BLD AUTO: 16.2 %
NEUTROPHILS NFR BLD AUTO: 6 % — LOW (ref 43–77)
NRBC # BLD: 0 /100 — SIGNIFICANT CHANGE UP (ref 0–0)
NRBC # BLD: SIGNIFICANT CHANGE UP /100 WBCS (ref 0–0)
PLAT MORPH BLD: NORMAL — SIGNIFICANT CHANGE UP
PLATELET # BLD AUTO: 148 K/UL — LOW (ref 150–400)
PLATELET # BLD AUTO: 198 K/UL — SIGNIFICANT CHANGE UP (ref 150–400)
PLATELET # BLD AUTO: 198 K/UL — SIGNIFICANT CHANGE UP (ref 150–400)
PLATELET # BLD AUTO: 210 K/UL
PLATELET # BLD AUTO: 256 K/UL
POTASSIUM SERPL-SCNC: 4 MMOL/L
POTASSIUM SERPL-SCNC: 4 MMOL/L
POTASSIUM SERPL-SCNC: 4.1 MMOL/L
POTASSIUM SERPL-SCNC: 4.1 MMOL/L
POTASSIUM SERPL-SCNC: 4.2 MMOL/L
POTASSIUM SERPL-SCNC: 4.3 MMOL/L
PROT SERPL-MCNC: 6.4 G/DL
PROT SERPL-MCNC: 6.5 G/DL
PROT SERPL-MCNC: 6.7 G/DL
PROT SERPL-MCNC: 6.8 G/DL
PROT SERPL-MCNC: 6.8 G/DL
PROT SERPL-MCNC: 7 G/DL
RBC # BLD: 3.36 M/UL
RBC # BLD: 3.53 M/UL
RBC # BLD: 3.55 M/UL — LOW (ref 3.8–5.2)
RBC # BLD: 3.55 M/UL — LOW (ref 3.8–5.2)
RBC # BLD: 3.59 M/UL — LOW (ref 3.8–5.2)
RBC # FLD: 13.2 % — SIGNIFICANT CHANGE UP (ref 10.3–14.5)
RBC # FLD: 13.2 % — SIGNIFICANT CHANGE UP (ref 10.3–14.5)
RBC # FLD: 13.6 % — SIGNIFICANT CHANGE UP (ref 10.3–14.5)
RBC # FLD: 13.9 %
RBC # FLD: 14 %
RBC BLD AUTO: SIGNIFICANT CHANGE UP
SMUDGE CELLS # BLD: PRESENT — SIGNIFICANT CHANGE UP
SODIUM SERPL-SCNC: 139 MMOL/L
SODIUM SERPL-SCNC: 140 MMOL/L
SODIUM SERPL-SCNC: 142 MMOL/L
SODIUM SERPL-SCNC: 143 MMOL/L
VIT B1 SERPL-MCNC: 318.9 NMOL/L
VIT B12 SERPL-MCNC: 827 PG/ML
VIT B6 SERPL-MCNC: 28.7 UG/L
WBC # BLD: 52.31 K/UL — CRITICAL HIGH (ref 3.8–10.5)
WBC # BLD: 84.91 K/UL — CRITICAL HIGH (ref 3.8–10.5)
WBC # BLD: 84.91 K/UL — CRITICAL HIGH (ref 3.8–10.5)
WBC # FLD AUTO: 43.21 K/UL
WBC # FLD AUTO: 52.31 K/UL — CRITICAL HIGH (ref 3.8–10.5)
WBC # FLD AUTO: 84.91 K/UL — CRITICAL HIGH (ref 3.8–10.5)
WBC # FLD AUTO: 84.91 K/UL — CRITICAL HIGH (ref 3.8–10.5)
WBC # FLD AUTO: 88.13 K/UL
ZINC SERPL-MCNC: 81 UG/DL

## 2023-01-01 PROCEDURE — 72131 CT LUMBAR SPINE W/O DYE: CPT | Mod: 26,MH

## 2023-01-01 PROCEDURE — 99215 OFFICE O/P EST HI 40 MIN: CPT

## 2023-01-01 PROCEDURE — 71260 CT THORAX DX C+: CPT

## 2023-01-01 PROCEDURE — 72131 CT LUMBAR SPINE W/O DYE: CPT | Mod: MH

## 2023-01-01 PROCEDURE — 72141 MRI NECK SPINE W/O DYE: CPT | Mod: 26,MH

## 2023-01-01 PROCEDURE — 77067 SCR MAMMO BI INCL CAD: CPT

## 2023-01-01 PROCEDURE — 96372 THER/PROPH/DIAG INJ SC/IM: CPT

## 2023-01-01 PROCEDURE — 72148 MRI LUMBAR SPINE W/O DYE: CPT | Mod: 26,MH

## 2023-01-01 PROCEDURE — 76376 3D RENDER W/INTRP POSTPROCES: CPT | Mod: 26

## 2023-01-01 PROCEDURE — 76700 US EXAM ABDOM COMPLETE: CPT | Mod: 26

## 2023-01-01 PROCEDURE — 71260 CT THORAX DX C+: CPT | Mod: 26,MH

## 2023-01-01 PROCEDURE — 77067 SCR MAMMO BI INCL CAD: CPT | Mod: 26

## 2023-01-01 PROCEDURE — 77063 BREAST TOMOSYNTHESIS BI: CPT

## 2023-01-01 PROCEDURE — 99214 OFFICE O/P EST MOD 30 MIN: CPT | Mod: 25

## 2023-01-01 PROCEDURE — G0008: CPT

## 2023-01-01 PROCEDURE — 78815 PET IMAGE W/CT SKULL-THIGH: CPT | Mod: 26,PI,MH

## 2023-01-01 PROCEDURE — 74177 CT ABD & PELVIS W/CONTRAST: CPT

## 2023-01-01 PROCEDURE — 96401 CHEMO ANTI-NEOPL SQ/IM: CPT

## 2023-01-01 PROCEDURE — 72141 MRI NECK SPINE W/O DYE: CPT | Mod: MH

## 2023-01-01 PROCEDURE — 99214 OFFICE O/P EST MOD 30 MIN: CPT

## 2023-01-01 PROCEDURE — 77063 BREAST TOMOSYNTHESIS BI: CPT | Mod: 26

## 2023-01-01 PROCEDURE — 76376 3D RENDER W/INTRP POSTPROCES: CPT

## 2023-01-01 PROCEDURE — 93000 ELECTROCARDIOGRAM COMPLETE: CPT

## 2023-01-01 PROCEDURE — A9552: CPT

## 2023-01-01 PROCEDURE — 78815 PET IMAGE W/CT SKULL-THIGH: CPT

## 2023-01-01 PROCEDURE — 76700 US EXAM ABDOM COMPLETE: CPT

## 2023-01-01 PROCEDURE — 72148 MRI LUMBAR SPINE W/O DYE: CPT | Mod: MH

## 2023-01-01 PROCEDURE — 74177 CT ABD & PELVIS W/CONTRAST: CPT | Mod: 26,MH

## 2023-01-01 PROCEDURE — 90662 IIV NO PRSV INCREASED AG IM: CPT

## 2023-01-01 PROCEDURE — 99213 OFFICE O/P EST LOW 20 MIN: CPT

## 2023-01-01 RX ORDER — QUETIAPINE 200 MG/1
200 TABLET, FILM COATED ORAL
Refills: 0 | Status: ACTIVE | COMMUNITY

## 2023-01-01 RX ORDER — LISDEXAMFETAMINE DIMESYLATE 60 MG/1
60 CAPSULE ORAL
Refills: 0 | Status: DISCONTINUED | COMMUNITY
End: 2023-01-01

## 2023-01-01 RX ORDER — PANTOPRAZOLE SODIUM 40 MG/1
40 TABLET, DELAYED RELEASE ORAL
Refills: 0 | Status: DISCONTINUED | COMMUNITY
End: 2023-01-01

## 2023-01-01 RX ORDER — OXYCODONE 10 MG/1
10 TABLET ORAL
Refills: 0 | Status: DISCONTINUED | COMMUNITY
Start: 2022-01-19 | End: 2023-01-01

## 2023-01-01 RX ORDER — ZOSTER VACCINE RECOMBINANT, ADJUVANTED 50 MCG/0.5
50 KIT INTRAMUSCULAR
Qty: 1 | Refills: 1 | Status: DISCONTINUED | COMMUNITY
Start: 2022-04-29 | End: 2023-01-01

## 2023-01-01 RX ORDER — ESOMEPRAZOLE MAGNESIUM 40 MG/1
40 CAPSULE, DELAYED RELEASE ORAL
Refills: 0 | Status: ACTIVE | COMMUNITY

## 2023-01-01 RX ORDER — DENOSUMAB 60 MG/ML
60 INJECTION SUBCUTANEOUS
Qty: 1 | Refills: 0 | Status: COMPLETED | OUTPATIENT
Start: 2023-01-01

## 2023-01-01 RX ORDER — VORTIOXETINE 10 MG/1
10 TABLET, FILM COATED ORAL
Refills: 0 | Status: DISCONTINUED | COMMUNITY
End: 2023-01-01

## 2023-01-01 RX ORDER — LISDEXAMFETAMINE 50 MG/1
50 CAPSULE ORAL DAILY
Qty: 1 | Refills: 0 | Status: ACTIVE | COMMUNITY
Start: 2023-01-01

## 2023-01-01 RX ORDER — DENOSUMAB 60 MG/ML
60 INJECTION SUBCUTANEOUS
Qty: 1 | Refills: 5 | Status: ACTIVE | COMMUNITY
Start: 2023-01-01 | End: 1900-01-01

## 2023-01-01 RX ORDER — OXYCODONE AND ACETAMINOPHEN 5; 325 MG/1; MG/1
5-325 TABLET ORAL
Refills: 0 | Status: DISCONTINUED | COMMUNITY
Start: 2021-09-09 | End: 2023-01-01

## 2023-01-01 RX ORDER — GABAPENTIN 600 MG/1
600 TABLET, COATED ORAL DAILY
Qty: 90 | Refills: 0 | Status: DISCONTINUED | COMMUNITY
Start: 2021-11-03 | End: 2023-01-01

## 2023-01-01 RX ORDER — FLUCONAZOLE 200 MG/1
200 TABLET ORAL DAILY
Qty: 60 | Refills: 0 | Status: DISCONTINUED | COMMUNITY
Start: 2019-11-20 | End: 2023-01-01

## 2023-01-01 RX ADMIN — DENOSUMAB 0 MG/ML: 60 INJECTION SUBCUTANEOUS at 00:00

## 2023-01-03 ENCOUNTER — APPOINTMENT (OUTPATIENT)
Dept: OTOLARYNGOLOGY | Facility: CLINIC | Age: 68
End: 2023-01-03
Payer: MEDICARE

## 2023-01-03 VITALS
WEIGHT: 225 LBS | DIASTOLIC BLOOD PRESSURE: 77 MMHG | BODY MASS INDEX: 38.41 KG/M2 | HEART RATE: 95 BPM | SYSTOLIC BLOOD PRESSURE: 154 MMHG | HEIGHT: 64 IN

## 2023-01-03 DIAGNOSIS — K22.4 DYSKINESIA OF ESOPHAGUS: ICD-10-CM

## 2023-01-03 PROCEDURE — 99204 OFFICE O/P NEW MOD 45 MIN: CPT

## 2023-01-03 RX ORDER — PANTOPRAZOLE 40 MG/1
40 TABLET, DELAYED RELEASE ORAL TWICE DAILY
Qty: 180 | Refills: 3 | Status: COMPLETED | COMMUNITY
Start: 2021-12-06 | End: 2023-01-03

## 2023-01-03 RX ORDER — ONABOTULINUMTOXINA 100 [USP'U]/1
100 INJECTION, POWDER, LYOPHILIZED, FOR SOLUTION INTRADERMAL; INTRAMUSCULAR
Qty: 3 | Refills: 0 | Status: COMPLETED | COMMUNITY
Start: 2021-03-17 | End: 2023-01-03

## 2023-01-03 NOTE — HISTORY OF PRESENT ILLNESS
[de-identified] : Pt with hx of Chronic lymphocytic leukemia f/u by Dr. Nguyen and is referred by Dr. Paul for tongue lesion. pt states pt has 2 lesions, on of the left posterior tongue for 2 yrs, no bx done and also new lesion of the right front tongue lesion about 3 months, both are painful, no bleeding. \par The lesions on and off and treated with cream and injection with steroid with some what improved, but still there. pt went to dentist and filled the tooth down on the left side, but still not resolved. \par \par never smoke\par \par 
PAST MEDICAL HISTORY:  Diabetes mellitus     Endometrial cancer     Hepatitis C ~2017, treated

## 2023-01-03 NOTE — PHYSICAL EXAM
[FreeTextEntry1] : Small superficial well defined ulcer with slight TTP along the R. lateral tongue, about 2-3mm.  There is another similar lesion along the L. posterior tongue/FOM, which is 3-4 mm.   [Normal] : no rashes

## 2023-01-05 ENCOUNTER — APPOINTMENT (OUTPATIENT)
Dept: INFECTIOUS DISEASE | Facility: CLINIC | Age: 68
End: 2023-01-05
Payer: MEDICARE

## 2023-01-05 VITALS
OXYGEN SATURATION: 96 % | TEMPERATURE: 97.6 F | BODY MASS INDEX: 37.56 KG/M2 | HEIGHT: 64 IN | HEART RATE: 91 BPM | WEIGHT: 220 LBS | DIASTOLIC BLOOD PRESSURE: 89 MMHG | SYSTOLIC BLOOD PRESSURE: 144 MMHG

## 2023-01-05 DIAGNOSIS — K21.9 GASTRO-ESOPHAGEAL REFLUX DISEASE W/OUT ESOPHAGITIS: ICD-10-CM

## 2023-01-05 DIAGNOSIS — K14.8 OTHER DISEASES OF TONGUE: ICD-10-CM

## 2023-01-05 PROCEDURE — 99213 OFFICE O/P EST LOW 20 MIN: CPT

## 2023-01-05 NOTE — HISTORY OF PRESENT ILLNESS
[FreeTextEntry1] : Cristian powell\par has oral lesions evaluated - not cancer\par notes GERD,\par She is doing well  after undergoing Left TKR on 11/10/22 - she is relieved to be free from knee pain and is walking better, no longer anxious about falling\par She has continued to lose wieght - currenlty 220# BMI - 37.36\par She sees Hematology about every 6 months to evaluate CLL\par \par As noted, Ms Damico was hospitalized [facility] 9/27 --> 10/3/22:\par 67F with HTN, psoriasis, psoriatic arthritis, CLL (Stage 1 - no active chemo), JAEN, bipolar, anemia, h/o PE (not on AC), obesity s/p alexandria-en-y bypass with revision MRSA infection of abd 2009 and prepatellar bursitis due to MSSA 2013.\par After fall on 9/6/22 with RLE injury, she was given doxy for 2 weeks and small aspiration at [facility] (no culture done as per the patient) but still had edema and erythema, then was given clinda which she took for a few days and admitted 9/27/22 via ER \par She has severe degeneration of left knee with gait instability, multiple falls, rib fractures\par Had fall 9/6/22 and injured right ant tib region - took antibiotics 2 weeks of doxy and a few days clinda, still with area of firmness, edema, discoloration and tenderness, --no abscess on most recent CT scan\par remote h/o MRSA and MSSA in the past, but 9/27/22 MRSA/MSSA PCR NEGATIVE \par swallowing disturbance - esophageal dysmotility and reflux\par she states bronchiectasis was noted on previous imaging\par history of thrush\par 10/1/22 ProCalcitonin <0.03, \par 10/1 Quantitative Immunoglobulins wnl IgG= 699; IgM =144 IgA = 116\par S/P IV Vanco 9/27 --10/3\par NO Active infection\par No additional antibiotics required\par  from ID perspective, there is no contraindication to performing TKR

## 2023-01-05 NOTE — ASSESSMENT
[FreeTextEntry1] : doing well\par \par asked to STOP FLUCONAZOLE for history of thrush and contact me if needed

## 2023-01-05 NOTE — PHYSICAL EXAM
[General Appearance - Alert] : alert [General Appearance - In No Acute Distress] : in no acute distress [FreeTextEntry1] : ambulates short distances without assistive device - short gait, waddles upper body

## 2023-02-24 ENCOUNTER — LABORATORY RESULT (OUTPATIENT)
Age: 68
End: 2023-02-24

## 2023-02-24 ENCOUNTER — NON-APPOINTMENT (OUTPATIENT)
Age: 68
End: 2023-02-24

## 2023-03-09 PROBLEM — K43.2 INCISIONAL HERNIA, WITHOUT OBSTRUCTION OR GANGRENE: Status: ACTIVE | Noted: 2020-07-09

## 2023-03-09 NOTE — PHYSICAL EXAM
[Normal Thyroid] : the thyroid was normal [Normal Breath Sounds] : Normal breath sounds [Normal Heart Sounds] : normal heart sounds [Normal Rate and Rhythm] : normal rate and rhythm [No HSM] : no hepatosplenomegaly [No Rash or Lesion] : No rash or lesion [Alert] : alert [Oriented to Person] : oriented to person [Oriented to Place] : oriented to place [Oriented to Time] : oriented to time [Calm] : calm [de-identified] : NAD [de-identified] : mucous membranes moist  [de-identified] : abdomen soft nontender nondistended [de-identified] : no CVAT [de-identified] : grossly intact uses cane

## 2023-03-09 NOTE — HISTORY OF PRESENT ILLNESS
[de-identified] : Ms. SHANTEL CRUZ is a 65 year-old woman who presented with an incisional hernia s/p incisional hernia repair with implantation of mesh, posterior component separation bilateral myofascial flap advancement with intact neurovascular bundle, bilateral skin flaps greater than 100cm, implantation of OviTex mesh bridge, placement of wound vacuum-assisted closure, and scar excision on 5/12/2021 who comes in today for follow up visit. Doing well. No fever/chills/abdominal pain. CLL being followed by hem/onc, stable. Knee replacement done at [facility] (11/10/2022), doing well since, lost 11#.\par Very encouraged by increased mobility and weight loss\par Dealing with osteopenia as well as CLL which may require chemotherapy [de-identified] : Long limb Slois-en-Y gastric bypass (6/27/2001) preop weight: 391#\par Revision Solis-en-Y gastric bypass with transoral gastric plication (5/25/2010)

## 2023-03-09 NOTE — PLAN
[FreeTextEntry1] : Plan:\par Continue high-protein low-carb diet\par Increase activities as tolerated\par Continue vitamins daily\par Calcium supplements\par Treatment of osteoporosis per rheumatology\par Treatment of CLL per hematology\par Follow-up 6 months

## 2023-04-08 NOTE — CHART NOTE - NSCHARTNOTEFT_GEN_A_CORE
Notified by RN that patient hypotensive to mid 80s/50s.    Evaluated patient at bedside.  Lying comfortably in bed with no acute complaints. Denies chest pain, abd pain, dizziness or SOB.  Manual BP 80/60, HR 70s  Abdomen soft, nontender  UOP 1475cc since OR, 620cc overnight, clear yellow urine  50cc serosang GABRIELLA drain   Patient with acute hypotension in the postop period, however stable, mentating well in no distress with a benign abdominal exam. Suspect hypotension is due to PCEA. Low suspicion for bleeding given adequate UOP and low GABRIELLA output.  Will administer 500cc IVF bolus and check stat labs.    Discussed with senior resident.    [facility]  [id number] - - -

## 2023-04-10 PROBLEM — L02.415 ABSCESS OF LEG, RIGHT: Status: RESOLVED | Noted: 2022-09-27 | Resolved: 2023-01-01

## 2023-04-10 PROBLEM — E66.9 OBESITY, CLASS II, BMI 35-39.9: Status: ACTIVE | Noted: 2023-01-01

## 2023-04-11 PROBLEM — M85.80 OSTEOPENIA: Noted: 2023-01-01

## 2023-04-11 PROBLEM — J18.9 PNEUMONIA: Status: ACTIVE | Noted: 2023-01-01

## 2023-04-11 NOTE — HISTORY OF PRESENT ILLNESS
[FreeTextEntry1] : f/u medical issues [de-identified] : 68 yo with multiple medical problems. After last visit pt sent to hospital for IV ATBs for deep abscess, then had TKR, surgery went well. Pt still doing PT. Working on weight loss and exercising more.\par Recent dx of PNA after URI, chest x-ray, completed course of  Augmentin and then doxycycline. Now doing better. Cough is gone.\par Lesions on tongue were gone, now back, did steroid injection and topical to both lesions, has f/u. Bx not cancer. \par Swallowing is ok, Nexium is better than Protonix, pt also doing positional and other things to do for this. \par Had some manic days last month, still following with psych, meds were changed. \par GaBapentin changed to lyrica, pt not really using the oxycodone.\par \par

## 2023-04-11 NOTE — PHYSICAL EXAM
[No Acute Distress] : no acute distress [No Respiratory Distress] : no respiratory distress  [No Accessory Muscle Use] : no accessory muscle use [Normal Percussion] : the chest was normal to percussion [Normal Gait] : normal gait [Normal] : affect was normal and insight and judgment were intact [de-identified] : obese [de-identified] : scattered rhonchi

## 2023-05-24 NOTE — ED ADULT TRIAGE NOTE - SOURCE OF INFORMATION
Patient (s)  given copy of dc instructions and 2 script(s). Patient (s)  verbalized understanding of instructions and script (s). Patient given a current medication reconciliation form and verbalized understanding of their medications. Patient (s) verbalized understanding of the importance of discussing medications with his or her physician or clinic they will be following up with. Patient alert and oriented and in no acute distress. Patient discharged home ambulatory with self.       Rik Bonilla RN  05/24/23 [id number] Patient

## 2023-06-10 NOTE — PHYSICAL EXAM
[Obese] : obese [Normal] : affect appropriate [Restricted in physically strenuous activity but ambulatory and able to carry out work of a light or sedentary nature] : Status 1- Restricted in physically strenuous activity but ambulatory and able to carry out work of a light or sedentary nature, e.g., light house work, office work [de-identified] : ambulates with difficulty [de-identified] : Left side tongue lesion [de-identified] : s/p hernia repair; excess pannus; no open wounds [de-identified] : s/p L TKR; scar well healed [de-identified] : old scars abdomen

## 2023-06-10 NOTE — HISTORY OF PRESENT ILLNESS
[de-identified] : 67F with PMHx of psoriasis, psoriatic arthritis, on Otezla, GERD, hypertension, obstructive sleep apnea, bipolar disorder, hiatal hernia and morbid obesity, s/p revision Solis-en-Y gastric bypass and transoral gastric plication (for weight 421 lbs), returning for hematologic follow-up of  CLL and anemia.\par \par CASE SYNOPSIS:\par 10/22/2021: CT A/P–postoperative changes, s/p gastric bypass with reflux into the aferrent limb.  No evidence of bowel obstruction, no evidence of recurrent ventral hernia, no intra-abdominal lymphadenopathy or splenomegaly.\par \par 10/23/2021: CT neck soft tissue-mildly enlarged diffuse prominent size cervical and axillary lymph nodes of uncertain etiology.  Lymphoproliferative etiology should be considered\par \par 1/6/2022: Bone marrow biopsy/aspirate–CLL/small lymphocytic lymphoma (60-70% involvement).  Iron stores present, no ringed sideroblasts seen.  Flow cytometry: Monotypic B cells (91.5% of lymphocytes, 40.4% of cells), positive for dim kappa, CD19, dim CD20, CD23, CD5, CD38 positive and 18.7% of cells consistent with SLL/CLL.  The myeloid immunophenotypic findings show normal myeloid granularity, no increase in myeloid immaturity and normal myeloid antigen maturation pattern.  Cytogenetics–no mutations identified.  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 deletion and 12%, FAHEEM deletion in 15.5%.\par \par 8/2/2022: PET–minimal to no significant FDG uptake numerous subcentimeter and enlarged multistation cervical lymph nodes, left supraclavicular lymph node, bilateral axillary lymph nodes and bilateral iliac and inguinal lymph nodes.  Cervical and left cervical lymphadenopathy without significant change in size when compared with CT neck 7/13/2022, but increased in size when compared to CT neck 10/11/22.\par \par [facility]10/[facility]- [facility]par \par 3/20/2023 open MRI cervical spine: multilevel cervical discogenic spondylosis.  Bilateral cervical adenopathy likely related to CLL.  Correlation with patient's clinical symptoms suggested.\par  [FreeTextEntry1] : expectant surveillance\par \par  [de-identified] :  has recovered after November 2022 left TKR and has lost significant weight, allowing more mobility.  No longer coming in the wheelchair, but continues to use assistive devices.  Surgery was a success, and the patient is no longer on painkillers.  On March 20, 2023 she had MRI cervical spine c/w multilevel cervical discogenic spondylosis.  Bilateral cervical adenopathy likely related to CLL.  Correlation with patient's clinical symptoms suggested.\par On 11/3/2022, patient's mammogram/breast US also visualized an enlarged left axillary lymph node consistent with known CLL.\par Patient denies B symptoms and in fact she is feeling more energetic and is more active.  Her hematologic picture however shows a steady increase in WBC in the past 6-month, from 35 K to 56 K, with predominant lymphocytosis.\par \par

## 2023-06-10 NOTE — REVIEW OF SYSTEMS
[Fatigue] : fatigue [Recent Change In Weight] : ~T recent weight change [Nosebleeds] : nosebleeds [Fever] : no fever [Chills] : no chills [Night Sweats] : no night sweats [Joint Pain] : no joint pain [Easy Bleeding] : no tendency for easy bleeding [Easy Bruising] : no tendency for easy bruising [Negative] : ENT [FreeTextEntry2] : ambulates with cane; lost weight postoperative [FreeTextEntry7] : s/p abdominal surgery; present incisional hernia [FreeTextEntry9] : no postoperative knee pain; much improved ambulatory capacity

## 2023-07-27 NOTE — REASON FOR VISIT
[Follow-Up Visit] : a follow-up visit for [CLL] : chronic lymphocytic leukemia [Spouse] : spouse [Family Member] : family member
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
## 2023-07-28 NOTE — REVIEW OF SYSTEMS
[Fatigue] : fatigue [Recent Change In Weight] : ~T recent weight change [Nosebleeds] : nosebleeds [Negative] : Allergic/Immunologic [Fever] : no fever [Chills] : no chills [Night Sweats] : no night sweats [Joint Pain] : no joint pain [Easy Bleeding] : no tendency for easy bleeding [Easy Bruising] : no tendency for easy bruising [FreeTextEntry2] : ambulates with cane; lost weight postoperative [FreeTextEntry7] : s/p abdominal surgery; present incisional hernia [FreeTextEntry9] : Left chest wall pain

## 2023-07-28 NOTE — PHYSICAL EXAM
[Restricted in physically strenuous activity but ambulatory and able to carry out work of a light or sedentary nature] : Status 1- Restricted in physically strenuous activity but ambulatory and able to carry out work of a light or sedentary nature, e.g., light house work, office work [Obese] : obese [Normal] : affect appropriate [de-identified] : ambulates with difficulty [de-identified] : Left side tongue lesion [de-identified] : s/p hernia repair; excess pannus; no open wounds [de-identified] : s/p L TKR; scar well healed [de-identified] : old scars abdomen

## 2023-08-18 NOTE — HISTORY OF PRESENT ILLNESS
[de-identified] : 66 F with PMHx of psoriasis, psoriatic arthritis, on Otezla, GERD, hypertension, obstructive sleep apnea, bipolar disorder, hiatal hernia and morbid obesity, s/p revision Solis-en-Y gastric bypass and transoral gastric plication (for weight 421 lbs), returning for hematologic follow-up of leukocytosis and anemia.\par  [FreeTextEntry1] : expectant surveillance\par \par  [de-identified] : Clinical status unchanged since her last visit in July 2021.  During this interval she has been experiencing chronic knee pain, as well as discomfort below incision of hernia repair.  She was evaluated by Dr. Diaz (surgery) who recommended a CT of the abdomen to rule out recurrent hernia.  Hematologic picture consistent with globin stable at 10.5 g/dL, WBC 17.6 with 54% lymphocytes.  Patient denies fever or recurrent infections.Continues followup with psychiatrist as she remains anxious and depressed.  Unaccompanied by family. needs device

## 2023-09-06 PROBLEM — Z13.29 SCREENING FOR ENDOCRINE, NUTRITIONAL, METABOLIC AND IMMUNITY DISORDER: Status: ACTIVE | Noted: 2023-01-01

## 2023-09-06 PROBLEM — Z13.21 ENCOUNTER FOR VITAMIN DEFICIENCY SCREENING: Status: ACTIVE | Noted: 2023-01-01

## 2023-09-07 NOTE — PLAN
[FreeTextEntry1] : Plan: Continue high-protein low-carb diet Avoid sugar and starch Dietary counseling provided Increase activities as tolerated, no contraindication to abdominal/core exercises  Continue vitamins daily Follow-up 6 months

## 2023-09-07 NOTE — HISTORY OF PRESENT ILLNESS
[de-identified] : Ms. SHANTEL CRUZ is a 65-year-old woman who presented with an incisional hernia s/p incisional hernia repair with implantation of mesh, posterior component separation bilateral myofascial flap advancement with intact neurovascular bundle, bilateral skin flaps greater than 100cm, implantation of OviTex mesh bridge, placement of wound vacuum-assisted closure, and scar excision on 5/12/2021 who comes in today for follow up visit. Denies abdominal pain/fever/chills; off all assistive devices s/p knee replacement. Continuing PT. CLL diagnosis with poor progression. Concerns regarding new onset abdominal pain questionably associated with the bulge.  No GI obstructive symptoms Tolerating diet as usual [de-identified] : Long limb Solis-en-Y gastric bypass (6/27/2001) preop weight: 391#\par  Revision Solis-en-Y gastric bypass with transoral gastric plication (5/25/2010)

## 2023-09-07 NOTE — PHYSICAL EXAM
[Normal Thyroid] : the thyroid was normal [Normal Breath Sounds] : Normal breath sounds [Normal Heart Sounds] : normal heart sounds [Normal Rate and Rhythm] : normal rate and rhythm [No HSM] : no hepatosplenomegaly [No Rash or Lesion] : No rash or lesion [Alert] : alert [Oriented to Person] : oriented to person [Oriented to Place] : oriented to place [Oriented to Time] : oriented to time [Calm] : calm [de-identified] : NAD  [de-identified] : anicteric, mucous membranes moist [de-identified] : abdomen soft nontender nondistended no palpable hernia [de-identified] : grossly intact

## 2023-09-07 NOTE — ASSESSMENT
[FreeTextEntry1] : 24 years status post gastric bypass for morbid obesity and more recent repair of the incisional hernia doing well.  No change in physical exam today.   Previous visit weight: 222 Today's weight:226

## 2023-10-11 PROBLEM — M81.0 OSTEOPOROSIS: Status: ACTIVE | Noted: 2023-01-01

## 2023-10-24 PROBLEM — Z23 ENCOUNTER FOR IMMUNIZATION: Status: ACTIVE | Noted: 2023-01-01 | Resolved: 2023-01-01

## 2023-10-24 PROBLEM — R05.9 COUGH: Status: ACTIVE | Noted: 2023-01-01

## 2023-12-21 PROBLEM — R19.7 DIARRHEA, UNSPECIFIED TYPE: Status: ACTIVE | Noted: 2023-01-01

## 2023-12-21 PROBLEM — R07.89 CHEST TIGHTNESS: Status: ACTIVE | Noted: 2023-01-01

## 2023-12-21 NOTE — REVIEW OF SYSTEMS
[Fatigue] : fatigue [Abdominal Pain] : abdominal pain [Nausea] : no nausea [Constipation] : no constipation [Diarrhea] : diarrhea [Vomiting] : no vomiting [Heartburn] : heartburn [Negative] : Respiratory

## 2023-12-21 NOTE — ASSESSMENT
[FreeTextEntry1] : 1) Diarrhea - getting better , but still an issue  - BRAT diet , avoid dairy  - check CBC / CMP , TSH  - stool WBC / C.S  - Covid test / Rotavirus - has young grandkids , not sick though   2) chest tightness - given hx, most likely esophageal  - will get EKG

## 2023-12-21 NOTE — HISTORY OF PRESENT ILLNESS
[FreeTextEntry8] : here for  acute visit  main issue is that she has had diarrhea that started 2 weeks ago , started quite suddenly , multiple watery movements / day - over the last 6 days stools have become soft , a little less frequent  no relation to meals- not eating does not make it better mild abd discomfort , no real pain  no fever , but felt feverish and achy  no blood in stool  no travel , no new meds , no sick contact , no recent antibiotic use  feels very bloated, has some LUQ pain   also has had 2 episodes of chest tightness, both while laying down , and on both occasions, NTG helped - she has had it for ? esphageal dismotility - however says pain is different this time no exertional chest pressure   pt is also demi anxious 0  h/o CLL - recently her WBC have gone up and she ha had an increase in her  lymphadenopathy

## 2023-12-21 NOTE — PHYSICAL EXAM
[No Acute Distress] : no acute distress [Well Nourished] : well nourished [Normal Voice/Communication] : normal voice/communication [Normal Outer Ear/Nose] : the outer ears and nose were normal in appearance [Normal] : normal rate, regular rhythm, normal S1 and S2 and no murmur heard [Soft] : abdomen soft [No HSM] : no HSM [Normal Bowel Sounds] : normal bowel sounds [de-identified] : (+) sx scar / mild LUQ tenderness -- inconsistent

## 2024-01-01 ENCOUNTER — OUTPATIENT (OUTPATIENT)
Dept: OUTPATIENT SERVICES | Facility: HOSPITAL | Age: 69
LOS: 1 days | Discharge: HOME | End: 2024-01-01

## 2024-01-01 ENCOUNTER — APPOINTMENT (OUTPATIENT)
Dept: HEMATOLOGY ONCOLOGY | Facility: CLINIC | Age: 69
End: 2024-01-01
Payer: MEDICARE

## 2024-01-01 ENCOUNTER — RESULT REVIEW (OUTPATIENT)
Age: 69
End: 2024-01-01

## 2024-01-01 ENCOUNTER — NON-APPOINTMENT (OUTPATIENT)
Age: 69
End: 2024-01-01

## 2024-01-01 ENCOUNTER — RX RENEWAL (OUTPATIENT)
Age: 69
End: 2024-01-01

## 2024-01-01 ENCOUNTER — APPOINTMENT (OUTPATIENT)
Dept: INFUSION THERAPY | Facility: HOSPITAL | Age: 69
End: 2024-01-01

## 2024-01-01 ENCOUNTER — APPOINTMENT (OUTPATIENT)
Dept: HEMATOLOGY ONCOLOGY | Facility: CLINIC | Age: 69
End: 2024-01-01

## 2024-01-01 ENCOUNTER — OUTPATIENT (OUTPATIENT)
Dept: OUTPATIENT SERVICES | Facility: HOSPITAL | Age: 69
LOS: 1 days | End: 2024-01-01

## 2024-01-01 ENCOUNTER — INPATIENT (INPATIENT)
Facility: HOSPITAL | Age: 69
LOS: 4 days | Discharge: HOME | DRG: 841 | End: 2024-02-29
Attending: INTERNAL MEDICINE | Admitting: STUDENT IN AN ORGANIZED HEALTH CARE EDUCATION/TRAINING PROGRAM
Payer: MEDICARE

## 2024-01-01 ENCOUNTER — LABORATORY RESULT (OUTPATIENT)
Age: 69
End: 2024-01-01

## 2024-01-01 ENCOUNTER — APPOINTMENT (OUTPATIENT)
Dept: SURGERY | Facility: CLINIC | Age: 69
End: 2024-01-01
Payer: MEDICARE

## 2024-01-01 ENCOUNTER — TRANSCRIPTION ENCOUNTER (OUTPATIENT)
Age: 69
End: 2024-01-01

## 2024-01-01 VITALS
TEMPERATURE: 97.1 F | OXYGEN SATURATION: 99 % | DIASTOLIC BLOOD PRESSURE: 82 MMHG | SYSTOLIC BLOOD PRESSURE: 146 MMHG | RESPIRATION RATE: 18 BRPM | HEART RATE: 86 BPM | BODY MASS INDEX: 38.37 KG/M2 | HEIGHT: 65 IN | WEIGHT: 230.31 LBS

## 2024-01-01 VITALS
DIASTOLIC BLOOD PRESSURE: 84 MMHG | HEIGHT: 65 IN | TEMPERATURE: 98 F | RESPIRATION RATE: 19 BRPM | HEART RATE: 85 BPM | SYSTOLIC BLOOD PRESSURE: 141 MMHG | OXYGEN SATURATION: 96 % | WEIGHT: 229.94 LBS

## 2024-01-01 VITALS
SYSTOLIC BLOOD PRESSURE: 147 MMHG | DIASTOLIC BLOOD PRESSURE: 80 MMHG | TEMPERATURE: 98 F | RESPIRATION RATE: 18 BRPM | OXYGEN SATURATION: 94 % | HEART RATE: 73 BPM

## 2024-01-01 DIAGNOSIS — F41.9 ANXIETY DISORDER, UNSPECIFIED: ICD-10-CM

## 2024-01-01 DIAGNOSIS — C91.10 CHRONIC LYMPHOCYTIC LEUKEMIA OF B-CELL TYPE NOT HAVING ACHIEVED REMISSION: ICD-10-CM

## 2024-01-01 DIAGNOSIS — B99.9 UNSPECIFIED INFECTIOUS DISEASE: ICD-10-CM

## 2024-01-01 DIAGNOSIS — G47.33 OBSTRUCTIVE SLEEP APNEA (ADULT) (PEDIATRIC): ICD-10-CM

## 2024-01-01 DIAGNOSIS — Z98.84 BARIATRIC SURGERY STATUS: ICD-10-CM

## 2024-01-01 DIAGNOSIS — Z51.11 ENCOUNTER FOR ANTINEOPLASTIC CHEMOTHERAPY: ICD-10-CM

## 2024-01-01 DIAGNOSIS — R11.2 NAUSEA WITH VOMITING, UNSPECIFIED: ICD-10-CM

## 2024-01-01 DIAGNOSIS — D50.9 IRON DEFICIENCY ANEMIA, UNSPECIFIED: ICD-10-CM

## 2024-01-01 DIAGNOSIS — I10 ESSENTIAL (PRIMARY) HYPERTENSION: ICD-10-CM

## 2024-01-01 DIAGNOSIS — Z29.9 ENCOUNTER FOR PROPHYLACTIC MEASURES, UNSPECIFIED: ICD-10-CM

## 2024-01-01 DIAGNOSIS — D64.9 ANEMIA, UNSPECIFIED: ICD-10-CM

## 2024-01-01 DIAGNOSIS — F39 UNSPECIFIED MOOD [AFFECTIVE] DISORDER: ICD-10-CM

## 2024-01-01 DIAGNOSIS — K21.9 GASTRO-ESOPHAGEAL REFLUX DISEASE WITHOUT ESOPHAGITIS: ICD-10-CM

## 2024-01-01 DIAGNOSIS — L40.9 PSORIASIS, UNSPECIFIED: ICD-10-CM

## 2024-01-01 DIAGNOSIS — M79.675 PAIN IN LEFT TOE(S): ICD-10-CM

## 2024-01-01 DIAGNOSIS — D59.10 AUTOIMMUNE HEMOLYTIC ANEMIA, UNSPECIFIED: ICD-10-CM

## 2024-01-01 DIAGNOSIS — Z51.89 ENCOUNTER FOR OTHER SPECIFIED AFTERCARE: ICD-10-CM

## 2024-01-01 DIAGNOSIS — R32 UNSPECIFIED URINARY INCONTINENCE: ICD-10-CM

## 2024-01-01 LAB
ADD ON TEST-SPECIMEN IN LAB: SIGNIFICANT CHANGE UP
ALBUMIN SERPL ELPH-MCNC: 3.7 G/DL — SIGNIFICANT CHANGE UP (ref 3.3–5)
ALBUMIN SERPL ELPH-MCNC: 3.9 G/DL — SIGNIFICANT CHANGE UP (ref 3.3–5)
ALBUMIN SERPL ELPH-MCNC: 4 G/DL — SIGNIFICANT CHANGE UP (ref 3.3–5)
ALBUMIN SERPL ELPH-MCNC: 4 G/DL — SIGNIFICANT CHANGE UP (ref 3.3–5)
ALBUMIN SERPL ELPH-MCNC: 4.1 G/DL — SIGNIFICANT CHANGE UP (ref 3.3–5)
ALBUMIN SERPL ELPH-MCNC: 4.3 G/DL — SIGNIFICANT CHANGE UP (ref 3.3–5)
ALBUMIN SERPL ELPH-MCNC: 4.4 G/DL
ALP BLD-CCNC: 76 U/L
ALP SERPL-CCNC: 72 U/L — SIGNIFICANT CHANGE UP (ref 40–120)
ALP SERPL-CCNC: 73 U/L — SIGNIFICANT CHANGE UP (ref 40–120)
ALP SERPL-CCNC: 74 U/L — SIGNIFICANT CHANGE UP (ref 40–120)
ALP SERPL-CCNC: 78 U/L — SIGNIFICANT CHANGE UP (ref 40–120)
ALP SERPL-CCNC: 80 U/L — SIGNIFICANT CHANGE UP (ref 40–120)
ALP SERPL-CCNC: 81 U/L — SIGNIFICANT CHANGE UP (ref 40–120)
ALT FLD-CCNC: 14 U/L — SIGNIFICANT CHANGE UP (ref 10–45)
ALT FLD-CCNC: 16 U/L — SIGNIFICANT CHANGE UP (ref 10–45)
ALT FLD-CCNC: 19 U/L — SIGNIFICANT CHANGE UP (ref 10–45)
ALT FLD-CCNC: 19 U/L — SIGNIFICANT CHANGE UP (ref 10–45)
ALT FLD-CCNC: 20 U/L — SIGNIFICANT CHANGE UP (ref 10–45)
ALT FLD-CCNC: 27 U/L — SIGNIFICANT CHANGE UP (ref 10–45)
ALT SERPL-CCNC: 13 U/L
ANION GAP SERPL CALC-SCNC: 10 MMOL/L — SIGNIFICANT CHANGE UP (ref 5–17)
ANION GAP SERPL CALC-SCNC: 10 MMOL/L — SIGNIFICANT CHANGE UP (ref 5–17)
ANION GAP SERPL CALC-SCNC: 11 MMOL/L
ANION GAP SERPL CALC-SCNC: 12 MMOL/L — SIGNIFICANT CHANGE UP (ref 5–17)
ANION GAP SERPL CALC-SCNC: 13 MMOL/L
ANION GAP SERPL CALC-SCNC: 13 MMOL/L — SIGNIFICANT CHANGE UP (ref 5–17)
ANION GAP SERPL CALC-SCNC: 16 MMOL/L — SIGNIFICANT CHANGE UP (ref 5–17)
ANION GAP SERPL CALC-SCNC: 9 MMOL/L — SIGNIFICANT CHANGE UP (ref 5–17)
ANISOCYTOSIS BLD QL: SIGNIFICANT CHANGE UP
ANISOCYTOSIS BLD QL: SLIGHT — SIGNIFICANT CHANGE UP
APPEARANCE UR: CLEAR — SIGNIFICANT CHANGE UP
APTT BLD: 27.2 SEC — SIGNIFICANT CHANGE UP (ref 24.5–35.6)
APTT BLD: 30.1 SEC — SIGNIFICANT CHANGE UP (ref 24.5–35.6)
AST SERPL-CCNC: 19 U/L
AST SERPL-CCNC: 31 U/L — SIGNIFICANT CHANGE UP (ref 10–40)
AST SERPL-CCNC: 32 U/L — SIGNIFICANT CHANGE UP (ref 10–40)
AST SERPL-CCNC: 36 U/L — SIGNIFICANT CHANGE UP (ref 10–40)
AST SERPL-CCNC: 38 U/L — SIGNIFICANT CHANGE UP (ref 10–40)
AST SERPL-CCNC: 39 U/L — SIGNIFICANT CHANGE UP (ref 10–40)
AST SERPL-CCNC: 43 U/L — HIGH (ref 10–40)
B2 MICROGLOB SERPL-MCNC: 2.9 MG/L
BACTERIA # UR AUTO: NEGATIVE /HPF — SIGNIFICANT CHANGE UP
BASOPHILS # BLD AUTO: 0 K/UL — SIGNIFICANT CHANGE UP (ref 0–0.2)
BASOPHILS # BLD AUTO: 0.14 K/UL — SIGNIFICANT CHANGE UP (ref 0–0.2)
BASOPHILS # BLD AUTO: 0.17 K/UL — SIGNIFICANT CHANGE UP (ref 0–0.2)
BASOPHILS # BLD AUTO: 1.98 K/UL — HIGH (ref 0–0.2)
BASOPHILS NFR BLD AUTO: 0 % — SIGNIFICANT CHANGE UP (ref 0–2)
BASOPHILS NFR BLD AUTO: 0.1 % — SIGNIFICANT CHANGE UP (ref 0–2)
BASOPHILS NFR BLD AUTO: 0.1 % — SIGNIFICANT CHANGE UP (ref 0–2)
BASOPHILS NFR BLD AUTO: 1 % — SIGNIFICANT CHANGE UP (ref 0–2)
BCR/ABL BY RT - PCR QUANTITATIVE: SIGNIFICANT CHANGE UP
BILIRUB DIRECT SERPL-MCNC: 0.6 MG/DL — HIGH (ref 0–0.3)
BILIRUB SERPL-MCNC: 0.4 MG/DL
BILIRUB SERPL-MCNC: 2.5 MG/DL — HIGH (ref 0.2–1.2)
BILIRUB SERPL-MCNC: 2.6 MG/DL — HIGH (ref 0.2–1.2)
BILIRUB SERPL-MCNC: 2.8 MG/DL — HIGH (ref 0.2–1.2)
BILIRUB SERPL-MCNC: 3 MG/DL — HIGH (ref 0.2–1.2)
BILIRUB SERPL-MCNC: 3.1 MG/DL — HIGH (ref 0.2–1.2)
BILIRUB SERPL-MCNC: 3.2 MG/DL — HIGH (ref 0.2–1.2)
BILIRUB UR-MCNC: NEGATIVE — SIGNIFICANT CHANGE UP
BLD GP AB SCN SERPL QL: POSITIVE — SIGNIFICANT CHANGE UP
BUN SERPL-MCNC: 14 MG/DL — SIGNIFICANT CHANGE UP (ref 7–23)
BUN SERPL-MCNC: 15 MG/DL — SIGNIFICANT CHANGE UP (ref 7–23)
BUN SERPL-MCNC: 16 MG/DL
BUN SERPL-MCNC: 16 MG/DL — SIGNIFICANT CHANGE UP (ref 7–23)
BUN SERPL-MCNC: 17 MG/DL — SIGNIFICANT CHANGE UP (ref 7–23)
BUN SERPL-MCNC: 18 MG/DL — SIGNIFICANT CHANGE UP (ref 7–23)
BUN SERPL-MCNC: 19 MG/DL
BUN SERPL-MCNC: 22 MG/DL — SIGNIFICANT CHANGE UP (ref 7–23)
CA-I BLD-SCNC: 1.08 MMOL/L — LOW (ref 1.15–1.33)
CALCIUM SERPL-MCNC: 8.8 MG/DL
CALCIUM SERPL-MCNC: 8.8 MG/DL — SIGNIFICANT CHANGE UP (ref 8.4–10.5)
CALCIUM SERPL-MCNC: 9.2 MG/DL
CALCIUM SERPL-MCNC: 9.2 MG/DL — SIGNIFICANT CHANGE UP (ref 8.4–10.5)
CALCIUM SERPL-MCNC: 9.7 MG/DL — SIGNIFICANT CHANGE UP (ref 8.4–10.5)
CALCIUM SERPL-MCNC: 9.9 MG/DL — SIGNIFICANT CHANGE UP (ref 8.4–10.5)
CAST: 0 /LPF — SIGNIFICANT CHANGE UP (ref 0–4)
CHLORIDE SERPL-SCNC: 101 MMOL/L
CHLORIDE SERPL-SCNC: 101 MMOL/L — SIGNIFICANT CHANGE UP (ref 96–108)
CHLORIDE SERPL-SCNC: 102 MMOL/L
CHLORIDE SERPL-SCNC: 103 MMOL/L — SIGNIFICANT CHANGE UP (ref 96–108)
CHLORIDE SERPL-SCNC: 103 MMOL/L — SIGNIFICANT CHANGE UP (ref 96–108)
CHLORIDE SERPL-SCNC: 104 MMOL/L — SIGNIFICANT CHANGE UP (ref 96–108)
CHLORIDE SERPL-SCNC: 104 MMOL/L — SIGNIFICANT CHANGE UP (ref 96–108)
CHLORIDE SERPL-SCNC: 105 MMOL/L — SIGNIFICANT CHANGE UP (ref 96–108)
CHROM ANALY INTERPHASE BLD FISH-IMP: SIGNIFICANT CHANGE UP
CHROM ANALY OVERALL INTERP SPEC-IMP: SIGNIFICANT CHANGE UP
CK MB CFR SERPL CALC: 1.3 NG/ML — SIGNIFICANT CHANGE UP (ref 0–3.8)
CK SERPL-CCNC: 78 U/L — SIGNIFICANT CHANGE UP (ref 25–170)
CO2 SERPL-SCNC: 24 MMOL/L — SIGNIFICANT CHANGE UP (ref 22–31)
CO2 SERPL-SCNC: 25 MMOL/L — SIGNIFICANT CHANGE UP (ref 22–31)
CO2 SERPL-SCNC: 26 MMOL/L — SIGNIFICANT CHANGE UP (ref 22–31)
CO2 SERPL-SCNC: 27 MMOL/L — SIGNIFICANT CHANGE UP (ref 22–31)
CO2 SERPL-SCNC: 27 MMOL/L — SIGNIFICANT CHANGE UP (ref 22–31)
CO2 SERPL-SCNC: 28 MMOL/L
CO2 SERPL-SCNC: 28 MMOL/L — SIGNIFICANT CHANGE UP (ref 22–31)
CO2 SERPL-SCNC: 29 MMOL/L
COLOR SPEC: SIGNIFICANT CHANGE UP
COPPER SERPL-MCNC: 116 UG/DL
CREAT SERPL-MCNC: 0.38 MG/DL — LOW (ref 0.5–1.3)
CREAT SERPL-MCNC: 0.39 MG/DL — LOW (ref 0.5–1.3)
CREAT SERPL-MCNC: 0.41 MG/DL
CREAT SERPL-MCNC: 0.41 MG/DL — LOW (ref 0.5–1.3)
CREAT SERPL-MCNC: 0.44 MG/DL — LOW (ref 0.5–1.3)
CREAT SERPL-MCNC: 0.45 MG/DL
CREAT SERPL-MCNC: 0.45 MG/DL — LOW (ref 0.5–1.3)
CREAT SERPL-MCNC: 0.46 MG/DL — LOW (ref 0.5–1.3)
D DIMER BLD IA.RAPID-MCNC: 1530 NG/ML DDU — HIGH
D DIMER BLD IA.RAPID-MCNC: 309 NG/ML DDU — HIGH
DACRYOCYTES BLD QL SMEAR: SLIGHT — SIGNIFICANT CHANGE UP
DAT C3-SP REAG RBC QL: NEGATIVE — SIGNIFICANT CHANGE UP
DAT POLY-SP REAG RBC QL: POSITIVE — SIGNIFICANT CHANGE UP
DIFF PNL FLD: NEGATIVE — SIGNIFICANT CHANGE UP
EGFR: 105 ML/MIN/1.73M2 — SIGNIFICANT CHANGE UP
EGFR: 106 ML/MIN/1.73M2
EGFR: 106 ML/MIN/1.73M2 — SIGNIFICANT CHANGE UP
EGFR: 107 ML/MIN/1.73M2 — SIGNIFICANT CHANGE UP
EGFR: 108 ML/MIN/1.73M2
EGFR: 108 ML/MIN/1.73M2 — SIGNIFICANT CHANGE UP
EGFR: 110 ML/MIN/1.73M2 — SIGNIFICANT CHANGE UP
EGFR: 110 ML/MIN/1.73M2 — SIGNIFICANT CHANGE UP
ELLIPTOCYTES BLD QL SMEAR: SLIGHT — SIGNIFICANT CHANGE UP
ELUATE ANTIBODY 1: SIGNIFICANT CHANGE UP
EOSINOPHIL # BLD AUTO: 0 K/UL — SIGNIFICANT CHANGE UP (ref 0–0.5)
EOSINOPHIL # BLD AUTO: 0.1 K/UL — SIGNIFICANT CHANGE UP (ref 0–0.5)
EOSINOPHIL # BLD AUTO: 0.35 K/UL — SIGNIFICANT CHANGE UP (ref 0–0.5)
EOSINOPHIL # BLD AUTO: 0.95 K/UL — HIGH (ref 0–0.5)
EOSINOPHIL # BLD AUTO: 1.16 K/UL — HIGH (ref 0–0.5)
EOSINOPHIL # BLD AUTO: 1.98 K/UL — HIGH (ref 0–0.5)
EOSINOPHIL NFR BLD AUTO: 0 % — SIGNIFICANT CHANGE UP (ref 0–6)
EOSINOPHIL NFR BLD AUTO: 0.2 % — SIGNIFICANT CHANGE UP (ref 0–6)
EOSINOPHIL NFR BLD AUTO: 1 % — SIGNIFICANT CHANGE UP (ref 0–6)
EOSINOPHIL NFR BLD AUTO: 1.9 % — SIGNIFICANT CHANGE UP (ref 0–6)
EOSINOPHIL NFR BLD AUTO: 2 % — SIGNIFICANT CHANGE UP (ref 0–6)
ERYTHROCYTE [SEDIMENTATION RATE] IN BLOOD BY WESTERGREN METHOD: 19 MM/HR
FIBRINOGEN PPP-MCNC: 293 MG/DL — SIGNIFICANT CHANGE UP (ref 200–445)
FIBRINOGEN PPP-MCNC: 308 MG/DL — SIGNIFICANT CHANGE UP (ref 200–445)
FSP PPP-MCNC: < 5 UG/ML — SIGNIFICANT CHANGE UP
G6PD RBC-CCNC: 34.2 U/G HGB — HIGH (ref 7–20.5)
GLUCOSE SERPL-MCNC: 87 MG/DL
GLUCOSE SERPL-MCNC: 90 MG/DL
GLUCOSE SERPL-MCNC: 90 MG/DL — SIGNIFICANT CHANGE UP (ref 70–99)
GLUCOSE SERPL-MCNC: 90 MG/DL — SIGNIFICANT CHANGE UP (ref 70–99)
GLUCOSE SERPL-MCNC: 91 MG/DL — SIGNIFICANT CHANGE UP (ref 70–99)
GLUCOSE SERPL-MCNC: 95 MG/DL — SIGNIFICANT CHANGE UP (ref 70–99)
GLUCOSE SERPL-MCNC: 98 MG/DL — SIGNIFICANT CHANGE UP (ref 70–99)
GLUCOSE SERPL-MCNC: 98 MG/DL — SIGNIFICANT CHANGE UP (ref 70–99)
GLUCOSE UR QL: NEGATIVE MG/DL — SIGNIFICANT CHANGE UP
HAPTOGLOB SERPL-MCNC: <20 MG/DL — LOW (ref 34–200)
HAV IGM SER-ACNC: SIGNIFICANT CHANGE UP
HAV IGM SER-ACNC: SIGNIFICANT CHANGE UP
HBV CORE AB SER-ACNC: SIGNIFICANT CHANGE UP
HBV CORE IGM SER-ACNC: SIGNIFICANT CHANGE UP
HBV CORE IGM SER-ACNC: SIGNIFICANT CHANGE UP
HBV SURFACE AB SER-ACNC: SIGNIFICANT CHANGE UP
HBV SURFACE AG SER-ACNC: SIGNIFICANT CHANGE UP
HBV SURFACE AG SER-ACNC: SIGNIFICANT CHANGE UP
HCT VFR BLD CALC: 17.6 % — CRITICAL LOW (ref 34.5–45)
HCT VFR BLD CALC: 19.1 % — CRITICAL LOW (ref 34.5–45)
HCT VFR BLD CALC: 19.3 % — CRITICAL LOW (ref 34.5–45)
HCT VFR BLD CALC: 19.4 % — CRITICAL LOW (ref 34.5–45)
HCT VFR BLD CALC: 19.9 % — CRITICAL LOW (ref 34.5–45)
HCT VFR BLD CALC: 20.6 % — CRITICAL LOW (ref 34.5–45)
HCT VFR BLD CALC: 20.9 % — CRITICAL LOW (ref 34.5–45)
HCT VFR BLD CALC: 21.1 % — LOW (ref 34.5–45)
HCT VFR BLD CALC: 22.5 % — LOW (ref 34.5–45)
HCT VFR BLD CALC: 22.9 % — LOW (ref 34.5–45)
HCT VFR BLD CALC: 25 % — LOW (ref 34.5–45)
HCT VFR BLD CALC: 25.1 % — LOW (ref 34.5–45)
HCV AB S/CO SERPL IA: 0.07 S/CO — SIGNIFICANT CHANGE UP (ref 0–0.99)
HCV AB S/CO SERPL IA: 0.07 S/CO — SIGNIFICANT CHANGE UP (ref 0–0.99)
HCV AB SERPL-IMP: SIGNIFICANT CHANGE UP
HCV AB SERPL-IMP: SIGNIFICANT CHANGE UP
HGB BLD-MCNC: 5.1 G/DL — CRITICAL LOW (ref 11.5–15.5)
HGB BLD-MCNC: 5.4 G/DL — CRITICAL LOW (ref 11.5–15.5)
HGB BLD-MCNC: 5.6 G/DL — CRITICAL LOW (ref 11.5–15.5)
HGB BLD-MCNC: 5.7 G/DL — CRITICAL LOW (ref 11.5–15.5)
HGB BLD-MCNC: 6.2 G/DL — CRITICAL LOW (ref 11.5–15.5)
HGB BLD-MCNC: 6.2 G/DL — CRITICAL LOW (ref 11.5–15.5)
HGB BLD-MCNC: 6.7 G/DL — CRITICAL LOW (ref 11.5–15.5)
HGB BLD-MCNC: 6.7 G/DL — CRITICAL LOW (ref 11.5–15.5)
HGB BLD-MCNC: 6.8 G/DL — CRITICAL LOW (ref 11.5–15.5)
HGB BLD-MCNC: 7.3 G/DL — LOW (ref 11.5–15.5)
HGB BLD-MCNC: 7.9 G/DL — LOW (ref 11.5–15.5)
HGB BLD-MCNC: 8.6 G/DL — LOW (ref 11.5–15.5)
HIV 1+2 AB+HIV1 P24 AG SERPL QL IA: SIGNIFICANT CHANGE UP
HYPOCHROMIA BLD QL: SLIGHT — SIGNIFICANT CHANGE UP
IGA FLD-MCNC: 91 MG/DL — SIGNIFICANT CHANGE UP (ref 84–499)
IGG FLD-MCNC: 681 MG/DL — SIGNIFICANT CHANGE UP (ref 610–1660)
IGM SERPL-MCNC: 95 MG/DL — SIGNIFICANT CHANGE UP (ref 35–242)
IMM GRANULOCYTES NFR BLD AUTO: 0.3 % — SIGNIFICANT CHANGE UP (ref 0–0.9)
IMM GRANULOCYTES NFR BLD AUTO: 0.4 % — SIGNIFICANT CHANGE UP (ref 0–0.9)
INR BLD: 0.97 RATIO — SIGNIFICANT CHANGE UP (ref 0.85–1.18)
INR BLD: 1.01 RATIO — SIGNIFICANT CHANGE UP (ref 0.85–1.18)
INR BLD: 1.01 RATIO — SIGNIFICANT CHANGE UP (ref 0.85–1.18)
KAPPA LC SER QL IFE: 7.18 MG/DL — HIGH (ref 0.33–1.94)
KAPPA/LAMBDA FREE LIGHT CHAIN RATIO, SERUM: 7.11 RATIO — HIGH (ref 0.26–1.65)
KETONES UR-MCNC: NEGATIVE MG/DL — SIGNIFICANT CHANGE UP
LAMBDA LC SER QL IFE: 1.01 MG/DL — SIGNIFICANT CHANGE UP (ref 0.57–2.63)
LDH SERPL L TO P-CCNC: 455 U/L — HIGH (ref 50–242)
LDH SERPL L TO P-CCNC: 465 U/L — HIGH (ref 50–242)
LDH SERPL L TO P-CCNC: 468 U/L — HIGH (ref 50–242)
LDH SERPL L TO P-CCNC: 567 U/L — HIGH (ref 50–242)
LDH SERPL L TO P-CCNC: 728 U/L — HIGH (ref 50–242)
LDH SERPL-CCNC: 176 U/L
LEUKOCYTE ESTERASE UR-ACNC: NEGATIVE — SIGNIFICANT CHANGE UP
LYMPHOCYTES # BLD AUTO: 17.8 K/UL — HIGH (ref 1–3.3)
LYMPHOCYTES # BLD AUTO: 177.11 K/UL — HIGH (ref 1–3.3)
LYMPHOCYTES # BLD AUTO: 194.89 K/UL — HIGH (ref 1–3.3)
LYMPHOCYTES # BLD AUTO: 222.38 K/UL — HIGH (ref 1–3.3)
LYMPHOCYTES # BLD AUTO: 26.51 K/UL — HIGH (ref 1–3.3)
LYMPHOCYTES # BLD AUTO: 34.19 K/UL — HIGH (ref 1–3.3)
LYMPHOCYTES # BLD AUTO: 45.6 K/UL — HIGH (ref 1–3.3)
LYMPHOCYTES # BLD AUTO: 62.01 K/UL — HIGH (ref 1–3.3)
LYMPHOCYTES # BLD AUTO: 66 % — HIGH (ref 13–44)
LYMPHOCYTES # BLD AUTO: 72 % — HIGH (ref 13–44)
LYMPHOCYTES # BLD AUTO: 75 % — HIGH (ref 13–44)
LYMPHOCYTES # BLD AUTO: 79.1 % — HIGH (ref 13–44)
LYMPHOCYTES # BLD AUTO: 83.32 K/UL — HIGH (ref 1–3.3)
LYMPHOCYTES # BLD AUTO: 9 % — LOW (ref 13–44)
LYMPHOCYTES # BLD AUTO: 90 % — HIGH (ref 13–44)
LYMPHOCYTES # BLD AUTO: 91.9 % — HIGH (ref 13–44)
LYMPHOCYTES # BLD AUTO: 92.9 % — HIGH (ref 13–44)
LYMPHOCYTES # BLD AUTO: 97.1 % — HIGH (ref 13–44)
LYMPHOCYTES # SPEC AUTO: 1 % — HIGH (ref 0–0)
LYMPHOCYTES # SPEC AUTO: 58 % — HIGH (ref 0–0)
LYMPHOCYTES # SPEC AUTO: 7 % — HIGH (ref 0–0)
MACROCYTES BLD QL: SLIGHT — SIGNIFICANT CHANGE UP
MAGNESIUM SERPL-MCNC: 1.8 MG/DL — SIGNIFICANT CHANGE UP (ref 1.6–2.6)
MAGNESIUM SERPL-MCNC: 1.8 MG/DL — SIGNIFICANT CHANGE UP (ref 1.6–2.6)
MAGNESIUM SERPL-MCNC: 1.9 MG/DL — SIGNIFICANT CHANGE UP (ref 1.6–2.6)
MAGNESIUM SERPL-MCNC: 2 MG/DL — SIGNIFICANT CHANGE UP (ref 1.6–2.6)
MANUAL DIF COMMENT BLD-IMP: SIGNIFICANT CHANGE UP
MANUAL SMEAR VERIFICATION: SIGNIFICANT CHANGE UP
MCHC RBC-ENTMCNC: 27.4 PG — SIGNIFICANT CHANGE UP (ref 27–34)
MCHC RBC-ENTMCNC: 28.1 GM/DL — LOW (ref 32–36)
MCHC RBC-ENTMCNC: 28.3 GM/DL — LOW (ref 32–36)
MCHC RBC-ENTMCNC: 28.5 PG — SIGNIFICANT CHANGE UP (ref 27–34)
MCHC RBC-ENTMCNC: 28.5 PG — SIGNIFICANT CHANGE UP (ref 27–34)
MCHC RBC-ENTMCNC: 28.9 PG — SIGNIFICANT CHANGE UP (ref 27–34)
MCHC RBC-ENTMCNC: 29 GM/DL — LOW (ref 32–36)
MCHC RBC-ENTMCNC: 29.2 PG — SIGNIFICANT CHANGE UP (ref 27–34)
MCHC RBC-ENTMCNC: 29.3 GM/DL — LOW (ref 32–36)
MCHC RBC-ENTMCNC: 29.3 PG — SIGNIFICANT CHANGE UP (ref 27–34)
MCHC RBC-ENTMCNC: 29.5 GM/DL — LOW (ref 32–36)
MCHC RBC-ENTMCNC: 30.1 GM/DL — LOW (ref 32–36)
MCHC RBC-ENTMCNC: 30.4 PG — SIGNIFICANT CHANGE UP (ref 27–34)
MCHC RBC-ENTMCNC: 30.5 PG — SIGNIFICANT CHANGE UP (ref 27–34)
MCHC RBC-ENTMCNC: 30.7 PG — SIGNIFICANT CHANGE UP (ref 27–34)
MCHC RBC-ENTMCNC: 31 PG — SIGNIFICANT CHANGE UP (ref 27–34)
MCHC RBC-ENTMCNC: 31.5 GM/DL — LOW (ref 32–36)
MCHC RBC-ENTMCNC: 32 GM/DL — SIGNIFICANT CHANGE UP (ref 32–36)
MCHC RBC-ENTMCNC: 32.1 GM/DL — SIGNIFICANT CHANGE UP (ref 32–36)
MCHC RBC-ENTMCNC: 32.2 GM/DL — SIGNIFICANT CHANGE UP (ref 32–36)
MCHC RBC-ENTMCNC: 32.4 G/DL — SIGNIFICANT CHANGE UP (ref 32–36)
MCHC RBC-ENTMCNC: 33.3 PG — SIGNIFICANT CHANGE UP (ref 27–34)
MCHC RBC-ENTMCNC: 34.4 G/DL — SIGNIFICANT CHANGE UP (ref 32–36)
MCHC RBC-ENTMCNC: 35.8 PG — HIGH (ref 27–34)
MCV RBC AUTO: 102.7 FL — HIGH (ref 80–100)
MCV RBC AUTO: 104.2 FL — HIGH (ref 80–100)
MCV RBC AUTO: 104.2 FL — HIGH (ref 80–100)
MCV RBC AUTO: 94.2 FL — SIGNIFICANT CHANGE UP (ref 80–100)
MCV RBC AUTO: 95.6 FL — SIGNIFICANT CHANGE UP (ref 80–100)
MCV RBC AUTO: 96.8 FL — SIGNIFICANT CHANGE UP (ref 80–100)
MCV RBC AUTO: 97 FL — SIGNIFICANT CHANGE UP (ref 80–100)
MCV RBC AUTO: 97.2 FL — SIGNIFICANT CHANGE UP (ref 80–100)
MCV RBC AUTO: 97.4 FL — SIGNIFICANT CHANGE UP (ref 80–100)
MCV RBC AUTO: 97.7 FL — SIGNIFICANT CHANGE UP (ref 80–100)
MCV RBC AUTO: 98 FL — SIGNIFICANT CHANGE UP (ref 80–100)
MCV RBC AUTO: 98.3 FL — SIGNIFICANT CHANGE UP (ref 80–100)
MICROCYTES BLD QL: SLIGHT — SIGNIFICANT CHANGE UP
MICROCYTES BLD QL: SLIGHT — SIGNIFICANT CHANGE UP
MONOCYTES # BLD AUTO: 0 K/UL — SIGNIFICANT CHANGE UP (ref 0–0.9)
MONOCYTES # BLD AUTO: 0.47 K/UL — SIGNIFICANT CHANGE UP (ref 0–0.9)
MONOCYTES # BLD AUTO: 0.71 K/UL — SIGNIFICANT CHANGE UP (ref 0–0.9)
MONOCYTES # BLD AUTO: 0.93 K/UL — HIGH (ref 0–0.9)
MONOCYTES # BLD AUTO: 1.21 K/UL — HIGH (ref 0–0.9)
MONOCYTES # BLD AUTO: 15.82 K/UL — HIGH (ref 0–0.9)
MONOCYTES # BLD AUTO: 4.14 K/UL — HIGH (ref 0–0.9)
MONOCYTES # BLD AUTO: 4.42 K/UL — HIGH (ref 0–0.9)
MONOCYTES # BLD AUTO: 8.35 K/UL — HIGH (ref 0–0.9)
MONOCYTES NFR BLD AUTO: 0 % — LOW (ref 2–14)
MONOCYTES NFR BLD AUTO: 0.9 % — LOW (ref 2–14)
MONOCYTES NFR BLD AUTO: 1 % — LOW (ref 2–14)
MONOCYTES NFR BLD AUTO: 1 % — LOW (ref 2–14)
MONOCYTES NFR BLD AUTO: 2.2 % — SIGNIFICANT CHANGE UP (ref 2–14)
MONOCYTES NFR BLD AUTO: 2.2 % — SIGNIFICANT CHANGE UP (ref 2–14)
MONOCYTES NFR BLD AUTO: 3 % — SIGNIFICANT CHANGE UP (ref 2–14)
MONOCYTES NFR BLD AUTO: 3.5 % — SIGNIFICANT CHANGE UP (ref 2–14)
MONOCYTES NFR BLD AUTO: 8 % — SIGNIFICANT CHANGE UP (ref 2–14)
NEUTROPHILS # BLD AUTO: 1.4 K/UL — LOW (ref 1.8–7.4)
NEUTROPHILS # BLD AUTO: 10.1 K/UL — HIGH (ref 1.8–7.4)
NEUTROPHILS # BLD AUTO: 12.05 K/UL — HIGH (ref 1.8–7.4)
NEUTROPHILS # BLD AUTO: 14.04 K/UL — HIGH (ref 1.8–7.4)
NEUTROPHILS # BLD AUTO: 15.68 K/UL — HIGH (ref 1.8–7.4)
NEUTROPHILS # BLD AUTO: 45.48 K/UL — HIGH (ref 1.8–7.4)
NEUTROPHILS # BLD AUTO: 8.33 K/UL — HIGH (ref 1.8–7.4)
NEUTROPHILS # BLD AUTO: 8.33 K/UL — HIGH (ref 1.8–7.4)
NEUTROPHILS # BLD AUTO: 8.55 K/UL — HIGH (ref 1.8–7.4)
NEUTROPHILS NFR BLD AUTO: 0.7 % — LOW (ref 43–77)
NEUTROPHILS NFR BLD AUTO: 18 % — LOW (ref 43–77)
NEUTROPHILS NFR BLD AUTO: 20 % — LOW (ref 43–77)
NEUTROPHILS NFR BLD AUTO: 23 % — LOW (ref 43–77)
NEUTROPHILS NFR BLD AUTO: 23.1 % — LOW (ref 43–77)
NEUTROPHILS NFR BLD AUTO: 30 % — LOW (ref 43–77)
NEUTROPHILS NFR BLD AUTO: 4.1 % — LOW (ref 43–77)
NEUTROPHILS NFR BLD AUTO: 4.3 % — LOW (ref 43–77)
NEUTROPHILS NFR BLD AUTO: 9 % — LOW (ref 43–77)
NITRITE UR-MCNC: NEGATIVE — SIGNIFICANT CHANGE UP
NRBC # BLD: 0 /100 WBCS — SIGNIFICANT CHANGE UP (ref 0–0)
NRBC # BLD: 1 /100 WBCS — HIGH (ref 0–0)
NRBC # BLD: 2 /100 WBCS — HIGH (ref 0–0)
NRBC # BLD: SIGNIFICANT CHANGE UP /100 WBCS (ref 0–0)
NRBC # BLD: SIGNIFICANT CHANGE UP /100 WBCS (ref 0–0)
OVALOCYTES BLD QL SMEAR: SLIGHT — SIGNIFICANT CHANGE UP
OVALOCYTES BLD QL SMEAR: SLIGHT — SIGNIFICANT CHANGE UP
PH UR: 8.5 (ref 5–8)
PHOSPHATE SERPL-MCNC: 3.9 MG/DL — SIGNIFICANT CHANGE UP (ref 2.5–4.5)
PHOSPHATE SERPL-MCNC: 4 MG/DL — SIGNIFICANT CHANGE UP (ref 2.5–4.5)
PHOSPHATE SERPL-MCNC: 4.1 MG/DL — SIGNIFICANT CHANGE UP (ref 2.5–4.5)
PHOSPHATE SERPL-MCNC: 4.3 MG/DL — SIGNIFICANT CHANGE UP (ref 2.5–4.5)
PHOSPHATE SERPL-MCNC: 4.6 MG/DL — HIGH (ref 2.5–4.5)
PHOSPHATE SERPL-MCNC: 5 MG/DL — HIGH (ref 2.5–4.5)
PLAT MORPH BLD: NORMAL — SIGNIFICANT CHANGE UP
PLATELET # BLD AUTO: 210 K/UL — SIGNIFICANT CHANGE UP (ref 150–400)
PLATELET # BLD AUTO: 221 K/UL — SIGNIFICANT CHANGE UP (ref 150–400)
PLATELET # BLD AUTO: 222 K/UL — SIGNIFICANT CHANGE UP (ref 150–400)
PLATELET # BLD AUTO: 227 K/UL — SIGNIFICANT CHANGE UP (ref 150–400)
PLATELET # BLD AUTO: 231 K/UL — SIGNIFICANT CHANGE UP (ref 150–400)
PLATELET # BLD AUTO: 236 K/UL — SIGNIFICANT CHANGE UP (ref 150–400)
PLATELET # BLD AUTO: 246 K/UL — SIGNIFICANT CHANGE UP (ref 150–400)
PLATELET # BLD AUTO: 252 K/UL — SIGNIFICANT CHANGE UP (ref 150–400)
PLATELET # BLD AUTO: 260 K/UL — SIGNIFICANT CHANGE UP (ref 150–400)
PLATELET # BLD AUTO: 260 K/UL — SIGNIFICANT CHANGE UP (ref 150–400)
PLATELET # BLD AUTO: 264 K/UL — SIGNIFICANT CHANGE UP (ref 150–400)
PLATELET # BLD AUTO: 356 K/UL — SIGNIFICANT CHANGE UP (ref 150–400)
POIKILOCYTOSIS BLD QL AUTO: SLIGHT — SIGNIFICANT CHANGE UP
POIKILOCYTOSIS BLD QL AUTO: SLIGHT — SIGNIFICANT CHANGE UP
POLYCHROMASIA BLD QL SMEAR: SLIGHT — SIGNIFICANT CHANGE UP
POTASSIUM SERPL-MCNC: 3.4 MMOL/L — LOW (ref 3.5–5.3)
POTASSIUM SERPL-MCNC: 4.1 MMOL/L — SIGNIFICANT CHANGE UP (ref 3.5–5.3)
POTASSIUM SERPL-MCNC: 4.2 MMOL/L — SIGNIFICANT CHANGE UP (ref 3.5–5.3)
POTASSIUM SERPL-MCNC: 4.9 MMOL/L — SIGNIFICANT CHANGE UP (ref 3.5–5.3)
POTASSIUM SERPL-SCNC: 3.4 MMOL/L — LOW (ref 3.5–5.3)
POTASSIUM SERPL-SCNC: 4.1 MMOL/L — SIGNIFICANT CHANGE UP (ref 3.5–5.3)
POTASSIUM SERPL-SCNC: 4.2 MMOL/L
POTASSIUM SERPL-SCNC: 4.2 MMOL/L
POTASSIUM SERPL-SCNC: 4.2 MMOL/L — SIGNIFICANT CHANGE UP (ref 3.5–5.3)
POTASSIUM SERPL-SCNC: 4.9 MMOL/L — SIGNIFICANT CHANGE UP (ref 3.5–5.3)
PROT SERPL-MCNC: 5.9 G/DL — LOW (ref 6–8.3)
PROT SERPL-MCNC: 6 G/DL — SIGNIFICANT CHANGE UP (ref 6–8.3)
PROT SERPL-MCNC: 6.1 G/DL — SIGNIFICANT CHANGE UP (ref 6–8.3)
PROT SERPL-MCNC: 6.3 G/DL — SIGNIFICANT CHANGE UP (ref 6–8.3)
PROT SERPL-MCNC: 6.3 G/DL — SIGNIFICANT CHANGE UP (ref 6–8.3)
PROT SERPL-MCNC: 6.5 G/DL
PROT SERPL-MCNC: 6.9 G/DL — SIGNIFICANT CHANGE UP (ref 6–8.3)
PROT UR-MCNC: NEGATIVE MG/DL — SIGNIFICANT CHANGE UP
PROTHROM AB SERPL-ACNC: 10.6 SEC — SIGNIFICANT CHANGE UP (ref 9.5–13)
PROTHROM AB SERPL-ACNC: 10.6 SEC — SIGNIFICANT CHANGE UP (ref 9.5–13)
PROTHROM AB SERPL-ACNC: 10.7 SEC — SIGNIFICANT CHANGE UP (ref 9.5–13)
RBC # BLD: 1.79 M/UL — LOW (ref 3.8–5.2)
RBC # BLD: 1.91 M/UL — LOW (ref 3.8–5.2)
RBC # BLD: 1.97 M/UL — LOW (ref 3.8–5.2)
RBC # BLD: 1.97 M/UL — LOW (ref 3.8–5.2)
RBC # BLD: 2.03 M/UL — LOW (ref 3.8–5.2)
RBC # BLD: 2.12 M/UL — LOW (ref 3.8–5.2)
RBC # BLD: 2.16 M/UL — LOW (ref 3.8–5.2)
RBC # BLD: 2.19 M/UL — LOW (ref 3.8–5.2)
RBC # BLD: 2.24 M/UL — LOW (ref 3.8–5.2)
RBC # BLD: 2.35 M/UL — LOW (ref 3.8–5.2)
RBC # BLD: 2.4 M/UL — LOW (ref 3.8–5.2)
RBC # BLD: 2.51 M/UL — LOW (ref 3.8–5.2)
RBC # BLD: 2.57 M/UL — LOW (ref 3.8–5.2)
RBC # FLD: 23.5 % — HIGH (ref 10.3–14.5)
RBC # FLD: 23.9 % — HIGH (ref 10.3–14.5)
RBC # FLD: 24.1 % — HIGH (ref 10.3–14.5)
RBC # FLD: 24.3 % — HIGH (ref 10.3–14.5)
RBC # FLD: 24.9 % — HIGH (ref 10.3–14.5)
RBC # FLD: 25.7 % — HIGH (ref 10.3–14.5)
RBC # FLD: 26.3 % — HIGH (ref 10.3–14.5)
RBC # FLD: 27.4 % — HIGH (ref 10.3–14.5)
RBC # FLD: 27.9 % — HIGH (ref 10.3–14.5)
RBC # FLD: 29.3 % — HIGH (ref 10.3–14.5)
RBC BLD AUTO: ABNORMAL
RBC BLD AUTO: SIGNIFICANT CHANGE UP
RBC CASTS # UR COMP ASSIST: 3 /HPF — SIGNIFICANT CHANGE UP (ref 0–4)
RETICS #: 146.7 K/UL — HIGH (ref 25–125)
RETICS #: 255.8 K/UL — HIGH (ref 25–125)
RETICS #: 327.6 K/UL — HIGH (ref 25–125)
RETICS/RBC NFR: 12.9 % — HIGH (ref 0.5–2.5)
RETICS/RBC NFR: 13.1 % — HIGH (ref 0.5–2.5)
RETICS/RBC NFR: 7.6 % — HIGH (ref 0.5–2.5)
RH IG SCN BLD-IMP: POSITIVE — SIGNIFICANT CHANGE UP
SCHISTOCYTES BLD QL AUTO: SLIGHT — SIGNIFICANT CHANGE UP
SMUDGE CELLS # BLD: PRESENT — SIGNIFICANT CHANGE UP
SODIUM SERPL-SCNC: 139 MMOL/L — SIGNIFICANT CHANGE UP (ref 135–145)
SODIUM SERPL-SCNC: 140 MMOL/L — SIGNIFICANT CHANGE UP (ref 135–145)
SODIUM SERPL-SCNC: 141 MMOL/L
SODIUM SERPL-SCNC: 141 MMOL/L — SIGNIFICANT CHANGE UP (ref 135–145)
SODIUM SERPL-SCNC: 141 MMOL/L — SIGNIFICANT CHANGE UP (ref 135–145)
SODIUM SERPL-SCNC: 142 MMOL/L
SODIUM SERPL-SCNC: 143 MMOL/L — SIGNIFICANT CHANGE UP (ref 135–145)
SODIUM SERPL-SCNC: 143 MMOL/L — SIGNIFICANT CHANGE UP (ref 135–145)
SP GR SPEC: 1.01 — SIGNIFICANT CHANGE UP (ref 1–1.03)
SQUAMOUS # UR AUTO: 0 /HPF — SIGNIFICANT CHANGE UP (ref 0–5)
STOMATOCYTES BLD QL SMEAR: SLIGHT — SIGNIFICANT CHANGE UP
TM INTERPRETATION: SIGNIFICANT CHANGE UP
TROPONIN T, HIGH SENSITIVITY RESULT: 7 NG/L — SIGNIFICANT CHANGE UP (ref 0–51)
TROPONIN T, HIGH SENSITIVITY RESULT: 9 NG/L — SIGNIFICANT CHANGE UP (ref 0–51)
URATE SERPL-MCNC: 2.7 MG/DL — SIGNIFICANT CHANGE UP (ref 2.5–7)
URATE SERPL-MCNC: 3.1 MG/DL — SIGNIFICANT CHANGE UP (ref 2.5–7)
URATE SERPL-MCNC: 3.1 MG/DL — SIGNIFICANT CHANGE UP (ref 2.5–7)
URATE SERPL-MCNC: 3.3 MG/DL — SIGNIFICANT CHANGE UP (ref 2.5–7)
URATE SERPL-MCNC: 3.9 MG/DL — SIGNIFICANT CHANGE UP (ref 2.5–7)
URATE SERPL-MCNC: 4.2 MG/DL — SIGNIFICANT CHANGE UP (ref 2.5–7)
UROBILINOGEN FLD QL: >=8 MG/DL (ref 0.2–1)
WBC # BLD: 187.07 K/UL — CRITICAL HIGH (ref 3.8–10.5)
WBC # BLD: 190.69 K/UL — CRITICAL HIGH (ref 3.8–10.5)
WBC # BLD: 195.21 K/UL — CRITICAL HIGH (ref 3.8–10.5)
WBC # BLD: 197.73 K/UL — CRITICAL HIGH (ref 3.8–10.5)
WBC # BLD: 200.71 K/UL — CRITICAL HIGH (ref 3.8–10.5)
WBC # BLD: 242 K/UL — CRITICAL HIGH (ref 3.8–10.5)
WBC # BLD: 40.17 K/UL — CRITICAL HIGH (ref 3.8–10.5)
WBC # BLD: 47.48 K/UL — CRITICAL HIGH (ref 3.8–10.5)
WBC # BLD: 60.8 K/UL — CRITICAL HIGH (ref 3.8–10.5)
WBC # BLD: 69.72 K/UL — CRITICAL HIGH (ref 3.8–10.5)
WBC # BLD: 78.39 K/UL — CRITICAL HIGH (ref 3.8–10.5)
WBC # BLD: 92.58 K/UL — CRITICAL HIGH (ref 3.8–10.5)
WBC # FLD AUTO: 187.07 K/UL — CRITICAL HIGH (ref 3.8–10.5)
WBC # FLD AUTO: 190.69 K/UL — CRITICAL HIGH (ref 3.8–10.5)
WBC # FLD AUTO: 195.21 K/UL — CRITICAL HIGH (ref 3.8–10.5)
WBC # FLD AUTO: 197.73 K/UL — CRITICAL HIGH (ref 3.8–10.5)
WBC # FLD AUTO: 200.71 K/UL — CRITICAL HIGH (ref 3.8–10.5)
WBC # FLD AUTO: 242 K/UL — CRITICAL HIGH (ref 3.8–10.5)
WBC # FLD AUTO: 40.17 K/UL — CRITICAL HIGH (ref 3.8–10.5)
WBC # FLD AUTO: 47.48 K/UL — CRITICAL HIGH (ref 3.8–10.5)
WBC # FLD AUTO: 60.8 K/UL — CRITICAL HIGH (ref 3.8–10.5)
WBC # FLD AUTO: 69.72 K/UL — CRITICAL HIGH (ref 3.8–10.5)
WBC # FLD AUTO: 78.39 K/UL — CRITICAL HIGH (ref 3.8–10.5)
WBC # FLD AUTO: 92.58 K/UL — CRITICAL HIGH (ref 3.8–10.5)
WBC UR QL: 0 /HPF — SIGNIFICANT CHANGE UP (ref 0–5)
ZINC SERPL-MCNC: 82 UG/DL

## 2024-01-01 PROCEDURE — 82784 ASSAY IGA/IGD/IGG/IGM EACH: CPT

## 2024-01-01 PROCEDURE — 87205 SMEAR GRAM STAIN: CPT

## 2024-01-01 PROCEDURE — 86902 BLOOD TYPE ANTIGEN DONOR EA: CPT

## 2024-01-01 PROCEDURE — 88184 FLOWCYTOMETRY/ TC 1 MARKER: CPT

## 2024-01-01 PROCEDURE — 85362 FIBRIN DEGRADATION PRODUCTS: CPT

## 2024-01-01 PROCEDURE — 99215 OFFICE O/P EST HI 40 MIN: CPT

## 2024-01-01 PROCEDURE — 85610 PROTHROMBIN TIME: CPT

## 2024-01-01 PROCEDURE — 99232 SBSQ HOSP IP/OBS MODERATE 35: CPT | Mod: GC

## 2024-01-01 PROCEDURE — 82955 ASSAY OF G6PD ENZYME: CPT

## 2024-01-01 PROCEDURE — G0452: CPT | Mod: 26

## 2024-01-01 PROCEDURE — 84484 ASSAY OF TROPONIN QUANT: CPT

## 2024-01-01 PROCEDURE — 36415 COLL VENOUS BLD VENIPUNCTURE: CPT

## 2024-01-01 PROCEDURE — 73630 X-RAY EXAM OF FOOT: CPT

## 2024-01-01 PROCEDURE — 74177 CT ABD & PELVIS W/CONTRAST: CPT | Mod: 26,MC

## 2024-01-01 PROCEDURE — 99285 EMERGENCY DEPT VISIT HI MDM: CPT | Mod: 25

## 2024-01-01 PROCEDURE — 84100 ASSAY OF PHOSPHORUS: CPT

## 2024-01-01 PROCEDURE — 74177 CT ABD & PELVIS W/CONTRAST: CPT | Mod: MC

## 2024-01-01 PROCEDURE — 99233 SBSQ HOSP IP/OBS HIGH 50: CPT | Mod: GC

## 2024-01-01 PROCEDURE — 85045 AUTOMATED RETICULOCYTE COUNT: CPT

## 2024-01-01 PROCEDURE — 86901 BLOOD TYPING SEROLOGIC RH(D): CPT

## 2024-01-01 PROCEDURE — 86860 RBC ANTIBODY ELUTION: CPT

## 2024-01-01 PROCEDURE — 93970 EXTREMITY STUDY: CPT | Mod: 26

## 2024-01-01 PROCEDURE — 80074 ACUTE HEPATITIS PANEL: CPT

## 2024-01-01 PROCEDURE — 88237 TISSUE CULTURE BONE MARROW: CPT

## 2024-01-01 PROCEDURE — 86077 PHYS BLOOD BANK SERV XMATCH: CPT

## 2024-01-01 PROCEDURE — 82330 ASSAY OF CALCIUM: CPT

## 2024-01-01 PROCEDURE — 99212 OFFICE O/P EST SF 10 MIN: CPT

## 2024-01-01 PROCEDURE — 83615 LACTATE (LD) (LDH) ENZYME: CPT

## 2024-01-01 PROCEDURE — 93010 ELECTROCARDIOGRAM REPORT: CPT

## 2024-01-01 PROCEDURE — 86850 RBC ANTIBODY SCREEN: CPT

## 2024-01-01 PROCEDURE — 86870 RBC ANTIBODY IDENTIFICATION: CPT

## 2024-01-01 PROCEDURE — 86905 BLOOD TYPING RBC ANTIGENS: CPT

## 2024-01-01 PROCEDURE — 88280 CHROMOSOME KARYOTYPE STUDY: CPT

## 2024-01-01 PROCEDURE — 82553 CREATINE MB FRACTION: CPT

## 2024-01-01 PROCEDURE — 87389 HIV-1 AG W/HIV-1&-2 AB AG IA: CPT

## 2024-01-01 PROCEDURE — 71260 CT THORAX DX C+: CPT | Mod: MC

## 2024-01-01 PROCEDURE — 88271 CYTOGENETICS DNA PROBE: CPT

## 2024-01-01 PROCEDURE — 88275 CYTOGENETICS 100-300: CPT

## 2024-01-01 PROCEDURE — 93005 ELECTROCARDIOGRAM TRACING: CPT

## 2024-01-01 PROCEDURE — 81001 URINALYSIS AUTO W/SCOPE: CPT

## 2024-01-01 PROCEDURE — 86706 HEP B SURFACE ANTIBODY: CPT

## 2024-01-01 PROCEDURE — 36430 TRANSFUSION BLD/BLD COMPNT: CPT

## 2024-01-01 PROCEDURE — 85384 FIBRINOGEN ACTIVITY: CPT

## 2024-01-01 PROCEDURE — 86704 HEP B CORE ANTIBODY TOTAL: CPT

## 2024-01-01 PROCEDURE — 85060 BLOOD SMEAR INTERPRETATION: CPT

## 2024-01-01 PROCEDURE — 99285 EMERGENCY DEPT VISIT HI MDM: CPT | Mod: GC

## 2024-01-01 PROCEDURE — 81207 BCR/ABL1 GENE MINOR BP: CPT

## 2024-01-01 PROCEDURE — 85379 FIBRIN DEGRADATION QUANT: CPT

## 2024-01-01 PROCEDURE — 73630 X-RAY EXAM OF FOOT: CPT | Mod: 26,LT

## 2024-01-01 PROCEDURE — G0452: CPT | Mod: 26,59

## 2024-01-01 PROCEDURE — P9040: CPT

## 2024-01-01 PROCEDURE — 82248 BILIRUBIN DIRECT: CPT

## 2024-01-01 PROCEDURE — 85025 COMPLETE CBC W/AUTO DIFF WBC: CPT

## 2024-01-01 PROCEDURE — 71260 CT THORAX DX C+: CPT | Mod: 26,MC

## 2024-01-01 PROCEDURE — 81206 BCR/ABL1 GENE MAJOR BP: CPT

## 2024-01-01 PROCEDURE — 99233 SBSQ HOSP IP/OBS HIGH 50: CPT

## 2024-01-01 PROCEDURE — 85730 THROMBOPLASTIN TIME PARTIAL: CPT

## 2024-01-01 PROCEDURE — 85027 COMPLETE CBC AUTOMATED: CPT

## 2024-01-01 PROCEDURE — 83010 ASSAY OF HAPTOGLOBIN QUANT: CPT

## 2024-01-01 PROCEDURE — 88185 FLOWCYTOMETRY/TC ADD-ON: CPT

## 2024-01-01 PROCEDURE — 86922 COMPATIBILITY TEST ANTIGLOB: CPT

## 2024-01-01 PROCEDURE — 86900 BLOOD TYPING SEROLOGIC ABO: CPT

## 2024-01-01 PROCEDURE — 83735 ASSAY OF MAGNESIUM: CPT

## 2024-01-01 PROCEDURE — 88189 FLOWCYTOMETRY/READ 16 & >: CPT | Mod: 59

## 2024-01-01 PROCEDURE — 80053 COMPREHEN METABOLIC PANEL: CPT

## 2024-01-01 PROCEDURE — 99223 1ST HOSP IP/OBS HIGH 75: CPT | Mod: GC

## 2024-01-01 PROCEDURE — 88264 CHROMOSOME ANALYSIS 20-25: CPT

## 2024-01-01 PROCEDURE — 99233 SBSQ HOSP IP/OBS HIGH 50: CPT | Mod: FS

## 2024-01-01 PROCEDURE — 93970 EXTREMITY STUDY: CPT

## 2024-01-01 PROCEDURE — 99239 HOSP IP/OBS DSCHRG MGMT >30: CPT

## 2024-01-01 PROCEDURE — 88291 CYTO/MOLECULAR REPORT: CPT | Mod: 59

## 2024-01-01 PROCEDURE — 82550 ASSAY OF CK (CPK): CPT

## 2024-01-01 PROCEDURE — 86880 COOMBS TEST DIRECT: CPT

## 2024-01-01 PROCEDURE — 99233 SBSQ HOSP IP/OBS HIGH 50: CPT | Mod: FS,GC

## 2024-01-01 PROCEDURE — 84550 ASSAY OF BLOOD/URIC ACID: CPT

## 2024-01-01 RX ORDER — ACETAMINOPHEN 500 MG
975 TABLET ORAL ONCE
Refills: 0 | Status: COMPLETED | OUTPATIENT
Start: 2024-01-01 | End: 2024-01-01

## 2024-01-01 RX ORDER — SERTRALINE 25 MG/1
1 TABLET, FILM COATED ORAL
Refills: 0 | DISCHARGE

## 2024-01-01 RX ORDER — CYCLOBENZAPRINE HYDROCHLORIDE 10 MG/1
1 TABLET, FILM COATED ORAL
Qty: 0 | Refills: 0 | DISCHARGE

## 2024-01-01 RX ORDER — VORTIOXETINE 5 MG/1
10 TABLET, FILM COATED ORAL DAILY
Refills: 0 | Status: DISCONTINUED | OUTPATIENT
Start: 2024-01-01 | End: 2024-01-01

## 2024-01-01 RX ORDER — PANTOPRAZOLE SODIUM 20 MG/1
40 TABLET, DELAYED RELEASE ORAL
Refills: 0 | Status: DISCONTINUED | OUTPATIENT
Start: 2024-01-01 | End: 2024-01-01

## 2024-01-01 RX ORDER — DIPHENHYDRAMINE HCL 50 MG
50 CAPSULE ORAL ONCE
Refills: 0 | Status: COMPLETED | OUTPATIENT
Start: 2024-01-01 | End: 2024-01-01

## 2024-01-01 RX ORDER — ACETAMINOPHEN 500 MG
650 TABLET ORAL ONCE
Refills: 0 | Status: COMPLETED | OUTPATIENT
Start: 2024-01-01 | End: 2024-01-01

## 2024-01-01 RX ORDER — QUETIAPINE FUMARATE 200 MG/1
300 TABLET, FILM COATED ORAL
Refills: 0 | Status: DISCONTINUED | OUTPATIENT
Start: 2024-01-01 | End: 2024-01-01

## 2024-01-01 RX ORDER — ENOXAPARIN SODIUM 100 MG/ML
40 INJECTION SUBCUTANEOUS EVERY 24 HOURS
Refills: 0 | Status: DISCONTINUED | OUTPATIENT
Start: 2024-01-01 | End: 2024-01-01

## 2024-01-01 RX ORDER — HYDROXYUREA 500 MG/1
500 CAPSULE ORAL EVERY 12 HOURS
Refills: 0 | Status: DISCONTINUED | OUTPATIENT
Start: 2024-01-01 | End: 2024-01-01

## 2024-01-01 RX ORDER — LISDEXAMFETAMINE DIMESYLATE 70 MG/1
1 CAPSULE ORAL
Qty: 0 | Refills: 0 | DISCHARGE

## 2024-01-01 RX ORDER — CLONAZEPAM 1 MG
0.5 TABLET ORAL EVERY 24 HOURS
Refills: 0 | Status: DISCONTINUED | OUTPATIENT
Start: 2024-01-01 | End: 2024-01-01

## 2024-01-01 RX ORDER — CLONAZEPAM 1 MG
1 TABLET ORAL
Qty: 0 | Refills: 0 | DISCHARGE

## 2024-01-01 RX ORDER — ALLOPURINOL 300 MG
300 TABLET ORAL DAILY
Refills: 0 | Status: DISCONTINUED | OUTPATIENT
Start: 2024-01-01 | End: 2024-01-01

## 2024-01-01 RX ORDER — LAMOTRIGINE 25 MG/1
200 TABLET, ORALLY DISINTEGRATING ORAL
Refills: 0 | Status: DISCONTINUED | OUTPATIENT
Start: 2024-01-01 | End: 2024-01-01

## 2024-01-01 RX ORDER — HYDROMORPHONE HYDROCHLORIDE 2 MG/ML
0.5 INJECTION INTRAMUSCULAR; INTRAVENOUS; SUBCUTANEOUS ONCE
Refills: 0 | Status: DISCONTINUED | OUTPATIENT
Start: 2024-01-01 | End: 2024-01-01

## 2024-01-01 RX ORDER — QUETIAPINE FUMARATE 200 MG/1
1 TABLET, FILM COATED ORAL
Qty: 0 | Refills: 0 | DISCHARGE

## 2024-01-01 RX ORDER — SERTRALINE 25 MG/1
150 TABLET, FILM COATED ORAL
Refills: 0 | Status: DISCONTINUED | OUTPATIENT
Start: 2024-01-01 | End: 2024-01-01

## 2024-01-01 RX ORDER — APREMILAST 10-20-30MG
30 KIT ORAL
Refills: 0 | Status: DISCONTINUED | OUTPATIENT
Start: 2024-01-01 | End: 2024-01-01

## 2024-01-01 RX ORDER — TRAZODONE HCL 50 MG
1 TABLET ORAL
Refills: 0 | DISCHARGE

## 2024-01-01 RX ORDER — IPRATROPIUM/ALBUTEROL SULFATE 18-103MCG
3 AEROSOL WITH ADAPTER (GRAM) INHALATION ONCE
Refills: 0 | Status: DISCONTINUED | OUTPATIENT
Start: 2024-01-01 | End: 2024-01-01

## 2024-01-01 RX ORDER — L.ACIDOPH/B.ANIMALIS/B.LONGUM 15B CELL
1 CAPSULE ORAL
Qty: 0 | Refills: 0 | DISCHARGE

## 2024-01-01 RX ORDER — MIRABEGRON 50 MG/1
1 TABLET, EXTENDED RELEASE ORAL
Qty: 0 | Refills: 0 | DISCHARGE

## 2024-01-01 RX ORDER — SERTRALINE 25 MG/1
1.5 TABLET, FILM COATED ORAL
Qty: 0 | Refills: 0 | DISCHARGE

## 2024-01-01 RX ORDER — SODIUM CHLORIDE 9 MG/ML
1000 INJECTION, SOLUTION INTRAVENOUS
Refills: 0 | Status: DISCONTINUED | OUTPATIENT
Start: 2024-01-01 | End: 2024-01-01

## 2024-01-01 RX ORDER — ALLOPURINOL 300 MG
100 TABLET ORAL DAILY
Refills: 0 | Status: DISCONTINUED | OUTPATIENT
Start: 2024-01-01 | End: 2024-01-01

## 2024-01-01 RX ORDER — VORTIOXETINE 5 MG/1
1 TABLET, FILM COATED ORAL
Qty: 0 | Refills: 0 | DISCHARGE
Start: 2024-01-01

## 2024-01-01 RX ORDER — DIPHENHYDRAMINE HCL 50 MG
25 CAPSULE ORAL EVERY 12 HOURS
Refills: 0 | Status: DISCONTINUED | OUTPATIENT
Start: 2024-01-01 | End: 2024-01-01

## 2024-01-01 RX ORDER — MULTIVIT-MIN/FERROUS GLUCONATE 9 MG/15 ML
1 LIQUID (ML) ORAL
Qty: 0 | Refills: 0 | DISCHARGE

## 2024-01-01 RX ORDER — GABAPENTIN 400 MG/1
1 CAPSULE ORAL
Qty: 0 | Refills: 0 | DISCHARGE

## 2024-01-01 RX ORDER — CLONAZEPAM 1 MG
1.5 TABLET ORAL
Qty: 0 | Refills: 0 | DISCHARGE

## 2024-01-01 RX ORDER — OXYBUTYNIN CHLORIDE 5 MG
10 TABLET ORAL
Refills: 0 | Status: DISCONTINUED | OUTPATIENT
Start: 2024-01-01 | End: 2024-01-01

## 2024-01-01 RX ORDER — FOLIC ACID 0.8 MG
1 TABLET ORAL DAILY
Refills: 0 | Status: DISCONTINUED | OUTPATIENT
Start: 2024-01-01 | End: 2024-01-01

## 2024-01-01 RX ORDER — VORTIOXETINE 5 MG/1
1 TABLET, FILM COATED ORAL
Qty: 0 | Refills: 0 | DISCHARGE

## 2024-01-01 RX ORDER — CALCIUM ACETATE 667 MG
1334 TABLET ORAL
Refills: 0 | Status: COMPLETED | OUTPATIENT
Start: 2024-01-01 | End: 2024-01-01

## 2024-01-01 RX ORDER — DICLOFENAC SODIUM 30 MG/G
1 GEL TOPICAL
Qty: 0 | Refills: 0 | DISCHARGE

## 2024-01-01 RX ORDER — POTASSIUM CHLORIDE 20 MEQ
40 PACKET (EA) ORAL ONCE
Refills: 0 | Status: COMPLETED | OUTPATIENT
Start: 2024-01-01 | End: 2024-01-01

## 2024-01-01 RX ORDER — ESOMEPRAZOLE MAGNESIUM 40 MG/1
1 CAPSULE, DELAYED RELEASE ORAL
Qty: 0 | Refills: 0 | DISCHARGE

## 2024-01-01 RX ORDER — CLONAZEPAM 1 MG
1 TABLET ORAL
Refills: 0 | Status: DISCONTINUED | OUTPATIENT
Start: 2024-01-01 | End: 2024-01-01

## 2024-01-01 RX ORDER — SOLIFENACIN SUCCINATE 10 MG/1
1 TABLET ORAL
Qty: 0 | Refills: 0 | DISCHARGE

## 2024-01-01 RX ORDER — FOLIC ACID 0.8 MG
1 TABLET ORAL
Qty: 30 | Refills: 3
Start: 2024-01-01 | End: 2024-06-27

## 2024-01-01 RX ORDER — PROPRANOLOL HCL 160 MG
1 CAPSULE, EXTENDED RELEASE 24HR ORAL
Qty: 0 | Refills: 0 | DISCHARGE

## 2024-01-01 RX ORDER — APREMILAST 10-20-30MG
1 KIT ORAL
Qty: 0 | Refills: 0 | DISCHARGE

## 2024-01-01 RX ORDER — CLONAZEPAM 1 MG
1.5 TABLET ORAL
Refills: 0 | Status: DISCONTINUED | OUTPATIENT
Start: 2024-01-01 | End: 2024-01-01

## 2024-01-01 RX ORDER — POTASSIUM CHLORIDE 20 MEQ
20 PACKET (EA) ORAL ONCE
Refills: 0 | Status: COMPLETED | OUTPATIENT
Start: 2024-01-01 | End: 2024-01-01

## 2024-01-01 RX ORDER — ACETAMINOPHEN 500 MG
650 TABLET ORAL EVERY 6 HOURS
Refills: 0 | Status: DISCONTINUED | OUTPATIENT
Start: 2024-01-01 | End: 2024-01-01

## 2024-01-01 RX ORDER — DIPHENHYDRAMINE HCL 50 MG
25 CAPSULE ORAL ONCE
Refills: 0 | Status: COMPLETED | OUTPATIENT
Start: 2024-01-01 | End: 2024-01-01

## 2024-01-01 RX ORDER — SERTRALINE 25 MG/1
3 TABLET, FILM COATED ORAL
Qty: 0 | Refills: 0 | DISCHARGE
Start: 2024-01-01

## 2024-01-01 RX ORDER — CLONAZEPAM 1 MG
2 TABLET ORAL
Qty: 0 | Refills: 0 | DISCHARGE

## 2024-01-01 RX ORDER — HYDROCORTISONE 20 MG
100 TABLET ORAL ONCE
Refills: 0 | Status: COMPLETED | OUTPATIENT
Start: 2024-01-01 | End: 2024-01-01

## 2024-01-01 RX ORDER — NITROGLYCERIN 6.5 MG
0.3 CAPSULE, EXTENDED RELEASE ORAL
Qty: 0 | Refills: 0 | DISCHARGE

## 2024-01-01 RX ORDER — RITUXIMAB 10 MG/ML
780 INJECTION, SOLUTION INTRAVENOUS ONCE
Refills: 0 | Status: COMPLETED | OUTPATIENT
Start: 2024-01-01 | End: 2024-01-01

## 2024-01-01 RX ORDER — LAMOTRIGINE 25 MG/1
1 TABLET, ORALLY DISINTEGRATING ORAL
Qty: 0 | Refills: 0 | DISCHARGE
Start: 2024-01-01

## 2024-01-01 RX ORDER — LACTOBACILLUS ACIDOPHILUS 100MM CELL
1 CAPSULE ORAL
Refills: 0 | Status: DISCONTINUED | OUTPATIENT
Start: 2024-01-01 | End: 2024-01-01

## 2024-01-01 RX ORDER — CLONAZEPAM 1 MG
2 TABLET ORAL AT BEDTIME
Refills: 0 | Status: DISCONTINUED | OUTPATIENT
Start: 2024-01-01 | End: 2024-01-01

## 2024-01-01 RX ORDER — OMEGA-3 ACID ETHYL ESTERS 1 G
1 CAPSULE ORAL
Qty: 0 | Refills: 0 | DISCHARGE

## 2024-01-01 RX ORDER — FLUCONAZOLE 150 MG/1
1 TABLET ORAL
Qty: 0 | Refills: 0 | DISCHARGE

## 2024-01-01 RX ORDER — MIRABEGRON 50 MG/1
25 TABLET, EXTENDED RELEASE ORAL DAILY
Refills: 0 | Status: DISCONTINUED | OUTPATIENT
Start: 2024-01-01 | End: 2024-01-01

## 2024-01-01 RX ADMIN — APREMILAST 30 MILLIGRAM(S): KIT ORAL at 18:53

## 2024-01-01 RX ADMIN — Medication 1334 MILLIGRAM(S): at 08:52

## 2024-01-01 RX ADMIN — Medication 1 TABLET(S): at 12:54

## 2024-01-01 RX ADMIN — Medication 1.5 MILLIGRAM(S): at 14:06

## 2024-01-01 RX ADMIN — LAMOTRIGINE 200 MILLIGRAM(S): 25 TABLET, ORALLY DISINTEGRATING ORAL at 08:55

## 2024-01-01 RX ADMIN — Medication 10 MILLIGRAM(S): at 18:19

## 2024-01-01 RX ADMIN — Medication 1 MILLIGRAM(S): at 08:53

## 2024-01-01 RX ADMIN — Medication 1.5 MILLIGRAM(S): at 14:49

## 2024-01-01 RX ADMIN — Medication 650 MILLIGRAM(S): at 22:10

## 2024-01-01 RX ADMIN — Medication 1 MILLIGRAM(S): at 11:46

## 2024-01-01 RX ADMIN — APREMILAST 30 MILLIGRAM(S): KIT ORAL at 06:58

## 2024-01-01 RX ADMIN — RITUXIMAB 780 MILLIGRAM(S): 10 INJECTION, SOLUTION INTRAVENOUS at 15:30

## 2024-01-01 RX ADMIN — SERTRALINE 150 MILLIGRAM(S): 25 TABLET, FILM COATED ORAL at 08:24

## 2024-01-01 RX ADMIN — Medication 1.5 MILLIGRAM(S): at 13:32

## 2024-01-01 RX ADMIN — Medication 50 MILLIGRAM(S): at 08:53

## 2024-01-01 RX ADMIN — Medication 650 MILLIGRAM(S): at 05:46

## 2024-01-01 RX ADMIN — Medication 1 TABLET(S): at 18:19

## 2024-01-01 RX ADMIN — PANTOPRAZOLE SODIUM 40 MILLIGRAM(S): 20 TABLET, DELAYED RELEASE ORAL at 06:39

## 2024-01-01 RX ADMIN — Medication 50 MILLIGRAM(S): at 09:12

## 2024-01-01 RX ADMIN — APREMILAST 30 MILLIGRAM(S): KIT ORAL at 05:42

## 2024-01-01 RX ADMIN — Medication 20 MILLIGRAM(S): at 09:12

## 2024-01-01 RX ADMIN — APREMILAST 30 MILLIGRAM(S): KIT ORAL at 06:11

## 2024-01-01 RX ADMIN — Medication 100 MILLIGRAM(S): at 22:03

## 2024-01-01 RX ADMIN — Medication 2 MILLIGRAM(S): at 21:22

## 2024-01-01 RX ADMIN — Medication 105 MILLIGRAM(S): at 05:41

## 2024-01-01 RX ADMIN — APREMILAST 30 MILLIGRAM(S): KIT ORAL at 17:41

## 2024-01-01 RX ADMIN — QUETIAPINE FUMARATE 300 MILLIGRAM(S): 200 TABLET, FILM COATED ORAL at 18:58

## 2024-01-01 RX ADMIN — Medication 1 MILLIGRAM(S): at 09:39

## 2024-01-01 RX ADMIN — Medication 1 TABLET(S): at 12:19

## 2024-01-01 RX ADMIN — Medication 105 MILLIGRAM(S): at 12:20

## 2024-01-01 RX ADMIN — MIRABEGRON 25 MILLIGRAM(S): 50 TABLET, EXTENDED RELEASE ORAL at 11:46

## 2024-01-01 RX ADMIN — APREMILAST 30 MILLIGRAM(S): KIT ORAL at 18:25

## 2024-01-01 RX ADMIN — Medication 25 MILLIGRAM(S): at 12:01

## 2024-01-01 RX ADMIN — Medication 20 MILLIGRAM(S): at 09:04

## 2024-01-01 RX ADMIN — LAMOTRIGINE 200 MILLIGRAM(S): 25 TABLET, ORALLY DISINTEGRATING ORAL at 09:08

## 2024-01-01 RX ADMIN — LAMOTRIGINE 200 MILLIGRAM(S): 25 TABLET, ORALLY DISINTEGRATING ORAL at 08:24

## 2024-01-01 RX ADMIN — Medication 1 MILLIGRAM(S): at 08:59

## 2024-01-01 RX ADMIN — Medication 10 MILLIGRAM(S): at 17:37

## 2024-01-01 RX ADMIN — Medication 20 MILLIGRAM(S): at 08:23

## 2024-01-01 RX ADMIN — Medication 50 MILLIGRAM(S): at 08:24

## 2024-01-01 RX ADMIN — Medication 50 MILLIGRAM(S): at 13:52

## 2024-01-01 RX ADMIN — Medication 1 TABLET(S): at 11:37

## 2024-01-01 RX ADMIN — MIRABEGRON 25 MILLIGRAM(S): 50 TABLET, EXTENDED RELEASE ORAL at 12:04

## 2024-01-01 RX ADMIN — Medication 10 MILLIGRAM(S): at 18:53

## 2024-01-01 RX ADMIN — Medication 1 MILLIGRAM(S): at 12:02

## 2024-01-01 RX ADMIN — Medication 300 MILLIGRAM(S): at 12:55

## 2024-01-01 RX ADMIN — LAMOTRIGINE 200 MILLIGRAM(S): 25 TABLET, ORALLY DISINTEGRATING ORAL at 09:11

## 2024-01-01 RX ADMIN — SERTRALINE 150 MILLIGRAM(S): 25 TABLET, FILM COATED ORAL at 09:12

## 2024-01-01 RX ADMIN — Medication 25 MILLIGRAM(S): at 14:51

## 2024-01-01 RX ADMIN — QUETIAPINE FUMARATE 300 MILLIGRAM(S): 200 TABLET, FILM COATED ORAL at 17:37

## 2024-01-01 RX ADMIN — Medication 20 MILLIGRAM(S): at 08:53

## 2024-01-01 RX ADMIN — Medication 100 MILLIGRAM(S): at 21:22

## 2024-01-01 RX ADMIN — Medication 50 MILLIGRAM(S): at 08:58

## 2024-01-01 RX ADMIN — Medication 105 MILLIGRAM(S): at 06:08

## 2024-01-01 RX ADMIN — Medication 650 MILLIGRAM(S): at 21:23

## 2024-01-01 RX ADMIN — Medication 1 MILLIGRAM(S): at 11:37

## 2024-01-01 RX ADMIN — QUETIAPINE FUMARATE 300 MILLIGRAM(S): 200 TABLET, FILM COATED ORAL at 18:19

## 2024-01-01 RX ADMIN — Medication 50 MILLIGRAM(S): at 09:04

## 2024-01-01 RX ADMIN — Medication 1.5 MILLIGRAM(S): at 13:24

## 2024-01-01 RX ADMIN — Medication 20 MILLIGRAM(S): at 08:52

## 2024-01-01 RX ADMIN — Medication 1 TABLET(S): at 12:02

## 2024-01-01 RX ADMIN — Medication 10 MILLIGRAM(S): at 05:41

## 2024-01-01 RX ADMIN — Medication 650 MILLIGRAM(S): at 13:51

## 2024-01-01 RX ADMIN — MIRABEGRON 25 MILLIGRAM(S): 50 TABLET, EXTENDED RELEASE ORAL at 11:37

## 2024-01-01 RX ADMIN — Medication 1334 MILLIGRAM(S): at 17:37

## 2024-01-01 RX ADMIN — Medication 975 MILLIGRAM(S): at 12:02

## 2024-01-01 RX ADMIN — Medication 650 MILLIGRAM(S): at 14:29

## 2024-01-01 RX ADMIN — Medication 2 MILLIGRAM(S): at 21:45

## 2024-01-01 RX ADMIN — Medication 100 MILLIGRAM(S): at 21:45

## 2024-01-01 RX ADMIN — Medication 650 MILLIGRAM(S): at 15:49

## 2024-01-01 RX ADMIN — Medication 1 MILLIGRAM(S): at 09:11

## 2024-01-01 RX ADMIN — QUETIAPINE FUMARATE 300 MILLIGRAM(S): 200 TABLET, FILM COATED ORAL at 17:36

## 2024-01-01 RX ADMIN — ENOXAPARIN SODIUM 40 MILLIGRAM(S): 100 INJECTION SUBCUTANEOUS at 08:23

## 2024-01-01 RX ADMIN — Medication 975 MILLIGRAM(S): at 21:59

## 2024-01-01 RX ADMIN — Medication 1 TABLET(S): at 17:36

## 2024-01-01 RX ADMIN — VORTIOXETINE 10 MILLIGRAM(S): 5 TABLET, FILM COATED ORAL at 11:37

## 2024-01-01 RX ADMIN — Medication 10 MILLIGRAM(S): at 06:49

## 2024-01-01 RX ADMIN — VORTIOXETINE 10 MILLIGRAM(S): 5 TABLET, FILM COATED ORAL at 12:03

## 2024-01-01 RX ADMIN — Medication 25 MILLIGRAM(S): at 23:35

## 2024-01-01 RX ADMIN — MIRABEGRON 25 MILLIGRAM(S): 50 TABLET, EXTENDED RELEASE ORAL at 12:53

## 2024-01-01 RX ADMIN — Medication 1 TABLET(S): at 17:38

## 2024-01-01 RX ADMIN — Medication 100 MILLIGRAM(S): at 22:23

## 2024-01-01 RX ADMIN — VORTIOXETINE 10 MILLIGRAM(S): 5 TABLET, FILM COATED ORAL at 12:21

## 2024-01-01 RX ADMIN — Medication 1 TABLET(S): at 11:46

## 2024-01-01 RX ADMIN — Medication 40 MILLIGRAM(S): at 06:50

## 2024-01-01 RX ADMIN — Medication 1334 MILLIGRAM(S): at 12:54

## 2024-01-01 RX ADMIN — Medication 650 MILLIGRAM(S): at 14:49

## 2024-01-01 RX ADMIN — Medication 1 TABLET(S): at 18:54

## 2024-01-01 RX ADMIN — PANTOPRAZOLE SODIUM 40 MILLIGRAM(S): 20 TABLET, DELAYED RELEASE ORAL at 06:09

## 2024-01-01 RX ADMIN — Medication 1 MILLIGRAM(S): at 12:54

## 2024-01-01 RX ADMIN — Medication 975 MILLIGRAM(S): at 01:43

## 2024-01-01 RX ADMIN — Medication 300 MILLIGRAM(S): at 15:28

## 2024-01-01 RX ADMIN — Medication 1 TABLET(S): at 12:56

## 2024-01-01 RX ADMIN — Medication 975 MILLIGRAM(S): at 10:17

## 2024-01-01 RX ADMIN — SERTRALINE 150 MILLIGRAM(S): 25 TABLET, FILM COATED ORAL at 08:55

## 2024-01-01 RX ADMIN — VORTIOXETINE 10 MILLIGRAM(S): 5 TABLET, FILM COATED ORAL at 12:54

## 2024-01-01 RX ADMIN — VORTIOXETINE 10 MILLIGRAM(S): 5 TABLET, FILM COATED ORAL at 11:46

## 2024-01-01 RX ADMIN — MIRABEGRON 25 MILLIGRAM(S): 50 TABLET, EXTENDED RELEASE ORAL at 12:21

## 2024-01-01 RX ADMIN — Medication 10 MILLIGRAM(S): at 06:10

## 2024-01-01 RX ADMIN — Medication 1 TABLET(S): at 12:55

## 2024-01-01 RX ADMIN — Medication 1.5 MILLIGRAM(S): at 15:28

## 2024-01-01 RX ADMIN — HYDROXYUREA 500 MILLIGRAM(S): 500 CAPSULE ORAL at 06:09

## 2024-01-01 RX ADMIN — SERTRALINE 150 MILLIGRAM(S): 25 TABLET, FILM COATED ORAL at 09:09

## 2024-01-01 RX ADMIN — Medication 40 MILLIEQUIVALENT(S): at 17:37

## 2024-01-01 RX ADMIN — Medication 975 MILLIGRAM(S): at 11:17

## 2024-01-01 RX ADMIN — Medication 1 TABLET(S): at 12:03

## 2024-01-01 RX ADMIN — PANTOPRAZOLE SODIUM 40 MILLIGRAM(S): 20 TABLET, DELAYED RELEASE ORAL at 05:42

## 2024-01-01 RX ADMIN — Medication 2 MILLIGRAM(S): at 22:23

## 2024-01-01 RX ADMIN — Medication 25 MILLIGRAM(S): at 18:00

## 2024-01-01 RX ADMIN — ENOXAPARIN SODIUM 40 MILLIGRAM(S): 100 INJECTION SUBCUTANEOUS at 09:30

## 2024-01-01 RX ADMIN — Medication 105 MILLIGRAM(S): at 06:10

## 2024-01-01 RX ADMIN — PANTOPRAZOLE SODIUM 40 MILLIGRAM(S): 20 TABLET, DELAYED RELEASE ORAL at 06:10

## 2024-01-01 RX ADMIN — Medication 100 MILLIGRAM(S): at 13:52

## 2024-01-01 RX ADMIN — Medication 650 MILLIGRAM(S): at 06:46

## 2024-01-01 RX ADMIN — HYDROXYUREA 500 MILLIGRAM(S): 500 CAPSULE ORAL at 14:50

## 2024-01-01 RX ADMIN — SODIUM CHLORIDE 100 MILLILITER(S): 9 INJECTION, SOLUTION INTRAVENOUS at 08:32

## 2024-01-01 RX ADMIN — Medication 1 MILLIGRAM(S): at 08:24

## 2024-01-01 RX ADMIN — SERTRALINE 150 MILLIGRAM(S): 25 TABLET, FILM COATED ORAL at 08:54

## 2024-01-01 RX ADMIN — Medication 2 MILLIGRAM(S): at 22:02

## 2024-01-01 RX ADMIN — LAMOTRIGINE 200 MILLIGRAM(S): 25 TABLET, ORALLY DISINTEGRATING ORAL at 08:53

## 2024-01-01 RX ADMIN — Medication 25 MILLIGRAM(S): at 14:43

## 2024-01-01 RX ADMIN — PANTOPRAZOLE SODIUM 40 MILLIGRAM(S): 20 TABLET, DELAYED RELEASE ORAL at 06:51

## 2024-01-01 RX ADMIN — Medication 10 MILLIGRAM(S): at 06:09

## 2024-02-08 NOTE — PHYSICAL EXAM
[Restricted in physically strenuous activity but ambulatory and able to carry out work of a light or sedentary nature] : Status 1- Restricted in physically strenuous activity but ambulatory and able to carry out work of a light or sedentary nature, e.g., light house work, office work [Obese] : obese [Normal] : affect appropriate [de-identified] : s/p hernia repair; excess pannus; no open wounds [de-identified] : Palpable bilateral axillary lymphadenopathy, chronic [de-identified] : s/p L TKR; scar well healed [de-identified] : old scars abdomen

## 2024-02-08 NOTE — ASSESSMENT
[FreeTextEntry1] : Ms. CRUZ 's questions were answered to her satisfaction.  She  expressed her  understanding and willingness to comply with the above recommendations, and  will return to the office in 3-4 months.

## 2024-02-08 NOTE — HISTORY OF PRESENT ILLNESS
[de-identified] : 68F, PMHx of psoriasis, psoriatic arthritis, on Otezla, GERD, hypertension, obstructive sleep apnea, bipolar disorder, hiatal hernia and morbid obesity, s/p revision Solis-en-Y gastric bypass and transoral gastric plication (for weight 421 lbs), returning for hematologic follow-up of CLL and anemia.  CASE SYNOPSIS: 10/22/2021: CT A/P-postoperative changes, s/p gastric bypass with reflux into the aferrent limb.  No evidence of bowel obstruction, no evidence of recurrent ventral hernia, no intra-abdominal lymphadenopathy or splenomegaly.  10/23/2021: CT neck soft tissue-mildly enlarged diffuse prominent size cervical and axillary lymph nodes of uncertain etiology.  Lymphoproliferative etiology should be considered  1/6/2022: Bone marrow biopsy/aspirate-CLL/small lymphocytic lymphoma (60-70% involvement).  Iron stores present, no ringed sideroblasts seen.  Flow cytometry: Monotypic B cells (91.5% of lymphocytes, 40.4% of cells), positive for dim kappa, CD19, dim CD20, CD23, CD5, CD38 positive and 18.7% of cells consistent with SLL/CLL.  The myeloid immunophenotypic findings show normal myeloid granularity, no increase in myeloid immaturity and normal myeloid antigen maturation pattern.  Cytogenetics-no mutations identified.  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 deletion and 12%, FAHEEM deletion in 15.5%.  8/2/2022: PET-minimal to no significant FDG uptake numerous subcentimeter and enlarged multistation cervical lymph nodes, left supraclavicular lymph node, bilateral axillary lymph nodes and bilateral iliac and inguinal lymph nodes.  Cervical and left cervical lymphadenopathy without significant change in size when compared with CT neck 7/13/2022, but increased in size when compared to CT neck 10/11/22.  [facility]  3/13/2023-DEXA scan-reportedly abnormal  3/20/2023 open MRI cervical spine: multilevel cervical discogenic spondylosis.  Bilateral cervical adenopathy likely related to CLL.  Correlation with patient's clinical symptoms suggested.  6/29/23 PET: Multiple mildly enlarged and  small lymph nodes in the neck, thorax, abdomen and pelvis demonstrating mild, or no FDG avidity, or increased size, number and/or metabolism, compatible with known CLL. Nonspecific mildly avid fluid in the subcutaneous tissue of the lower back, increased from prior study.  Please correlate clinically.  7/4/23- hairline fracture left rib at home; treated supportively.  9/6/2023: WBC 88.13K, 91% lymphocytes  10/26/2023 CT C/A/P: Lymphadenopathy in chest, abdomen and pelvis is in keeping with the known CLL.  There is slight interval increase (from 2.7 x 1.9 cm to 3.8x2.2 cm) in the right axillary adenopathy without significant interval change in the remainder of the lymphadenopathy.  Mild subpleural nodularity in the left lower lobe may represent focal atelectasis; attention should be paid to this on subsequent follow-up imaging.  11/4/23: WBC 84.91K,  81% lymphocytes  2/62024 WBC 97K [FreeTextEntry1] : expectant surveillance\par  \par   [de-identified] : Returning after 3-month interval complaining of persistent diarrhea (up to 8 stools a day, at times watery).  In interim she was evaluated by GI (Dr. Justo Cruz), had colonoscopy which showed diverticulosis approximately 3 weeks ago and is taking Lomotil with no significant improvement of symptoms.  Nevertheless, patient has recently returned from a cruise in the [facility]  Physical examination is not significantly changed, there is no palpable lymphadenopathy or organomegaly, no weight loss, blood work consistent of normal sodium and potassium, despite reported daily diarrhea.  Patient did not require hospitalization or IV hydration.  She is here accompanied by her .

## 2024-02-15 PROBLEM — Z98.84 HISTORY OF GASTRIC BYPASS: Status: ACTIVE | Noted: 2020-07-30

## 2024-02-15 NOTE — PHYSICAL EXAM
[de-identified] : Normoactive bowel sounds, no hepatosplenomegaly, no masses, non-tender.  Healed incisions no hernia Self

## 2024-02-15 NOTE — PLAN
[FreeTextEntry1] : Plan: Continue high-protein low-carb diet Dietary counseling provided activities as tolerated Check blood work Continue vitamins daily Follow-up 6 months  Plan for GI symptoms and CLL per GI and oncology respectively

## 2024-02-15 NOTE — HISTORY OF PRESENT ILLNESS
[de-identified] : Ms. SHANTEL CRUZ is a 68-year-old woman who presented with an incisional hernia s/p incisional hernia repair with implantation of mesh, posterior component separation bilateral myofascial flap advancement with intact neurovascular bundle, bilateral skin flaps greater than 100cm, implantation of OviTex mesh bridge, placement of wound vacuum-assisted closure, and scar excision on 5/12/2021 who comes in today for follow up visit.  Today pt reports slight discomfort of abdomen. Surgical incision is clean, dry, and intact. Is tolerating phase 4 diet. Drinks about 2L of water daily .  Taking daily vitamins. Is going to PT twice a week. Maintaining a high protein, low carb diet. Slight nausea but no vomiting. Does have acid reflux - taking omeprazole with relief. Denies fevers/chills. Has diarrhea for 10 weeks - currently taking xifaxin and following GI Dr. Justo Cruz with some improvement.   [de-identified] : : Long limb Solis-en-Y gastric bypass (6/27/2001) preop weight: 391# Revision Solis-en-Y gastric bypass with transoral gastric plication (5/25/2010). CLL diagnosis with poor progression[facility]

## 2024-02-24 NOTE — ED PROVIDER NOTE - OBJECTIVE STATEMENT
68F with PMH CLL (follows with Dr. Nguyen) presenting after noted to have elevated WBC and anemia on outpatient labs. Pt states she was getting blood work done at her dermatology appt, requested CBC given her CLL and was notified today that her WBC was 187 and Hg 6.8. On review of records, WBC was 86.38 in Dec and 97.01 in Feb and baseline Hg around 9. 68F with PMH CLL (follows with Dr. Nguyen) presenting after noted to have elevated WBC and anemia on outpatient labs. Pt states she was getting blood work done at her dermatology appt, requested CBC given her CLL and was notified today that her WBC was 187 and Hg 6.8. On review of records, WBC was 86.38 in Dec and 97.01 in Feb and baseline Hg around 9. She spoke to heme/onc on-call and advised to come to ED. Pt notes she has been feeling unwell for the past 2 days. States she had chest pain, localized in the center of her chest, improved with nitroglycerin 68F with PMH CLL (follows with Dr. Nguyen) presenting after noted to have elevated WBC and anemia on outpatient labs. Pt states she was getting blood work done at her dermatology appt, requested CBC given her CLL and was notified today that her WBC was 187 and Hg 6.8. On review of records, WBC was 86.38 in Dec and 97.01 in Feb and baseline Hg around 9. She spoke to heme/onc on-call and advised to come to ED. Pt notes she has been feeling unwell for the past 2 days. States she had chest pain, localized in the center of her chest, improved with nitroglycerin (which she was prescribed for esophageal issues, not cardiac). Pt also reporting headache, "pounding" in her ears, generalized weakness. Pt denies fever, chills, SOB, nausea, vomiting, hematuria, hematochezia. 68F with PMH CLL (follows with Dr. Nguyen) presenting after noted to have elevated WBC and anemia on outpatient labs. Pt states she was getting blood work done at her dermatology appt, requested CBC given her CLL and was notified today that her WBC was 187 and Hg 6.8. On review of records, WBC was 86.38 in Dec and 97.01 in Feb and baseline Hg around 9. She spoke to heme/onc on-call and advised to come to ED. Pt notes she has been feeling unwell for the past 2 days. States she had chest pain, localized in the center of her chest, improved with nitroglycerin (which she was prescribed for esophageal issues, not cardiac). Pt also reporting headache, "pounding" in her ears, generalized weakness. Pt denies fever, chills, SOB, nausea, vomiting, hematuria, hematochezia.    PCP: Dr. Zuleyka Gates

## 2024-02-24 NOTE — ED PROVIDER NOTE - CLINICAL SUMMARY MEDICAL DECISION MAKING FREE TEXT BOX
68F with PMH CLL (follows with Dr. Nguyen) presenting after noted to have elevated WBC and anemia on outpatient labs. Pt states she was getting blood work done at her dermatology appt, requested CBC given her CLL and was notified today that her WBC was 187 and Hg 6.8. On review of records, WBC was 86.38 in Dec and 97.01 in Feb and baseline Hg around 9. She spoke to heme/onc on-call and advised to come to ED. Pt notes she has been feeling unwell for the past 2 days. States she had chest pain, localized in the center of her chest, improved with nitroglycerin (which she was prescribed for esophageal issues, not cardiac). Pt also reporting headache, tinnitus, generalized weakness. Pt denies fever, chills, SOB, nausea, vomiting, hematuria, hematochezia. Presentation is concerning for hyperleukocytosis iso CLL. Plan: CBC, CMP, coags, trop, D-dimer, FDP, TLS labs. Will consult heme onc.

## 2024-02-24 NOTE — ED PROVIDER NOTE - PROGRESS NOTE DETAILS
[place] PGY-1  Pt accepted to medicine service (tele). [place] PGY-1  Spoke with heme onc fellow. Per recommendations, obtained CT chest/abdomen/pelvis to eval for lymphadenopathy. Will hold off on blood transfusion at this time as concern elevated bili and possibility of hemolytic anemia that can be seen in CLL. Rubia test ordered. Per fellow, likely that pt will require IVIG and pt made aware of this possibility.

## 2024-02-24 NOTE — ED ADULT NURSE NOTE - OBJECTIVE STATEMENT
67 y/o female, A&O x3, PMH CLL presents to ED c/o abnormal lab results. Pt endorses outpt lab work done at her dermatology appt, requested CBC and was notified today that her WBC was elevated and Hbg was low. Pt states she has been having on and off chest pain for the past 2 days, localized in her center chest, partial w/ nitroglycerin. Pt endorses constant diarrhea and has been seen outpt for treatment. Pt affect is calm and appropriate, spontaneous unlabored breathing, strong peripheral pulses, abdomen soft nondistended, skin clean dry intact, ambulation w/ cane. Pt denies SOB/difficulty breathing, fever/chills, HA, abd pain, lightheadedness/dizziness, numbness/tingling. Pt placed on continuous cardiac monitoring, safety and comfort measures maintained.

## 2024-02-24 NOTE — ED PROVIDER NOTE - PHYSICAL EXAMINATION
PHYSICAL EXAM:    GENERAL: NAD  HEENT: Atraumatic  CHEST/LUNG: Chest rise equal bilaterally  HEART: Regular rate and rhythm. Chest pain reproducible with palpation.  ABDOMEN: Soft, mild discomfort to palpation diffusely  EXTREMITIES:  Extremities warm  PSYCH: A&Ox3  SKIN: No obvious rashes or lesions

## 2024-02-24 NOTE — ED ADULT TRIAGE NOTE - CHIEF COMPLAINT QUOTE
C/o elevated WBC and low HGB on outpt labs. Endorses CP x 2 days. Denies fevers, abdominal pain,  symptoms, blood thinners   PMH CLL

## 2024-02-24 NOTE — ED PROVIDER NOTE - ATTENDING CONTRIBUTION TO CARE
Attending YANIRA Martini performed a history and physical exam of the patient and discussed their management with the resident. I reviewed the resident's note and agree with the documented findings and plan of care, except as noted. My medical decision making and observations are as follows:    68 F with PMH CLL referred to ED by outpatient oncology for abnormal labs.  Blood work obtained at routine dermatology appointment showed  and hemoglobin 6.8; acutely changed from WBC 97 and hemoglobin 9 previously.  Patient reports 2 days of generalized malaise and central chest pain that improved with nitroglycerin.  Also reporting intermittent headaches with pounding sensation in ears, resolved at the time of evaluation.  No fever, cough, shortness of breath, abdominal pain, GI symptoms, dysuria, black or bloody stools, dizziness or lightheadedness, change in hearing or vision, focal weakness or paresthesias.  On exam, patient in no acute distress, normal work of breathing, speaking full sentences.  Heart and lungs clear to auscultation, abdomen soft and nontender, no pedal edema.    MDM–patient with history of CLL presenting with acute increase in WBC with intermittent chest pain and headache; concern for hyper leukostasis.  Will obtain labs including TLS labs and consult heme-onc.  On my independent interpretation, EKG shows NSR rate 81, normal axis, normal intervals, no acute ischemia; eval limited due to baseline wander in lead V2 and artifact in V6.  Similar to prior dating 8/13/2022.

## 2024-02-24 NOTE — ED ADULT NURSE NOTE - NSFALLRISKINTERV_ED_ALL_ED

## 2024-02-25 NOTE — CHART NOTE - NSCHARTNOTEFT_GEN_A_CORE
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    She initially presented to the ED after noted to have elevated WBC and anemia on outpatient labs. Pt states she was getting blood work done at her dermatology appt, requested CBC given her CLL and was notified today that her WBC was 187 and Hg 6.8. On review of records, WBC was 86.38 in Dec and 97.01 in Feb and baseline Hg around 9. She spoke to heme/onc on-call and advised to come to ED.    Peripheral smear:  RBCs: numerous spherocytes, anisocytosis, hypochromia, marked roulette; 0-2 schistocytes/HPF  WBCs: lymphocytic predominance with few blast-like cells; well differentiated neutrophils and eosinophils  Plt: normal morphology, adequate in number    #CLL  #Acute on chronic anemia  - Concern for CLL-related AIHA given elevated retic, LDH, and Tbili. Awaiting Rubia and haptoglobin for confirmation.  >> If warm AIHA (+IgG) and haptoglobin low, please transfuse 1 unit PRBCs and start Pred 1mg/kg/day + GI ppx  >> If cold AIHA (+IgM/IgG-) and haptoglobin low, please transfuse 1 unit PRBCs VIA BLOOD WARMER and start Pred 1mg/kg/day + GI ppx  - Given concern for blast-like cells, will sent peripheral flow to r/o transformation to acute leukemia  - Monitor CBC with diff Q12 and obtain daily CMP, Ph, LDH, uric acid, haptoglobin  - Check CT C/A/P    DVT ppx: lovenox    Rest of care as per primary team    Full consult note to follow later today. Discussed with Dr. Francisco.    **Please be aware note/recommendations not finalized until attending addendum complete.**    Mai Moura DO, MPH  Medical Oncology Fellow, PGY-8  *Can be reached via Teams, but please contact the on-call fellow after 5pm-8am on weekdays and on weekends. 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    She initially presented to the ED after noted to have elevated WBC and anemia on outpatient labs. Pt states she was getting blood work done at her dermatology appt, requested CBC given her CLL and was notified today that her WBC was 187 and Hg 6.8. On review of records, WBC was 86.38 in Dec and 97.01 in Feb and baseline Hg around 9. She spoke to heme/onc on-call and advised to come to ED.    Peripheral smear:  RBCs: numerous spherocytes, anisocytosis, hypochromia, marked roulette; 0-2 schistocytes/HPF  WBCs: lymphocytic predominance with few blast-like cells; well differentiated neutrophils and eosinophils  Plt: normal morphology, adequate in number    #CLL  #Acute on chronic anemia  - Concern for CLL-related AIHA given elevated retic, LDH, and Tbili. Awaiting Rubia and haptoglobin for confirmation.  >> If warm AIHA (+IgG) and haptoglobin low, please transfuse 1 unit PRBCs and start Pred 1mg/kg/day + GI ppx  >> If cold AIHA (+IgM/IgG-) and haptoglobin low, please transfuse 1 unit PRBCs VIA BLOOD WARMER and start Pred 1mg/kg/day + GI ppx  - Given concern for blast-like cells, will sent peripheral flow to r/o transformation to acute leukemia  - Monitor CBC with diff Q12 and obtain daily CMP, Ph, LDH, uric acid, haptoglobin  - Check CT C/A/P    DVT ppx: lovenox    Rest of care as per primary team    Full consult note to follow later today. Discussed with Dr. Francisco.    **Please be aware note/recommendations not finalized until attending addendum complete.**    Mai Moura DO, MPH  Medical Oncology Fellow, PGY-8  *Can be reached via Teams, but please contact the on-call fellow after 5pm-8am on weekdays and on weekends.

## 2024-02-25 NOTE — H&P ADULT - ATTENDING COMMENTS
acute worsening leukocytosis to 200 from 90 and anemia to 5.4 from 9 admitted with concern for CLL transformation to acute leukemia, evaluate for TLS  - will check tumor lysis labs, acute worsening leukocytosis to 200 from 90 and anemia to 5.4 from 9 admitted with concern for CLL transformation to acute leukemia, evaluate for TLS  - will check tumor lysis labs, AIHA labs, peripheral smear.  Heme consult appreciated, transfuse and Prednisone 1gm/kg/d if low haptoglobin and +IgG (cold) or +IgM (warm)  - IVF  - continue home medis  - LE doppler r/o DVT, c/o R posterior knee pain  - DVT prophylaxis

## 2024-02-25 NOTE — H&P ADULT - PROBLEM SELECTOR PLAN 2
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 Awaiting Rubia and haptoglobin for confirmation"  - pending Rubia results  >> If warm AIHA (+IgG) and haptoglobin low, transfuse 1 unit PRBCs and start Pred 1mg/kg/day + GI ppx  >> If cold AIHA (+IgM/IgG-) and haptoglobin low, transfuse 1 unit PRBCs VIA BLOOD WARMER and start Pred 1mg/kg/day + GI ppx

## 2024-02-25 NOTE — H&P ADULT - ASSESSMENT
68F with PMH of CLL (not on treatment), anemia, HTN, mood disorder, psoriasis presenting after outpatient bloodwork revealed worsening anemia and increased leukocytosis.  68F with PMH of CLL (not on treatment), anemia, HTN, mood disorder, psoriasis presenting after outpatient blood work revealed worsening anemia and increased leukocytosis.

## 2024-02-25 NOTE — H&P ADULT - NSHPREVIEWOFSYSTEMS_GEN_ALL_CORE
CONSTITUTIONAL:  No weight loss, fever, chills, weakness or fatigue.  HEENT:  Eyes:  No visual loss, blurred vision, double vision or yellow sclerae. Ears, Nose, Throat:  No hearing loss, sneezing, congestion, runny nose or sore throat.  SKIN:  No rash or itching.  CARDIOVASCULAR:  slight chest pain, No palpitations or edema.  RESPIRATORY:  No shortness of breath, cough or sputum.  GASTROINTESTINAL:  No anorexia, nausea, vomiting or diarrhea. No abdominal pain or blood.  GENITOURINARY:  No dysuria, hematuria or increased urinary frequency.  NEUROLOGICAL:  +headache. No dizziness, syncope, paralysis, ataxia, numbness or tingling in the extremities. No change in bowel or bladder control.  MUSCULOSKELETAL:  chronic joint pains.  HEMATOLOGIC:  No anemia, bleeding or bruising.  LYMPHATICS:  No enlarged nodes. No history of splenectomy.  PSYCHIATRIC:  No history of depression or anxiety.  ENDOCRINOLOGIC:  No reports of sweating, cold or heat intolerance. No polyuria or polydipsia.  ALLERGIES:  No history of asthma, hives, eczema or rhinitis.

## 2024-02-25 NOTE — DISCHARGE NOTE PROVIDER - NSDCFUADDINST_GEN_ALL_CORE_FT
[facility]:  - Saturday 3/2 for possible PRBCs   - Tuesday 3/5 at 2:45pm to see Dr. Nguyen and Rituximab You have a follow up at the [facility] on:  - Saturday 3/2 at 8am for possible PRBCs   - Friday 3/8 at 7:30am for labs, 8am for rituximab and ______ to see Dr. Nguyen  You have a follow up at the [facility] on:  - Saturday 3/2 at 8am for possible PRBCs  - Tuesday 3/5 at 1:45pm to see Dr. Nguyen   - Friday 3/8 at 7:30am for labs, 8am for rituximab   Rituximab may be changed to Tuesday 3/5 - you will receive a call from [facility] if these appointments change

## 2024-02-25 NOTE — CONSULT NOTE ADULT - ATTENDING COMMENTS
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    -She initially presented to the ED after noted to have elevated WBC and anemia on outpatient labs. Pt states she was getting blood work done at her dermatology appt, requested CBC given her CLL and was notified today that her WBC was 187 and Hg 6.8. On review of records, WBC was 86.38 in Dec and 97.01 in Feb and baseline Hg around 9. She spoke to heme/onc on-call and advised to come to ED.  Patient most likely with hemolytic anemia.  Please see above for recommendations.  Once HA confirmed, start prednisone. R/o infectious causes.  Folic acid 1mg daily.  Hydrea for high WBC, Check flow.

## 2024-02-25 NOTE — DISCHARGE NOTE PROVIDER - PROVIDER TOKENS
PROVIDER:[TOKEN:[[id number]]],PROVIDER:[TOKEN:[[id number]]],PROVIDER:[TOKEN:[[id number]]] PROVIDER:[TOKEN:[[id number]]]

## 2024-02-25 NOTE — H&P ADULT - PROBLEM SELECTOR PLAN 3
- patient with hx of anxiety and mood disorder  - continue home Klonopin with 1mg in AM, 1.5mg in afternoon and 2mg at bedtime  - continue home Lamictal 200mg daily  - continue home zoloft 150mg daily  - continue home Seroquel 300mg daily  - continue home trintellix 10mg daily  - home vyvance 50mg daily not on formulary; patient will need to be instructed to bring home suppy of medication

## 2024-02-25 NOTE — PROGRESS NOTE ADULT - PROBLEM SELECTOR PLAN 1
- hx of CLL following with Dr. Nguyen, never on treatment previously  - noted to have drastic rise in WBC from 80-90K to 187K and 197K in ED  - hematology following, appreciate recs  - concern for blast-like cells, hematology to send peripheral flow to r/o transformation to acute leukemia  - Monitor CBC with diff Q12 and obtain daily CMP, Ph, LDH, uric acid, haptoglobin  - CT C/A/P with mildly increased adenopathy  - will start maintenance fluids LR @100cc/hr for 10hr, hold if giving blood transfusion

## 2024-02-25 NOTE — H&P ADULT - NSHPPHYSICALEXAM_GEN_ALL_CORE
Vital Signs Last 24 Hrs  T(C): 36.6 (25 Feb 2024 04:42), Max: 36.8 (24 Feb 2024 20:59)  T(F): 97.9 (25 Feb 2024 04:42), Max: 98.2 (24 Feb 2024 20:59)  HR: 82 (25 Feb 2024 04:42) (76 - 88)  BP: 125/76 (25 Feb 2024 04:42) (100/65 - 141/84)  BP(mean): 75 (25 Feb 2024 01:52) (75 - 88)  RR: 20 (25 Feb 2024 04:42) (19 - 22)  SpO2: 95% (25 Feb 2024 04:42) (94% - 96%)    Parameters below as of 25 Feb 2024 04:42  Patient On (Oxygen Delivery Method): room air      CAPILLARY BLOOD GLUCOSE        I&O's Summary      PHYSICAL EXAM:  GENERAL: NAD, well-developed  HEAD:  Atraumatic, Normocephalic  EYES: EOMI, PERRLA, conjunctiva and sclera clear  NECK: Supple, No JVD  CHEST/LUNG: Clear to auscultation bilaterally; No wheeze  HEART: Regular rate and rhythm; No murmurs, rubs, or gallops  ABDOMEN: Soft, Nontender, Nondistended; Bowel sounds present  EXTREMITIES:  2+ Peripheral Pulses, No clubbing, cyanosis, or edema  PSYCH: AAOx3  NEUROLOGY: non-focal  SKIN: No rashes or lesions Vital Signs Last 24 Hrs  T(C): 36.6 (25 Feb 2024 04:42), Max: 36.8 (24 Feb 2024 20:59)  T(F): 97.9 (25 Feb 2024 04:42), Max: 98.2 (24 Feb 2024 20:59)  HR: 82 (25 Feb 2024 04:42) (76 - 88)  BP: 125/76 (25 Feb 2024 04:42) (100/65 - 141/84)  BP(mean): 75 (25 Feb 2024 01:52) (75 - 88)  RR: 20 (25 Feb 2024 04:42) (19 - 22)  SpO2: 95% (25 Feb 2024 04:42) (94% - 96%)    Parameters below as of 25 Feb 2024 04:42  Patient On (Oxygen Delivery Method): room air      CAPILLARY BLOOD GLUCOSE        I&O's Summary      PHYSICAL EXAM:  GENERAL: NAD, well-developed  HEAD:  Atraumatic, Normocephalic  EYES: EOMI, PERRLA, conjunctiva and sclera clear  NECK: Supple, No JVD  CHEST/LUNG: Clear to auscultation bilaterally; No wheeze  HEART: Normal rate and regular rhythm; No murmurs, rubs, or gallops  ABDOMEN: Soft, Nontender, Nondistended; Bowel sounds present  EXTREMITIES:  2+ Peripheral Pulses, No clubbing, cyanosis, or edema  PSYCH: AAOx3  NEUROLOGY: non-focal  SKIN: No rashes or lesions Vital Signs Last 24 Hrs  T(C): 36.6 (25 Feb 2024 04:42), Max: 36.8 (24 Feb 2024 20:59)  T(F): 97.9 (25 Feb 2024 04:42), Max: 98.2 (24 Feb 2024 20:59)  HR: 82 (25 Feb 2024 04:42) (76 - 88)  BP: 125/76 (25 Feb 2024 04:42) (100/65 - 141/84)  BP(mean): 75 (25 Feb 2024 01:52) (75 - 88)  RR: 20 (25 Feb 2024 04:42) (19 - 22)  SpO2: 95% (25 Feb 2024 04:42) (94% - 96%)    Parameters below as of 25 Feb 2024 04:42  Patient On (Oxygen Delivery Method): room air      CAPILLARY BLOOD GLUCOSE        I&O's Summary      PHYSICAL EXAM:  GENERAL: NAD, well-developed  HEAD:  Atraumatic, Normocephalic  EYES: EOMI, conjunctiva and sclera clear  NECK: Supple, No JVD  CHEST/LUNG: Clear to auscultation bilaterally; No wheeze  HEART: Normal rate and regular rhythm  ABDOMEN: Soft, Nontender, Nondistended; Bowel sounds present  EXTREMITIES:  2+ Peripheral Pulses, No clubbing, cyanosis, or edema  PSYCH: AAOx3  NEUROLOGY: moves all extremities  SKIN: No rashes or lesions

## 2024-02-25 NOTE — H&P ADULT - PROBLEM SELECTOR PLAN 5
- otezla 30mg BID not in hospital pharmacy, will need to instruct patient to bring home supply if she wants to take while inpatient  - psoriasis complicated by psoriatic arthritis  - continue home lyrica 50mg in AM and 100mg in PM

## 2024-02-25 NOTE — PROGRESS NOTE ADULT - PROBLEM SELECTOR PLAN 2
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 Awaiting Rubia and haptoglobin for confirmation"  - pending Rubia results  >> If warm AIHA (+IgG) and haptoglobin low, transfuse 1 unit PRBCs and start Pred 1mg/kg/day + GI ppx  >> If cold AIHA (+IgM/IgG-) and haptoglobin low, transfuse 1 unit PRBCs VIA BLOOD WARMER and start Pred 1mg/kg/day + GI ppx - baseline hgb 9, Hgb 5.4 on admission  - no signs of acute blood loss  - per heme, "concern for CLL-related AIHA given elevated retic, LDH, and Tbili. Awaiting Rubia and haptoglobin for confirmation"  - pending Rubia results  >> Warm AIHA (+IgG) and haptoglobin low, transfuse 1 unit PRBCs and start Pred 1mg/kg/day + GI ppx  - 2/25: 105 prednisone daily w/ 2 unit apheresed PRBCs ordered

## 2024-02-25 NOTE — DISCHARGE NOTE PROVIDER - HOSPITAL COURSE
HPI:  68F with PMH of CLL (not on treatment, follows with Dr. Nguyen), anemia, HTN, mood disorder, psoriasis presenting after outpatient bloodwork revealed worsening anemia and increased leukocytosis.     She initially presented to the ED after noted to have elevated WBC and anemia on outpatient labs. Pt states she was getting blood work done at her dermatology appt, requested CBC given her CLL and was notified today that her WBC was 187 and Hg 6.8. On review of records, WBC was 86.38 in Dec and 97.01 in Feb and baseline Hg around 9. She spoke to heme/onc on-call and advised to come to ED.    Patient states that she had been having vague symptoms such as chest pain, headache, tinnitus, weakness for the past few days. Denies fevers, chills, dyspnea, n/v, bleeding.  (25 Feb 2024 05:50)    Hospital Course:  While hospitalized the patient was found to have a warm hemolytic anemia. She was started on prednisone 105mg/day with GI ppx. She was transfused ____ units while hospitalized to maintain hemoglobin >7. Oncology evaluated her peripheral blood smear and lab markers for conversation to AML and found _____>    b/l duplexes showed _________      Important Medication Changes and Reason:    Active or Pending Issues Requiring Follow-up:    Advanced Directives:   [ ] Full code  [ ] DNR  [ ] Hospice    Discharge Diagnoses:         68F with PMH of CLL (not on treatment, follows with Dr. Nguyen), anemia, HTN, mood disorder, psoriasis presenting after outpatient bloodwork revealed worsening anemia and increased leukocytosis.   While hospitalized the patient was found to have a warm hemolytic anemia. She was started on prednisone 105mg/day with GI ppx.   Due to edema she underwent b/l duplexes  which showed no evidence of DVT.   She was treated with Rituximab on 2/27: tolerated well.   Currently, Mrs. Carolina ready  for discharge with a follow up appointments at [facility]      68F with PMH of CLL (not on treatment, follows with Dr. Nguyen), anemia, HTN, mood disorder, psoriasis presenting after outpatient bloodwork revealed worsening anemia and increased leukocytosis. While hospitalized the patient was found to have a warm hemolytic anemia. She was started on prednisone 105mg/day with GI ppx.   Due to edema she underwent b/l duplexes  which showed no evidence of DVT. She was treated with Rituximab on 2/27: tolerated well. Currently, Mrs. Damico is ready  for discharge with a follow up appointments at [facility]

## 2024-02-25 NOTE — DISCHARGE NOTE PROVIDER - NSDCCPCAREPLAN_GEN_ALL_CORE_FT
PRINCIPAL DISCHARGE DIAGNOSIS  Diagnosis: AIHA (autoimmune hemolytic anemia)  Assessment and Plan of Treatment: Autoimmune hemolytic anemia is when your body attacks your red bloos cells causing them to burst.  While you were hosptilized you were treated for autoimmune hemolytic anemia with steroids. The steroids helped surpress your immune system so we were able to give you blood transfusions.  Your blood count remained stable after transfusions and you were eventually able to taper the steroids per our oncology team. Please follow up with them outpatient to complete your steroid taper.  If you develop worsening shortness of breath, dizziness, weakness please return to the ED for further evaluation      SECONDARY DISCHARGE DIAGNOSES  Diagnosis: CLL (chronic lymphocytic leukemia)  Assessment and Plan of Treatment: You have a history of CLL which you have not needed chemotherapy for. We tested your blood and found ______ (conversion to AML vs stable). the heme oncology doctors recommend ______  Please follow up with your oncologist and PCP for further management    Diagnosis: AIHA (autoimmune hemolytic anemia)  Assessment and Plan of Treatment:      PRINCIPAL DISCHARGE DIAGNOSIS  Diagnosis: AIHA (autoimmune hemolytic anemia)  Assessment and Plan of Treatment: Autoimmune hemolytic anemia is when your body attacks your red bloos cells causing them to burst.  While you were hosptilized you were treated for autoimmune hemolytic anemia with steroids. The steroids helped surpress your immune system so we were able to give you blood transfusions.  Your blood count remained stable after transfusions and you were eventually able to taper the steroids per our oncology team. Please follow up with them outpatient to complete your steroid taper.  If you develop worsening shortness of breath, dizziness, weakness please return to the ED for further evaluation      SECONDARY DISCHARGE DIAGNOSES  Diagnosis: AIHA (autoimmune hemolytic anemia)  Assessment and Plan of Treatment:     Diagnosis: CLL (chronic lymphocytic leukemia)  Assessment and Plan of Treatment:   Please follow up with your oncologist and PCP for further management

## 2024-02-25 NOTE — CHART NOTE - NSCHARTNOTEFT_GEN_A_CORE
Confidential Drug Utilization Report  Search Terms: Alejandra Damico, 1955Search Date: 02/25/2024 06:36:58 AM  Searching on behalf of: [id number]  The Drug Utilization Report below displays all of the controlled substance prescriptions, if any, that your patient has filled in the last twelve months. The information displayed on this report is compiled from pharmacy submissions to the Department, and accurately reflects the information as submitted by the pharmacies.    This report was requested by: Brian White | Reference #: [id number]    Practitioner Count: 2  Pharmacy Count: 1  Current Opioid Prescriptions: 0  Current Benzodiazepine Prescriptions: 1  Current Stimulant Prescriptions: 0      Patient Demographic Information (PDI)       PDI	First Name	Last Name	Birth Date	Gender	Street Address	[facility]	Zip Code  REBEKA Damico	1955	Female	[address]	[place]	[address]    Prescription Information      PDI Filter:    PDI	Current Rx	Drug Type	Rx Written	Rx Dispensed	Drug	Quantity	Days Supply	Prescriber Name	Prescriber ROSA ISELA #	Payment Method	Dispenser  A	Y		12/06/2023	02/10/2024	pregabalin 50 mg capsule	90	30	Ha Hugo MD	[id number]	Medicare	[id number]  A	Y	B	02/01/2024	02/02/2024	clonazepam 0.5 mg tablet	90	30	ReitRony rendon MD	[id number]	Medicare	[id number]  A	N		12/06/2023	01/08/2024	pregabalin 50 mg capsule	90	30	Ha Hugo MD	[id number]	Medicare	[id number]  A	N	B	12/20/2023	12/22/2023	clonazepam 0.5 mg tablet	90	30	ReRony cuello MD	[id number]	Medicare	[id number]  A	N	O	12/20/2023	12/21/2023	diphenoxylate-atropine 2.5-0.025 mg tablet	120	15	ReRony cuello MD	[id number]	Medicare	[id number]  A	N	B	12/20/2023	12/21/2023	clonazepam 1 mg tablet	90	30	ReRony cuello MD	[id number]	Medicare	[id number]  A	N	B	12/20/2023	12/21/2023	clonazepam 2 mg tablet	60	30	ReRony cuello MD	[id number]	Medicare	[id number]  A	N		12/06/2023	12/11/2023	pregabalin 50 mg capsule	90	30	Ha Hugo MD	[id number]	Medicare	[id number]  A	N	B	11/21/2023	11/26/2023	clonazepam 0.5 mg tablet	90	30	ReitRony rendon MD	[id number]	Medicare	[id number]  A	N	S	11/21/2023	11/26/2023	lisdexamfetamine 50 mg capsule	90	90	ReitRony rendon MD	[id number]	Medicare	[id number]  A	N		09/14/2023	11/08/2023	pregabalin 50 mg capsule	90	30	Ha Hugo MD	[id number]	Medicare	[id number]  A	N	B	10/18/2023	10/21/2023	clonazepam 2 mg tablet	60	30	ReitRony rendon MD	[id number]	Medicare	[id number]  A	N	B	10/18/2023	10/21/2023	clonazepam 1 mg tablet	90	30	ReitRony rendon MD	[id number]	Medicare	[id number]  A	N		09/14/2023	10/11/2023	pregabalin 50 mg capsule	90	30	Ha Hugo MD	[id number]	Medicare	[id number]  A	N	O	10/04/2023	10/11/2023	oxycodone hcl (ir) 5 mg tablet	120	30	StuartAlan	[id number]	Medicare	[id number]  A	N		09/14/2023	09/17/2023	pregabalin 50 mg capsule	90	30	Ha Hugo MD	[id number]	Medicare	[id number]  A	N	B	09/05/2023	09/05/2023	clonazepam 1 mg tablet	90	30	ReitRony rendon MD	[id number]	Medicare	[id number]  A	N	B	09/05/2023	09/05/2023	clonazepam 0.5 mg tablet	90	30	ReitRony rendon MD	[id number]	Medicare	[id number]  A	N	S	08/21/2023	08/22/2023	vyvanse 50 mg capsule	90	90	ReitRony rendon MD	[id number]	Medicare	[id number]  A	N	B	08/21/2023	08/22/2023	clonazepam 2 mg tablet	60	30	ReitRony rendon MD	[id number]	Medicare	[id number]  A	N		03/23/2023	08/18/2023	pregabalin 50 mg capsule	90	30	Ha Hugo MD	[id number]	Medicare	[id number]  A	N		03/23/2023	07/27/2023	pregabalin 50 mg capsule	90	30	Ha Hugo MD	[id number]	Medicare	[id number]  A	N	B	07/20/2023	07/21/2023	clonazepam 0.5 mg tablet	90	30	ReitRony rendon MD	[id number]	Medicare	[id number]  A	N	B	07/20/2023	07/21/2023	clonazepam 1 mg tablet	90	30	ReitRony rendon MD	[id number]	Medicare	[id number]  A	N		03/23/2023	06/26/2023	pregabalin 50 mg capsule	90	30	Ha Hugo MD	[id number]	Medicare	[facility][id number]  A	N	B	06/22/2023	06/23/2023	clonazepam 2 mg tablet	60	30	ReitRony rendon MD	[id number]	Medicare	[id number]  A	N		03/23/2023	05/27/2023	pregabalin 50 mg capsule	90	30	Ha Hugo MD	[id number]	Medicare	[id number]  A	N	B	05/17/2023	05/23/2023	clonazepam 2 mg tablet	60	30	ReitRony rendon MD	[id number]	Medicare	[id number]  A	N	S	05/17/2023	05/23/2023	vyvanse 50 mg capsule	90	90	ReitRony rendon MD	[id number]	Medicare	[id number]  A	N		03/23/2023	04/26/2023	pregabalin 50 mg capsule	90	30	Ha Hugo MD	[id number]	Medicare	[id number]  A	N	O	04/12/2023	04/13/2023	oxycodone hcl (ir) 10 mg tab	120	30	Alan Stuart	[id number]	Medicare	[facility][id number]  A	N		03/23/2023	03/26/2023	pregabalin 50 mg capsule	90	30	Ha Hugo MD	[id number]	Medicare	[id number]  A	N	B	03/14/2023	03/17/2023	clonazepam 0.5 mg tablet	90	30	Rony Mesa MD	[id number]	Medicare	[id number]    * - Details of Drug Type : O = Opioid, B = Benzodiazepine, S = Stimulant    * - Drugs marked with an asterisk are compound drugs. If the compound drug is made up of more than one controlled substance, then each controlled substance will be a separate row in the table.

## 2024-02-25 NOTE — H&P ADULT - NSHPLABSRESULTS_GEN_ALL_CORE
LABS:                        5.4    197.73 )-----------( 264      ( 24 Feb 2024 22:39 )             19.1     02-24    141  |  104  |  22  ----------------------------<  95  4.1   |  27  |  0.44<L>    Ca    8.8      24 Feb 2024 22:39  Phos  3.9     02-24  Mg     1.9     02-24    TPro  6.3  /  Alb  4.1  /  TBili  2.8<H>  /  DBili  x   /  AST  31  /  ALT  16  /  AlkPhos  81  02-24    PT/INR - ( 24 Feb 2024 22:40 )   PT: 10.6 sec;   INR: 1.01 ratio         PTT - ( 24 Feb 2024 22:40 )  PTT:30.1 sec      Urinalysis Basic - ( 24 Feb 2024 22:39 )    Color: x / Appearance: x / SG: x / pH: x  Gluc: 95 mg/dL / Ketone: x  / Bili: x / Urobili: x   Blood: x / Protein: x / Nitrite: x   Leuk Esterase: x / RBC: x / WBC x   Sq Epi: x / Non Sq Epi: x / Bacteria: x        RADIOLOGY & ADDITIONAL TESTS:    Imaging Personally Reviewed:    Consultant(s) Notes Reviewed:      Care Discussed with Consultants/Other Providers: LABS:                        5.4    197.73 )-----------( 264      ( 24 Feb 2024 22:39 )             19.1     02-24    141  |  104  |  22  ----------------------------<  95  4.1   |  27  |  0.44<L>    Ca    8.8      24 Feb 2024 22:39  Phos  3.9     02-24  Mg     1.9     02-24    TPro  6.3  /  Alb  4.1  /  TBili  2.8<H>  /  DBili  x   /  AST  31  /  ALT  16  /  AlkPhos  81  02-24    PT/INR - ( 24 Feb 2024 22:40 )   PT: 10.6 sec;   INR: 1.01 ratio         PTT - ( 24 Feb 2024 22:40 )  PTT:30.1 sec      Urinalysis Basic - ( 24 Feb 2024 22:39 )    Color: x / Appearance: x / SG: x / pH: x  Gluc: 95 mg/dL / Ketone: x  / Bili: x / Urobili: x   Blood: x / Protein: x / Nitrite: x   Leuk Esterase: x / RBC: x / WBC x   Sq Epi: x / Non Sq Epi: x / Bacteria: x        RADIOLOGY & ADDITIONAL TESTS:    Imaging Personally Reviewed:  CT Chest 2/24/24  IMPRESSION:  1. Scattered pathologic adenopathy in the visualized lower neck,   thoracic inlet and axillary regions. Axillary adenopathy is mildly increased   bilaterally.  2.   Small stable pericardial effusion.  3.   Stable borderline cardiomegaly.  4.   Stable mediastinal and hilar adenopathy.  5.   Small nonspecific nodule in the lingula and right lower lobe, both  measuring less than 4 mm.  6.   Mild cardiomegaly.  7.   Apparent mild distal esophageal wall thickening consistent with  esophagitis, incomplete distention or possibly small hiatal hernia. Cannot  entirely exclude neoplastic process in the appropriate clinical setting.  Correlate clinically.    CT AP 2/24/24  IMPRESSION:  1.   Mild increase in bilateral pathologic pelvic sidewall adenopathy.  2.   Small fat-containing umbilical hernia.  3.   Mild hepatomegaly.    Consultant(s) Notes Reviewed:      Care Discussed with Consultants/Other Providers: Labs, CT reports and EKG tracing personally reviewed and interpreted                        5.4    197.73 )-----------( 264      ( 24 Feb 2024 22:39 )             19.1     02-24    141  |  104  |  22  ----------------------------<  95  4.1   |  27  |  0.44<L>    Ca    8.8      24 Feb 2024 22:39  Phos  3.9     02-24  Mg     1.9     02-24    TPro  6.3  /  Alb  4.1  /  TBili  2.8<H>  /  DBili  x   /  AST  31  /  ALT  16  /  AlkPhos  81  02-24    PT/INR - ( 24 Feb 2024 22:40 )   PT: 10.6 sec;   INR: 1.01 ratio         PTT - ( 24 Feb 2024 22:40 )  PTT:30.1 sec      Urinalysis Basic - ( 24 Feb 2024 22:39 )    Color: x / Appearance: x / SG: x / pH: x  Gluc: 95 mg/dL / Ketone: x  / Bili: x / Urobili: x   Blood: x / Protein: x / Nitrite: x   Leuk Esterase: x / RBC: x / WBC x   Sq Epi: x / Non Sq Epi: x / Bacteria: x        RADIOLOGY & ADDITIONAL TESTS:    Imaging Personally Reviewed:  CT Chest 2/24/24  IMPRESSION:  1. Scattered pathologic adenopathy in the visualized lower neck,   thoracic inlet and axillary regions. Axillary adenopathy is mildly increased   bilaterally.  2.   Small stable pericardial effusion.  3.   Stable borderline cardiomegaly.  4.   Stable mediastinal and hilar adenopathy.  5.   Small nonspecific nodule in the lingula and right lower lobe, both  measuring less than 4 mm.  6.   Mild cardiomegaly.  7.   Apparent mild distal esophageal wall thickening consistent with  esophagitis, incomplete distention or possibly small hiatal hernia. Cannot  entirely exclude neoplastic process in the appropriate clinical setting.  Correlate clinically.    CT AP 2/24/24  IMPRESSION:  1.   Mild increase in bilateral pathologic pelvic sidewall adenopathy.  2.   Small fat-containing umbilical hernia.  3.   Mild hepatomegaly.    Consultant(s) Notes Reviewed:      Care Discussed with Consultants/Other Providers:

## 2024-02-25 NOTE — CONSULT NOTE ADULT - ASSESSMENT
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    She initially presented to the ED after noted to have elevated WBC and anemia on outpatient labs. Pt states she was getting blood work done at her dermatology appt, requested CBC given her CLL and was notified today that her WBC was 187 and Hg 6.8. On review of records, WBC was 86.38 in Dec and 97.01 in Feb and baseline Hg around 9. She spoke to heme/onc on-call and advised to come to ED.    Peripheral smear:  RBCs: numerous spherocytes, anisocytosis, hypochromia, marked roulette; 0-2 schistocytes/HPF  WBCs: lymphocytic predominance with few blast-like cells; well differentiated neutrophils and eosinophils  Plt: normal morphology, adequate in number    CT chest/abd/pelvis 2/24/24: IMPRESSION: 1.   Scattered pathologic adenopathy in the visualized lower neck, thoracic inlet and axillary regions. Axillary adenopathy is mildly increased bilaterally.2.   Small stable pericardial effusion.3.   Stable borderline cardiomegaly.4.   Stable mediastinal and hilar adenopathy.5.   Small nonspecific nodule in the lingula and right lower lobe, bothmeasuring less than 4 mm.6.   Mild cardiomegaly.7.   Apparent mild distal esophageal wall thickening consistent with esophagitis, incomplete distention or possibly small hiatal hernia. Cannot entirely exclude neoplastic process in the appropriate clinical setting.Correlate clinically. IMPRESSION: 1.   Mild increase in bilateral pathologic pelvic sidewall adenopathy.2.   Small fat-containing umbilical hernia.3.   Mild hepatomegaly.    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    -She initially presented to the ED after noted to have elevated WBC and anemia on outpatient labs. Pt states she was getting blood work done at her dermatology appt, requested CBC given her CLL and was notified today that her WBC was 187 and Hg 6.8. On review of records, WBC was 86.38 in Dec and 97.01 in Feb and baseline Hg around 9. She spoke to heme/onc on-call and advised to come to ED.    -    -Peripheral smear:  RBCs: numerous spherocytes, anisocytosis, hypochromia, marked roulette; 0-2 schistocytes/HPF  WBCs: lymphocytic predominance with few blast-like cells; well differentiated neutrophils and eosinophils  Plt: normal morphology, adequate in number    -CT chest/abd/pelvis 2/24/24: IMPRESSION: 1.   Scattered pathologic adenopathy in the visualized lower neck, thoracic inlet and axillary regions. Axillary adenopathy is mildly increased bilaterally.2.   Small stable pericardial effusion.3.   Stable borderline cardiomegaly.4.   Stable mediastinal and hilar adenopathy.5.   Small nonspecific nodule in the lingula and right lower lobe, bothmeasuring less than 4 mm.6.   Mild cardiomegaly.7.   Apparent mild distal esophageal wall thickening consistent with esophagitis, incomplete distention or possibly small hiatal hernia. Cannot entirely exclude neoplastic process in the appropriate clinical setting.Correlate clinically. IMPRESSION: 1.   Mild increase in bilateral pathologic pelvic sidewall adenopathy.2.   Small fat-containing umbilical hernia.3.   Mild hepatomegaly.    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 Rubia IgG+ and haptoglobin <20 concerning for WAIHA  Recommend:  - please transfuse 1 unit PRBCs and start Pred 1mg/kg/day + GI ppx  - Given concern for blast-like cells, will send peripheral flow to r/o transformation to acute leukemia  - hydrea 500mg BID for leukocytosis  - folic acid supplementation given active hemolysis  - Monitor CBC with diff Q12 and obtain daily CMP, Ph, LDH, uric acid, haptoglobin  - repeat CBC after each transfusion and give premeds benadryl and tylenol before any transfusions  - discussed today with blood bank that pt has anti E antibodies, she currently has 3 compatible units  - given her symptoms later in day of worse headaches and sweats, will give one unit pRBCs, recheck CBC, and consider whether needs any addl units pending Hgb response and symptoms  - DVT ppx: lovenox    Rest of care as per primary team  Recommendations preliminary until attending attestation  68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    -She initially presented to the ED after noted to have elevated WBC and anemia on outpatient labs. Pt states she was getting blood work done at her dermatology appt, requested CBC given her CLL and was notified today that her WBC was 187 and Hg 6.8. On review of records, WBC was 86.38 in Dec and 97.01 in Feb and baseline Hg around 9. She spoke to heme/onc on-call and advised to come to ED.    - .73, Hgb 5.4, Plt 264 -> , Hgb 5.1, Plt 260  - other lymphocytes 58%  - retic 6%, retic index 1.09 hypoproliferative (2/24/24)  - hapto <20,  (2/24/24), bilirubin 2.6, uric acid 3.1; coags WNL; fibrinogen 301, fibrin split products <5  - Direct ayaz IgG+, direct ayaz c3 negative,     -Peripheral smear:  RBCs: numerous spherocytes, anisocytosis, hypochromia, marked roulette; 0-2 schistocytes/HPF  WBCs: lymphocytic predominance with few blast-like cells; well differentiated neutrophils and eosinophils  Plt: normal morphology, adequate in number    -CT chest/abd/pelvis 2/24/24: IMPRESSION: severe atherosclerotic calcification of the coronary arteries...1.   Scattered pathologic adenopathy in the visualized lower neck, thoracic inlet and axillary regions. Axillary adenopathy is mildly increased bilaterally.2.   Small stable pericardial effusion.3.   Stable borderline cardiomegaly.4.   Stable mediastinal and hilar adenopathy.5.   Small nonspecific nodule in the lingula and right lower lobe, bothmeasuring less than 4 mm.6.   Mild cardiomegaly.7.   Apparent mild distal esophageal wall thickening consistent with esophagitis, incomplete distention or possibly small hiatal hernia. Cannot entirely exclude neoplastic process in the appropriate clinical setting.Correlate clinically. IMPRESSION: 1.   Mild increase in bilateral pathologic pelvic sidewall adenopathy.2.   Small fat-containing umbilical hernia.3.   Mild hepatomegaly.    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 Ayaz IgG+ and haptoglobin <20 concerning for WAIHA  Recommend:  - please transfuse 1 unit PRBCs and start Pred 1mg/kg/day + GI ppx  - Given concern for blast-like cells, will send peripheral flow to r/o transformation to acute leukemia  - hydrea 500mg BID for leukocytosis  - folic acid supplementation given active hemolysis  - Monitor CBC with diff Q12 and obtain daily CMP, Ph, LDH, uric acid, haptoglobin  - repeat CBC after each transfusion and give premeds benadryl and tylenol before any transfusions  - discussed today with blood bank that pt has anti E antibodies, she currently has 3 compatible units  - given her symptoms later in day of worse headaches and sweats, will give one unit pRBCs, recheck CBC, and consider whether needs any addl units pending Hgb response and symptoms  - DVT ppx: lovenox  - cardiology follow up for severe calcification of coronaries    Rest of care as per primary team  Recommendations preliminary until attending attestation  68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    -She initially presented to the ED after noted to have elevated WBC and anemia on outpatient labs. Pt states she was getting blood work done at her dermatology appt, requested CBC given her CLL and was notified today that her WBC was 187 and Hg 6.8. On review of records, WBC was 86.38 in Dec and 97.01 in Feb and baseline Hg around 9. She spoke to heme/onc on-call and advised to come to ED.    - .73, Hgb 5.4, Plt 264 -> , Hgb 5.1, Plt 260  - other lymphocytes 58%  - retic 6%, retic index 1.09 hypoproliferative (2/24/24)  - hapto <20,  (2/24/24), bilirubin 2.6, uric acid 3.1; coags WNL; fibrinogen 301, fibrin split products <5  - Direct ayaz IgG+, direct ayaz c3 negative,     -Peripheral smear:  RBCs: numerous spherocytes, anisocytosis, hypochromia, marked roulette; 0-2 schistocytes/HPF  WBCs: lymphocytic predominance with few blast-like cells; well differentiated neutrophils and eosinophils  Plt: normal morphology, adequate in number    -CT chest/abd/pelvis 2/24/24: IMPRESSION: severe atherosclerotic calcification of the coronary arteries...1.   Scattered pathologic adenopathy in the visualized lower neck, thoracic inlet and axillary regions. Axillary adenopathy is mildly increased bilaterally.2.   Small stable pericardial effusion.3.   Stable borderline cardiomegaly.4.   Stable mediastinal and hilar adenopathy.5.   Small nonspecific nodule in the lingula and right lower lobe, bothmeasuring less than 4 mm.6.   Mild cardiomegaly.7.   Apparent mild distal esophageal wall thickening consistent with esophagitis, incomplete distention or possibly small hiatal hernia. Cannot entirely exclude neoplastic process in the appropriate clinical setting.Correlate clinically. IMPRESSION: 1.   Mild increase in bilateral pathologic pelvic sidewall adenopathy.2.   Small fat-containing umbilical hernia.3.   Mild hepatomegaly.    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 Ayaz IgG+ and haptoglobin <20 concerning for WAIHA  Recommend:  - please transfuse 1 unit PRBCs and start Pred 1mg/kg/day + GI ppx  - Given concern for blast-like cells, will send peripheral flow to r/o transformation to acute leukemia  - hydrea 500mg BID for leukocytosis  - folic acid supplementation given active hemolysis  - Monitor CBC with diff Q12 and obtain daily CMP, Ph, LDH, uric acid, haptoglobin  - repeat CBC after each transfusion and give premeds benadryl and tylenol before any transfusions  - discussed today with blood bank that pt has anti E antibodies, she currently has 3 compatible units  - given her symptoms later in day of worse headaches and sweats, will give one unit pRBCs, recheck CBC, and consider whether needs any addl units pending Hgb response and symptoms  - DVT ppx: lovenox  - consider neuro consult / brain imaging if persistent pulsatile tinnitus  - cardiology follow up for severe calcification of coronaries    Rest of care as per primary team  Recommendations preliminary until attending attestation

## 2024-02-25 NOTE — H&P ADULT - HISTORY OF PRESENT ILLNESS
Brian White, PGY3    DATE OF SERVICE: 02-25-24 @ 05:51    Patient is a 68y old  Female who presents with a chief complaint of     SUBJECTIVE / OVERNIGHT EVENTS:    MEDICATIONS  (STANDING):    MEDICATIONS  (PRN):             68F with PMH of CLL (not on treatment, follows with Dr. Nguyen), anemia, HTN, mood disorder, psoriasis presenting after outpatient bloodwork revealed worsening anemia and increased leukocytosis.     She initially presented to the ED after noted to have elevated WBC and anemia on outpatient labs. Pt states she was getting blood work done at her dermatology appt, requested CBC given her CLL and was notified today that her WBC was 187 and Hg 6.8. On review of records, WBC was 86.38 in Dec and 97.01 in Feb and baseline Hg around 9. She spoke to heme/onc on-call and advised to come to ED.    Patient states that she had been having vague symptoms such as chest pain, headache, tinnitus, weakness for the past few days. Denies fevers, chills, dyspnea, n/v, bleeding.

## 2024-02-25 NOTE — DISCHARGE NOTE PROVIDER - NSDCFUSCHEDAPPT_GEN_ALL_CORE_FT
[facility]  [facility] [address]  Scheduled Appointment: 04/19/2024    Tereso Nguyen  [facility]  [address]  Scheduled Appointment: 05/16/2024     [facility]  [address] [facility]  Scheduled Appointment: 03/02/2024    [facility]  [facility]  Scheduled Appointment: 03/08/2024    [facility]  Scheduled Appointment: 03/08/2024    [facility]  [facility] [address]  Scheduled Appointment: 04/19/2024    Tereso Nguyen  [facility]  [facility]  Scheduled Appointment: 05/16/2024

## 2024-02-25 NOTE — PATIENT PROFILE ADULT - FALL HARM RISK - HARM RISK INTERVENTIONS
Assistance with ambulation/Communicate Risk of Fall with Harm to all staff/Monitor gait and stability/Provide patient with walking aids - walker, cane, crutches/Reinforce activity limits and safety measures with patient and family/Tailored Fall Risk Interventions/Use of alarms - bed, chair and/or voice tab/Visual Cue: Yellow wristband and red socks/Bed in lowest position, wheels locked, appropriate side rails in place/Call bell, personal items and telephone in reach/Instruct patient to call for assistance before getting out of bed or chair/Non-slip footwear when patient is out of bed/[place] to call system/Physically safe environment - no spills, clutter or unnecessary equipment/Purposeful Proactive Rounding/Room/bathroom lighting operational, light cord in reach

## 2024-02-25 NOTE — DISCHARGE NOTE PROVIDER - CARE PROVIDER_API CALL
Conigliaro, Rosemarie Lombardi  Internal Medicine  [address], [address]  [place] [address]  Phone: [phone]  Fax: [phone]  Follow Up Time:     Tereso Chester  Medical Oncology  [address] [place] [address]  Phone: [phone]  Fax: [phone]  Follow Up Time:     Rony Meas  Child/Adolescent Psychiatry  [address], [address]  [place] [address]  Phone: [phone]  Fax: [phone]  Follow Up Time:    Tereso Chester  Medical Oncology  [address] [place] [id number]  Phone: [phone]  Fax: [phone]  Follow Up Time:

## 2024-02-25 NOTE — PROGRESS NOTE ADULT - PROBLEM SELECTOR PLAN 7
- therapeutic equivalent of home vesicare 10mg with oxybutynin 10mg BID  - continue home myrbetriq 25mg daily

## 2024-02-25 NOTE — PROGRESS NOTE ADULT - ASSESSMENT
68F with PMH of CLL (not on treatment), anemia, HTN, mood disorder, psoriasis presenting after outpatient blood work revealed worsening anemia and increased leukocytosis.

## 2024-02-25 NOTE — DISCHARGE NOTE PROVIDER - CARE PROVIDERS DIRECT ADDRESSES
[email or web address]

## 2024-02-25 NOTE — DISCHARGE NOTE PROVIDER - NSDCMRMEDTOKEN_GEN_ALL_CORE_FT
acetaminophen 325 mg oral tablet: 2 tab(s) orally every 6 hours, As needed, Mild Pain (1 - 3)  B-Complex 50 oral tablet: 1 tab(s) orally once a day  biotin 5000 mcg oral capsule: 2 tab(s) orally once a day  Calcium 600+D oral tablet: 1 tab(s) orally 2 times a day  Centrum Silver oral tablet: 1 tab(s) orally once a day  Flexeril 10 mg oral tablet: 1 tab(s) orally once a day, As Needed  up to 2x a day   KlonoPIN 1 mg oral tablet: 1 tab(s) orally once a day (in the morning)  KlonoPIN 1 mg oral tablet: 2 tab(s) orally once a day (at bedtime)  KlonoPIN 1 mg oral tablet: 1.5 milligram(s) orally once a day at 2PM  lactobacillus acidophilus oral capsule: 1 tab(s) orally 3 times a day  LaMICtal 200 mg oral tablet: 1 tab(s) orally once a day  Myrbetriq 25 mg oral tablet, extended release: 1 tab(s) orally once a day (in the morning)  NexIUM 40 mg oral delayed release capsule: 1 cap(s) orally once a day  nitroglycerin: 0.3 milligram(s) orally 2x in a 5min interval , As Needed as swallowing aid   Otezla 30 mg oral tablet: 1 tab(s) orally 2 times a day  Pennsaid: Apply topically to affected area 4 times a day, As Needed  pregabalin 50 mg oral capsule: 1 cap(s) orally once a day (in the morning)  pregabalin 50 mg oral capsule: 2 cap(s) orally once a day (at bedtime)  Probiotic Formula oral capsule: 1 cap(s) orally once a day  propranolol 20 mg oral tablet: 1 tab(s) orally every 8 hours, As Needed hand tremors  SEROquel  mg oral tablet, extended release: 1 tab(s) orally once a day (in the evening 6 PM)  sertraline 150 mg oral capsule: 1 cap(s) orally once a day  traZODone 100 mg oral tablet: 1 tab(s) orally once a day (at bedtime) as needed for  insomnia  Trintellix 10 mg oral tablet: 1 tab(s) orally once a day (in the morning)  Vasotec 10 mg oral tablet: 1 tab(s) orally once a day (in the morning)  VESIcare 10 mg oral tablet: 1 tab(s) orally once a day (at bedtime)  Vyvanse 50 mg oral capsule: 1 cap(s) orally once a day (in the morning)   Calcium 600+D oral tablet: 1 tab(s) orally 2 times a day  folic acid 1 mg oral tablet: 1 tab(s) orally once a day  KlonoPIN 1 mg oral tablet: 1 tab(s) orally once a day (in the morning)  KlonoPIN 1 mg oral tablet: 2 tab(s) orally once a day (at bedtime)  lamoTRIgine 200 mg oral tablet: 1 tab(s) orally once a day (before a meal)  Multiple Vitamins oral tablet: 1 tab(s) orally once a day  Myrbetriq 25 mg oral tablet, extended release: 1 tab(s) orally once a day (in the morning)  NexIUM 40 mg oral delayed release capsule: 1 cap(s) orally once a day  nitroglycerin: 0.3 milligram(s) orally 2x in a 5min interval , As Needed as swallowing aid   Otezla 30 mg oral tablet: 1 tab(s) orally 2 times a day  Pennsaid: Apply topically to affected area 4 times a day, As Needed  predniSONE 20 mg oral tablet: 2 tab(s) orally once a day take 2 tabs daily for 2 days followed by 1 tab daily for 3 days and then stop  pregabalin 100 mg oral capsule: 1 cap(s) orally once a day (at bedtime)  pregabalin 50 mg oral capsule: 1 cap(s) orally once a day  Probiotic Formula oral capsule: 1 cap(s) orally once a day  propranolol 20 mg oral tablet: 1 tab(s) orally every 8 hours, As Needed hand tremors  SEROquel  mg oral tablet, extended release: 1 tab(s) orally once a day (in the evening 6 PM)  sertraline 50 mg oral tablet: 3 tab(s) orally once a day (before a meal)  Vasotec 10 mg oral tablet: 1 tab(s) orally once a day (in the morning)  vortioxetine 10 mg oral tablet: 1 tab(s) orally once a day

## 2024-02-25 NOTE — PROGRESS NOTE ADULT - SUBJECTIVE AND OBJECTIVE BOX
Patient is a 68y old  Female who presents with a chief complaint of Anemia and leukocytosis (25 Feb 2024 05:50)      SUBJECTIVE / OVERNIGHT EVENTS: Patient seen and examined at bedside. Patient without acute complaints this AM. Denies any chest pain, shortness of breath, or abdominal pain.    MEDICATIONS  (STANDING):  calcium carbonate 1250 mG  + Vitamin D (OsCal 500 + D) 1 Tablet(s) Oral daily  clonazePAM  Tablet 1.5 milliGRAM(s) Oral <User Schedule>  clonazePAM  Tablet 2 milliGRAM(s) Oral at bedtime  clonazePAM  Tablet 1 milliGRAM(s) Oral <User Schedule>  enalapril 20 milliGRAM(s) Oral with breakfast  enoxaparin Injectable 40 milliGRAM(s) SubCutaneous every 24 hours  lactated ringers. 1000 milliLiter(s) (100 mL/Hr) IV Continuous <Continuous>  lactobacillus acidophilus 1 Tablet(s) Oral with dinner  lamoTRIgine 200 milliGRAM(s) Oral with breakfast  mirabegron ER 25 milliGRAM(s) Oral daily  multivitamin 1 Tablet(s) Oral daily  oxybutynin 10 milliGRAM(s) Oral two times a day  pantoprazole    Tablet 40 milliGRAM(s) Oral before breakfast  pregabalin 100 milliGRAM(s) Oral at bedtime  pregabalin 50 milliGRAM(s) Oral <User Schedule>  QUEtiapine 300 milliGRAM(s) Oral <User Schedule>  sertraline 150 milliGRAM(s) Oral with breakfast  vortioxetine 10 milliGRAM(s) Oral daily    MEDICATIONS  (PRN):      Vital Signs Last 24 Hrs  T(C): 36.4 (25 Feb 2024 06:43), Max: 36.8 (24 Feb 2024 20:59)  T(F): 97.6 (25 Feb 2024 06:43), Max: 98.2 (24 Feb 2024 20:59)  HR: 87 (25 Feb 2024 06:43) (76 - 88)  BP: 126/70 (25 Feb 2024 06:43) (100/65 - 141/84)  BP(mean): 75 (25 Feb 2024 01:52) (75 - 88)  RR: 20 (25 Feb 2024 06:43) (19 - 22)  SpO2: 93% (25 Feb 2024 06:43) (93% - 96%)    Parameters below as of 25 Feb 2024 06:43  Patient On (Oxygen Delivery Method): room air      CAPILLARY BLOOD GLUCOSE        I&O's Summary    24 Feb 2024 07:01  -  25 Feb 2024 07:00  --------------------------------------------------------  IN: 120 mL / OUT: 0 mL / NET: 120 mL        PHYSICAL EXAM:  GENERAL: NAD, well-developed  HEAD:  Atraumatic, Normocephalic  EYES: EOMI, conjunctiva and sclera clear  NECK: Supple, No JVD  CHEST/LUNG: Clear to auscultation bilaterally; No wheeze  HEART: Normal rate and regular rhythm  ABDOMEN: Soft, Nontender, Nondistended; Bowel sounds present  EXTREMITIES:  2+ Peripheral Pulses, No clubbing, cyanosis, or edema  PSYCH: AAOx3  NEUROLOGY: moves all extremities  SKIN: No rashes or lesions      LABS:                        5.4    197.73 )-----------( 264      ( 24 Feb 2024 22:39 )             19.1     02-24    141  |  104  |  22  ----------------------------<  95  4.1   |  27  |  0.44<L>    Ca    8.8      24 Feb 2024 22:39  Phos  3.9     02-24  Mg     1.9     02-24    TPro  6.3  /  Alb  4.1  /  TBili  2.8<H>  /  DBili  x   /  AST  31  /  ALT  16  /  AlkPhos  81  02-24    PT/INR - ( 24 Feb 2024 22:40 )   PT: 10.6 sec;   INR: 1.01 ratio         PTT - ( 24 Feb 2024 22:40 )  PTT:30.1 sec      Urinalysis Basic - ( 24 Feb 2024 22:39 )    Color: x / Appearance: x / SG: x / pH: x  Gluc: 95 mg/dL / Ketone: x  / Bili: x / Urobili: x   Blood: x / Protein: x / Nitrite: x   Leuk Esterase: x / RBC: x / WBC x   Sq Epi: x / Non Sq Epi: x / Bacteria: x        RADIOLOGY & ADDITIONAL TESTS:    Imaging Personally Reviewed:    Consultant(s) Notes Reviewed:      Care Discussed with Consultants/Other Providers:    Maria Ines Swenson, PGY-3; [facility] Pager: [phone]; [facility] Pager: [phone]   Patient is a 68y old  Female who presents with a chief complaint of Anemia and leukocytosis (25 Feb 2024 05:50)      SUBJECTIVE / OVERNIGHT EVENTS: Patient seen and examined at bedside. Patient reports worsening pain in L 2nd toe over past month, and pain behind right knee.  Denies any chest pain, shortness of breath, or abdominal pain.    MEDICATIONS  (STANDING):  calcium carbonate 1250 mG  + Vitamin D (OsCal 500 + D) 1 Tablet(s) Oral daily  clonazePAM  Tablet 1.5 milliGRAM(s) Oral <User Schedule>  clonazePAM  Tablet 2 milliGRAM(s) Oral at bedtime  clonazePAM  Tablet 1 milliGRAM(s) Oral <User Schedule>  enalapril 20 milliGRAM(s) Oral with breakfast  enoxaparin Injectable 40 milliGRAM(s) SubCutaneous every 24 hours  lactated ringers. 1000 milliLiter(s) (100 mL/Hr) IV Continuous <Continuous>  lactobacillus acidophilus 1 Tablet(s) Oral with dinner  lamoTRIgine 200 milliGRAM(s) Oral with breakfast  mirabegron ER 25 milliGRAM(s) Oral daily  multivitamin 1 Tablet(s) Oral daily  oxybutynin 10 milliGRAM(s) Oral two times a day  pantoprazole    Tablet 40 milliGRAM(s) Oral before breakfast  pregabalin 100 milliGRAM(s) Oral at bedtime  pregabalin 50 milliGRAM(s) Oral <User Schedule>  QUEtiapine 300 milliGRAM(s) Oral <User Schedule>  sertraline 150 milliGRAM(s) Oral with breakfast  vortioxetine 10 milliGRAM(s) Oral daily    MEDICATIONS  (PRN):      Vital Signs Last 24 Hrs  T(C): 36.4 (25 Feb 2024 06:43), Max: 36.8 (24 Feb 2024 20:59)  T(F): 97.6 (25 Feb 2024 06:43), Max: 98.2 (24 Feb 2024 20:59)  HR: 87 (25 Feb 2024 06:43) (76 - 88)  BP: 126/70 (25 Feb 2024 06:43) (100/65 - 141/84)  BP(mean): 75 (25 Feb 2024 01:52) (75 - 88)  RR: 20 (25 Feb 2024 06:43) (19 - 22)  SpO2: 93% (25 Feb 2024 06:43) (93% - 96%)    Parameters below as of 25 Feb 2024 06:43  Patient On (Oxygen Delivery Method): room air      CAPILLARY BLOOD GLUCOSE        I&O's Summary    24 Feb 2024 07:01  -  25 Feb 2024 07:00  --------------------------------------------------------  IN: 120 mL / OUT: 0 mL / NET: 120 mL        PHYSICAL EXAM:  GENERAL: NAD, well-developed  HEAD:  Atraumatic, Normocephalic  EYES: EOMI, conjunctiva and sclera clear  NECK: Supple, No JVD  CHEST/LUNG: Clear to auscultation bilaterally; No wheeze  HEART: Normal rate and regular rhythm  ABDOMEN: Soft, Nontender, Nondistended; Bowel sounds present  EXTREMITIES:  2+ Peripheral Pulses, No clubbing, cyanosis, or edema  PSYCH: AAOx3  NEUROLOGY: moves all extremities  SKIN: No rashes or lesions      LABS:                        5.4    197.73 )-----------( 264      ( 24 Feb 2024 22:39 )             19.1     02-24    141  |  104  |  22  ----------------------------<  95  4.1   |  27  |  0.44<L>    Ca    8.8      24 Feb 2024 22:39  Phos  3.9     02-24  Mg     1.9     02-24    TPro  6.3  /  Alb  4.1  /  TBili  2.8<H>  /  DBili  x   /  AST  31  /  ALT  16  /  AlkPhos  81  02-24    PT/INR - ( 24 Feb 2024 22:40 )   PT: 10.6 sec;   INR: 1.01 ratio         PTT - ( 24 Feb 2024 22:40 )  PTT:30.1 sec      Urinalysis Basic - ( 24 Feb 2024 22:39 )    Color: x / Appearance: x / SG: x / pH: x  Gluc: 95 mg/dL / Ketone: x  / Bili: x / Urobili: x   Blood: x / Protein: x / Nitrite: x   Leuk Esterase: x / RBC: x / WBC x   Sq Epi: x / Non Sq Epi: x / Bacteria: x        RADIOLOGY & ADDITIONAL TESTS:    Imaging Personally Reviewed:    Consultant(s) Notes Reviewed:      Care Discussed with Consultants/Other Providers:    MariaI nes Swenson, PGY-3; [facility] Pager: [phone]; [facility] Pager: [phone]

## 2024-02-25 NOTE — H&P ADULT - NSHPSOCIALHISTORY_GEN_ALL_CORE
Implemented All Universal Safety Interventions:  [place] to call system. Call bell, personal items and telephone within reach. Instruct patient to call for assistance. Room bathroom lighting operational. Non-slip footwear when patient is off stretcher. Physically safe environment: no spills, clutter or unnecessary equipment. Stretcher in lowest position, wheels locked, appropriate side rails in place. Patient is  and lives at home with . Able to perform ADLs independently. Patient is  and lives at home with . Able to perform ADLs independently with cane.
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
## 2024-02-26 NOTE — CHART NOTE - NSCHARTNOTEFT_GEN_A_CORE
Pt admitted for acute hemolytic anemia, received 1u PRBC this evening. Post transfusion Hgb was 6.2, improved from 5.7. VSS, patient clinically not demonstrating any signs of symptomatic anemia, says she feels okay just tired from not sleeping much last night. Will hold off on further transfusion and monitor for now given increase in Hgb and stable vitals along with no clinical sxs. Discussed with on-call heme/onc fellow Dr. Randle.

## 2024-02-26 NOTE — CHART NOTE - NSCHARTNOTEFT_GEN_A_CORE
Pt admitted for acute hemolytic anemia, received 1u PRBC this evening. Post transfusion Hgb was 6.7, improved from 5.1. VSS, patient clinically demonstrating less signs of symptomatic anemia ie feeling less winded when she walks. Will hold off on further transfusion and monitor for now given one point increase in Hgb and clinical improvement. Discussed with on-call heme/onc fellow Dr. Rahman.

## 2024-02-26 NOTE — PROGRESS NOTE ADULT - SUBJECTIVE AND OBJECTIVE BOX
Subjective:    Pt seen and examined at bedside. Denies f/c/n/v/d/CP/SOB.      MEDICATIONS  (STANDING):  calcium carbonate 1250 mG  + Vitamin D (OsCal 500 + D) 1 Tablet(s) Oral daily  clonazePAM  Tablet 1.5 milliGRAM(s) Oral <User Schedule>  clonazePAM  Tablet 2 milliGRAM(s) Oral at bedtime  clonazePAM  Tablet 1 milliGRAM(s) Oral <User Schedule>  enalapril 20 milliGRAM(s) Oral with breakfast  enoxaparin Injectable 40 milliGRAM(s) SubCutaneous every 24 hours  folic acid 1 milliGRAM(s) Oral daily  hydroxyurea 500 milliGRAM(s) Oral every 12 hours  lactated ringers. 1000 milliLiter(s) (100 mL/Hr) IV Continuous <Continuous>  lactobacillus acidophilus 1 Tablet(s) Oral with dinner  lamoTRIgine 200 milliGRAM(s) Oral with breakfast  mirabegron ER 25 milliGRAM(s) Oral daily  multivitamin 1 Tablet(s) Oral daily  oxybutynin 10 milliGRAM(s) Oral two times a day  pantoprazole    Tablet 40 milliGRAM(s) Oral before breakfast  predniSONE   Tablet 105 milliGRAM(s) Oral daily  pregabalin 100 milliGRAM(s) Oral at bedtime  pregabalin 50 milliGRAM(s) Oral <User Schedule>  QUEtiapine 300 milliGRAM(s) Oral <User Schedule>  sertraline 150 milliGRAM(s) Oral with breakfast  vortioxetine 10 milliGRAM(s) Oral daily    MEDICATIONS  (PRN):  acetaminophen     Tablet .. 650 milliGRAM(s) Oral every 6 hours PRN Mild Pain (1 - 3), Moderate Pain (4 - 6)      Allergies    No Known Allergies    Intolerances    Cymbalta (Diarrhea)      DVT Prophylaxis: [ ] YES [ ] NO      Antibiotics: [ ] YES [ ] NO    Pain Scale (1-10):       Location:    Vital Signs Last 24 Hrs  T(C): 36.8 (26 Feb 2024 08:30), Max: 37.1 (25 Feb 2024 17:04)  T(F): 98.2 (26 Feb 2024 08:30), Max: 98.7 (25 Feb 2024 17:04)  HR: 88 (26 Feb 2024 08:30) (79 - 89)  BP: 150/76 (26 Feb 2024 08:30) (110/69 - 150/76)  BP(mean): --  RR: 18 (26 Feb 2024 08:30) (16 - 19)  SpO2: 94% (26 Feb 2024 08:30) (92% - 95%)    Parameters below as of 26 Feb 2024 08:30  Patient On (Oxygen Delivery Method): room air        Drug Dosing Weight  Height (cm): 165.1 (24 Feb 2024 20:59)  Weight (kg): 104.3 (24 Feb 2024 20:59)  BMI (kg/m2): 38.3 (24 Feb 2024 20:59)  BSA (m2): 2.1 (24 Feb 2024 20:59)    PHYSICAL EXAM:  GENERAL: NAD, well-developed  HEAD:  Atraumatic, Normocephalic  EYES: EOMI, PERRLA, conjunctiva and sclera clear  NECK: Supple, No JVD  CHEST/LUNG: Clear to auscultation bilaterally; No wheeze  HEART: Regular rate and rhythm; No murmurs, rubs, or gallops  ABDOMEN: Soft, Nontender, Nondistended; Bowel sounds present  EXTREMITIES:  2+ Peripheral Pulses, No clubbing, cyanosis, or edema  PSYCH: AAOx3  NEUROLOGY: non-focal  SKIN: No rashes or lesions      URINARY CATHETER: [ ] YES [ ] NO     LABS:  CBC Full  -  ( 26 Feb 2024 07:58 )  WBC Count : 200.71 K/uL  RBC Count : 1.97 M/uL  Hemoglobin : 5.7 g/dL  Hematocrit : 19.3 %  Platelet Count - Automated : 260 K/uL  Mean Cell Volume : 98.0 fl  Mean Cell Hemoglobin : 28.9 pg  Mean Cell Hemoglobin Concentration : 29.5 gm/dL  Auto Neutrophil # : 1.40 K/uL  Auto Lymphocyte # : 194.89 K/uL  Auto Monocyte # : 4.42 K/uL  Auto Eosinophil # : 0.00 K/uL  Auto Basophil # : 0.00 K/uL  Auto Neutrophil % : 0.7 %  Auto Lymphocyte % : 97.1 %  Auto Monocyte % : 2.2 %  Auto Eosinophil % : 0.0 %  Auto Basophil % : 0.0 %    02-26    143  |  104  |  14  ----------------------------<  91  4.2   |  26  |  0.38<L>    Ca    9.7      26 Feb 2024 07:56  Phos  4.1     02-26  Mg     1.9     02-26    TPro  6.3  /  Alb  4.0  /  TBili  3.1<H>  /  DBili  x   /  AST  38  /  ALT  19  /  AlkPhos  78  02-26    PT/INR - ( 24 Feb 2024 22:40 )   PT: 10.6 sec;   INR: 1.01 ratio         PTT - ( 24 Feb 2024 22:40 )  PTT:30.1 sec  Urinalysis Basic - ( 26 Feb 2024 07:56 )    Color: x / Appearance: x / SG: x / pH: x  Gluc: 91 mg/dL / Ketone: x  / Bili: x / Urobili: x   Blood: x / Protein: x / Nitrite: x   Leuk Esterase: x / RBC: x / WBC x   Sq Epi: x / Non Sq Epi: x / Bacteria: x        CULTURES:    RADIOLOGY & ADDITIONAL STUDIES:

## 2024-02-26 NOTE — PROGRESS NOTE ADULT - PROBLEM SELECTOR PLAN 3
- patient with hx of anxiety and mood disorder  - continue home Klonopin with 1mg in AM, 1.5mg in afternoon and 2mg at bedtime  - continue home Lamictal 200mg daily  - continue home zoloft 150mg daily  - continue home Seroquel 300mg daily  - continue home trintellix 10mg daily  - home vyvance 50mg daily not on formulary; patient will need to be instructed to bring home suppy of medication Plan: - patient with hx of anxiety and mood disorder  - continue home Klonopin with 1mg in AM, 1.5mg in afternoon and 2mg at bedtime  - continue home Lamictal 200mg daily  - continue home zoloft 150mg daily  - continue home Seroquel 300mg daily  - continue home trintellix 10mg daily  - home vyvance 50mg daily not on formulary; patient indicates is ok off this currently and will let team know if she needs. Plan: - patient with hx of anxiety and mood disorder  - continue home Klonopin with 1mg in AM, 1.5mg in afternoon and 2mg at bedtime  - continue home Lamictal 200mg daily  - continue home zoloft 150mg daily  - continue home Seroquel 300mg daily  - continue home Trintellix 10mg daily  - home Vyvanse 50mg daily not on formulary; patient indicates is ok off this currently and will let team know if she needs.

## 2024-02-26 NOTE — PROGRESS NOTE ADULT - ASSESSMENT
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    #AIHA  Initially presented with elevated WBC and anemia on outpatient labs, with a WBC was 187 and Hg 6.8. On review of records, WBC was 86.38 in Dec and 97.01 in Feb and baseline Hg around 9. She spoke to heme/onc on-call and advised to come to ED. .73, Hgb 5.4, Plt 264 -> , Hgb 5.1, Plt 260; ymphocytes 58%; retic 6%, retic index 1.09 hypoproliferative (2/24/24); hapto <20,  (2/24/24), bilirubin 2.6, uric acid 3.1; coags WNL; fibrinogen 301, fibrin split products <5; Direct ayaz IgG+, direct ayaz c3 negative; Peripheral smear: RBCs: numerous spherocytes, anisocytosis, hypochromia, marked roulette; 0-2 schistocytes/HPF; WBCs: lymphocytic predominance with few blast-like cells; well differentiated neutrophils and eosinophils; Plt: normal morphology, adequate in number. CT A/P showing Stable mediastinal and hilar adenopathy  - Give 1 unit of pRBCs  - c/w Prednisone 1 mg/kg/day and PPIs for prophylaxis    #CLL  Follows with Dr. Crawley outpatient. Pt with IGVH positive CLL, never treated  - Given concern for blast-like cells, will send peripheral flow to r/o transformation to acute leukemia  - hydrea 500mg BID for leukocytosis  - folic acid supplementation given active hemolysis  - Monitor CBC with diff Q12 and obtain daily CMP, Ph, LDH, uric acid, haptoglobin  - repeat CBC after each transfusion and give premeds benadryl and tylenol before any transfusions  - discussed today with blood bank that pt has anti E antibodies, she currently has 3 compatible units  - given her symptoms later in day of worse headaches and sweats, will give one unit pRBCs, recheck CBC, and consider whether needs any addl units pending Hgb response and symptoms  - DVT ppx: lovenox  - consider neuro consult / brain imaging if persistent pulsatile tinnitus  - cardiology follow up for severe calcification of coronaries 68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    #AIHA  Initially presented with elevated WBC and anemia on outpatient labs, with a WBC was 187 and Hg 6.8. On review of records, WBC was 86.38 in Dec and 97.01 in Feb and baseline Hg around 9. She spoke to heme/onc on-call and advised to come to ED. .73, Hgb 5.4, Plt 264 -> , Hgb 5.1, Plt 260; ymphocytes 58%; retic 6%, retic index 1.09 hypoproliferative (2/24/24); hapto <20,  (2/24/24), bilirubin 2.6, uric acid 3.1; coags WNL; fibrinogen 301, fibrin split products <5; Direct ayaz IgG+, direct ayaz c3 negative; Peripheral smear: RBCs: numerous spherocytes, anisocytosis, hypochromia, marked roulette; 0-2 schistocytes/HPF; WBCs: lymphocytic predominance with few blast-like cells; well differentiated neutrophils and eosinophils; Plt: normal morphology, adequate in number. CT A/P showing Stable mediastinal and hilar adenopathy  - Give 1 unit of pRBCs  - c/w Prednisone 1 mg/kg/day and PPIs for prophylaxis  - folic acid supplementation given active hemolysis  - discussed today with blood bank that pt has anti E antibodies, she currently has 3 compatible units  - given her symptoms later in day of worse headaches and sweats, will give one unit pRBCs, recheck CBC, and consider whether needs any addl units pending Hgb response and symptoms    #CLL  Follows with Dr. Crawley outpatient. Pt with IGVH positive CLL, never treated  - Given concern for blast-like cells, will send peripheral flow to r/o transformation to acute leukemia  - hydrea 500mg BID for leukocytosis    Attending recommendations to follow.    Shanna Hutchins M.D.  Hematology and Medical Oncology Fellow  Pager: [phone]  For weekends and evenings (5 pm - 8 am), please page Heme/Onc fellow on call. 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    #AIHA  Initially presented with elevated WBC and anemia on outpatient labs, with a WBC was 187 and Hg 6.8. On review of records, WBC was 86.38 in Dec and 97.01 in Feb and baseline Hg around 9. She spoke to heme/onc on-call and advised to come to ED. .73, Hgb 5.4, Plt 264 -> , Hgb 5.1, Plt 260; ymphocytes 58%; retic 6%, retic index 1.09 hypoproliferative (2/24/24); hapto <20,  (2/24/24), bilirubin 2.6, uric acid 3.1; coags WNL; fibrinogen 301, fibrin split products <5; Direct ayaz IgG+, direct ayaz c3 negative; Peripheral smear: RBCs: numerous spherocytes, anisocytosis, hypochromia, marked roulette; 0-2 schistocytes/HPF; WBCs: lymphocytic predominance with few blast-like cells; well differentiated neutrophils and eosinophils; Plt: normal morphology, adequate in number. CT A/P showing Stable mediastinal and hilar adenopathy  - Give 1 unit of pRBCs  - c/w Prednisone 1 mg/kg/day and PPI for prophylaxis  - folic acid supplementation given active hemolysis  - discussed today with blood bank that pt has anti E antibodies, she currently has 3 compatible units  - given her symptoms later in day of worse headaches and sweats, will give one unit pRBCs, recheck CBC, and consider whether needs any addl units pending Hgb response and symptoms    #CLL  Follows with Dr. Crawley outpatient. Pt with IGVH positive CLL, never treated. Bone Marrow Biopsy from January 2022 showed 60-70% bone marrow involvement of CLL  - Given concern for blast-like cells, will send peripheral flow to r/o transformation to acute leukemia  - Stop hydrea 500mg BID.  - Will plan for a bone marrow biopsy    Case d/w Dr. Lizbeth Hutchins M.D.  Hematology and Medical Oncology Fellow  Pager: [phone]  For weekends and evenings (5 pm - 8 am), please page Heme/Onc fellow on call. 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    #AIHA  Initially presented with elevated WBC and anemia on outpatient labs, with a WBC was 187 and Hg 6.8. On review of records, WBC was 86.38 in Dec and 97.01 in Feb and baseline Hg around 9. She spoke to heme/onc on-call and advised to come to ED. .73, Hgb 5.4, Plt 264 -> , Hgb 5.1, Plt 260; ymphocytes 58%; retic 6%, retic index 1.09 hypoproliferative (2/24/24); hapto <20,  (2/24/24), bilirubin 2.6, uric acid 3.1; coags WNL; fibrinogen 301, fibrin split products <5; Direct ayaz IgG+, direct ayaz c3 negative; Peripheral smear: RBCs: numerous spherocytes, anisocytosis, hypochromia, marked roulette; 0-2 schistocytes/HPF; WBCs: lymphocytic predominance with few blast-like cells; well differentiated neutrophils and eosinophils; Plt: normal morphology, adequate in number. CT A/P showing Stable mediastinal and hilar adenopathy  - Give 1 unit of pRBCs  - c/w Prednisone 1 mg/kg/day and PPI for prophylaxis  - folic acid supplementation given active hemolysis  - discussed today with blood bank that pt has anti E antibodies, she currently has 3 compatible units  - given her symptoms later in day of worse headaches and sweats, will give one unit pRBCs, recheck CBC, and consider whether needs any addl units pending Hgb response and symptoms    #CLL  Follows with Dr. Crawley outpatient. Pt with IGVH positive CLL, never treated. Bone Marrow Biopsy from January 2022 showed 60-70% bone marrow involvement of CLL  - Given concern for blast-like cells, will send peripheral flow to r/o transformation to acute leukemia  - Stop hydrea 500mg BID.  - Please consult IR for biopsy. Attempted bedside bone marrow biopsy, but unable to palpate landmarks to be able to perform this biopsy.    Case d/w Dr. Lizbeth Hutchins M.D.  Hematology and Medical Oncology Fellow  Pager: [phone]  For weekends and evenings (5 pm - 8 am), please page Heme/Onc fellow on call.

## 2024-02-26 NOTE — PROGRESS NOTE ADULT - ASSESSMENT
68F with PMH of CLL (not on treatment), anemia, HTN, mood disorder, psoriasis presenting after outpatient blood work revealed worsening anemia and increased leukocytosis.     Problem/Plan - 1:  ·  Problem: CLL (chronic lymphocytic leukemia).   ·  Plan: - hx of CLL following with Dr. Nguyen, never on treatment previously  - noted to have drastic rise in WBC from 80-90K to 187K and 197K in ED  - hematology following, appreciate recs  - concern for blast-like cells, hematology to send peripheral flow to r/o transformation to acute leukemia  - Monitor CBC with diff Q12 and obtain daily CMP, Ph, LDH, uric acid, haptoglobin  - CT C/A/P with mildly increased adenopathy  - will start maintenance fluids LR @100cc/hr for 10hr, hold if giving blood transfusion.    Problem/Plan - 2:  ·  Problem: AIHA (autoimmune hemolytic anemia) warm.  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 Awaiting Rubia and haptoglobin for confirmation"  >> Warm AIHA (+IgG) and haptoglobin low, transfuse 1 unit PRBCs and start Pred 1mg/kg/day + GI ppx  - 2/25: 105 prednisone daily w/ 2 unit apheresed PRBCs ordered.  -protonix 40 PO /day  - folic acid daily  - hydrea 500mg BID  - IgE antibody positive give tyenol and benadryl for any transfusion  - 2nd pRBC unit???    Problem/Plan - 3:  ·  Problem: Mood disorder.   ·  Plan: - patient with hx of anxiety and mood disorder  - continue home Klonopin with 1mg in AM, 1.5mg in afternoon and 2mg at bedtime  - continue home Lamictal 200mg daily  - continue home zoloft 150mg daily  - continue home Seroquel 300mg daily  - continue home trintellix 10mg daily  - home vyvance 50mg daily not on formulary; patient indicates is ok off this currently and will let team know if she needs.    Problem/Plan - 4:  ·  Problem: HTN (hypertension).   ·  Plan: - continue home enalapril 20mg daily.    Problem/Plan - 5:  ·  Problem: Psoriasis.   ·  Plan: - otezla 30mg BID not in hospital pharmacy, will need to instruct patient to bring home supply if she wants to take while inpatient  - spouse to bring medication otezla today.  - psoriasis complicated by psoriatic arthritis  - continue home lyrica 50mg in AM and 100mg in PM.    Problem/Plan - 6:  ·  Problem: GERD (gastroesophageal reflux disease).   ·  Plan: - therapeutic equivalent of home nexium with pantoprazole 40mg daily.    Problem/Plan - 7:  ·  Problem: Urinary incontinence.   ·  Plan: - therapeutic equivalent of home vesicare 10mg with oxybutynin 10mg BID  - continue home myrbetriq 25mg daily.    Problem/Plan - 8:  ·  Problem: Prophylactic measure.   ·  Plan: DVT ppx: lovenox 40mg daily  Diet: DASH diet  Dispo: pending clinical course.   68F with PMH of CLL (not on treatment), anemia, HTN, mood disorder, psoriasis presenting after outpatient blood work revealed worsening anemia and increased leukocytosis.

## 2024-02-26 NOTE — PROGRESS NOTE ADULT - SUBJECTIVE AND OBJECTIVE BOX
PROGRESS NOTE:     Patient is a 68y old  Female who presents with a chief complaint of Anemia and leukocytosis (25 Feb 2024 12:55)      SUBJECTIVE / OVERNIGHT EVENTS:  Patient said improved signanctly after transfusion of RBC. In middle of night had some symptoms return with chest tightness, palpations but still not as bad as on initial admission. Notes no diarrhea anymore.    ADDITIONAL REVIEW OF SYSTEMS:    MEDICATIONS  (STANDING):  calcium carbonate 1250 mG  + Vitamin D (OsCal 500 + D) 1 Tablet(s) Oral daily  clonazePAM  Tablet 1.5 milliGRAM(s) Oral <User Schedule>  clonazePAM  Tablet 2 milliGRAM(s) Oral at bedtime  clonazePAM  Tablet 1 milliGRAM(s) Oral <User Schedule>  enalapril 20 milliGRAM(s) Oral with breakfast  enoxaparin Injectable 40 milliGRAM(s) SubCutaneous every 24 hours  folic acid 1 milliGRAM(s) Oral daily  hydroxyurea 500 milliGRAM(s) Oral every 12 hours  lactated ringers. 1000 milliLiter(s) (100 mL/Hr) IV Continuous <Continuous>  lactobacillus acidophilus 1 Tablet(s) Oral with dinner  lamoTRIgine 200 milliGRAM(s) Oral with breakfast  mirabegron ER 25 milliGRAM(s) Oral daily  multivitamin 1 Tablet(s) Oral daily  oxybutynin 10 milliGRAM(s) Oral two times a day  pantoprazole    Tablet 40 milliGRAM(s) Oral before breakfast  predniSONE   Tablet 105 milliGRAM(s) Oral daily  pregabalin 100 milliGRAM(s) Oral at bedtime  pregabalin 50 milliGRAM(s) Oral <User Schedule>  QUEtiapine 300 milliGRAM(s) Oral <User Schedule>  sertraline 150 milliGRAM(s) Oral with breakfast  vortioxetine 10 milliGRAM(s) Oral daily    MEDICATIONS  (PRN):  acetaminophen     Tablet .. 650 milliGRAM(s) Oral every 6 hours PRN Mild Pain (1 - 3), Moderate Pain (4 - 6)      CAPILLARY BLOOD GLUCOSE        I&O's Summary    25 Feb 2024 07:01  -  26 Feb 2024 07:00  --------------------------------------------------------  IN: 2200 mL / OUT: 0 mL / NET: 2200 mL        PHYSICAL EXAM:  Vital Signs Last 24 Hrs  T(C): 36.9 (26 Feb 2024 05:17), Max: 37.1 (25 Feb 2024 17:04)  T(F): 98.5 (26 Feb 2024 05:17), Max: 98.7 (25 Feb 2024 17:04)  HR: 79 (26 Feb 2024 05:17) (79 - 92)  BP: 122/56 (26 Feb 2024 05:17) (102/60 - 142/70)  BP(mean): --  RR: 18 (26 Feb 2024 05:17) (16 - 19)  SpO2: 93% (26 Feb 2024 05:17) (92% - 95%)    Parameters below as of 26 Feb 2024 05:17  Patient On (Oxygen Delivery Method): room air        GENERAL: NAD  HEAD:  Atraumatic, Normocephalic  EYES: EOMI, conjunctiva and sclera clear  NECK: Supple, No JVD  CHEST/LUNG: Clear to auscultation bilaterally; No wheeze  HEART: Normal rate and regular rhythm  ABDOMEN: Soft, Nontender, Nondistended; Bowel sounds present  EXTREMITIES:  2+ Peripheral Pulses, No clubbing, cyanosis, or edema  PSYCH: AAOx3  NEUROLOGY: moves all extremities  SKIN: No rashes or lesions    LABS:                        5.7    200.71 )-----------( 260      ( 26 Feb 2024 07:58 )             19.3     02-25    141  |  105  |  17  ----------------------------<  98  4.9   |  27  |  0.39<L>    Ca    8.8      25 Feb 2024 07:38  Phos  4.3     02-25  Mg     1.9     02-25    TPro  6.0  /  Alb  4.0  /  TBili  2.6<H>  /  DBili  x   /  AST  36  /  ALT  14  /  AlkPhos  74  02-25    PT/INR - ( 24 Feb 2024 22:40 )   PT: 10.6 sec;   INR: 1.01 ratio         PTT - ( 24 Feb 2024 22:40 )  PTT:30.1 sec      Urinalysis Basic - ( 25 Feb 2024 07:38 )    Color: x / Appearance: x / SG: x / pH: x  Gluc: 98 mg/dL / Ketone: x  / Bili: x / Urobili: x   Blood: x / Protein: x / Nitrite: x   Leuk Esterase: x / RBC: x / WBC x   Sq Epi: x / Non Sq Epi: x / Bacteria: x          RADIOLOGY & ADDITIONAL TESTS:  Lab Results Reviewed   Imaging Reviewed  Electrocardiogram Reviewed   PROGRESS NOTE:     Patient is a 68y old  Female who presents with a chief complaint of Anemia and leukocytosis (25 Feb 2024 12:55)      SUBJECTIVE / OVERNIGHT EVENTS:  Patient said improved significantly  after transfusion of RBC. In middle of night had some symptoms return with chest tightness, palpations but still not as bad as on initial admission. Notes no diarrhea anymore. No cough or SOB    ADDITIONAL REVIEW OF SYSTEMS:    MEDICATIONS  (STANDING):  calcium carbonate 1250 mG  + Vitamin D (OsCal 500 + D) 1 Tablet(s) Oral daily  clonazePAM  Tablet 1.5 milliGRAM(s) Oral <User Schedule>  clonazePAM  Tablet 2 milliGRAM(s) Oral at bedtime  clonazePAM  Tablet 1 milliGRAM(s) Oral <User Schedule>  enalapril 20 milliGRAM(s) Oral with breakfast  enoxaparin Injectable 40 milliGRAM(s) SubCutaneous every 24 hours  folic acid 1 milliGRAM(s) Oral daily  hydroxyurea 500 milliGRAM(s) Oral every 12 hours  lactated ringers. 1000 milliLiter(s) (100 mL/Hr) IV Continuous <Continuous>  lactobacillus acidophilus 1 Tablet(s) Oral with dinner  lamoTRIgine 200 milliGRAM(s) Oral with breakfast  mirabegron ER 25 milliGRAM(s) Oral daily  multivitamin 1 Tablet(s) Oral daily  oxybutynin 10 milliGRAM(s) Oral two times a day  pantoprazole    Tablet 40 milliGRAM(s) Oral before breakfast  predniSONE   Tablet 105 milliGRAM(s) Oral daily  pregabalin 100 milliGRAM(s) Oral at bedtime  pregabalin 50 milliGRAM(s) Oral <User Schedule>  QUEtiapine 300 milliGRAM(s) Oral <User Schedule>  sertraline 150 milliGRAM(s) Oral with breakfast  vortioxetine 10 milliGRAM(s) Oral daily    MEDICATIONS  (PRN):  acetaminophen     Tablet .. 650 milliGRAM(s) Oral every 6 hours PRN Mild Pain (1 - 3), Moderate Pain (4 - 6)      CAPILLARY BLOOD GLUCOSE        I&O's Summary    25 Feb 2024 07:01  -  26 Feb 2024 07:00  --------------------------------------------------------  IN: 2200 mL / OUT: 0 mL / NET: 2200 mL        PHYSICAL EXAM:  Vital Signs Last 24 Hrs  T(C): 36.9 (26 Feb 2024 05:17), Max: 37.1 (25 Feb 2024 17:04)  T(F): 98.5 (26 Feb 2024 05:17), Max: 98.7 (25 Feb 2024 17:04)  HR: 79 (26 Feb 2024 05:17) (79 - 92)  BP: 122/56 (26 Feb 2024 05:17) (102/60 - 142/70)  BP(mean): --  RR: 18 (26 Feb 2024 05:17) (16 - 19)  SpO2: 93% (26 Feb 2024 05:17) (92% - 95%)    Parameters below as of 26 Feb 2024 05:17  Patient On (Oxygen Delivery Method): room air        GENERAL: NAD  HEAD:  Atraumatic, Normocephalic  EYES: EOMI, conjunctiva and sclera clear  NECK: Supple, No JVD  CHEST/LUNG: Clear to auscultation bilaterally; No wheeze  HEART: Normal rate and regular rhythm  ABDOMEN: Soft, Nontender, Nondistended; Bowel sounds present  EXTREMITIES:  2+ Peripheral Pulses, No clubbing, cyanosis, or edema  PSYCH: AAOx3  NEUROLOGY: moves all extremities  SKIN: No rashes or lesions    LABS:                        5.7    200.71 )-----------( 260      ( 26 Feb 2024 07:58 )             19.3     02-25    141  |  105  |  17  ----------------------------<  98  4.9   |  27  |  0.39<L>    Ca    8.8      25 Feb 2024 07:38  Phos  4.3     02-25  Mg     1.9     02-25    TPro  6.0  /  Alb  4.0  /  TBili  2.6<H>  /  DBili  x   /  AST  36  /  ALT  14  /  AlkPhos  74  02-25    PT/INR - ( 24 Feb 2024 22:40 )   PT: 10.6 sec;   INR: 1.01 ratio         PTT - ( 24 Feb 2024 22:40 )  PTT:30.1 sec      Urinalysis Basic - ( 25 Feb 2024 07:38 )    Color: x / Appearance: x / SG: x / pH: x  Gluc: 98 mg/dL / Ketone: x  / Bili: x / Urobili: x   Blood: x / Protein: x / Nitrite: x   Leuk Esterase: x / RBC: x / WBC x   Sq Epi: x / Non Sq Epi: x / Bacteria: x          RADIOLOGY & ADDITIONAL TESTS:  Lab Results Reviewed   Imaging Reviewed  Electrocardiogram Reviewed

## 2024-02-26 NOTE — PROGRESS NOTE ADULT - PROBLEM SELECTOR PLAN 2
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 Awaiting Rubia and haptoglobin for confirmation"  >> Warm AIHA (+IgG) and haptoglobin low, transfuse 1 unit PRBCs and start Pred 1mg/kg/day + GI ppx  - 2/25: 105 prednisone daily w/ 2 unit apheresed PRBCs ordered.  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 Awaiting Rubia and haptoglobin for confirmation"  >> Warm AIHA (+IgG) and haptoglobin low, transfuse 1 unit PRBCs and start Pred 1mg/kg/day + GI ppx  - 2/25: 105 prednisone daily w/ 2 unit apheresed PRBCs ordered.  -Protonix 40 PO /day  - folic acid daily  - hydrae 500mg BID  - IgE antibody positive give tyenol and benadryl for any transfusion  - 2nd pRBC to be transfused 2/26

## 2024-02-26 NOTE — CONSULT NOTE ADULT - SUBJECTIVE AND OBJECTIVE BOX
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 IR being consulted for bone marrow biopsy. Heme unable to do due to patient habitus.    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 IR being consulted for bone marrow biopsy. Heme unable to do due to patient habitus.      - case reviewed and approved for tomorrow 2/27 for CT guided BM biopsy  - please place IR procedure order under RAYSA Silva  - STAT labs in AM (cbc,coags, bmp, T&S)  - hold AC  - NPO tonight at 11pm  - d/w primary team  - If patient clinically decompensates please contact IR for more urgent intervention      --  Reilly Silva NP  Interventional Radiology  Available on [employer] TEAMS / [employer]    For EMERGENT inquiries/questions:  IR Pager ([facility]): [phone]    For non-emergent consults/questions:   Please place a sunrise order "Consult- Interventional Radiology" with an appropriate callback number    For questions about scheduling during appropriate work hours, call IR :  [facility]: [phone]    For outpatient IR booking:  [facility]: [phone]

## 2024-02-26 NOTE — PROGRESS NOTE ADULT - PROBLEM SELECTOR PLAN 1
- hx of CLL following with Dr. Nguyen, never on treatment previously  - noted to have drastic rise in WBC from 80-90K to 187K and 197K in ED  - hematology following, appreciate recs  - concern for blast-like cells, hematology to send peripheral flow to r/o transformation to acute leukemia  - Monitor CBC with diff Q12 and obtain daily CMP, Ph, LDH, uric acid, haptoglobin  - CT C/A/P with mildly increased adenopathy  - will start maintenance fluids LR @100cc/hr for 10hr, hold if giving blood transfusion ·  Problem: CLL (chronic lymphocytic leukemia).   ·  Plan: - hx of CLL following with Dr. Nguyen, never on treatment previously  - noted to have drastic rise in WBC from 80-90K to 187K and 197K in ED  - hematology following, appreciate recs  - concern for blast-like cells, hematology to send peripheral flow to r/o transformation to acute leukemia  - Monitor CBC with diff Q12 and obtain daily CMP, Ph, LDH, uric acid, haptoglobin  - CT C/A/P with mildly increased adenopathy  - will start maintenance fluids LR @100cc/hr for 10hr, hold if giving blood transfusion.

## 2024-02-27 NOTE — PROGRESS NOTE ADULT - ASSESSMENT
68F with PMH of CLL (not on treatment), anemia, HTN, mood disorder, psoriasis presenting after outpatient blood work revealed worsening anemia and increased leukocytosis.      68F with PMH of CLL (not on treatment), anemia, HTN, mood disorder, psoriasis presenting after outpatient blood work revealed worsening anemia and increased leukocytosis with AIHA warm complciation from CLL.

## 2024-02-27 NOTE — PROGRESS NOTE ADULT - PROBLEM SELECTOR PLAN 3
Plan: - patient with hx of anxiety and mood disorder  - continue home Klonopin with 1mg in AM, 1.5mg in afternoon and 2mg at bedtime  - continue home Lamictal 200mg daily  - continue home zoloft 150mg daily  - continue home Seroquel 300mg daily  - continue home Trintellix 10mg daily  - home Vyvanse 50mg daily not on formulary; patient indicates is ok off this currently and will let team know if she needs.

## 2024-02-27 NOTE — PROGRESS NOTE ADULT - PROBLEM SELECTOR PLAN 9
- Pt stubbed toe into chair ~1month ago has had toe pain on Left second digit since then  - Pain increasing/more noticeable recently  - L foot x-ray   - tyenol PRN

## 2024-02-27 NOTE — PROGRESS NOTE ADULT - PROBLEM SELECTOR PLAN 5
- otezla 30mg BID from home started  - psoriasis complicated by psoriatic arthritis  - continue home lyrica 50mg in AM and 100mg in PM

## 2024-02-27 NOTE — PROGRESS NOTE ADULT - ATTENDING COMMENTS
68-yr-old woman seen in follow up for CLL. Patient was being followed up with surveillance since diagnosis in 2022. Recently her ALC doubled within a matter of weeks and Hemoglobin started dropping. Degree of anemia does not match with other AIHA parameters (eg, LDH). Moreover, her ANC dropped to 1.4K from 45.0K on admission. Interestingly her Platelets remain stable at 250K. She does not have splenomegaly. She is on steroid and does not seem to be responding yet. She endorsed long standing diarrhea, some weight loss and increased night sweats. It appears that she will need to be started on treatment for CLL. Agree with stopping Hydrea. Will get an immunoglobulin level and plan on a bone marrow biopsy. Will present in the tumor board.
Betina Min MD  Division of Hospital Medicine  [facility]   Available on Microsoft Teams - messages preferred prior to calls.    Patient seen and examined today with Team 6 Resident and Intern. Agree with above findings, assessment, and plan with the following additions/exceptions:    Overall, 67 yo F with PMH of CLL, anemia, HTN, psoriasis, mood disorder, and subacute ongoing diarrhea with multiple negative infectious work-up who presents with hyperleukocytosis and anemia admitted for concerns for CLL- related AIHA.    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 but to err on side of caution, will check GI PCR first prior to adding imodium/lomotil PRN diarrhea    Rest as detailed in note above.    Plan discussed with patient,  bedside, and Team 6 Resident Dr. Swenson.
Patient seen and examined today with Team  Resident and MS4. Agree with above findings, assessment, and plan unless noted below.  Chart reviewed    physical exam-   Lungs are clear  No dullness in Traube space   Imaging during this admission reviewed including CT chest and abdomen - no splenomegaly + enlarged axillary LN     Hb 5.7    67 yo F with PMH of CLL, anemia, HTN, Psoriasis, mood disorder, and subacute ongoing diarrhea with multiple negative infectious work-up who presents with hyperleukocytosis and anemia admitted for concerns for CLL-  Related AIHA Hb still low   Transfuse 1u PRBC,  blood bank aware  continue prednisone   monitor CBC closely   c/w pantoprazole   Heme/onc note appreciated - will check flow cytometry   Plan discussed with patient and her daughter at bedside
.    Primary: [place]    MEDICATIONS  (STANDING):  acetaminophen     Tablet .. 975 milliGRAM(s) Oral once  allopurinol 300 milliGRAM(s) Oral daily  apremilast 30 milliGRAM(s) Oral two times a day  calcium carbonate 1250 mG  + Vitamin D (OsCal 500 + D) 1 Tablet(s) Oral daily  clonazePAM  Tablet 1.5 milliGRAM(s) Oral <User Schedule>  clonazePAM  Tablet 2 milliGRAM(s) Oral at bedtime  clonazePAM  Tablet 1 milliGRAM(s) Oral <User Schedule>  diphenhydrAMINE Injectable 25 milliGRAM(s) IV Push once  enalapril 20 milliGRAM(s) Oral with breakfast  folic acid 1 milliGRAM(s) Oral daily  lactated ringers. 1000 milliLiter(s) (100 mL/Hr) IV Continuous <Continuous>  lactobacillus acidophilus 1 Tablet(s) Oral with dinner  lamoTRIgine 200 milliGRAM(s) Oral with breakfast  mirabegron ER 25 milliGRAM(s) Oral daily  multivitamin 1 Tablet(s) Oral daily  oxybutynin 10 milliGRAM(s) Oral two times a day  pantoprazole    Tablet 40 milliGRAM(s) Oral before breakfast  predniSONE   Tablet 105 milliGRAM(s) Oral daily  pregabalin 100 milliGRAM(s) Oral at bedtime  pregabalin 50 milliGRAM(s) Oral <User Schedule>  QUEtiapine 300 milliGRAM(s) Oral <User Schedule>  sertraline 150 milliGRAM(s) Oral with breakfast  vortioxetine 10 milliGRAM(s) Oral daily    Assessment: 68 year old with maximum Cortez stage III, 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    Plan:  Heme:  PLEASE TX TO 7 MONTE  Hgb goal > 6.0g/dL  rituximab today 375mg/mm then weekly.   Continue prednisone today 1mg/kg but will taper starting tomorrow.   allopurinol, folate    ID: if steroids continue will require PCP prophylaxis.     DVT prophylaxis: once Hgb is stable    Over 40 minutes were spent in direct patient, non-resident teaching, care and care coordination.
Patient seen and examined today with Team  Resident and MS4. I agree with above findings, assessment, and plan unless noted below.  events noted   no chest pain   physical exam- Lungs are clear  Hb 6.2-- 5.7         67 yo F with PMH of CLL, anemia, HTN, Psoriasis, mood disorder, and subacute ongoing diarrhea with multiple negative infectious work-up who presents with hyperleukocytosis and anemia admitted for concerns for CLL related warm antibody AIHA   Hb still low   Will transfuse 1u PRBC,    continue steroid   Monitor CBC closely   c/w pantoprazole   D/w Heme fellow and plan to start Rituximab and transfer to bone marrow haem floor   Check hepatitis panel   start IVF and Allopurinol   d/w patient

## 2024-02-27 NOTE — CONSULT NOTE ADULT - CONSULT REASON
Reviewed lifestyle modifications - avoidance of food triggers, sit upright for 30min after meals, no large meals 2hrs prior to bed.   
cellulitis/abscess
cellulitis

## 2024-02-27 NOTE — PROGRESS NOTE ADULT - SUBJECTIVE AND OBJECTIVE BOX
INTERVAL HPI/OVERNIGHT EVENTS:    Patient seen and examined at the bedside. Hgb this am 5.6 g/dL. Feeling more tired than normal however denies any chest pain, shortness of breath, nausea or vomiting.     MEDICATIONS  (STANDING):  acetaminophen     Tablet .. 975 milliGRAM(s) Oral once  allopurinol 300 milliGRAM(s) Oral daily  apremilast 30 milliGRAM(s) Oral two times a day  calcium carbonate 1250 mG  + Vitamin D (OsCal 500 + D) 1 Tablet(s) Oral daily  clonazePAM  Tablet 1.5 milliGRAM(s) Oral <User Schedule>  clonazePAM  Tablet 2 milliGRAM(s) Oral at bedtime  clonazePAM  Tablet 1 milliGRAM(s) Oral <User Schedule>  diphenhydrAMINE Injectable 25 milliGRAM(s) IV Push once  enalapril 20 milliGRAM(s) Oral with breakfast  folic acid 1 milliGRAM(s) Oral daily  lactated ringers. 1000 milliLiter(s) (100 mL/Hr) IV Continuous <Continuous>  lactobacillus acidophilus 1 Tablet(s) Oral with dinner  lamoTRIgine 200 milliGRAM(s) Oral with breakfast  mirabegron ER 25 milliGRAM(s) Oral daily  multivitamin 1 Tablet(s) Oral daily  oxybutynin 10 milliGRAM(s) Oral two times a day  pantoprazole    Tablet 40 milliGRAM(s) Oral before breakfast  predniSONE   Tablet 105 milliGRAM(s) Oral daily  pregabalin 50 milliGRAM(s) Oral <User Schedule>  pregabalin 100 milliGRAM(s) Oral at bedtime  QUEtiapine 300 milliGRAM(s) Oral <User Schedule>  sertraline 150 milliGRAM(s) Oral with breakfast  vortioxetine 10 milliGRAM(s) Oral daily    MEDICATIONS  (PRN):  acetaminophen     Tablet .. 650 milliGRAM(s) Oral every 6 hours PRN Mild Pain (1 - 3), Moderate Pain (4 - 6)      Allergies    No Known Allergies    Intolerances    Cymbalta (Diarrhea)      REVIEW OF SYSTEMS    +Fatigue     Vital Signs Last 24 Hrs  T(C): 37.1 (27 Feb 2024 12:12), Max: 37.1 (26 Feb 2024 13:05)  T(F): 98.8 (27 Feb 2024 12:12), Max: 98.8 (26 Feb 2024 13:05)  HR: 82 (27 Feb 2024 12:12) (71 - 85)  BP: 147/78 (27 Feb 2024 12:12) (104/64 - 148/82)  BP(mean): --  RR: 18 (27 Feb 2024 12:12) (18 - 22)  SpO2: 94% (27 Feb 2024 12:12) (92% - 98%)    Parameters below as of 27 Feb 2024 12:12  Patient On (Oxygen Delivery Method): room air        PHYSICAL EXAM:    GENERAL: No apparent distress on 2L NC  HEAD:  Atraumatic, Normocephalic  EYES: EOMI, PERRLA, no scleral icterus  NECK: Supple, no elevated JVP  CHEST/LUNG: Clear to auscultation bilateral and symmetric; No wheezes, rales, or rhonchi  HEART: S1 and S2 normal. Regular rate and rhythm; No murmurs,  ABDOMEN: Soft, non-tender, non-distended; normal bowel sounds  EXTREMITIES:  2+ peripheral pulses b/l, No clubbing, cyanosis, or edema  NEUROLOGY: A&O x 3, no focal deficits  SKIN: No rashes or lesions        LABS:                        5.6    190.69 )-----------( 231      ( 27 Feb 2024 04:59 )             19.9     02-27    139  |  101  |  18  ----------------------------<  90  4.2   |  28  |  0.46<L>    Ca    9.2      27 Feb 2024 04:59  Phos  4.6     02-27  Mg     1.8     02-27    TPro  6.1  /  Alb  3.9  /  TBili  3.2<H>  /  DBili  x   /  AST  32  /  ALT  19  /  AlkPhos  73  02-27    PT/INR - ( 27 Feb 2024 04:59 )   PT: 10.6 sec;   INR: 1.01 ratio         PTT - ( 27 Feb 2024 04:59 )  PTT:27.2 sec  Urinalysis Basic - ( 27 Feb 2024 04:59 )    Color: x / Appearance: x / SG: x / pH: x  Gluc: 90 mg/dL / Ketone: x  / Bili: x / Urobili: x   Blood: x / Protein: x / Nitrite: x   Leuk Esterase: x / RBC: x / WBC x   Sq Epi: x / Non Sq Epi: x / Bacteria: x        RADIOLOGY & ADDITIONAL TESTS: Reviewed

## 2024-02-27 NOTE — PROGRESS NOTE ADULT - PROBLEM SELECTOR PLAN 2
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 Awaiting Rubia and haptoglobin for confirmation"  >> Warm AIHA (+IgG) and haptoglobin low, transfuse 1 unit PRBCs and start Pred 1mg/kg/day + GI ppx  - 2/25: 105 prednisone daily w/ 2 unit apheresed PRBCs ordered.  -Protonix 40 PO /day  - folic acid daily  - hydrae 500mg BID  - IgE antibody positive give tyenol and benadryl for any transfusion  - 2nd pRBC to be transfused 2/26  - continue monitor H&H and WBC trend ·  Plan: - baseline hgb 9, Hgb 5.4 on admission  - no signs of acute blood loss  - per heme, "concern for CLL-related AIHA given elevated retic, LDH, and Tbili. Awaiting Rubia and haptoglobin for confirmation"  >> Warm AIHA (+IgG) and haptoglobin low, transfuse 1 unit PRBCs and start Pred 1mg/kg/day + GI ppx  - 2/25: 105 prednisone daily w/ 2 unit apheresed PRBCs ordered.  -Protonix 40 PO /day  - folic acid daily  - D/safia hydrea 500mg BID  - IgE antibody positive give tyenol and benadryl for any transfusion  - 2nd pRBC to be transfused 2/26  - continue monitor H&H and WBC trend ·  Plan: - baseline hgb 9, Hgb 5.4 on admission  - no signs of acute blood loss  - per heme, "concern for CLL-related AIHA given elevated retic, LDH, and Tbili. Awaiting Rubia and haptoglobin for confirmation"  >> Warm AIHA (+IgG) and haptoglobin low, transfuse 1 unit PRBCs and start Pred 1mg/kg/day + GI ppx  - 2/25: 105 prednisone daily w/ 2 unit apheresed PRBCs ordered.  -Protonix 40 PO /day  - folic acid daily  - D/safia hydrea 500mg BID  - IgE antibody positive give tyenol and benadryl for any transfusion  - 2nd pRBC to be transfused 2/26  - discussed with BB and requested ~6 total pRBC to have prepared.  - continue monitor H&H and WBC trend ·  Plan: - baseline hgb 9, Hgb 5.4 on admission  - no signs of acute blood loss  - per heme, "concern for CLL-related AIHA given elevated retic, LDH, and Tbili. Awaiting Rubia and haptoglobin for confirmation"  >> Warm AIHA (+IgG) and haptoglobin low, transfuse 1 unit PRBCs and start Pred 1mg/kg/day + GI ppx  - 2/25: 105 prednisone daily w/ 2 unit apheresed PRBCs ordered.  -Protonix 40 PO /day  - folic acid daily  - D/safia hydrea 500mg BID  - IgE antibody positive give tyenol and benadryl for any transfusion  - 2nd pRBC to be transfused 2/26  -3rd pRBC to be transfused 2/27  - discussed with BB and requested ~6 total pRBC to have prepared which was approved by BB  - continue monitor H&H and WBC trend

## 2024-02-27 NOTE — PROGRESS NOTE ADULT - PROBLEM SELECTOR PLAN 2
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 Awaiting Rubia and haptoglobin for confirmation"  >> Warm AIHA (+IgG) and haptoglobin low, transfuse 1 unit PRBCs and start Pred 1mg/kg/day + GI ppx  - 2/25: 105 prednisone daily w/ 2 unit apheresed PRBCs ordered.  -Protonix 40 PO /day  - folic acid daily  - D/safia hydrea 500mg BID  - IgE antibody positive give tyenol and benadryl for any transfusion  - 2nd pRBC to be transfused 2/26  -3rd pRBC to be transfused 2/27  - discussed with BB and requested ~6 total pRBC to have prepared which was approved by BB  - continue monitor H&H and WBC trend

## 2024-02-27 NOTE — PROGRESS NOTE ADULT - TIME BILLING
Personally reviewing chart, lab and performing physical exam. Personally interpreting labs and coordinating care. reviewing consultant recommendation. discussing with patient and family
Personally reviewing events, lab and performing physical exam. Personally interpreting labs and coordinating care and discussing with consultant

## 2024-02-27 NOTE — PROGRESS NOTE ADULT - PROBLEM SELECTOR PLAN 1
·  Problem: CLL (chronic lymphocytic leukemia).   ·  Plan: - hx of CLL following with Dr. Nguyen, never on treatment previously  - noted to have drastic rise in WBC from 80-90K to 187K and 197K in ED  - hematology following, appreciate recs  - concern for blast-like cells, hematology to send peripheral flow to r/o transformation to acute leukemia  - Monitor CBC with diff Q12 and obtain daily CMP, Ph, LDH, uric acid, haptoglobin  - CT C/A/P with mildly increased adenopathy  - will start maintenance fluids LR @100cc/hr for 10hr, hold if giving blood transfusion.  - IR to do bone marrow biopsy on 2/27 to access potential transition to ALL (2) more than 100 beats/min ·  Problem: CLL (chronic lymphocytic leukemia).   ·  Plan: - hx of CLL following with Dr. Nguyen, never on treatment previously  - noted to have drastic rise in WBC from 80-90K to 187K and 197K in ED  - hematology following, appreciate recs  - concern for blast-like cells, hematology to send peripheral flow to r/o transformation to acute leukemia  - Monitor CBC with diff Q12 and obtain daily CMP, Ph, LDH, uric acid, haptoglobin  - CT C/A/P with mildly increased adenopathy  - will start maintenance fluids LR @100cc/hr for 10hr, hold if giving blood transfusion.  - IR biopsy canceled  - To start chemotherapy treatment per leukemia team (being transferred)

## 2024-02-27 NOTE — PROGRESS NOTE ADULT - PROBLEM SELECTOR PLAN 10
-uses CPAP machine most nights  - instructed to have family bring her CPAP machine to hospital to use

## 2024-02-27 NOTE — PROGRESS NOTE ADULT - ASSESSMENT
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

## 2024-02-27 NOTE — PROGRESS NOTE ADULT - PROBLEM SELECTOR PLAN 9
- Pt stubbed toe into chair ~1month ago has had toe pain on Left second digit since then  - Pain increasing/more noticeable recently  - x-ray   - tyenol PRN - Pt stubbed toe into chair ~1month ago has had toe pain on Left second digit since then  - Pain increasing/more noticeable recently  - L foot x-ray   - tyenol PRN

## 2024-02-27 NOTE — PROVIDER CONTACT NOTE (OTHER) - RECOMMENDATIONS
Inform provider, EKG? Place pt on supplemental O2.
delay infusion
EKG?
Inform provider. xray for left foot and possible [facility] Duplex/xray for right knee?

## 2024-02-27 NOTE — ADVANCED PRACTICE NURSE CONSULT - ASSESSMENT
Pt A&Ox4, vitals stable, afebrile. Appears comfortable in bed and denies SOB, N/V, or chest pain. Patient get dyspnea on exertion. Patient recieved chemotherapy education, verbalizes understanding. New 22G peripheral IV placed in left wrist, + blood return and easily flushes. Dressing is clean, dry and intact. Hepatitis panel drawn and sent when new IV was placed. Labs reviewed by Dr. Castaneda, Hemoglobin is 5.6 and total bilirubin is 3.2, doctor and team are aware but want to treat with Rituxan prior to receiving a blood transfusion. Patient received tylenol 650mg oral, benadryl 50mg IV, and hydrocortisone 100 mg IV as premedication as per chemo order received and sign from Chad Govea MD. 2 RN check completed prior to start. At 1530, patient started on riTUXimab-pvvr (RUXIENCE) IVPB (eMAR) [Ordered as RUXIENCE IVPB (eMAR)] - 780 milliGRAM(s) in sodium chloride 0.9% 702 milliLiter(s) attached to the lowest side arm of primary normal saline through left wrist 22G peripheral IV from 2/27/24 via alaris pump. Started at an infusion rate of 50 mG/hour; if there is no infusion-related reaction, increase the rate by 50 mG/hour increments every 30 minutes, to a maximum rate of 150 mG/hour, with vital signs to be completed every 30 minutes. One hour into infusion, patient stated she felt short of breath but oxygen saturation was 96%, patient was repositioned in bed and comfort 2L NC started. Patient also complaint of headache that has not been relieved since the start of the infusion, tylenol was given as a premed and Antwan MCCAIN aware and continuing to monitor. Patient has also complaint of throat tightness that was relieved after 5 minutes with no intervention. No other complaints at this time. Chemo nurse closely monitoring patient at bedside. Primary RN made aware of care and treatment plan. Pt A&Ox4, vitals stable, afebrile. Appears comfortable in bed and denies SOB, N/V, or chest pain. Patient get dyspnea on exertion. Patient recieved chemotherapy education, verbalizes understanding. New 22G peripheral IV placed in left wrist, + blood return and easily flushes. Dressing is clean, dry and intact. Hepatitis panel drawn and sent when new IV was placed. Labs reviewed by Dr. Castaneda, Hemoglobin is 5.6 and total bilirubin is 3.2, doctor and team are aware but want to treat with Rituxan prior to receiving a blood transfusion. Patient received tylenol 650mg oral, benadryl 50mg IV, and hydrocortisone 100 mg IV as premedication as per chemo order received and signed from Chad Govea MD. 2 RN check completed prior to start. At 1530, patient started on riTUXimab-pvvr (RUXIENCE) IVPB (eMAR) [Ordered as RUXIENCE IVPB (eMAR)] - 780 milliGRAM(s) in sodium chloride 0.9% 702 milliLiter(s) attached to the lowest side arm of primary normal saline through left wrist 22G peripheral IV from 2/27/24 via alaris pump. Started at an infusion rate of 50 mG/hour; if there is no infusion-related reaction, increase the rate by 50 mG/hour increments every 30 minutes, to a maximum rate of 150 mG/hour, with vital signs to be completed every 30 minutes. One hour into infusion, patient stated she felt short of breath but oxygen saturation was 96%, patient was repositioned in bed and comfort 2L NC started. Patient also complaint of headache that has not been relieved since the start of the infusion, tylenol was given as a premed and Antwan MCCAIN aware and continuing to monitor. Patient has also complaint of throat tightness that was relieved after 5 minutes with no intervention. No other complaints at this time. Chemo nurse closely monitoring patient at bedside. Primary RN made aware of care and treatment plan.

## 2024-02-27 NOTE — PROGRESS NOTE ADULT - PROBLEM SELECTOR PLAN 1
·  Problem: CLL (chronic lymphocytic leukemia).   ·  Plan: - hx of CLL following with Dr. Nguyen, never on treatment previously  - noted to have drastic rise in WBC from 80-90K to 187K and 197K in ED  - Plan to treat for AIHA/CLL on 2/27 w/ C1D1 of RItuximab 375mg/m2 weekly for 4 weeks.   - f/u peripheral flow results. FISH negative for PML-MASSIEL and BCR-ABL translocations.   - Monitor CBC with diff and daily CMP, Ph, LDH, uric acid, haptoglobin  - CT C/A/P with mildly increased adenopathy  - will start maintenance fluids LR if indication of TLS.   - Allopurinol 300mg qdaily for TLS ppx.   - F/u G6PD level

## 2024-02-27 NOTE — PROGRESS NOTE ADULT - SUBJECTIVE AND OBJECTIVE BOX
PROGRESS NOTE:      Patient is a 68y old  Female who presents with a chief complaint of Anemia and leukocytosis (26 Feb 2024 16:25)      SUBJECTIVE / OVERNIGHT EVENTS:  No acute events overnight.     ADDITIONAL REVIEW OF SYSTEMS:    MEDICATIONS  (STANDING):  apremilast 30 milliGRAM(s) Oral two times a day  calcium carbonate 1250 mG  + Vitamin D (OsCal 500 + D) 1 Tablet(s) Oral daily  clonazePAM  Tablet 1.5 milliGRAM(s) Oral <User Schedule>  clonazePAM  Tablet 2 milliGRAM(s) Oral at bedtime  clonazePAM  Tablet 1 milliGRAM(s) Oral <User Schedule>  enalapril 20 milliGRAM(s) Oral with breakfast  folic acid 1 milliGRAM(s) Oral daily  lactated ringers. 1000 milliLiter(s) (100 mL/Hr) IV Continuous <Continuous>  lactobacillus acidophilus 1 Tablet(s) Oral with dinner  lamoTRIgine 200 milliGRAM(s) Oral with breakfast  mirabegron ER 25 milliGRAM(s) Oral daily  multivitamin 1 Tablet(s) Oral daily  oxybutynin 10 milliGRAM(s) Oral two times a day  pantoprazole    Tablet 40 milliGRAM(s) Oral before breakfast  predniSONE   Tablet 105 milliGRAM(s) Oral daily  pregabalin 100 milliGRAM(s) Oral at bedtime  pregabalin 50 milliGRAM(s) Oral <User Schedule>  QUEtiapine 300 milliGRAM(s) Oral <User Schedule>  sertraline 150 milliGRAM(s) Oral with breakfast  vortioxetine 10 milliGRAM(s) Oral daily    MEDICATIONS  (PRN):  acetaminophen     Tablet .. 650 milliGRAM(s) Oral every 6 hours PRN Mild Pain (1 - 3), Moderate Pain (4 - 6)      CAPILLARY BLOOD GLUCOSE        I&O's Summary    26 Feb 2024 07:01  -  27 Feb 2024 07:00  --------------------------------------------------------  IN: 510 mL / OUT: 0 mL / NET: 510 mL        PHYSICAL EXAM:  Vital Signs Last 24 Hrs  T(C): 36.7 (27 Feb 2024 05:16), Max: 37.1 (26 Feb 2024 13:05)  T(F): 98 (27 Feb 2024 05:16), Max: 98.8 (26 Feb 2024 13:05)  HR: 79 (27 Feb 2024 05:16) (71 - 88)  BP: 116/69 (27 Feb 2024 05:16) (104/64 - 150/76)  BP(mean): --  RR: 18 (27 Feb 2024 05:16) (18 - 18)  SpO2: 95% (27 Feb 2024 05:16) (94% - 98%)    Parameters below as of 27 Feb 2024 05:16  Patient On (Oxygen Delivery Method): room air        GENERAL: No apparent distress on 2L NC  HEAD:  Atraumatic, Normocephalic  EYES: EOMI, PERRLA, no scleral icterus  NECK: Supple, no elevated JVP  CHEST/LUNG: Clear to auscultation bilateral and symmetric; No wheezes, rales, or rhonchi  HEART: S1 and S2 normal. Regular rate and rhythm; No murmurs,  ABDOMEN: Soft, non-tender, non-distended; normal bowel sounds  EXTREMITIES:  2+ peripheral pulses b/l, No clubbing, cyanosis, or edema  NEUROLOGY: A&O x 3, no focal deficits  SKIN: No rashes or lesions    LABS:                        5.6    190.69 )-----------( 231      ( 27 Feb 2024 04:59 )             19.9     02-27    139  |  101  |  18  ----------------------------<  90  4.2   |  28  |  0.46<L>    Ca    9.2      27 Feb 2024 04:59  Phos  4.6     02-27  Mg     1.8     02-27    TPro  6.1  /  Alb  3.9  /  TBili  3.2<H>  /  DBili  x   /  AST  32  /  ALT  19  /  AlkPhos  73  02-27    PT/INR - ( 27 Feb 2024 04:59 )   PT: 10.6 sec;   INR: 1.01 ratio         PTT - ( 27 Feb 2024 04:59 )  PTT:27.2 sec      Urinalysis Basic - ( 27 Feb 2024 04:59 )    Color: x / Appearance: x / SG: x / pH: x  Gluc: 90 mg/dL / Ketone: x  / Bili: x / Urobili: x   Blood: x / Protein: x / Nitrite: x   Leuk Esterase: x / RBC: x / WBC x   Sq Epi: x / Non Sq Epi: x / Bacteria: x          RADIOLOGY & ADDITIONAL TESTS:  Lab Results Reviewed   Imaging   < from: [facility] Duplex Lower Ext Vein Scan, Bilat (02.26.24 @ 19:53) >    IMPRESSION:  No evidence of deep venous thrombosis in either lower extremity.    Enlarged bilateral groin lymph nodes as described.    < end of copied text >    Electrocardiogram Reviewed   PROGRESS NOTE:      Patient is a 68y old  Female who presents with a chief complaint of Anemia and leukocytosis (26 Feb 2024 16:25)      SUBJECTIVE / OVERNIGHT EVENTS:  CBC night returned heme of 5.7 patient noted chest pain/pressure again but resolved this morning when seeing her. Was placed on 2L NC. Notes also toe pain started 1 month ago after stubbing toe been hurting since but notes it more currently. Also notes 2L NC is helping her and notes she usually uses CPAP machine at night to sleep for her JANE.     ADDITIONAL REVIEW OF SYSTEMS:    MEDICATIONS  (STANDING):  apremilast 30 milliGRAM(s) Oral two times a day  calcium carbonate 1250 mG  + Vitamin D (OsCal 500 + D) 1 Tablet(s) Oral daily  clonazePAM  Tablet 1.5 milliGRAM(s) Oral <User Schedule>  clonazePAM  Tablet 2 milliGRAM(s) Oral at bedtime  clonazePAM  Tablet 1 milliGRAM(s) Oral <User Schedule>  enalapril 20 milliGRAM(s) Oral with breakfast  folic acid 1 milliGRAM(s) Oral daily  lactated ringers. 1000 milliLiter(s) (100 mL/Hr) IV Continuous <Continuous>  lactobacillus acidophilus 1 Tablet(s) Oral with dinner  lamoTRIgine 200 milliGRAM(s) Oral with breakfast  mirabegron ER 25 milliGRAM(s) Oral daily  multivitamin 1 Tablet(s) Oral daily  oxybutynin 10 milliGRAM(s) Oral two times a day  pantoprazole    Tablet 40 milliGRAM(s) Oral before breakfast  predniSONE   Tablet 105 milliGRAM(s) Oral daily  pregabalin 100 milliGRAM(s) Oral at bedtime  pregabalin 50 milliGRAM(s) Oral <User Schedule>  QUEtiapine 300 milliGRAM(s) Oral <User Schedule>  sertraline 150 milliGRAM(s) Oral with breakfast  vortioxetine 10 milliGRAM(s) Oral daily    MEDICATIONS  (PRN):  acetaminophen     Tablet .. 650 milliGRAM(s) Oral every 6 hours PRN Mild Pain (1 - 3), Moderate Pain (4 - 6)      CAPILLARY BLOOD GLUCOSE        I&O's Summary    26 Feb 2024 07:01  -  27 Feb 2024 07:00  --------------------------------------------------------  IN: 510 mL / OUT: 0 mL / NET: 510 mL        PHYSICAL EXAM:  Vital Signs Last 24 Hrs  T(C): 36.7 (27 Feb 2024 05:16), Max: 37.1 (26 Feb 2024 13:05)  T(F): 98 (27 Feb 2024 05:16), Max: 98.8 (26 Feb 2024 13:05)  HR: 79 (27 Feb 2024 05:16) (71 - 88)  BP: 116/69 (27 Feb 2024 05:16) (104/64 - 150/76)  BP(mean): --  RR: 18 (27 Feb 2024 05:16) (18 - 18)  SpO2: 95% (27 Feb 2024 05:16) (94% - 98%)    Parameters below as of 27 Feb 2024 05:16  Patient On (Oxygen Delivery Method): room air        GENERAL: No apparent distress on 2L NC  HEAD:  Atraumatic, Normocephalic  EYES: EOMI, PERRLA, no scleral icterus  NECK: Supple, no elevated JVP  CHEST/LUNG: Clear to auscultation bilateral and symmetric; No wheezes, rales, or rhonchi  HEART: S1 and S2 normal. Regular rate and rhythm; No murmurs,  ABDOMEN: Soft, non-tender, non-distended; normal bowel sounds  EXTREMITIES:  2+ peripheral pulses b/l, No clubbing, cyanosis, or edema  NEUROLOGY: A&O x 3, no focal deficits  SKIN: No rashes or lesions    LABS:                        5.6    190.69 )-----------( 231      ( 27 Feb 2024 04:59 )             19.9     02-27    139  |  101  |  18  ----------------------------<  90  4.2   |  28  |  0.46<L>    Ca    9.2      27 Feb 2024 04:59  Phos  4.6     02-27  Mg     1.8     02-27    TPro  6.1  /  Alb  3.9  /  TBili  3.2<H>  /  DBili  x   /  AST  32  /  ALT  19  /  AlkPhos  73  02-27    PT/INR - ( 27 Feb 2024 04:59 )   PT: 10.6 sec;   INR: 1.01 ratio         PTT - ( 27 Feb 2024 04:59 )  PTT:27.2 sec      Urinalysis Basic - ( 27 Feb 2024 04:59 )    Color: x / Appearance: x / SG: x / pH: x  Gluc: 90 mg/dL / Ketone: x  / Bili: x / Urobili: x   Blood: x / Protein: x / Nitrite: x   Leuk Esterase: x / RBC: x / WBC x   Sq Epi: x / Non Sq Epi: x / Bacteria: x          RADIOLOGY & ADDITIONAL TESTS:  Lab Results Reviewed   Imaging   < from: [facility] Dup[facility] Lower Ext Vein Scan, Bilat (02.26.24 @ 19:53) >    IMPRESSION:  No evidence of deep venous thrombosis in either lower extremity.    Enlarged bilateral groin lymph nodes as described.    < end of copied text >    Electrocardiogram Reviewed

## 2024-02-28 NOTE — PROGRESS NOTE ADULT - SUBJECTIVE AND OBJECTIVE BOX
INTERVAL HPI/OVERNIGHT EVENTS:    Patient seen and examined at the bedside. Hgb this am 5.6 g/dL. Feeling more tired than normal however denies any chest pain, shortness of breath, nausea or vomiting.     MEDICATIONS  (STANDING):  acetaminophen     Tablet .. 975 milliGRAM(s) Oral once  allopurinol 300 milliGRAM(s) Oral daily  apremilast 30 milliGRAM(s) Oral two times a day  calcium carbonate 1250 mG  + Vitamin D (OsCal 500 + D) 1 Tablet(s) Oral daily  clonazePAM  Tablet 1.5 milliGRAM(s) Oral <User Schedule>  clonazePAM  Tablet 2 milliGRAM(s) Oral at bedtime  clonazePAM  Tablet 1 milliGRAM(s) Oral <User Schedule>  diphenhydrAMINE Injectable 25 milliGRAM(s) IV Push once  enalapril 20 milliGRAM(s) Oral with breakfast  folic acid 1 milliGRAM(s) Oral daily  lactated ringers. 1000 milliLiter(s) (100 mL/Hr) IV Continuous <Continuous>  lactobacillus acidophilus 1 Tablet(s) Oral with dinner  lamoTRIgine 200 milliGRAM(s) Oral with breakfast  mirabegron ER 25 milliGRAM(s) Oral daily  multivitamin 1 Tablet(s) Oral daily  oxybutynin 10 milliGRAM(s) Oral two times a day  pantoprazole    Tablet 40 milliGRAM(s) Oral before breakfast  predniSONE   Tablet 105 milliGRAM(s) Oral daily  pregabalin 50 milliGRAM(s) Oral <User Schedule>  pregabalin 100 milliGRAM(s) Oral at bedtime  QUEtiapine 300 milliGRAM(s) Oral <User Schedule>  sertraline 150 milliGRAM(s) Oral with breakfast  vortioxetine 10 milliGRAM(s) Oral daily    MEDICATIONS  (PRN):  acetaminophen     Tablet .. 650 milliGRAM(s) Oral every 6 hours PRN Mild Pain (1 - 3), Moderate Pain (4 - 6)    No Known Allergies    Intolerances: Cymbalta (Diarrhea)    REVIEW OF SYSTEMS    +Fatigue     Vital Signs Last 24 Hrs  T(C): 36.6 (28 Feb 2024 09:01), Max: 37.8 (27 Feb 2024 18:32)  T(F): 97.9 (28 Feb 2024 09:01), Max: 100.1 (27 Feb 2024 18:32)  HR: 81 (28 Feb 2024 09:01) (70 - 103)  BP: 143/81 (28 Feb 2024 09:01) (105/62 - 168/91)  RR: 18 (28 Feb 2024 09:01) (18 - 26)  SpO2: 91% (28 Feb 2024 09:01) (91% - 98%)    Parameters below as of 28 Feb 2024 09:01  Patient On (Oxygen Delivery Method): room air    PHYSICAL EXAM:  GENERAL: No apparent distress on 2L NC prn   EYES: EOMI, PERRLA, no scleral icterus  NECK: Supple, no elevated JVP  CHEST/LUNG: Clear to auscultation bilateral and symmetric; No wheezes, rales, or rhonchi  HEART: S1 and S2 normal. Regular rate and rhythm; No murmurs,  ABDOMEN: Soft, non-tender, non-distended; normal bowel sounds  EXTREMITIES:  2+ peripheral pulses b/l, No clubbing, cyanosis, or edema  NEUROLOGY: A&O x 3, no focal deficits  SKIN: No rashes or lesions        LABS:                 6.2    78.39 )-----------( 221      ( 28 Feb 2024 07:15 )             19.4     Mean Cell Volume : 95.6 fl  Mean Cell Hemoglobin : 30.5 pg  Mean Cell Hemoglobin Concentration : 32.0 gm/dL  Auto Neutrophil # : 15.68 K/uL  Auto Lymphocyte # : 62.01 K/uL  Auto Monocyte # : 0.71 K/uL  Auto Eosinophil # : 0.00 K/uL  Auto Basophil # : 0.00 K/uL  Auto Neutrophil % : 20.0 %  Auto Lymphocyte % : 79.1 %  Auto Monocyte % : 0.9 %  Auto Eosinophil % : 0.0 %  Auto Basophil % : 0.0 %    02-28    140  |  103  |  16  ----------------------------<  90  3.4<L>   |  25  |  0.41<L>    Ca    8.8      28 Feb 2024 07:15  Phos  5.0     02-28  Mg     1.8     02-28    TPro  5.9<L>  /  Alb  3.7  /  TBili  3.0<H>  /  DBili  x   /  AST  39  /  ALT  20  /  AlkPhos  72  02-28    PT/INR - ( 28 Feb 2024 07:15 )   PT: 10.7 sec;   INR: 0.97 ratio    PTT - ( 27 Feb 2024 04:59 )  PTT:27.2 sec               Color: x / Appearance: x / SG: x / pH: x  Gluc: 90 mg/dL / Ketone: x  / Bili: x / Urobili: x   Blood: x / Protein: x / Nitrite: x   Leuk Esterase: x / RBC: x / WBC x   Sq Epi: x / Non Sq Epi: x / Bacteria: x      RADIOLOGY & ADDITIONAL TESTS: Reviewed    Diagnosis: CLL with leukocytosis     Protocol/Chemo Regimen: Rituximab + prednisone 40mg   Day: 2     Pt endorsed: no acute complaints     Review of Systems:  Patient denied nausea, vomiting, chest pain, cough, dyspnea, abdominal pain, constipation, diarrhea, rash, fatigue    MEDICATIONS  (STANDING):  acetaminophen     Tablet .. 975 milliGRAM(s) Oral once  allopurinol 300 milliGRAM(s) Oral daily  apremilast 30 milliGRAM(s) Oral two times a day  calcium carbonate 1250 mG  + Vitamin D (OsCal 500 + D) 1 Tablet(s) Oral daily  clonazePAM  Tablet 1.5 milliGRAM(s) Oral <User Schedule>  clonazePAM  Tablet 2 milliGRAM(s) Oral at bedtime  clonazePAM  Tablet 1 milliGRAM(s) Oral <User Schedule>  diphenhydrAMINE Injectable 25 milliGRAM(s) IV Push once  enalapril 20 milliGRAM(s) Oral with breakfast  folic acid 1 milliGRAM(s) Oral daily  lactated ringers. 1000 milliLiter(s) (100 mL/Hr) IV Continuous <Continuous>  lactobacillus acidophilus 1 Tablet(s) Oral with dinner  lamoTRIgine 200 milliGRAM(s) Oral with breakfast  mirabegron ER 25 milliGRAM(s) Oral daily  multivitamin 1 Tablet(s) Oral daily  oxybutynin 10 milliGRAM(s) Oral two times a day  pantoprazole    Tablet 40 milliGRAM(s) Oral before breakfast  predniSONE   Tablet 40 milliGRAM(s) Oral daily  pregabalin 50 milliGRAM(s) Oral <User Schedule>  pregabalin 100 milliGRAM(s) Oral at bedtime  QUEtiapine 300 milliGRAM(s) Oral <User Schedule>  sertraline 150 milliGRAM(s) Oral with breakfast  vortioxetine 10 milliGRAM(s) Oral daily    MEDICATIONS  (PRN):  acetaminophen     Tablet .. 650 milliGRAM(s) Oral every 6 hours PRN Mild Pain (1 - 3), Moderate Pain (4 - 6)    No Known Allergies  Intolerances: Cymbalta (Diarrhea)    REVIEW OF SYSTEMS  +Fatigue     Vital Signs Last 24 Hrs  T(C): 36.6 (28 Feb 2024 09:01), Max: 37.8 (27 Feb 2024 18:32)  T(F): 97.9 (28 Feb 2024 09:01), Max: 100.1 (27 Feb 2024 18:32)  HR: 81 (28 Feb 2024 09:01) (70 - 103)  BP: 143/81 (28 Feb 2024 09:01) (105/62 - 168/91)  RR: 18 (28 Feb 2024 09:01) (18 - 26)  SpO2: 91% (28 Feb 2024 09:01) (91% - 98%)    Parameters below as of 28 Feb 2024 09:01  Patient On (Oxygen Delivery Method): room air    PHYSICAL EXAM:  GENERAL: No apparent distress on 2L NC prn   EYES: EOMI, PERRLA, no scleral icterus  CHEST/LUNG: Clear to auscultation bilateral and symmetric; No wheezes, rales, or rhonchi  HEART: S1 and S2 normal. Regular rate and rhythm; No murmurs,  ABDOMEN: Soft, non-tender, non-distended; normal bowel sounds  EXTREMITIES:  2+ peripheral pulses b/l, No clubbing, cyanosis, or edema  NEUROLOGY: A&O x 3, no focal deficits  SKIN: No rashes or lesions    LABS:                 6.2    78.39 )-----------( 221      ( 28 Feb 2024 07:15 )             19.4     Mean Cell Volume : 95.6 fl  Mean Cell Hemoglobin : 30.5 pg  Mean Cell Hemoglobin Concentration : 32.0 gm/dL  Auto Neutrophil # : 15.68 K/uL  Auto Lymphocyte # : 62.01 K/uL  Auto Monocyte # : 0.71 K/uL  Auto Eosinophil # : 0.00 K/uL  Auto Basophil # : 0.00 K/uL  Auto Neutrophil % : 20.0 %  Auto Lymphocyte % : 79.1 %  Auto Monocyte % : 0.9 %  Auto Eosinophil % : 0.0 %  Auto Basophil % : 0.0 %    02-28    140  |  103  |  16  ----------------------------<  90  3.4<L>   |  25  |  0.41<L>    Ca    8.8      28 Feb 2024 07:15  Phos  5.0     02-28  Mg     1.8     02-28    TPro  5.9<L>  /  Alb  3.7  /  TBili  3.0<H>  /  DBili  x   /  AST  39  /  ALT  20  /  AlkPhos  72  02-28    PT/INR - ( 28 Feb 2024 07:15 )   PT: 10.7 sec;   INR: 0.97 ratio    PTT - ( 27 Feb 2024 04:59 )  PTT:27.2 sec      RADIOLOGY & ADDITIONAL TESTS:   VA Duplex Lower Ext Vein Scan, Bilat (02.26.24 @ 19:53)   IMPRESSION:  No evidence of deep venous thrombosis in either lower extremity.    Enlarged bilateral groin lymph nodes as described.             Diagnosis: CLL with leukocytosis     Protocol/Chemo Regimen: Rituximab + prednisone 40mg   Day: 2     Pt endorsed:  shortness of breath, chest pressure, headache 5/10   Review of Systems:  Patient denied nausea, vomiting, cough, dyspnea, abdominal pain, constipation, diarrhea, rash, fatigue    MEDICATIONS  (STANDING):  acetaminophen     Tablet .. 975 milliGRAM(s) Oral once  allopurinol 300 milliGRAM(s) Oral daily  apremilast 30 milliGRAM(s) Oral two times a day  calcium carbonate 1250 mG  + Vitamin D (OsCal 500 + D) 1 Tablet(s) Oral daily  clonazePAM  Tablet 1.5 milliGRAM(s) Oral <User Schedule>  clonazePAM  Tablet 2 milliGRAM(s) Oral at bedtime  clonazePAM  Tablet 1 milliGRAM(s) Oral <User Schedule>  diphenhydrAMINE Injectable 25 milliGRAM(s) IV Push once  enalapril 20 milliGRAM(s) Oral with breakfast  folic acid 1 milliGRAM(s) Oral daily  lactated ringers. 1000 milliLiter(s) (100 mL/Hr) IV Continuous <Continuous>  lactobacillus acidophilus 1 Tablet(s) Oral with dinner  lamoTRIgine 200 milliGRAM(s) Oral with breakfast  mirabegron ER 25 milliGRAM(s) Oral daily  multivitamin 1 Tablet(s) Oral daily  oxybutynin 10 milliGRAM(s) Oral two times a day  pantoprazole    Tablet 40 milliGRAM(s) Oral before breakfast  predniSONE   Tablet 40 milliGRAM(s) Oral daily  pregabalin 50 milliGRAM(s) Oral <User Schedule>  pregabalin 100 milliGRAM(s) Oral at bedtime  QUEtiapine 300 milliGRAM(s) Oral <User Schedule>  sertraline 150 milliGRAM(s) Oral with breakfast  vortioxetine 10 milliGRAM(s) Oral daily    MEDICATIONS  (PRN):  acetaminophen     Tablet .. 650 milliGRAM(s) Oral every 6 hours PRN Mild Pain (1 - 3), Moderate Pain (4 - 6)    No Known Allergies  Intolerances: Cymbalta (Diarrhea)    REVIEW OF SYSTEMS  +Fatigue     Vital Signs Last 24 Hrs  T(C): 36.6 (28 Feb 2024 09:01), Max: 37.8 (27 Feb 2024 18:32)  T(F): 97.9 (28 Feb 2024 09:01), Max: 100.1 (27 Feb 2024 18:32)  HR: 81 (28 Feb 2024 09:01) (70 - 103)  BP: 143/81 (28 Feb 2024 09:01) (105/62 - 168/91)  RR: 18 (28 Feb 2024 09:01) (18 - 26)  SpO2: 91% (28 Feb 2024 09:01) (91% - 98%)    Parameters below as of 28 Feb 2024 09:01  Patient On (Oxygen Delivery Method): room air    PHYSICAL EXAM:  GENERAL: No apparent distress on 2L NC prn   EYES: EOMI, PERRLA, no scleral icterus  CHEST/LUNG: Clear to auscultation bilateral and symmetric; No wheezes, rales, or rhonchi  HEART: S1 and S2 normal. Regular rate and rhythm; No murmurs,  ABDOMEN: Soft, non-tender, non-distended; normal bowel sounds  EXTREMITIES:  2+ peripheral pulses b/l, No clubbing, cyanosis, or edema  NEUROLOGY: A&O x 3, no focal deficits  SKIN: No rashes or lesions    LABS:                 6.2    78.39 )-----------( 221      ( 28 Feb 2024 07:15 )             19.4     Mean Cell Volume : 95.6 fl  Mean Cell Hemoglobin : 30.5 pg  Mean Cell Hemoglobin Concentration : 32.0 gm/dL  Auto Neutrophil # : 15.68 K/uL  Auto Lymphocyte # : 62.01 K/uL  Auto Monocyte # : 0.71 K/uL  Auto Eosinophil # : 0.00 K/uL  Auto Basophil # : 0.00 K/uL  Auto Neutrophil % : 20.0 %  Auto Lymphocyte % : 79.1 %  Auto Monocyte % : 0.9 %  Auto Eosinophil % : 0.0 %  Auto Basophil % : 0.0 %    02-28    140  |  103  |  16  ----------------------------<  90  3.4<L>   |  25  |  0.41<L>    Ca    8.8      28 Feb 2024 07:15  Phos  5.0     02-28  Mg     1.8     02-28    TPro  5.9<L>  /  Alb  3.7  /  TBili  3.0<H>  /  DBili  x   /  AST  39  /  ALT  20  /  AlkPhos  72  02-28    PT/INR - ( 28 Feb 2024 07:15 )   PT: 10.7 sec;   INR: 0.97 ratio    PTT - ( 27 Feb 2024 04:59 )  PTT:27.2 sec      RADIOLOGY & ADDITIONAL TESTS:   VA Duplex Lower Ext Vein Scan, Bilat (02.26.24 @ 19:53)   IMPRESSION:  No evidence of deep venous thrombosis in either lower extremity.    Enlarged bilateral groin lymph nodes as described.    < from: CT Chest w/ IV Cont (02.24.24 @ 23:49) >  IMPRESSION:  Mild increase in axillary lymphadenopathy and pelvic lymphadenopathy,   latter overall largely unchanged.  Stable mediastinal and visualized lower cervical and supraclavicular   lymph nodes    < end of copied text >           Diagnosis: CLL with leukocytosis     Protocol/Chemo Regimen: Rituximab + prednisone 40mg   Day: 2     Pt endorsed:  shortness of breath, chest pressure, headache 5/10   Review of Systems:  Patient denied nausea, vomiting, cough, dyspnea, abdominal pain, constipation, diarrhea, rash, fatigue    MEDICATIONS  (STANDING):  acetaminophen     Tablet .. 975 milliGRAM(s) Oral once  allopurinol 300 milliGRAM(s) Oral daily  apremilast 30 milliGRAM(s) Oral two times a day  calcium carbonate 1250 mG  + Vitamin D (OsCal 500 + D) 1 Tablet(s) Oral daily  clonazePAM  Tablet 1.5 milliGRAM(s) Oral <User Schedule>  clonazePAM  Tablet 2 milliGRAM(s) Oral at bedtime  clonazePAM  Tablet 1 milliGRAM(s) Oral <User Schedule>  diphenhydrAMINE Injectable 25 milliGRAM(s) IV Push once  enalapril 20 milliGRAM(s) Oral with breakfast  folic acid 1 milliGRAM(s) Oral daily  lactated ringers. 1000 milliLiter(s) (100 mL/Hr) IV Continuous <Continuous>  lactobacillus acidophilus 1 Tablet(s) Oral with dinner  lamoTRIgine 200 milliGRAM(s) Oral with breakfast  mirabegron ER 25 milliGRAM(s) Oral daily  multivitamin 1 Tablet(s) Oral daily  oxybutynin 10 milliGRAM(s) Oral two times a day  pantoprazole    Tablet 40 milliGRAM(s) Oral before breakfast  predniSONE   Tablet 40 milliGRAM(s) Oral daily  pregabalin 50 milliGRAM(s) Oral <User Schedule>  pregabalin 100 milliGRAM(s) Oral at bedtime  QUEtiapine 300 milliGRAM(s) Oral <User Schedule>  sertraline 150 milliGRAM(s) Oral with breakfast  vortioxetine 10 milliGRAM(s) Oral daily    MEDICATIONS  (PRN):  acetaminophen     Tablet .. 650 milliGRAM(s) Oral every 6 hours PRN Mild Pain (1 - 3), Moderate Pain (4 - 6)    No Known Allergies  Intolerances: Cymbalta (Diarrhea)    REVIEW OF SYSTEMS  +Fatigue     Vital Signs Last 24 Hrs  T(C): 36.6 (28 Feb 2024 09:01), Max: 37.8 (27 Feb 2024 18:32)  T(F): 97.9 (28 Feb 2024 09:01), Max: 100.1 (27 Feb 2024 18:32)  HR: 81 (28 Feb 2024 09:01) (70 - 103)  BP: 143/81 (28 Feb 2024 09:01) (105/62 - 168/91)  RR: 18 (28 Feb 2024 09:01) (18 - 26)  SpO2: 91% (28 Feb 2024 09:01) (91% - 98%)    Parameters below as of 28 Feb 2024 09:01  Patient On (Oxygen Delivery Method): room air    PHYSICAL EXAM:  GENERAL: No apparent distress on 2L NC prn   EYES: EOMI, PERRLA, no scleral icterus  CHEST/LUNG: Clear to auscultation bilateral and symmetric; No wheezes, rales, or rhonchi  HEART: S1 and S2 normal. Regular rate and rhythm; No murmurs,  ABDOMEN: Soft, non-tender, non-distended; normal bowel sounds  EXTREMITIES:  2+ peripheral pulses b/l, No clubbing, cyanosis, or edema  NEUROLOGY: A&O x 3, no focal deficits  SKIN: No rashes or lesions    LABS:                 6.2    78.39 )-----------( 221      ( 28 Feb 2024 07:15 )             19.4     Mean Cell Volume : 95.6 fl  Mean Cell Hemoglobin : 30.5 pg  Mean Cell Hemoglobin Concentration : 32.0 gm/dL  Auto Neutrophil # : 15.68 K/uL  Auto Lymphocyte # : 62.01 K/uL  Auto Monocyte # : 0.71 K/uL  Auto Eosinophil # : 0.00 K/uL  Auto Basophil # : 0.00 K/uL  Auto Neutrophil % : 20.0 %  Auto Lymphocyte % : 79.1 %  Auto Monocyte % : 0.9 %  Auto Eosinophil % : 0.0 %  Auto Basophil % : 0.0 %    02-28    140  |  103  |  16  ----------------------------<  90  3.4<L>   |  25  |  0.41<L>    Ca    8.8      28 Feb 2024 07:15  Phos  5.0     02-28  Mg     1.8     02-28    TPro  5.9<L>  /  Alb  3.7  /  TBili  3.0<H>  /  DBili  x   /  AST  39  /  ALT  20  /  AlkPhos  72  02-28    PT/INR - ( 28 Feb 2024 07:15 )   PT: 10.7 sec;   INR: 0.97 ratio    PTT - ( 27 Feb 2024 04:59 )  PTT:27.2 sec      RADIOLOGY & ADDITIONAL TESTS:   VA Duplex Lower Ext Vein Scan, Bilat (02.26.24 @ 19:53)   IMPRESSION:  No evidence of deep venous thrombosis in either lower extremity.    Enlarged bilateral groin lymph nodes as described.     CT Chest w/ IV Cont (02.24.24 @ 23:49) >  IMPRESSION:  Mild increase in axillary lymphadenopathy and pelvic lymphadenopathy,   latter overall largely unchanged.  Stable mediastinal and visualized lower cervical and supraclavicular   lymph nodes    Flow Cytometry (02.25.24 @ 10:57)    TM Interpretation:   Flow Cytometry Final Report  ________________________________________________________________________  Specimen: Peripheral Blood  Date Collected: 02/25/2024  Date Received: 02/26/2024  Date Processed: 02/26/2024  Date Reported: 02/27/2024 12:40  Accession #: [id number]  _________________________________________________________  CLINICAL DATA: Rule out leukemia, lymphoma  __________________________________________________________  CD45/Side Scatter Differential  Granulocytes: 6 % ;    Lymphocytes: 92 % ;    Monocytes: 1 % ;    Dim CD45 and/or blast gate: 0 % ;     Erythroid/debris: 0 %  __________________________________________________________  DIAGNOSIS:  Peripheral Blood:  -  Monotypic B-cells (84% of cells, 97.41% of  lymphocytes), positive for kappa, CD19, dim  CD20, CD23, CD5, ; negative FMC-7, CD79b, CD10, CD11c; consistent with small lymphocytic  lymphoma/chronic lymphocytic leukemia (CD38 positive in 15.36% of cells).  - The myeloid immunophenotypic findings show no increase in myeloid immaturity, and normal  myeloid antigen maturation pattern.

## 2024-02-28 NOTE — PROGRESS NOTE ADULT - PROBLEM SELECTOR PLAN 6
- therapeutic equivalent of home nexium with pantoprazole 40mg daily therapeutic equivalent of home nexium with pantoprazole 40mg daily

## 2024-02-28 NOTE — PROGRESS NOTE ADULT - ASSESSMENT
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      68F with PMH of CLL (not on treatment), anemia, HTN, mood disorder, psoriasis presenting after outpatient blood work revealed worsening anemia and increased leukocytosis with AIHA warm complication from CLL.. 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

## 2024-02-28 NOTE — PROGRESS NOTE ADULT - PROBLEM SELECTOR PLAN 3
Plan: - patient with hx of anxiety and mood disorder  - continue home Klonopin with 1mg in AM, 1.5mg in afternoon and 2mg at bedtime  - continue home Lamictal 200mg daily  - continue home zoloft 150mg daily  - continue home Seroquel 300mg daily  - continue home Trintellix 10mg daily  - home Vyvanse 50mg daily not on formulary; patient indicates is ok off this currently and will let team know if she needs. continue home Klonopin with 1mg in AM, 1.5mg in afternoon and 2mg at bedtime  continue home Lamictal 200mg daily  continue home zoloft 150mg daily  continue home Seroquel 300mg daily  continue home Trintellix 10mg daily  home Vyvanse 50mg daily not on formulary; patient indicates is ok off this currently and will let team know if she needs.

## 2024-02-28 NOTE — PROVIDER CONTACT NOTE (OTHER) - SITUATION
Pt c/o of pain in left 2nd toe and back of right knee
Pt c/o of intermittent chest tightness and pulsating In ears
Temp 103.1C
Pt reports feeling flushed during blood transfusion.
Pt c/o of chest pressure, feeling of "heart racing", and SOB

## 2024-02-28 NOTE — PROGRESS NOTE ADULT - PROBLEM SELECTOR PLAN 9
- Pt stubbed toe into chair ~1month ago has had toe pain on Left second digit since then  - Pain increasing/more noticeable recently  - L foot x-ray   - tyenol PRN -uses CPAP machine most nights  - instructed to have family bring her CPAP machine to hospital to use

## 2024-02-28 NOTE — PROVIDER CONTACT NOTE (OTHER) - ACTION/TREATMENT ORDERED:
MD aware, ordered EKG. States pt was having the same situation at home.
MD aware, ordered EKG. Pt recommended to have  bring in her CPAP machine to use overnight. Pt kept on 2LNC for relief of some discomfort.
MD aware, will relay to AM team.
MD Franklyn Jj at bedside to assess patient. 25 mg IVP benadryl ordered and administered, transfusion restarted after administration.
Tylenol 1000mg IV given and monitoring is on going.

## 2024-02-28 NOTE — PHARMACOTHERAPY INTERVENTION NOTE - COMMENTS
68-year-old female with PMH of CLL (not on treatment), anemia, HTN, mood disorder, psoriasis presenting after outpatient blood work revealed worsening anemia and increased leukocytosis with AIHA warm complication from CLL. Today is C1D2 of rituximab. Plan is to give rituximab weekly x4 weeks for AIHA treatment.     Would recommend starting maintenance fluids (75 mL/hr) for TLS prophylaxis if patient can tolerate. Can also consider sevelamer 800 mg PO TID x1 day for hyperphosphatemia associated with TLS (Phos on 2/28 is 5.0).    Vanna Davalos, PharmD, BCOP  Stem Cell Transplant Clinical Pharmacy Specialist  Available via [employer] Teams

## 2024-02-28 NOTE — PROGRESS NOTE ADULT - NS ATTEND AMEND GEN_ALL_CORE FT
.    Primary: [place]    Vital Signs Last 24 Hrs  T(C): 36.5 (28 Feb 2024 05:10), Max: 37.8 (27 Feb 2024 18:32)  T(F): 97.7 (28 Feb 2024 05:10), Max: 100.1 (27 Feb 2024 18:32)  HR: 70 (28 Feb 2024 05:10) (70 - 103)  BP: 120/74 (28 Feb 2024 05:10) (105/62 - 168/91)  BP(mean): --  RR: 18 (28 Feb 2024 05:10) (18 - 26)  SpO2: 97% (28 Feb 2024 05:10) (93% - 98%)    Parameters below as of 28 Feb 2024 05:10  Patient On (Oxygen Delivery Method): nasal cannula    MEDICATIONS  (STANDING):  allopurinol 300 milliGRAM(s) Oral daily  apremilast 30 milliGRAM(s) Oral two times a day  calcium carbonate 1250 mG  + Vitamin D (OsCal 500 + D) 1 Tablet(s) Oral daily  clonazePAM  Tablet 1.5 milliGRAM(s) Oral <User Schedule>  clonazePAM  Tablet 2 milliGRAM(s) Oral at bedtime  clonazePAM  Tablet 1 milliGRAM(s) Oral <User Schedule>  enalapril 20 milliGRAM(s) Oral with breakfast  folic acid 1 milliGRAM(s) Oral daily  lactated ringers. 1000 milliLiter(s) (100 mL/Hr) IV Continuous <Continuous>  lactobacillus acidophilus 1 Tablet(s) Oral with dinner  lamoTRIgine 200 milliGRAM(s) Oral with breakfast  mirabegron ER 25 milliGRAM(s) Oral daily  multivitamin 1 Tablet(s) Oral daily  oxybutynin 10 milliGRAM(s) Oral two times a day  pantoprazole    Tablet 40 milliGRAM(s) Oral before breakfast  predniSONE   Tablet 105 milliGRAM(s) Oral daily  pregabalin 100 milliGRAM(s) Oral at bedtime  pregabalin 50 milliGRAM(s) Oral <User Schedule>  QUEtiapine 300 milliGRAM(s) Oral <User Schedule>  sertraline 150 milliGRAM(s) Oral with breakfast  vortioxetine 10 milliGRAM(s) Oral daily      Assessment: 68 year old day 2 week 1 Rituximab for maximum Cortez stage III, 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    PMHx: plaque psorisais, bipolar/anxiety, bladder spasms.     Plan:  Heme:  Hgb goal > 6.0g/dL  rituximab weekly x4   decrease prednisone 40mg daily  allopurinol, folate    ID: please start bactrim for PCP prophylaxis    DVT prophylaxis: once Hgb is stable    Over 40 minutes were spent in direct patient, non-resident teaching, care and care coordination. .    Primary: [place]    Vital Signs Last 24 Hrs  T(C): 36.5 (28 Feb 2024 05:10), Max: 37.8 (27 Feb 2024 18:32)  T(F): 97.7 (28 Feb 2024 05:10), Max: 100.1 (27 Feb 2024 18:32)  HR: 70 (28 Feb 2024 05:10) (70 - 103)  BP: 120/74 (28 Feb 2024 05:10) (105/62 - 168/91)  BP(mean): --  RR: 18 (28 Feb 2024 05:10) (18 - 26)  SpO2: 97% (28 Feb 2024 05:10) (93% - 98%)    Parameters below as of 28 Feb 2024 05:10  Patient On (Oxygen Delivery Method): nasal cannula    MEDICATIONS  (STANDING):  allopurinol 300 milliGRAM(s) Oral daily  apremilast 30 milliGRAM(s) Oral two times a day  calcium carbonate 1250 mG  + Vitamin D (OsCal 500 + D) 1 Tablet(s) Oral daily  clonazePAM  Tablet 1.5 milliGRAM(s) Oral <User Schedule>  clonazePAM  Tablet 2 milliGRAM(s) Oral at bedtime  clonazePAM  Tablet 1 milliGRAM(s) Oral <User Schedule>  enalapril 20 milliGRAM(s) Oral with breakfast  folic acid 1 milliGRAM(s) Oral daily  lactated ringers. 1000 milliLiter(s) (100 mL/Hr) IV Continuous <Continuous>  lactobacillus acidophilus 1 Tablet(s) Oral with dinner  lamoTRIgine 200 milliGRAM(s) Oral with breakfast  mirabegron ER 25 milliGRAM(s) Oral daily  multivitamin 1 Tablet(s) Oral daily  oxybutynin 10 milliGRAM(s) Oral two times a day  pantoprazole    Tablet 40 milliGRAM(s) Oral before breakfast  predniSONE   Tablet 105 milliGRAM(s) Oral daily  pregabalin 100 milliGRAM(s) Oral at bedtime  pregabalin 50 milliGRAM(s) Oral <User Schedule>  QUEtiapine 300 milliGRAM(s) Oral <User Schedule>  sertraline 150 milliGRAM(s) Oral with breakfast  vortioxetine 10 milliGRAM(s) Oral daily      Assessment: 68 year old day 2 week 1 Rituximab for maximum Cortez stage III, 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    PMHx: plaque psorisais, bipolar/anxiety, bladder spasms.     Plan:  Heme:  Hgb goal > 6.0g/dL  rituximab weekly x4   decrease prednisone 40mg daily  allopurinol, folate  CLL FISH: pending  IGVH pending    ID: please start bactrim for PCP prophylaxis  IgG (2/26/24): 681    DVT prophylaxis: once Hgb is stable    Over 40 minutes were spent in direct patient, non-resident teaching, care and care coordination. .    Primary: [place]    Vital Signs Last 24 Hrs  T(C): 36.5 (28 Feb 2024 05:10), Max: 37.8 (27 Feb 2024 18:32)  T(F): 97.7 (28 Feb 2024 05:10), Max: 100.1 (27 Feb 2024 18:32)  HR: 70 (28 Feb 2024 05:10) (70 - 103)  BP: 120/74 (28 Feb 2024 05:10) (105/62 - 168/91)  BP(mean): --  RR: 18 (28 Feb 2024 05:10) (18 - 26)  SpO2: 97% (28 Feb 2024 05:10) (93% - 98%)    Parameters below as of 28 Feb 2024 05:10  Patient On (Oxygen Delivery Method): nasal cannula    MEDICATIONS  (STANDING):  allopurinol 300 milliGRAM(s) Oral daily  apremilast 30 milliGRAM(s) Oral two times a day  calcium carbonate 1250 mG  + Vitamin D (OsCal 500 + D) 1 Tablet(s) Oral daily  clonazePAM  Tablet 1.5 milliGRAM(s) Oral <User Schedule>  clonazePAM  Tablet 2 milliGRAM(s) Oral at bedtime  clonazePAM  Tablet 1 milliGRAM(s) Oral <User Schedule>  enalapril 20 milliGRAM(s) Oral with breakfast  folic acid 1 milliGRAM(s) Oral daily  lactated ringers. 1000 milliLiter(s) (100 mL/Hr) IV Continuous <Continuous>  lactobacillus acidophilus 1 Tablet(s) Oral with dinner  lamoTRIgine 200 milliGRAM(s) Oral with breakfast  mirabegron ER 25 milliGRAM(s) Oral daily  multivitamin 1 Tablet(s) Oral daily  oxybutynin 10 milliGRAM(s) Oral two times a day  pantoprazole    Tablet 40 milliGRAM(s) Oral before breakfast  predniSONE   Tablet 105 milliGRAM(s) Oral daily  pregabalin 100 milliGRAM(s) Oral at bedtime  pregabalin 50 milliGRAM(s) Oral <User Schedule>  QUEtiapine 300 milliGRAM(s) Oral <User Schedule>  sertraline 150 milliGRAM(s) Oral with breakfast  vortioxetine 10 milliGRAM(s) Oral daily      Assessment: 68 year old day 2 week 1 Rituximab for maximum Cortez stage III, 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    PMHx: plaque psoriasis, bipolar/anxiety, bladder spasms.     Plan:  Heme:  Hgb goal > 6.0g/dL; please recheck Hgb at 2PM today.    rituximab weekly x4   decrease prednisone 40mg daily  allopurinol, folate  CLL FISH: pending  IGVH pending    ID: please start bactrim for PCP prophylaxis  IgG (2/26/24): 681    DVT prophylaxis: once Hgb is stable    Over 40 minutes were spent in direct patient, non-resident teaching, care and care coordination.  Please schedule for next Rituximab as an outpatient 2/5/24 and with me for follow-up with CBC and type and cross.

## 2024-02-28 NOTE — PROGRESS NOTE ADULT - PROBLEM SELECTOR PLAN 5
- otezla 30mg BID from home started  - psoriasis complicated by psoriatic arthritis  - continue home lyrica 50mg in AM and 100mg in PM otezla 30mg BID from home started  psoriasis complicated by psoriatic arthritis  continue home lyrica 50mg in AM and 100mg in PM

## 2024-02-28 NOTE — PROVIDER CONTACT NOTE (OTHER) - ASSESSMENT
Patient alert and oriented x 4.
Pt is AOx4, at baseline. Pt stated that she began having "chest pressure" and some intermittent "heart racing" on waking this morning. Pt also endorsed SOB, and stated she is on CPAP at home, but has not used it the two nights she has been here. /69, HR 79, RR 22, SpO2 92% on room air, temp 97.3F. Pt placed on 2LNC oxygen saturation 95-96%, with relief of SOB, and heart racing. Awaiting morning labs results.
Pt is AOx4, at baseline. Pt c/o of chest tightness (denies it is a type of pain, so unable to rate pain level), and pulsating which she "hears" in her ears. Pt stated she has had this happen at home. Pt also endorsed right arm pain, which she states "could be muscular". Pt denies headache or numbness and tingling in her extremities at this time. /70, HR 87, RR 20, SpO2 93%, temp 97.9 oral.
Pt is AOx4, at baseline. Pt endorsed having left second toe pain when assessed, and endorsed "stubbing [her] toe" previously. Left second toe also red and swollen as compared to other toes. Sensation remains present and toes blanchable. Pt also endorsed intermittent pain behind right knee. Skin is warm to touch, with no tenderness. No abnormality in color. Pt mobility remains at baseline, requiring little assistance when ambulating with a cane.
Pt reports feeling flushed during blood transfusion. VS as follows 98.0 temp, HR 85. 148/74, 98% on room air, RR 18. Denies chest pain or SOB. MD Franklyn Jj made aware. Transfusion paused while awaiting further instruction from MD Franklyn Jj

## 2024-02-28 NOTE — PROVIDER CONTACT NOTE (OTHER) - REASON
Fever
Pt c/o of chest pressure, feeling of "heart racing", and SOB
reports feeling flushed during blood transfusion
Pt c/o of intermittent chest tightness and pulsating In ears
Pt c/o of pain in left 2nd toe and back of right knee

## 2024-02-28 NOTE — PROVIDER CONTACT NOTE (CRITICAL VALUE NOTIFICATION) - RECOMMENDATIONS
Inform MD
Inform provider
1 unit PRBC transfused over 3 hours
MD Franklyn Hardin made aware.
Notify provider.
Hgb goal is above 6, follow up with AM lab
Inform provider.

## 2024-02-28 NOTE — PROGRESS NOTE ADULT - PROBLEM SELECTOR PLAN 8
DVT ppx: lovenox 40mg daily  Diet: DASH diet  Dispo: pending clinical course - Pt stubbed toe into chair ~1month ago has had toe pain on Left second digit since then  - L foot x-ray   - tyenol PRN

## 2024-02-28 NOTE — PROGRESS NOTE ADULT - PROBLEM SELECTOR PLAN 7
- therapeutic equivalent of home vesicare 10mg with oxybutynin 10mg BID  - continue home myrbetriq 25mg daily therapeutic equivalent of home vesicare 10mg with oxybutynin 10mg BID   continue home myrbetriq 25mg daily

## 2024-02-28 NOTE — PROGRESS NOTE ADULT - PROBLEM SELECTOR PLAN 1
Leukocytosis   - f/u peripheral flow results. FISH negative for PML-MASSIEL and BCR-ABL translocations.   - Monitor CBC with diff and daily CMP, Ph, LDH, uric acid, haptoglobin  - CT C/A/P with mildly increased adenopathy  2/27 Treated for AIHA/CLL on 2/27 w/ C1D1 of RItuximab 375mg/m2. Plan for weekly tx for 4 weeks.   Allopurinol 300mg qdaily for TLS ppx.    F/u G6PD level Leukocytosis.. s/p treatment with hydrea 500mg BID  f/u peripheral flow results. FISH negative for PML-MASSIEL and BCR-ABL translocations.   Monitor CBC with diff and daily CMP, Ph, LDH, uric acid, haptoglobin  Allopurinol 300mg qdaily for TLS ppx.   F/u G6PD level  02/27 Day 1 Rituximab + prednisone   02/28 IgE antibody positive give tyenol and benadryl for any transfusion. Blood bank has 2 units left available for patient. f/u PM CBC. Leukocytosis.. s/p treatment with hydrea 500mg BID  f/u peripheral flow results. FISH negative for PML-MASSIEL and BCR-ABL translocations.   Monitor CBC with diff and daily CMP, Ph, LDH, uric acid, haptoglobin  Allopurinol 300mg qdaily for TLS ppx.   F/u G6PD level  Hemoglobin goal >6  02/27 Day 1 Rituximab + prednisone   02/28 IgE antibody positive give tyenol and benadryl for any transfusion. Blood bank has 2 units left available for patient. f/u PM CBC.  2/28 symptomatic anemia, 1 unit PRBC transfused.

## 2024-02-28 NOTE — PROGRESS NOTE ADULT - PROBLEM SELECTOR PLAN 10
-uses CPAP machine most nights  - instructed to have family bring her CPAP machine to hospital to use DVT ppx: lovenox 40mg daily  Diet: DASH diet  Dispo: pending DVT ppx: once Hgb is stable  Diet: DASH diet  Dispo: pending

## 2024-02-28 NOTE — PROVIDER CONTACT NOTE (OTHER) - BACKGROUND
CLL
Pt admitted for abnormal lab values Hbg 5.4
Pt admitted for abnormal lab values, Hgb 5.4
Pt w/hx of CLL, admitted for abnormal lab values.
pt adm with critical lab values
[place]oni Pregnancy And Lactation Text: The risk during pregnancy and breastfeeding is uncertain with this medication.

## 2024-02-28 NOTE — PROGRESS NOTE ADULT - PROBLEM SELECTOR PLAN 2
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 Awaiting Rubia and haptoglobin for confirmation"  >> Warm AIHA (+IgG) and haptoglobin low, transfuse 1 unit PRBCs and start Pred 1mg/kg/day + GI ppx  - 2/25: 105 prednisone daily w/ 2 unit apheresed PRBCs ordered.  -Protonix 40 PO /day  - folic acid daily  - D/safia hydrea 500mg BID  - IgE antibody positive give tyenol and benadryl for any transfusion  - 2nd pRBC to be transfused 2/26  -3rd pRBC to be transfused 2/27  - discussed with BB and requested ~6 total pRBC to have prepared which was approved by BB  - continue monitor H&H and WBC trend patient afebrile, not neutropenic   c/w bactrim ppx

## 2024-02-29 NOTE — PROVIDER CONTACT NOTE (CRITICAL VALUE NOTIFICATION) - PERSON GIVING RESULT:
Elio Osborne
Elio Tobias/Dylon
Lab: Chago Das
Elio Tobias, LAB
Khanh Gongora (lab)
Lab / Chago Das
Byron Brennan
Lab: Chago Das
Lab: Chago Das

## 2024-02-29 NOTE — PROGRESS NOTE ADULT - NS ATTEND AMEND GEN_ALL_CORE FT
.    Primary: [place]    Vital Signs Last 24 Hrs  T(C): 36.7 (29 Feb 2024 06:11), Max: 37.1 (28 Feb 2024 13:50)  T(F): 98 (29 Feb 2024 06:11), Max: 98.8 (28 Feb 2024 13:50)  HR: 71 (29 Feb 2024 06:11) (71 - 90)  BP: 138/77 (29 Feb 2024 06:11) (113/67 - 156/70)  BP(mean): --  RR: 18 (29 Feb 2024 06:11) (18 - 18)  SpO2: 98% (29 Feb 2024 06:11) (91% - 98%)    Parameters below as of 29 Feb 2024 06:11  Patient On (Oxygen Delivery Method): nasal cannula    MEDICATIONS  (STANDING):  allopurinol 300 milliGRAM(s) Oral daily  apremilast 30 milliGRAM(s) Oral two times a day  calcium acetate 1334 milliGRAM(s) Oral three times a day with meals  calcium carbonate 1250 mG  + Vitamin D (OsCal 500 + D) 1 Tablet(s) Oral daily  clonazePAM  Tablet 1.5 milliGRAM(s) Oral <User Schedule>  clonazePAM  Tablet 2 milliGRAM(s) Oral at bedtime  clonazePAM  Tablet 1 milliGRAM(s) Oral <User Schedule>  enalapril 20 milliGRAM(s) Oral with breakfast  folic acid 1 milliGRAM(s) Oral daily  lactobacillus acidophilus 1 Tablet(s) Oral with dinner  lamoTRIgine 200 milliGRAM(s) Oral with breakfast  mirabegron ER 25 milliGRAM(s) Oral daily  multivitamin 1 Tablet(s) Oral daily  oxybutynin 10 milliGRAM(s) Oral two times a day  pantoprazole    Tablet 40 milliGRAM(s) Oral before breakfast  predniSONE   Tablet 40 milliGRAM(s) Oral daily  pregabalin 100 milliGRAM(s) Oral at bedtime  pregabalin 50 milliGRAM(s) Oral <User Schedule>  QUEtiapine 300 milliGRAM(s) Oral <User Schedule>  sertraline 150 milliGRAM(s) Oral with breakfast  trimethoprim   80 mG/sulfamethoxazole 400 mG 1 Tablet(s) Oral daily  vortioxetine 10 milliGRAM(s) Oral daily    Assessment: 68 year old day 3 week 1 Rituximab for maximum Cortez stage III, 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    PMHx: plaque psoriasis, bipolar/anxiety, bladder spasms.     Plan:  Heme:  Hgb goal > 6.8g/dL;  rituximab weekly x4   decrease prednisone 40mg daily x 2 days. then 20mg x 3 days then stop.   May D/C allopurinol  Please continue folate  CLL FISH: pending  IGVH pending  Please send type and cross this AM.     ID: bactrim for PCP prophylaxis -- given brief exposure to steroids may stop this medication  IgG (2/26/24): 681    DVT prophylaxis: once Hgb is stable    Over 30 minutes were spent in discharge management.    Please schedule for this Saturday a finger stick at [facility] and transfusion for Hgb < 6.8g/dL  Please schedule for next Rituximab and appointment with me on Tuesday 3/5/24.  Please schedule for next Rituximab as an outpatient 2/5/24 and with me for follow-up with CBC and type and cross. .    Primary: [place]    Vital Signs Last 24 Hrs  T(C): 36.7 (29 Feb 2024 06:11), Max: 37.1 (28 Feb 2024 13:50)  T(F): 98 (29 Feb 2024 06:11), Max: 98.8 (28 Feb 2024 13:50)  HR: 71 (29 Feb 2024 06:11) (71 - 90)  BP: 138/77 (29 Feb 2024 06:11) (113/67 - 156/70)  BP(mean): --  RR: 18 (29 Feb 2024 06:11) (18 - 18)  SpO2: 98% (29 Feb 2024 06:11) (91% - 98%)    Parameters below as of 29 Feb 2024 06:11  Patient On (Oxygen Delivery Method): nasal cannula    MEDICATIONS  (STANDING):  allopurinol 300 milliGRAM(s) Oral daily  apremilast 30 milliGRAM(s) Oral two times a day  calcium acetate 1334 milliGRAM(s) Oral three times a day with meals  calcium carbonate 1250 mG  + Vitamin D (OsCal 500 + D) 1 Tablet(s) Oral daily  clonazePAM  Tablet 1.5 milliGRAM(s) Oral <User Schedule>  clonazePAM  Tablet 2 milliGRAM(s) Oral at bedtime  clonazePAM  Tablet 1 milliGRAM(s) Oral <User Schedule>  enalapril 20 milliGRAM(s) Oral with breakfast  folic acid 1 milliGRAM(s) Oral daily  lactobacillus acidophilus 1 Tablet(s) Oral with dinner  lamoTRIgine 200 milliGRAM(s) Oral with breakfast  mirabegron ER 25 milliGRAM(s) Oral daily  multivitamin 1 Tablet(s) Oral daily  oxybutynin 10 milliGRAM(s) Oral two times a day  pantoprazole    Tablet 40 milliGRAM(s) Oral before breakfast  predniSONE   Tablet 40 milliGRAM(s) Oral daily  pregabalin 100 milliGRAM(s) Oral at bedtime  pregabalin 50 milliGRAM(s) Oral <User Schedule>  QUEtiapine 300 milliGRAM(s) Oral <User Schedule>  sertraline 150 milliGRAM(s) Oral with breakfast  trimethoprim   80 mG/sulfamethoxazole 400 mG 1 Tablet(s) Oral daily  vortioxetine 10 milliGRAM(s) Oral daily    Assessment: 68 year old day 3 week 1 Rituximab for maximum Cortez stage III, 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    PMHx: plaque psoriasis, bipolar/anxiety, bladder spasms.     Plan:  Heme:  Hgb goal > 6.8g/dL;  rituximab weekly x4   decrease prednisone 40mg daily x 2 days. then 20mg x 3 days then stop.   May D/C allopurinol  Please continue folate  CLL FISH: pending  IGVH pending  Please send type and cross this AM.     ID: bactrim for PCP prophylaxis -- given brief exposure to steroids may stop this medication  IgG (2/26/24): 681    DVT prophylaxis: once Hgb is stable    Over 30 minutes were spent in discharge management.    Please schedule for this Saturday a finger stick at [facility] and transfusion for Hgb < 6.8g/dL  Please schedule for next Rituximab and appointment with Dr. Nguyen on Tuesday 3/5/24 (+/-) a day to accommodate Dr. Nguyen's schedule

## 2024-02-29 NOTE — PROVIDER CONTACT NOTE (CRITICAL VALUE NOTIFICATION) - NAME OF MD/NP/PA/DO NOTIFIED:
MD Franklyn Hardin
Tammie Gomez
KALYN Nina / Marifer Blandon NP
MD Neena Renteria
Maria Ines Swenson MD
Mikaela TABARES
MD Neena Renteria
MD Neena Renteria
Kerrie Goetz NP

## 2024-02-29 NOTE — PROVIDER CONTACT NOTE (CRITICAL VALUE NOTIFICATION) - TEST AND RESULT REPORTED:
WBC = 78.39  H/H = 6.2/19.4
WBC, hemoglobin, hematocrit
WBC: 92.58  Hgb: 6.7  Hct: 20.9
Hgb 6.7, 
WBC 69.72 & Hgb 6.8
WBC = 60.80
.07, Hg 5.1, Hct 17.6
WBC: 190.69, Hgb 5.6, HCt 19.9
.21, Hgb 6.2, HCt 20.6

## 2024-02-29 NOTE — PROVIDER CONTACT NOTE (CRITICAL VALUE NOTIFICATION) - ASSESSMENT
Pt denies any pain, distress, lightheadedness, or dizziness at this time.
VSS, NAD
WBC: 190.69, Hgb 5.6, HCt 19.9. Pt remains AOx4, at baseline. Pt had incidence of chest discomfort this am, see other note.
A&O x 4, vital signs stable
Pt is AOx4, at baseline. Pt appears more alert and talkative as compared to start of shift. Pt states she "feels much better" after receiving the 1uPRBC, and states she no longer feels "winded" when she ambulates. Pt denies fatigue, lightheadedness, chest palpitations, and other discomforts.
Pt is AOx4, at baseline. Pt denies discomfort, fatigue, lightheadedness, and chest palpitations.
Pt A&Ox4, vital signs stable, afebrile. No s/s of bleeding noted at this time. Pt does not appear to be in acute distress at this time.
Critical labs values: .21, hemoglobin 5.7, hematocrit 19.1. MD Franklyn Hardin made aware.
NAD

## 2024-02-29 NOTE — PROGRESS NOTE ADULT - PROBLEM SELECTOR PLAN 1
Leukocytosis.. s/p treatment with hydrea 500mg BID  f/u peripheral flow results. FISH negative for PML-MASSIEL and BCR-ABL translocations.   Monitor CBC with diff and daily CMP, Ph, LDH, uric acid, haptoglobin  Allopurinol 300mg qdaily for TLS ppx.   F/u G6PD level  Hemoglobin goal >6  02/27 Day 1 Rituximab + prednisone   02/28 IgE antibody positive give tyenol and benadryl for any transfusion. Blood bank has 2 units left available for patient. f/u PM CBC.  2/29 discharge home with a follow up at [facility]

## 2024-02-29 NOTE — PROGRESS NOTE ADULT - PROBLEM SELECTOR PLAN 8
- Pt stubbed toe into chair ~1month ago has had toe pain on Left second digit since then  - L foot x-ray   - tyenol PRN

## 2024-02-29 NOTE — DISCHARGE NOTE NURSING/CASE MANAGEMENT/SOCIAL WORK - PATIENT PORTAL LINK FT
You can access the FollowMyHealth Patient Portal offered by [facility] by registering at the following website: [email or web address]. By joining [facility]’s FollowMyHealth portal, you will also be able to view your health information using other applications (apps) compatible with our system.

## 2024-02-29 NOTE — PROGRESS NOTE ADULT - SUBJECTIVE AND OBJECTIVE BOX
Diagnosis: CLL with leukocytosis     Protocol/Chemo Regimen: Rituximab + prednisone 40mg   Day: 2     Pt endorsed:  shortness of breath, chest pressure, headache 5/10   Review of Systems:  Patient denied nausea, vomiting, cough, dyspnea, abdominal pain, constipation, diarrhea, rash, fatigue    Allergies    No Known Allergies    Intolerances    Cymbalta (Diarrhea)      ANTIMICROBIALS      HEME/ONC MEDICATIONS      STANDING MEDICATIONS  apremilast 30 milliGRAM(s) Oral two times a day  calcium carbonate 1250 mG  + Vitamin D (OsCal 500 + D) 1 Tablet(s) Oral daily  clonazePAM  Tablet 1.5 milliGRAM(s) Oral <User Schedule>  clonazePAM  Tablet 2 milliGRAM(s) Oral at bedtime  clonazePAM  Tablet 1 milliGRAM(s) Oral <User Schedule>  enalapril 20 milliGRAM(s) Oral with breakfast  folic acid 1 milliGRAM(s) Oral daily  lactobacillus acidophilus 1 Tablet(s) Oral with dinner  lamoTRIgine 200 milliGRAM(s) Oral with breakfast  mirabegron ER 25 milliGRAM(s) Oral daily  multivitamin 1 Tablet(s) Oral daily  oxybutynin 10 milliGRAM(s) Oral two times a day  pantoprazole    Tablet 40 milliGRAM(s) Oral before breakfast  predniSONE   Tablet 40 milliGRAM(s) Oral daily  pregabalin 100 milliGRAM(s) Oral at bedtime  pregabalin 50 milliGRAM(s) Oral <User Schedule>  QUEtiapine 300 milliGRAM(s) Oral <User Schedule>  sertraline 150 milliGRAM(s) Oral with breakfast  vortioxetine 10 milliGRAM(s) Oral daily      PRN MEDICATIONS  acetaminophen     Tablet .. 650 milliGRAM(s) Oral every 6 hours PRN  clonazePAM  Tablet 0.5 milliGRAM(s) Oral every 24 hours PRN  diphenhydrAMINE Injectable 25 milliGRAM(s) IV Push every 12 hours PRN  HYDROmorphone  Injectable 0.5 milliGRAM(s) IV Push once PRN        Vital Signs Last 24 Hrs  T(C): 36.8 (29 Feb 2024 08:50), Max: 37.1 (28 Feb 2024 13:50)  T(F): 98.2 (29 Feb 2024 08:50), Max: 98.8 (28 Feb 2024 13:50)  HR: 75 (29 Feb 2024 08:50) (71 - 90)  BP: 148/73 (29 Feb 2024 08:50) (113/67 - 156/70)  BP(mean): --  RR: 18 (29 Feb 2024 08:50) (18 - 18)  SpO2: 96% (29 Feb 2024 08:50) (94% - 98%)    Parameters below as of 29 Feb 2024 08:50  Patient On (Oxygen Delivery Method): nasal cannula  O2 Flow (L/min): 2        PHYSICAL EXAM:  GENERAL: No apparent distress on 2L NC prn   EYES: EOMI, PERRLA, no scleral icterus  CHEST/LUNG: Clear to auscultation bilateral and symmetric; No wheezes, rales, or rhonchi  HEART: S1 and S2 normal. Regular rate and rhythm; No murmurs,  ABDOMEN: Soft, non-tender, non-distended; normal bowel sounds  EXTREMITIES:  2+ peripheral pulses b/l, No clubbing, cyanosis, or edema  NEUROLOGY: A&O x 3, no focal deficits  SKIN: No rashes or lesions    LABS:    Blood Cultures:                           7.9    60.80 )-----------( 222      ( 29 Feb 2024 09:39 )             25.1         Mean Cell Volume : 97.7 fl  Mean Cell Hemoglobin : 30.7 pg  Mean Cell Hemoglobin Concentration : 31.5 gm/dL  Auto Neutrophil # : 14.04 K/uL  Auto Lymphocyte # : 45.60 K/uL  Auto Monocyte # : 0.00 K/uL  Auto Eosinophil # : 1.16 K/uL  Auto Basophil # : 0.00 K/uL  Auto Neutrophil % : 23.1 %  Auto Lymphocyte % : 75.0 %  Auto Monocyte % : 0.0 %  Auto Eosinophil % : 1.9 %  Auto Basophil % : 0.0 %      02-29    143  |  103  |  15  ----------------------------<  98  4.2   |  24  |  0.45<L>    Ca    9.9      29 Feb 2024 09:39  Phos  4.0     02-29  Mg     2.0     02-29    TPro  6.9  /  Alb  4.3  /  TBili  2.5<H>  /  DBili  x   /  AST  43<H>  /  ALT  27  /  AlkPhos  80  02-29      Mg 2.0  Phos 4.0      PT/INR - ( 28 Feb 2024 07:15 )   PT: 10.7 sec;   INR: 0.97 ratio               Uric Acid 3.9        RADIOLOGY & ADDITIONAL STUDIES:    RADIOLOGY & ADDITIONAL TESTS:   VA Duplex Lower Ext Vein Scan, Bilat (02.26.24 @ 19:53)   IMPRESSION:  No evidence of deep venous thrombosis in either lower extremity.    Enlarged bilateral groin lymph nodes as described.     CT Chest w/ IV Cont (02.24.24 @ 23:49) >  IMPRESSION:  Mild increase in axillary lymphadenopathy and pelvic lymphadenopathy,   latter overall largely unchanged.  Stable mediastinal and visualized lower cervical and supraclavicular   lymph nodes    Flow Cytometry (02.25.24 @ 10:57)    TM Interpretation:   Flow Cytometry Final Report  ________________________________________________________________________  Specimen: Peripheral Blood  Date Collected: 02/25/2024  Date Received: 02/26/2024  Date Processed: 02/26/2024  Date Reported: 02/27/2024 12:40  Accession #: [id number]  _________________________________________________________  CLINICAL DATA: Rule out leukemia, lymphoma  __________________________________________________________  CD45/Side Scatter Differential  Granulocytes: 6 % ;    Lymphocytes: 92 % ;    Monocytes: 1 % ;    Dim CD45 and/or blast gate: 0 % ;     Erythroid/debris: 0 %  __________________________________________________________  DIAGNOSIS:  Peripheral Blood:  -  Monotypic B-cells (84% of cells, 97.41% of  lymphocytes), positive for kappa, CD19, dim  CD20, CD23, CD5, ; negative FMC-7, CD79b, CD10, CD11c; consistent with small lymphocytic  lymphoma/chronic lymphocytic leukemia (CD38 positive in 15.36% of cells).  - The myeloid immunophenotypic findings show no increase in myeloid immaturity, and normal  myeloid antigen maturation pattern.

## 2024-02-29 NOTE — PROGRESS NOTE ADULT - ASSESSMENT
68F with PMH of CLL (not on treatment), anemia, HTN, mood disorder, psoriasis presenting after outpatient blood work revealed worsening anemia and increased leukocytosis with AIHA warm complication from CLL.. 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

## 2024-02-29 NOTE — PROGRESS NOTE ADULT - REASON FOR ADMISSION
Anemia and leukocytosis

## 2024-02-29 NOTE — PROGRESS NOTE ADULT - PROBLEM SELECTOR PLAN 5
otezla 30mg BID from home started  psoriasis complicated by psoriatic arthritis  continue home lyrica 50mg in AM and 100mg in PM

## 2024-02-29 NOTE — PROVIDER CONTACT NOTE (CRITICAL VALUE NOTIFICATION) - SITUATION
WBC: 92.58  Hgb: 6.7  Hct: 20.9
Hgb 6.7, 
WBC = 60.80
WBC: 190.69, Hgb 5.6, HCt 19.9
.21, Hgb 6.2, HCt 20.6
WBC = 78.39  H/H = 6.2/19.4
.07, Hg 5.1, Hct 17.6
Critical labs values
Critical value of WBC 69.72 & Hgb 6.8

## 2024-02-29 NOTE — PROGRESS NOTE ADULT - PROBLEM SELECTOR PLAN 7
therapeutic equivalent of home vesicare 10mg with oxybutynin 10mg BID   continue home myrbetriq 25mg daily

## 2024-02-29 NOTE — PROGRESS NOTE ADULT - PROBLEM SELECTOR PLAN 3
continue home Klonopin with 1mg in AM, 1.5mg in afternoon and 2mg at bedtime  continue home Lamictal 200mg daily  continue home zoloft 150mg daily  continue home Seroquel 300mg daily  continue home Trintellix 10mg daily  home Vyvanse 50mg daily not on formulary; patient indicates is ok off this currently and will let team know if she needs.
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
## 2024-02-29 NOTE — DISCHARGE NOTE NURSING/CASE MANAGEMENT/SOCIAL WORK - NSDCPEFALRISK_GEN_ALL_CORE
For information on Fall & Injury Prevention, visit: [email or web address] OR  [email or web address] OR  https://www.cdc.gov/steadi/patient.html

## 2024-02-29 NOTE — PROGRESS NOTE ADULT - PROBLEM SELECTOR PROBLEM 1
CLL (chronic lymphocytic leukemia)
No
CLL (chronic lymphocytic leukemia)

## 2024-02-29 NOTE — PROVIDER CONTACT NOTE (CRITICAL VALUE NOTIFICATION) - ACTION/TREATMENT ORDERED:
Md aware, will consult heme-onc
Hgb goal is above 6, follow up with AM lab
MD Franklyn Hardin made aware.
MD aware, will consult w/heme-onc team.
No new orders at this time, Will continue to monitor.
1 unit PRBC transfused over 3 hours
1u PRBC ordered. Given.
MD aware, will consult heme-onc
Provider notified.  Will order blood transfusion.  Plan of care ongoing.

## 2024-02-29 NOTE — ED PROVIDER NOTE - CLINICAL SUMMARY MEDICAL DECISION MAKING FREE TEXT BOX
Narciso, PGY2 - known rib fractures with pleural effusion, cxr, ct chest, consider trauma consult depending on findings. *The above represents an initial assessment/impression. Please refer to progress notes for potential changes in patient clinical course*
Surgery

## 2024-03-05 PROBLEM — C91.10 CHRONIC LYMPHOCYTIC LEUKEMIA: Status: ACTIVE | Noted: 2022-01-19

## 2024-03-05 NOTE — PHYSICAL EXAM
[de-identified] : s/p hernia repair; excess pannus; no open wounds [de-identified] : Palpable bilateral axillary lymphadenopathy, chronic [de-identified] : s/p L TKR; scar well healed [de-identified] : old scars abdomen

## 2024-03-05 NOTE — HISTORY OF PRESENT ILLNESS
[FreeTextEntry1] : expectant surveillance\par  \par   [de-identified] : 68F, PMHx of psoriasis, psoriatic arthritis, on Otezla, GERD, hypertension, obstructive sleep apnea, bipolar disorder, hiatal hernia and morbid obesity, s/p revision Solis-en-Y gastric bypass and transoral gastric plication (for weight 421 lbs), returning for hematologic follow-up of CLL and anemia.  CASE SYNOPSIS: 10/22/2021: CT A/P-postoperative changes, s/p gastric bypass with reflux into the aferrent limb.  No evidence of bowel obstruction, no evidence of recurrent ventral hernia, no intra-abdominal lymphadenopathy or splenomegaly.  10/23/2021: CT neck soft tissue-mildly enlarged diffuse prominent size cervical and axillary lymph nodes of uncertain etiology.  Lymphoproliferative etiology should be considered  1/6/2022: Bone marrow biopsy/aspirate-CLL/small lymphocytic lymphoma (60-70% involvement).  Iron stores present, no ringed sideroblasts seen.  Flow cytometry: Monotypic B cells (91.5% of lymphocytes, 40.4% of cells), positive for dim kappa, CD19, dim CD20, CD23, CD5, CD38 positive and 18.7% of cells consistent with SLL/CLL.  The myeloid immunophenotypic findings show normal myeloid granularity, no increase in myeloid immaturity and normal myeloid antigen maturation pattern.  Cytogenetics-no mutations identified.  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 deletion and 12%, FAHEEM deletion in 15.5%.  8/2/2022: PET-minimal to no significant FDG uptake numerous subcentimeter and enlarged multistation cervical lymph nodes, left supraclavicular lymph node, bilateral axillary lymph nodes and bilateral iliac and inguinal lymph nodes.  Cervical and left cervical lymphadenopathy without significant change in size when compared with CT neck 7/13/2022, but increased in size when compared to CT neck 10/11/22.  [facility]10[facility]  3/13/2023-DEXA scan-reportedly abnormal  3/20/2023 open MRI cervical spine: multilevel cervical discogenic spondylosis.  Bilateral cervical adenopathy likely related to CLL.  Correlation with patient's clinical symptoms suggested.  6/29/23 PET: Multiple mildly enlarged and  small lymph nodes in the neck, thorax, abdomen and pelvis demonstrating mild, or no FDG avidity, or increased size, number and/or metabolism, compatible with known CLL. Nonspecific mildly avid fluid in the subcutaneous tissue of the lower back, increased from prior study.  Please correlate clinically.  7/4/23- hairline fracture left rib at home; treated supportively.  9/6/2023: WBC 88.13K, 91% lymphocytes  10/26/2023 CT C/A/P: Lymphadenopathy in chest, abdomen and pelvis is in keeping with the known CLL.  There is slight interval increase (from 2.7 x 1.9 cm to 3.8x2.2 cm) in the right axillary adenopathy without significant interval change in the remainder of the lymphadenopathy.  Mild subpleural nodularity in the left lower lobe may represent focal atelectasis; attention should be paid to this on subsequent follow-up imaging.  11/4/23: WBC 84.91K,  81% lymphocytes  2/6/2024 WBC 97K  2/25 - 2/29/2024-admitted at [facility] with hemolytic anemia and leukocytosis ( WBC > 200K); no evidence of Brody transformation.  Received transfusion, prednisone/GI PPx and started Ruxience 375 mg/m weekly. [de-identified] : Today's visit during systemic treatment/in the treatment room.  Short interval follow-up; after her last visit in early February 2024, patient was hospitalized with hemolytic anemia (hemoglobin 7 g/dL) and WBC >200K. Fortunately, patient responded well to steroids for treatment with warm antibody hemolytic anemia and rituximab for lymphocytosis.  Received last dose of rituximab last week and is here to resume weekly rituximab/Ruxience at 375 mg/m.  Given premedication with Benadryl/Tylenol due to minimal allergic reaction to the first infusion of Ruxience while in the hospital.  Has multiple complaints, unchanged from the usual presentation.  Accompanied by her .

## 2024-03-05 NOTE — ASSESSMENT
[FreeTextEntry1] : Ms. CRUZ 's questions were answered to her satisfaction.  She  expressed her  understanding and willingness to comply with the above recommendations, and  will return to the office in 3 weeks.

## 2024-03-05 NOTE — REVIEW OF SYSTEMS
[Fever] : no fever [Chills] : no chills [Night Sweats] : no night sweats [Joint Pain] : no joint pain [Easy Bruising] : no tendency for easy bruising [Easy Bleeding] : no tendency for easy bleeding [FreeTextEntry7] : s/p abdominal surgery; present incisional hernia [FreeTextEntry2] : ambulates with cane; lost weight postoperative [FreeTextEntry9] : Left chest wall pain

## 2024-03-12 ENCOUNTER — APPOINTMENT (OUTPATIENT)
Dept: HEMATOLOGY ONCOLOGY | Facility: CLINIC | Age: 69
End: 2024-03-12

## 2024-03-12 ENCOUNTER — APPOINTMENT (OUTPATIENT)
Dept: INFUSION THERAPY | Facility: HOSPITAL | Age: 69
End: 2024-03-12

## 2024-03-13 ENCOUNTER — APPOINTMENT (OUTPATIENT)
Dept: INTERNAL MEDICINE | Facility: CLINIC | Age: 69
End: 2024-03-13

## 2024-03-19 ENCOUNTER — APPOINTMENT (OUTPATIENT)
Dept: HEMATOLOGY ONCOLOGY | Facility: CLINIC | Age: 69
End: 2024-03-19

## 2024-03-19 ENCOUNTER — APPOINTMENT (OUTPATIENT)
Dept: INFUSION THERAPY | Facility: HOSPITAL | Age: 69
End: 2024-03-19

## 2024-04-19 ENCOUNTER — APPOINTMENT (OUTPATIENT)
Dept: INTERNAL MEDICINE | Facility: CLINIC | Age: 69
End: 2024-04-19

## 2024-04-24 NOTE — REASON FOR VISIT
[Follow-Up Visit] : a follow-up visit for [Leukocytosis] : leukocytosis [CLL] : chronic lymphocytic leukemia [Family Member] : family member Render Note In Bullet Format When Appropriate: No Spray Paint Text: The liquid nitrogen was applied to the skin utilizing a spray paint frosting technique. Consent: The patient's consent was obtained including but not limited to risks of crusting, scabbing, blistering, scarring, darker or lighter pigmentary change, recurrence, incomplete removal and infection. Show Topical Anesthesia Variable?: Yes Medical Necessity Clause: This procedure was medically necessary because the lesions that were treated were: Post-Care Instructions: I reviewed with the patient in detail post-care instructions. Patient is to wear sunprotection, and avoid picking at any of the treated lesions. Pt may apply Vaseline to crusted or scabbing areas. Medical Necessity Information: It is in your best interest to select a reason for this procedure from the list below. All of these items fulfill various CMS LCD requirements except the new and changing color options. Detail Level: Detailed

## 2024-05-16 ENCOUNTER — APPOINTMENT (OUTPATIENT)
Dept: HEMATOLOGY ONCOLOGY | Facility: CLINIC | Age: 69
End: 2024-05-16

## 2024-08-08 ENCOUNTER — APPOINTMENT (OUTPATIENT)
Dept: SURGERY | Facility: CLINIC | Age: 69
End: 2024-08-08

## 2024-08-12 NOTE — ED ADULT NURSE NOTE - IS THE PATIENT ABLE TO BE SCREENED?
Called pt to inform her that Dr. Pires is out of the office but will review her test results as soon as she can and reach out. Adriano ESTRELLA.   Yes

## 2024-09-03 NOTE — H&P ADULT - PROBLEM SELECTOR PLAN 1
Xray Chest 1 View AP/PA - hx of CLL following with Dr. Nguyen, never on treatment previously  - noted to have drastic rise in WBC from 80-90K to 187K and 197K in ED  - hematology following, appreciate recs  - concern for blast-like cells, hematology to send peripheral flow to r/o transformation to acute leukemia  - Monitor CBC with diff Q12 and obtain daily CMP, Ph, LDH, uric acid, haptoglobin  - CT C/A/P with mildly increased adenopathy - hx of CLL following with Dr. Nguyen, never on treatment previously  - noted to have drastic rise in WBC from 80-90K to 187K and 197K in ED  - hematology following, appreciate recs  - concern for blast-like cells, hematology to send peripheral flow to r/o transformation to acute leukemia  - Monitor CBC with diff Q12 and obtain daily CMP, Ph, LDH, uric acid, haptoglobin  - CT C/A/P with mildly increased adenopathy  - will start maintenance fluids LR @100cc/hr for 10hr, hold if giving blood transfusion

## 2024-12-23 NOTE — PATIENT PROFILE ADULT - TRANSPORTATION
Intubation    Date/Time: 12/23/2024 12:24 PM    Performed by: Jeff Simms CRNA  Authorized by: Adam Umanzor MD    Intubation:     Induction:  Intravenous    Intubated:  Postinduction    Mask Ventilation:  Easy mask    Attempts:  1    Attempted By:  CRNA    Method of Intubation:  Video laryngoscopy    Blade:  Yadav 3    Laryngeal View Grade: Grade I - full view of cords      Difficult Airway Encountered?: No      Complications:  None    Airway Device:  Oral endotracheal tube    Airway Device Size:  8.0    Style/Cuff Inflation:  Cuffed    Inflation Amount (mL):  5    Tube secured:  23    Secured at:  The lips    Placement Verified By:  Capnometry    Complicating Factors:  None    Findings Post-Intubation:  BS equal bilateral       no

## 2025-01-06 NOTE — ED ADULT NURSE NOTE - NSFALLRSKINDICATORS_ED_ALL_ED
Cpx 11/11/25   Please refill. Current weight 182 lbs. /76   Per pdmp last filled 11/18/24 for 30 days    
[facility] database reviewed prior to refilling medication. Refill sent, phentermine, #30.  Arleth Chery MD    
no

## 2025-04-08 NOTE — ED ADULT NURSE NOTE - DATE OF LAST VACCINATION
Assessment per [facility]NA guidelines  Non labored breathing, skin dry warm and appropriate for race.Abdomen soft     
13-Apr-2021

## 2025-05-01 NOTE — ED ADULT NURSE NOTE - ISOLATION INDICATION CONTACT
[de-identified] : Ms. CUBA  is s/p excision left breast mass on 11/15/2024.  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  Contiguous breast tissue with dense perilobular and stromal  fibrosclerosis. Patient reports no interval changes to her overall health status or medical history. Ms. CUBA denies any trauma to the breast, denies  skin changes  and   she has no spontaneous nipple discharge. Patient denies any fever, night sweats or loss of appetite.     MRSA

## 2025-06-30 NOTE — PATIENT PROFILE ADULT - CHOOSE INDICATION TO IMMUNIZE (AN ORDER WILL BE GENERATED WHEN THIS NOTE IS SAVED):
[Patient Intake Form Reviewed] : Patient intake form was reviewed Patient is not pregnant (male or female)